# Patient Record
Sex: FEMALE | Race: WHITE | Employment: OTHER | URBAN - METROPOLITAN AREA
[De-identification: names, ages, dates, MRNs, and addresses within clinical notes are randomized per-mention and may not be internally consistent; named-entity substitution may affect disease eponyms.]

---

## 2017-01-03 ENCOUNTER — HOSPITAL ENCOUNTER (OUTPATIENT)
Dept: LAB | Age: 77
Discharge: HOME | End: 2017-01-03
Payer: MEDICARE

## 2017-01-03 PROCEDURE — 80053 COMPREHEN METABOLIC PANEL: CPT

## 2017-01-03 PROCEDURE — 80061 LIPID PANEL: CPT

## 2017-01-03 PROCEDURE — 82306 VITAMIN D 25 HYDROXY: CPT

## 2017-01-06 ENCOUNTER — OFFICE VISIT (OUTPATIENT)
Dept: INTERNAL MEDICINE CLINIC | Age: 77
End: 2017-01-06

## 2017-01-06 VITALS
WEIGHT: 219.6 LBS | SYSTOLIC BLOOD PRESSURE: 135 MMHG | DIASTOLIC BLOOD PRESSURE: 59 MMHG | HEIGHT: 66 IN | OXYGEN SATURATION: 97 % | HEART RATE: 79 BPM | TEMPERATURE: 98 F | BODY MASS INDEX: 35.29 KG/M2

## 2017-01-06 DIAGNOSIS — E08.43 DIABETIC AUTONOMIC NEUROPATHY ASSOCIATED WITH DIABETES MELLITUS DUE TO UNDERLYING CONDITION (HCC): ICD-10-CM

## 2017-01-06 DIAGNOSIS — E78.5 HYPERLIPIDEMIA, UNSPECIFIED HYPERLIPIDEMIA TYPE: ICD-10-CM

## 2017-01-06 DIAGNOSIS — I25.10 CORONARY ARTERY DISEASE INVOLVING NATIVE CORONARY ARTERY OF NATIVE HEART WITHOUT ANGINA PECTORIS: ICD-10-CM

## 2017-01-06 DIAGNOSIS — E08.3599: ICD-10-CM

## 2017-01-06 DIAGNOSIS — N18.30 CKD (CHRONIC KIDNEY DISEASE) STAGE 3, GFR 30-59 ML/MIN (HCC): ICD-10-CM

## 2017-01-06 DIAGNOSIS — Z79.4 TYPE 2 DIABETES MELLITUS WITH COMPLICATION, WITH LONG-TERM CURRENT USE OF INSULIN (HCC): Primary | ICD-10-CM

## 2017-01-06 DIAGNOSIS — M70.61 TROCHANTERIC BURSITIS, RIGHT HIP: ICD-10-CM

## 2017-01-06 DIAGNOSIS — E11.8 TYPE 2 DIABETES MELLITUS WITH COMPLICATION, WITH LONG-TERM CURRENT USE OF INSULIN (HCC): Primary | ICD-10-CM

## 2017-01-06 DIAGNOSIS — I10 ESSENTIAL HYPERTENSION: ICD-10-CM

## 2017-01-06 DIAGNOSIS — E55.9 VITAMIN D DEFICIENCY: ICD-10-CM

## 2017-01-06 RX ORDER — GABAPENTIN 300 MG/1
300 CAPSULE ORAL 3 TIMES DAILY
Qty: 90 CAP | Refills: 5 | Status: SHIPPED | OUTPATIENT
Start: 2017-01-06 | End: 2017-03-20 | Stop reason: SDUPTHER

## 2017-01-06 RX ORDER — ROSUVASTATIN CALCIUM 20 MG/1
20 TABLET, COATED ORAL
Qty: 90 TAB | Refills: 3 | Status: SHIPPED | OUTPATIENT
Start: 2017-01-06 | End: 2017-12-23 | Stop reason: SDUPTHER

## 2017-01-06 RX ORDER — NAPROXEN SODIUM 220 MG
220 TABLET ORAL 2 TIMES DAILY WITH MEALS
Qty: 30 TAB | Refills: 1 | Status: SHIPPED | OUTPATIENT
Start: 2017-01-06 | End: 2018-02-14

## 2017-01-06 NOTE — MR AVS SNAPSHOT
Visit Information Date & Time Provider Department Dept. Phone Encounter #  
 1/6/2017 11:30 AM Hieu Mcclain, [address] [place] [address] and Internal Medicine [phone] [id number] Follow-up Instructions Return in about 3 months (around 4/6/2017), or if symptoms worsen or fail to improve, for diabetes, neuropathy. Upcoming Health Maintenance Date Due DTaP/Tdap/Td series (1 - Tdap) 3/2/1960 ZOSTER VACCINE AGE 60> 3/2/1999 EYE EXAM RETINAL OR DILATED Q1 1/14/2017 MEDICARE YEARLY EXAM 3/12/2017 MICROALBUMIN Q1 3/21/2017 HEMOGLOBIN A1C Q6M 6/20/2017 FOOT EXAM Q1 12/20/2017 LIPID PANEL Q1 1/3/2018 GLAUCOMA SCREENING Q2Y 1/14/2018 Allergies as of 1/6/2017  Review Complete On: 1/6/2017 By: Hieu Mcclain MD  
  
 Severity Noted Reaction Type Reactions Ibuprofen  03/05/2010    Unknown (comments) Norvasc [Amlodipine]  04/14/2015    Other (comments) LE swelling Current Immunizations  Reviewed on 3/11/2016 Name Date Influenza Vaccine (Quad) PF 12/16/2014 Pneumococcal Conjugate (PCV-13) 3/11/2016 Pneumococcal Polysaccharide (PPSV-23) 12/16/2014 Not reviewed this visit You Were Diagnosed With   
  
 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 
ICD-9-CM: 401.9 CKD (chronic kidney disease) stage 3, GFR 30-59 ml/min     ICD-10-CM: N18.3 ICD-9-CM: 510. 3 Hyperlipidemia, unspecified hyperlipidemia type     ICD-10-CM: E78.5 ICD-9-CM: 272.4 Vitamin D deficiency     ICD-10-CM: E55.9 ICD-9-CM: 268.9 Trochanteric bursitis, right hip     ICD-10-CM: M70.61 ICD-9-CM: 726.5 Diabetic autonomic neuropathy associated with diabetes mellitus due to underlying condition ([facility]     ICD-10-CM: I85.56 
ICD-9-CM: 249.60, 337.1 Vitals BP Pulse Temp Height(growth percentile) Weight(growth percentile) SpO2  
 135/59 (BP 1 Location: Left arm, BP Patient Position: Sitting) 79 98 °F (36.7 °C) (Oral) 5' 6\" (1.676 m) 219 lb 9.6 oz (99.6 kg) 97% BMI OB Status Smoking Status 35.44 kg/m2 Postmenopausal Never Smoker BMI and BSA Data Body Mass Index Body Surface Area  
 35.44 kg/m 2 2.15 m 2 Preferred Pharmacy Pharmacy Name Phone [facility]/PHARMACY #[id number] [address] [phone] Your Updated Medication List  
  
   
This list is accurate as of: 1/6/17 12:57 PM.  Always use your most recent med list.  
  
  
  
  
 aspirin delayed-release 81 mg tablet Take 81 mg by mouth daily. atenolol 50 mg tablet Commonly known as:  TENORMIN Take 50 mg by mouth daily. BD INSULIN PEN NEEDLE UF SHORT 31 gauge x 5/16\" Ndle Generic drug:  Insulin Needles (Disposable) USE TO TEST BLOOD SUGAR 3 TIMES A DAY AS DIRECTED  
  
 esomeprazole 20 mg capsule Commonly known as:  NexIUM Take 1 Cap by mouth daily. gabapentin 300 mg capsule Commonly known as:  NEURONTIN Take 1 Cap by mouth three (3) times daily. Start 1 tab qhs x 1 week, then 1 bid x 1 week then 1 tid  
  
 glucose blood VI test strips strip Commonly known as:  ONE TOUCH TEST  
1 Each by Does Not Apply route. Pt checks blood sugar 3 times a day. hydroCHLOROthiazide 25 mg tablet Commonly known as:  HYDRODIURIL Take 1 Tab by mouth daily. Insulin Lisp & Lisp Prot (Hum) 100 unit/mL (75-25) Inpn Commonly known as:  HumaLOG Mix 75-25 KwikPen 32 units with breakfast and 30 units with dinner  Indications: TYPE 2 DIABETES MELLITUS  
  
 lisinopril 20 mg tablet Commonly known as:  Colette Shames Take 1 Tab by mouth daily. metFORMIN  mg tablet Commonly known as:  GLUCOPHAGE XR Take 2 Tabs by mouth daily (with dinner). naproxen sodium 220 mg tablet Commonly known as:  Abbe Deed Take 1 Tab by mouth two (2) times daily (with meals). X 1 week as directed  
  
 polyethylene glycol 17 gram/dose powder Commonly known as:  MIRALAX  
USE 1 TABLESPOONFUL AS DIRECTED BY MOUTH EVERY DAY  
  
 rosuvastatin 20 mg tablet Commonly known as:  CRESTOR Take 1 Tab by mouth nightly. SITagliptin 100 mg tablet Commonly known as:  Carolina Homans Take 1 Tab by mouth daily. VITAMIN D2 50,000 unit capsule Generic drug:  ergocalciferol TAKE ONE CAPSULE BY MOUTH EVERY 7 DAYS Prescriptions Sent to Pharmacy Refills  
 rosuvastatin (CRESTOR) 20 mg tablet 3 Sig: Take 1 Tab by mouth nightly. Class: Normal  
 Pharmacy: [facility]/pharmacy #[id number] Ph #: [phone] Route: Oral  
 naproxen sodium (ALEVE) 220 mg tablet 1 Sig: Take 1 Tab by mouth two (2) times daily (with meals). X 1 week as directed Class: Normal  
 Pharmacy: [facility]/pharmacy #[facility] Ph #: [phone] Route: Oral  
 gabapentin (NEURONTIN) 300 mg capsule 5 Sig: Take 1 Cap by mouth three (3) times daily. Start 1 tab qhs x 1 week, then 1 bid x 1 week then 1 tid Class: Normal  
 Pharmacy: [facility]/pharmacy #[id number] [place] [address] Ph #: [phone] Route: Oral  
  
Follow-up Instructions Return in about 3 months (around 4/6/2017), or if symptoms worsen or fail to improve, for diabetes, neuropathy. Introducing [facility]! Larissa Jackman introduces ehealthtracker patient portal. Now you can access parts of your medical record, email your doctor's office, and request medication refills online. 1. In your internet browser, go to [email or web address] 2. Click on the First Time User? Click Here link in the Sign In box. You will see the New Member Sign Up page. 3. Enter your ehealthtracker Access Code exactly as it appears below. You will not need to use this code after youve completed the sign-up process. If you do not sign up before the expiration date, you must request a new code. · ehealthtracker Access Code: [email or web address] Expires: 3/20/2017  9:28 AM 
 
4. Enter the last four digits of your Social Security Number (xxxx) and Date of Birth (mm/dd/yyyy) as indicated and click Submit. You will be taken to the next sign-up page. 5. Create a ehealthtracker ID. This will be your ehealthtracker login ID and cannot be changed, so think of one that is secure and easy to remember. 6. Create a ehealthtracker password. You can change your password at any time. 7. Enter your Password Reset Question and Answer. This can be used at a later time if you forget your password. 8. Enter your e-mail address. You will receive e-mail notification when new information is available in [address] 9. Click Sign Up. You can now view and download portions of your medical record. 10. Click the Download Summary menu link to download a portable copy of your medical information. If you have questions, please visit the Frequently Asked Questions section of the ehealthtracker website. Remember, ehealthtracker is NOT to be used for urgent needs. For medical emergencies, dial 911. Now available from your iPhone and Android! Please provide this summary of care documentation to your next provider. Your primary care clinician is listed as [address] If you have any questions after today's visit, please call [phone].

## 2017-01-06 NOTE — PROGRESS NOTES
HISTORY OF PRESENT ILLNESS  Sofi Mayberry is a 67 y.o. female. HPI  Presents for f/u leg pain, DM, HTN  Daughter translates by pt preference    Right leg pain - radiates from hip to foot  No inciting event  Cannot sleep or lie on that time    +daily walking    Daughter reports she tolerates prn ibuprofen fine in recent months    Past medical, Social, and Family history reviewed  Medications reviewed and updated. ROS  Complete ROS reviewed and negative or stable except as noted in HPI. Physical Exam   Constitutional: She is oriented to person, place, and time. She appears well-nourished. No distress. HENT:   Head: Normocephalic and atraumatic. Mouth/Throat: Oropharynx is clear and moist.   Eyes: EOM are normal. Pupils are equal, round, and reactive to light. No scleral icterus. Neck: Normal range of motion. Neck supple. No JVD present. Cardiovascular: Normal rate, regular rhythm and normal heart sounds. Exam reveals no gallop and no friction rub. No murmur heard. Pulmonary/Chest: Effort normal and breath sounds normal. No respiratory distress. She has no wheezes. She has no rales. Abdominal: Soft. She exhibits no distension. There is no tenderness. Musculoskeletal: Normal range of motion. She exhibits edema (trace ) and tenderness (right trochanteric bursal area). Also, skin sensitivity to lower leg with minimal palpation   Lymphadenopathy:     She has no cervical adenopathy. Neurological: She is alert and oriented to person, place, and time. She exhibits normal muscle tone. Skin: Skin is warm. No rash noted. Psychiatric: She has a normal mood and affect. Nursing note and vitals reviewed. Prior labs reviewed. ASSESSMENT and PLAN    ICD-10-CM ICD-9-CM    1. Type 2 diabetes mellitus with complication, with long-term current use of insulin ([facility]) E11.8 250.90     Z79.4 V58.67    2.  Proliferative diabetic retinopathy without macular edema associated with diabetes mellitus due to underlying condition ([facility] V10.7000 249.50      362.02    3. Coronary artery disease involving native coronary artery of native heart without angina pectoris I25.10 414.01    4. Essential hypertension I10 401.9    5. CKD (chronic kidney disease) stage 3, GFR 30-59 ml/min N18.3 585.3    6. Hyperlipidemia, unspecified hyperlipidemia type E78.5 272.4 rosuvastatin (CRESTOR) 20 mg tablet   7. Vitamin D deficiency E55.9 268.9    8. Trochanteric bursitis, right hip M70.61 726.5 naproxen sodium (ALEVE) 220 mg tablet   9. Diabetic autonomic neuropathy associated with diabetes mellitus due to underlying condition ([facility]) E08.43 249.60 gabapentin (NEURONTIN) 300 mg capsule     337.1      Follow-up Disposition:  Return in about 3 months (around 4/6/2017), or if symptoms worsen or fail to improve, for diabetes, neuropathy. results and schedule of future  studies reviewed with patient, daughter  reviewed diet, exercise and weight    cardiovascular risk and specific lipid/LDL goals reviewed  reviewed medications and side effects in detail   Add gabapentin  Defer trochanteric bursal injection  Scheduled NSAID x 1 week instead  Continue other care.

## 2017-01-06 NOTE — PROGRESS NOTES
Chief Complaint   Patient presents with    Labs     discuss lab results    Other     discomfor tof right hip radiating down to her leg   1. Have you been to the ER, urgent care clinic since your last visit? Hospitalized since your last visit? No    2. Have you seen or consulted any other health care providers outside of the [address] [place] [address] since your last visit? Include any pap smears or colon screening.  No

## 2017-01-16 ENCOUNTER — APPOINTMENT (OUTPATIENT)
Dept: CT IMAGING | Age: 77
End: 2017-01-16
Attending: EMERGENCY MEDICINE
Payer: MEDICARE

## 2017-01-16 ENCOUNTER — HOSPITAL ENCOUNTER (EMERGENCY)
Age: 77
Discharge: HOME | End: 2017-01-16
Attending: EMERGENCY MEDICINE | Admitting: EMERGENCY MEDICINE
Payer: MEDICARE

## 2017-01-16 VITALS
WEIGHT: 218.4 LBS | RESPIRATION RATE: 16 BRPM | HEIGHT: 66 IN | HEART RATE: 62 BPM | OXYGEN SATURATION: 97 % | TEMPERATURE: 97.7 F | BODY MASS INDEX: 35.1 KG/M2 | SYSTOLIC BLOOD PRESSURE: 166 MMHG | DIASTOLIC BLOOD PRESSURE: 56 MMHG

## 2017-01-16 DIAGNOSIS — R19.7 DIARRHEA, UNSPECIFIED TYPE: ICD-10-CM

## 2017-01-16 DIAGNOSIS — R11.2 NON-INTRACTABLE VOMITING WITH NAUSEA, UNSPECIFIED VOMITING TYPE: Primary | ICD-10-CM

## 2017-01-16 LAB
ALBUMIN SERPL BCP-MCNC: 3.6 G/DL (ref 3.5–5)
ALBUMIN/GLOB SERPL: 1.2 {RATIO} (ref 1.1–2.2)
ALP SERPL-CCNC: 57 U/L (ref 45–117)
ALT SERPL-CCNC: 34 U/L (ref 12–78)
ANION GAP BLD CALC-SCNC: 7 MMOL/L (ref 5–15)
APPEARANCE UR: ABNORMAL
AST SERPL W P-5'-P-CCNC: 17 U/L (ref 15–37)
BACTERIA URNS QL MICRO: NEGATIVE /HPF
BASOPHILS # BLD AUTO: 0 K/UL (ref 0–0.1)
BASOPHILS # BLD: 0 % (ref 0–1)
BILIRUB SERPL-MCNC: 0.3 MG/DL (ref 0.2–1)
BILIRUB UR QL: NEGATIVE
BUN SERPL-MCNC: 29 MG/DL (ref 6–20)
BUN/CREAT SERPL: 24 (ref 12–20)
CALCIUM SERPL-MCNC: 8.8 MG/DL (ref 8.5–10.1)
CHLORIDE SERPL-SCNC: 105 MMOL/L (ref 97–108)
CO2 SERPL-SCNC: 27 MMOL/L (ref 21–32)
COLOR UR: ABNORMAL
CREAT SERPL-MCNC: 1.19 MG/DL (ref 0.55–1.02)
EOSINOPHIL # BLD: 0.2 K/UL (ref 0–0.4)
EOSINOPHIL NFR BLD: 3 % (ref 0–7)
EPITH CASTS URNS QL MICRO: ABNORMAL /LPF
ERYTHROCYTE [DISTWIDTH] IN BLOOD BY AUTOMATED COUNT: 12.8 % (ref 11.5–14.5)
GLOBULIN SER CALC-MCNC: 3.1 G/DL (ref 2–4)
GLUCOSE SERPL-MCNC: 173 MG/DL (ref 65–100)
GLUCOSE UR STRIP.AUTO-MCNC: NEGATIVE MG/DL
HCT VFR BLD AUTO: 33.7 % (ref 35–47)
HGB BLD-MCNC: 11 G/DL (ref 11.5–16)
HGB UR QL STRIP: NEGATIVE
HYALINE CASTS URNS QL MICRO: ABNORMAL /LPF (ref 0–5)
KETONES UR QL STRIP.AUTO: NEGATIVE MG/DL
LEUKOCYTE ESTERASE UR QL STRIP.AUTO: ABNORMAL
LIPASE SERPL-CCNC: 126 U/L (ref 73–393)
LYMPHOCYTES # BLD AUTO: 44 % (ref 12–49)
LYMPHOCYTES # BLD: 3 K/UL (ref 0.8–3.5)
MCH RBC QN AUTO: 30.6 PG (ref 26–34)
MCHC RBC AUTO-ENTMCNC: 32.6 G/DL (ref 30–36.5)
MCV RBC AUTO: 93.9 FL (ref 80–99)
MONOCYTES # BLD: 0.5 K/UL (ref 0–1)
MONOCYTES NFR BLD AUTO: 7 % (ref 5–13)
NEUTS SEG # BLD: 3.1 K/UL (ref 1.8–8)
NEUTS SEG NFR BLD AUTO: 46 % (ref 32–75)
NITRITE UR QL STRIP.AUTO: NEGATIVE
PH UR STRIP: 7 [PH] (ref 5–8)
PLATELET # BLD AUTO: 143 K/UL (ref 150–400)
POTASSIUM SERPL-SCNC: 4.2 MMOL/L (ref 3.5–5.1)
PROT SERPL-MCNC: 6.7 G/DL (ref 6.4–8.2)
PROT UR STRIP-MCNC: NEGATIVE MG/DL
RBC # BLD AUTO: 3.59 M/UL (ref 3.8–5.2)
RBC #/AREA URNS HPF: ABNORMAL /HPF (ref 0–5)
SODIUM SERPL-SCNC: 139 MMOL/L (ref 136–145)
SP GR UR REFRACTOMETRY: 1.01 (ref 1–1.03)
UROBILINOGEN UR QL STRIP.AUTO: 0.2 EU/DL (ref 0.2–1)
WBC # BLD AUTO: 6.8 K/UL (ref 3.6–11)
WBC URNS QL MICRO: ABNORMAL /HPF (ref 0–4)

## 2017-01-16 PROCEDURE — 74011636320 HC RX REV CODE- 636/320: Performed by: EMERGENCY MEDICINE

## 2017-01-16 PROCEDURE — 85025 COMPLETE CBC W/AUTO DIFF WBC: CPT | Performed by: EMERGENCY MEDICINE

## 2017-01-16 PROCEDURE — 74011000258 HC RX REV CODE- 258: Performed by: EMERGENCY MEDICINE

## 2017-01-16 PROCEDURE — 74177 CT ABD & PELVIS W/CONTRAST: CPT

## 2017-01-16 PROCEDURE — 36415 COLL VENOUS BLD VENIPUNCTURE: CPT | Performed by: EMERGENCY MEDICINE

## 2017-01-16 PROCEDURE — 87147 CULTURE TYPE IMMUNOLOGIC: CPT | Performed by: EMERGENCY MEDICINE

## 2017-01-16 PROCEDURE — 74011250636 HC RX REV CODE- 250/636: Performed by: EMERGENCY MEDICINE

## 2017-01-16 PROCEDURE — 96361 HYDRATE IV INFUSION ADD-ON: CPT

## 2017-01-16 PROCEDURE — 99284 EMERGENCY DEPT VISIT MOD MDM: CPT

## 2017-01-16 PROCEDURE — 96374 THER/PROPH/DIAG INJ IV PUSH: CPT

## 2017-01-16 PROCEDURE — 80053 COMPREHEN METABOLIC PANEL: CPT | Performed by: EMERGENCY MEDICINE

## 2017-01-16 PROCEDURE — 83690 ASSAY OF LIPASE: CPT | Performed by: EMERGENCY MEDICINE

## 2017-01-16 PROCEDURE — 87086 URINE CULTURE/COLONY COUNT: CPT | Performed by: EMERGENCY MEDICINE

## 2017-01-16 PROCEDURE — 81001 URINALYSIS AUTO W/SCOPE: CPT | Performed by: EMERGENCY MEDICINE

## 2017-01-16 RX ORDER — ONDANSETRON 4 MG/1
4 TABLET, ORALLY DISINTEGRATING ORAL
Qty: 9 TAB | Refills: 0 | Status: SHIPPED | OUTPATIENT
Start: 2017-01-16 | End: 2017-03-20 | Stop reason: ALTCHOICE

## 2017-01-16 RX ORDER — ONDANSETRON 2 MG/ML
4 INJECTION INTRAMUSCULAR; INTRAVENOUS ONCE
Status: COMPLETED | OUTPATIENT
Start: 2017-01-16 | End: 2017-01-16

## 2017-01-16 RX ORDER — SODIUM CHLORIDE 0.9 % (FLUSH) 0.9 %
10 SYRINGE (ML) INJECTION
Status: COMPLETED | OUTPATIENT
Start: 2017-01-16 | End: 2017-01-16

## 2017-01-16 RX ADMIN — SODIUM CHLORIDE 1000 ML: 900 INJECTION, SOLUTION INTRAVENOUS at 21:41

## 2017-01-16 RX ADMIN — SODIUM CHLORIDE 100 ML: 900 INJECTION, SOLUTION INTRAVENOUS at 21:25

## 2017-01-16 RX ADMIN — IOPAMIDOL 100 ML: 755 INJECTION, SOLUTION INTRAVENOUS at 21:25

## 2017-01-16 RX ADMIN — Medication 10 ML: at 21:25

## 2017-01-16 RX ADMIN — ONDANSETRON 4 MG: 2 INJECTION INTRAMUSCULAR; INTRAVENOUS at 21:41

## 2017-01-16 NOTE — ED TRIAGE NOTES
Pt arrives from home c/o upper abd pain with N/V/D. Pt states that she is unable to keep anything down and is worried because she is diabetic.

## 2017-01-17 NOTE — DISCHARGE INSTRUCTIONS
We hope that we have addressed all of your medical concerns. The examination and treatment you received in the Emergency Department were for an emergent problem and were not intended as complete care. It is important that you follow up with your healthcare provider(s) for ongoing care. If your symptoms worsen or do not improve as expected, and you are unable to reach your usual health care provider(s), you should return to the Emergency Department. Today's healthcare is undergoing tremendous change, and patient satisfaction surveys are one of the many tools to assess the quality of medical care. You may receive a survey from the [employer] regarding your experience in the Emergency Department. I hope that your experience has been completely positive, particularly the medical care that I provided. As such, please participate in the survey; anything less than excellent does not meet my expectations or intentions. [facility] and 8 [facility] participate in nationally recognized quality of care measures. If your blood pressure is greater than 120/80, as reported below, we urge that you seek medical care to address the potential of high blood pressure, commonly known as hypertension. Hypertension can be hereditary or can be caused by certain medical conditions, pain, stress, or \"white coat syndrome. \"       Please make an appointment with your health care provider(s) for follow up of your Emergency Department visit. VITALS:   Patient Vitals for the past 8 hrs:   Temp Pulse Resp BP SpO2   01/16/17 2200 - 62 16 160/57 97 %   01/16/17 2143 97.8 °F (36.6 °C) 64 18 - 98 %   01/16/17 2115 - - - 157/60 98 %   01/16/17 2045 - (!) 59 20 164/59 99 %   01/16/17 1732 97.7 °F (36.5 °C) 69 20 168/68 97 %          Thank you for allowing us to provide you with medical care today. We realize that you have many choices for your emergency care needs.   Please choose us in the future for any continued health care needs. Erin Armand Perryangelica Valdeziban, [facility].   Office: [phone]            Recent Results (from the past 24 hour(s))   CBC WITH AUTOMATED DIFF    Collection Time: 01/16/17  6:18 PM   Result Value Ref Range    WBC 6.8 3.6 - 11.0 K/uL    RBC 3.59 (L) 3.80 - 5.20 M/uL    HGB 11.0 (L) 11.5 - 16.0 g/dL    HCT 33.7 (L) 35.0 - 47.0 %    MCV 93.9 80.0 - 99.0 FL    MCH 30.6 26.0 - 34.0 PG    MCHC 32.6 30.0 - 36.5 g/dL    RDW 12.8 11.5 - 14.5 %    PLATELET 624 (L) 019 - 400 K/uL    NEUTROPHILS 46 32 - 75 %    LYMPHOCYTES 44 12 - 49 %    MONOCYTES 7 5 - 13 %    EOSINOPHILS 3 0 - 7 %    BASOPHILS 0 0 - 1 %    ABS. NEUTROPHILS 3.1 1.8 - 8.0 K/UL    ABS. LYMPHOCYTES 3.0 0.8 - 3.5 K/UL    ABS. MONOCYTES 0.5 0.0 - 1.0 K/UL    ABS. EOSINOPHILS 0.2 0.0 - 0.4 K/UL    ABS. BASOPHILS 0.0 0.0 - 0.1 K/UL   METABOLIC PANEL, COMPREHENSIVE    Collection Time: 01/16/17  6:18 PM   Result Value Ref Range    Sodium 139 136 - 145 mmol/L    Potassium 4.2 3.5 - 5.1 mmol/L    Chloride 105 97 - 108 mmol/L    CO2 27 21 - 32 mmol/L    Anion gap 7 5 - 15 mmol/L    Glucose 173 (H) 65 - 100 mg/dL    BUN 29 (H) 6 - 20 MG/DL    Creatinine 1.19 (H) 0.55 - 1.02 MG/DL    BUN/Creatinine ratio 24 (H) 12 - 20      GFR est AA 53 (L) >60 ml/min/1.73m2    GFR est non-AA 44 (L) >60 ml/min/1.73m2    Calcium 8.8 8.5 - 10.1 MG/DL    Bilirubin, total 0.3 0.2 - 1.0 MG/DL    ALT 34 12 - 78 U/L    AST 17 15 - 37 U/L    Alk.  phosphatase 57 45 - 117 U/L    Protein, total 6.7 6.4 - 8.2 g/dL    Albumin 3.6 3.5 - 5.0 g/dL    Globulin 3.1 2.0 - 4.0 g/dL    A-G Ratio 1.2 1.1 - 2.2     LIPASE    Collection Time: 01/16/17  6:18 PM   Result Value Ref Range    Lipase 126 73 - 393 U/L   URINALYSIS W/MICROSCOPIC    Collection Time: 01/16/17 10:39 PM   Result Value Ref Range    Color YELLOW/STRAW      Appearance CLOUDY (A) CLEAR      Specific gravity 1.015 1.003 - 1.030      pH (UA) 7.0 5.0 - 8.0      Protein NEGATIVE  NEG mg/dL    Glucose NEGATIVE  NEG mg/dL    Ketone NEGATIVE  NEG mg/dL    Bilirubin NEGATIVE  NEG      Blood NEGATIVE  NEG      Urobilinogen 0.2 0.2 - 1.0 EU/dL    Nitrites NEGATIVE  NEG      Leukocyte Esterase SMALL (A) NEG      WBC 5-10 0 - 4 /hpf    RBC 0-5 0 - 5 /hpf    Epithelial cells FEW FEW /lpf    Bacteria NEGATIVE  NEG /hpf    Hyaline Cast 0-2 0 - 5 /lpf       Ct Abd Pelv W Cont    Result Date: 1/16/2017  INDICATION: Upper abdominal pain, nausea, and vomiting. Cholecystectomy. COMPARISON: CT abdomen/pelvis on 3/10/2014. TECHNIQUE: Following the uneventful intravenous administration of 98 cc Isovue-370, thin axial images were obtained through the abdomen and pelvis. Coronal and sagittal reconstructions were generated. Oral contrast was not administered. CT dose reduction was achieved through use of a standardized protocol tailored for this examination and automatic exposure control for dose modulation. Metformin instructions were given to the patient. Adaptive statistical iterative reconstruction (ASIR) was utilized. FINDINGS: LUNG BASES: Clear. INCIDENTALLY IMAGED HEART AND MEDIASTINUM: Unremarkable. LIVER: No mass or biliary dilatation. GALLBLADDER: Surgically resected. SPLEEN: No mass. PANCREAS: No mass or ductal dilatation. ADRENALS: Bilateral hypertrophy is unchanged. KIDNEYS: No mass, calculus, or hydronephrosis. STOMACH: Nondistended. SMALL BOWEL: No dilatation or wall thickening. COLON: No dilatation or wall thickening. APPENDIX: Small versus surgically resected. PERITONEUM: No ascites or pneumoperitoneum. RETROPERITONEUM: Aorta is atherosclerotic without aneurysm. REPRODUCTIVE ORGANS: Uterus is not enlarged. No adnexal mass. URINARY BLADDER: No mass or calculus. BONES: No destructive bone lesion. ADDITIONAL COMMENTS: N/A     IMPRESSION: No acute process on CT.               Diarrhea: Care Instructions  Your Care Instructions    Diarrhea is loose, watery stools (bowel movements). The exact cause is often hard to find. Sometimes diarrhea is your body's way of getting rid of what caused an upset stomach. Viruses, food poisoning, and many medicines can cause diarrhea. Some people get diarrhea in response to emotional stress, anxiety, or certain foods. Almost everyone has diarrhea now and then. It usually isn't serious, and your stools will return to normal soon. The important thing to do is replace the fluids you have lost, so you can prevent dehydration. The doctor has checked you carefully, but problems can develop later. If you notice any problems or new symptoms, get medical treatment right away. Follow-up care is a key part of your treatment and safety. Be sure to make and go to all appointments, and call your doctor if you are having problems. It's also a good idea to know your test results and keep a list of the medicines you take. How can you care for yourself at home? · Watch for signs of dehydration, which means your body has lost too much water. Dehydration is a serious condition and should be treated right away. Signs of dehydration are:  ¨ Increasing thirst and dry eyes and mouth. ¨ Feeling faint or lightheaded. ¨ Darker urine, and a smaller amount of urine than normal.  · To prevent dehydration, drink plenty of fluids, enough so that your urine is light yellow or clear like water. Choose water and other caffeine-free clear liquids until you feel better. If you have kidney, heart, or liver disease and have to limit fluids, talk with your doctor before you increase the amount of fluids you drink. · Begin eating small amounts of mild foods the next day, if you feel like it. ¨ Try yogurt that has live cultures of Lactobacillus. (Check the label.)  ¨ Avoid spicy foods, fruits, alcohol, and caffeine until 48 hours after all symptoms are gone. ¨ Avoid chewing gum that contains sorbitol.   ¨ Avoid dairy products (except for yogurt with Lactobacillus) while you have diarrhea and for 3 days after symptoms are gone. · The doctor may recommend that you take over-the-counter medicine, such as loperamide (Imodium), if you still have diarrhea after 6 hours. Read and follow all instructions on the label. Do not use this medicine if you have bloody diarrhea, a high fever, or other signs of serious illness. Call your doctor if you think you are having a problem with your medicine. When should you call for help? Call 911 anytime you think you may need emergency care. For example, call if:  · You passed out (lost consciousness). · Your stools are maroon or very bloody. Call your doctor now or seek immediate medical care if:  · You are dizzy or lightheaded, or you feel like you may faint. · Your stools are black and look like tar, or they have streaks of blood. · You have new or worse belly pain. · You have symptoms of dehydration, such as:  ¨ Dry eyes and a dry mouth. ¨ Passing only a little dark urine. ¨ Feeling thirstier than usual.  · You have a new or higher fever. Watch closely for changes in your health, and be sure to contact your doctor if:  · Your diarrhea is getting worse. · You see pus in the diarrhea. · You are not getting better after 2 days (48 hours). Where can you learn more? Go to http://desi-mono.info/. Enter V398 in the search box to learn more about \"Diarrhea: Care Instructions. \"  Current as of: May 27, 2016  Content Version: 11.1  © 9526-5911 [employer]. Care instructions adapted under license by [employer] (which disclaims liability or warranty for this information). If you have questions about a medical condition or this instruction, always ask your healthcare professional. [place] [address] any warranty or liability for your use of this information.          Nausea and Vomiting: Care Instructions  Your Care Instructions    When you are nauseated, you may feel weak and sweaty and notice a lot of saliva in your mouth. Nausea often leads to vomiting. Most of the time you do not need to worry about nausea and vomiting, but they can be signs of other illnesses. Two common causes of nausea and vomiting are stomach flu and food poisoning. Nausea and vomiting from viral stomach flu will usually start to improve within 24 hours. Nausea and vomiting from food poisoning may last from 12 to 48 hours. The doctor has checked you carefully, but problems can develop later. If you notice any problems or new symptoms, get medical treatment right away. Follow-up care is a key part of your treatment and safety. Be sure to make and go to all appointments, and call your doctor if you are having problems. It's also a good idea to know your test results and keep a list of the medicines you take. How can you care for yourself at home? · To prevent dehydration, drink plenty of fluids, enough so that your urine is light yellow or clear like water. Choose water and other caffeine-free clear liquids until you feel better. If you have kidney, heart, or liver disease and have to limit fluids, talk with your doctor before you increase the amount of fluids you drink. · Rest in bed until you feel better. · When you are able to eat, try clear soups, mild foods, and liquids until all symptoms are gone for 12 to 48 hours. Other good choices include dry toast, crackers, cooked cereal, and gelatin dessert, such as Jell-O. When should you call for help? Call 911 anytime you think you may need emergency care. For example, call if:  · You passed out (lost consciousness). Call your doctor now or seek immediate medical care if:  · You have symptoms of dehydration, such as:  ¨ Dry eyes and a dry mouth. ¨ Passing only a little dark urine. ¨ Feeling thirstier than usual.  · You have new or worsening belly pain. · You have a new or higher fever. · You vomit blood or what looks like coffee grounds.   Watch closely for changes in your health, and be sure to contact your doctor if:  · You have ongoing nausea and vomiting. · Your vomiting is getting worse. · Your vomiting lasts longer than 2 days. · You are not getting better as expected. Where can you learn more? Go to http://desi-mono.info/. Enter 25 [id number] in the search box to learn more about \"Nausea and Vomiting: Care Instructions. \"  Current as of: May 27, 2016  Content Version: 11.1  © 4751-7031 [employer]. Care instructions adapted under license by [employer] (which disclaims liability or warranty for this information). If you have questions about a medical condition or this instruction, always ask your healthcare professional. [address] any warranty or liability for your use of this information.

## 2017-01-17 NOTE — ED PROVIDER NOTES
HPI Comments: 71-year-old female presents emergency department for evaluation of nausea vomiting and diarrhea. Patient started with nausea vomiting and diarrhea today. Patient has had 4 episodes of nonbloody vomiting and 7 episodes of nonbloody diarrhea. Patient has associated upper abdominal cramping. No chest pain, shortness of breath difficulty breathing. No back pain. No dysuria. No headache, dizziness or lightheadedness. No known sick contacts. Patient has tried water and camomile tea with no relief. No alleviating factors at this time. Social hx  Nonsmoker  No alcohol    Patient is a 67 y.o. female presenting with vomiting. The history is provided by the patient. Vomiting    Associated symptoms include abdominal pain and diarrhea. Pertinent negatives include no chills, no fever, no headaches, no myalgias, no cough and no headaches. Past Medical History:   Diagnosis Date    CAD (coronary artery disease)      calcium score 229 - 2011, cath 4/2009 - [facility]    Cataract     CKD (chronic kidney disease) stage 3, GFR 30-59 ml/min     Diabetes ([address]     Diabetic neuropathy ([address]     Esophageal reflux 4/9/2015    Foot ulcer ([address]     Hypercholesterolemia     Hyperlipidemia 1/23/2015    Hypertension     Macular degeneration     Proliferative diabetic retinopathy ([address]     Rupture of ulnar collateral ligament of thumb     Urge incontinence     Vitreous hemorrhage of both eyes (HCC)        Past Surgical History:   Procedure Laterality Date    Hx cholecystectomy      Pr cardiac surg procedure unlist      Hx cataract removal Left          Family History:   Problem Relation Age of Onset    Family history unknown: Yes       Social History     Social History    Marital status: SINGLE     Spouse name: N/A    Number of children: N/A    Years of education: N/A     Occupational History    Not on file.      Social History Main Topics    Smoking status: Never Smoker    Smokeless tobacco: Never Used  Alcohol use No    Drug use: No    Sexual activity: No     Other Topics Concern    Not on file     Social History Narrative         ALLERGIES: Ibuprofen and Norvasc [amlodipine]    Review of Systems   Constitutional: Negative for chills and fever. HENT: Negative for congestion and rhinorrhea. Respiratory: Negative for cough and shortness of breath. Cardiovascular: Negative for chest pain and palpitations. Gastrointestinal: Positive for abdominal pain, diarrhea, nausea and vomiting. Negative for blood in stool. Genitourinary: Negative for dysuria, flank pain, frequency and urgency. Musculoskeletal: Negative for back pain, myalgias, neck pain and neck stiffness. Skin: Negative for rash and wound. Neurological: Negative for dizziness, weakness, numbness and headaches. All other systems reviewed and are negative. Vitals:    01/16/17 1732 01/16/17 2045 01/16/17 2115 01/16/17 2143   BP: 168/68 164/59 157/60    Pulse: 69 (!) 59  64   Resp: 20 20  18   Temp: 97.7 °F (36.5 °C)   97.8 °F (36.6 °C)   SpO2: 97% 99% 98% 98%   Weight: 99.1 kg (218 lb 6.4 oz)      Height: 5' 6\" (1.676 m)               Physical Exam   Constitutional: She is oriented to person, place, and time. She appears well-developed and well-nourished. No distress. HENT:   Head: Normocephalic and atraumatic. Right Ear: External ear normal.   Left Ear: External ear normal.   Eyes: Conjunctivae and EOM are normal. Pupils are equal, round, and reactive to light. Neck: Normal range of motion. Neck supple. Cardiovascular: Normal rate, regular rhythm and normal heart sounds. Pulmonary/Chest: Effort normal and breath sounds normal. No respiratory distress. She has no wheezes. Abdominal: Soft. Normal appearance and bowel sounds are normal. She exhibits no shifting dullness, no distension, no pulsatile liver, no abdominal bruit, no pulsatile midline mass and no mass.  There is no hepatosplenomegaly, splenomegaly or hepatomegaly. There is no tenderness. There is no rigidity, no rebound, no guarding, no CVA tenderness, no tenderness at McBurney's point and negative Mcnair's sign. Abdomen exposed for exam.  No pain with light or deep palpation. Soft, no peritoneal signs   Musculoskeletal: Normal range of motion. She exhibits no edema or tenderness. Neurological: She is alert and oriented to person, place, and time. She has normal reflexes. No cranial nerve deficit. Coordination normal.   Skin: Skin is warm and dry. No rash noted. No erythema. Psychiatric: She has a normal mood and affect. Her behavior is normal. Judgment and thought content normal.   Nursing note and vitals reviewed. MDM  Number of Diagnoses or Management Options  Diarrhea, unspecified type:   Non-intractable vomiting with nausea, unspecified vomiting type:   Diagnosis management comments: 50-year-old female presenting for nausea vomiting diarrhea. She is nontoxic appearing. She is afebrile. Abdomen is soft. Plan: CT, intravenous fluid, Zofran    11:39 PM  Patient is well hydrated, well appearing, and in no respiratory distress. Pt is feeling better. No nausea. No abdominal pain. Pt tolerating liquids. No diarrhea or vomiting while in ER. Physical exam is reassuring, and without signs of serious illness. Given the patient's history, clinical course, physical exam, and imaging findings, abdominal pain is unlikely secondary to a surgical etiology. Patient will be discharged home with pain control and follow-up with primary care physician in one to two days. Patient and caregivers were instructed on signs and symptoms of reasons to return including fever, worsening pain, vomiting, blood in the stool or any other concerns. Patient's results have been reviewed with them.   Patient and/or family have verbally conveyed their understanding and agreement of the patient's signs, symptoms, diagnosis, treatment and prognosis and additionally agree to follow up as recommended or return to the Emergency Room should their condition change prior to follow-up. Discharge instructions have also been provided to the patient with some educational information regarding their diagnosis as well a list of reasons why they would want to return to the ER prior to their follow-up appointment should their condition change. Amount and/or Complexity of Data Reviewed  Discuss the patient with other providers: yes (ER attending-Josephine)    Patient Progress  Patient progress: stable    ED Course       Procedures             Pt case including HPI, PE, and all available lab and radiology results has been discussed with attending physician. Opportunity to evaluate patient has been provided to ER attending. Discharge and prescription plan has been agreed upon.

## 2017-01-17 NOTE — ED NOTES
Assumed care of pt. Pt. For discharge. Denies any discomfort; states nausea has subsided. Discharge instructions given and reviewed with pt. Pt. Verbalized understanding and signed for discharge instructions.

## 2017-01-17 NOTE — ED NOTES
Pt A&O, speaks [place], orientation level assessed through visitor translating, pt respirations unlabored, skin warm and dry, not vomiting. No apparent distress.

## 2017-01-18 LAB
BACTERIA SPEC CULT: NORMAL
CC UR VC: NORMAL
SERVICE CMNT-IMP: NORMAL

## 2017-01-23 ENCOUNTER — TELEPHONE (OUTPATIENT)
Dept: INTERNAL MEDICINE CLINIC | Age: 77
End: 2017-01-23

## 2017-01-23 DIAGNOSIS — N18.30 CKD (CHRONIC KIDNEY DISEASE) STAGE 3, GFR 30-59 ML/MIN (HCC): ICD-10-CM

## 2017-01-23 DIAGNOSIS — Z79.4 TYPE 2 DIABETES MELLITUS WITH COMPLICATION, WITH LONG-TERM CURRENT USE OF INSULIN (HCC): Primary | ICD-10-CM

## 2017-01-23 DIAGNOSIS — E11.8 TYPE 2 DIABETES MELLITUS WITH COMPLICATION, WITH LONG-TERM CURRENT USE OF INSULIN (HCC): Primary | ICD-10-CM

## 2017-01-23 NOTE — TELEPHONE ENCOUNTER
The metformin should be held for 48-72 hours following the administration of IV contrast dye for a CT scan, but not the [place] and Chemult as long as they are in separate pills - as hers are. I can order the kidney function test, but it is not usually necessary. Since it has been >72 hours, she should resume both meds and may come in the get the kidney test just to be sure.

## 2017-01-23 NOTE — TELEPHONE ENCOUNTER
Pt's daughter Yessi Elias state's that the pt had a CT Scan on 1/16/17 at [facility] ER on her Kidney's. The ER doctor informed the pt's daughter Yessi Elias that she need's to discontinue her Januvia 100 mg and her Metformin 500 mg until she has labs drawn to see where her current kidney level's are. Please place order's to have these labs drawn. Please call Yessi Elias when labs have been placed so that she may make an appt.

## 2017-01-25 NOTE — TELEPHONE ENCOUNTER
Note from PCP reviewed with pt daughter. Pt has resumed both medications. Daughter will speak with mother and if mother desires to have labs drawn she will call back to schedule lab appt.

## 2017-01-31 ENCOUNTER — LAB ONLY (OUTPATIENT)
Dept: INTERNAL MEDICINE CLINIC | Age: 77
End: 2017-01-31

## 2017-01-31 ENCOUNTER — HOSPITAL ENCOUNTER (OUTPATIENT)
Dept: LAB | Age: 77
Discharge: HOME | End: 2017-01-31
Payer: MEDICARE

## 2017-01-31 PROCEDURE — 80048 BASIC METABOLIC PNL TOTAL CA: CPT

## 2017-02-01 LAB
BUN SERPL-MCNC: 25 MG/DL (ref 8–27)
BUN/CREAT SERPL: 24 (ref 11–26)
CALCIUM SERPL-MCNC: 9.4 MG/DL (ref 8.7–10.3)
CHLORIDE SERPL-SCNC: 106 MMOL/L (ref 96–106)
CO2 SERPL-SCNC: 27 MMOL/L (ref 18–29)
CREAT SERPL-MCNC: 1.06 MG/DL (ref 0.57–1)
GLUCOSE SERPL-MCNC: 129 MG/DL (ref 65–99)
POTASSIUM SERPL-SCNC: 5 MMOL/L (ref 3.5–5.2)
SODIUM SERPL-SCNC: 144 MMOL/L (ref 134–144)

## 2017-02-20 DIAGNOSIS — E55.9 VITAMIN D DEFICIENCY: ICD-10-CM

## 2017-02-20 RX ORDER — ERGOCALCIFEROL 1.25 MG/1
CAPSULE ORAL
Qty: 12 CAP | Refills: 1 | Status: SHIPPED | OUTPATIENT
Start: 2017-02-20 | End: 2017-05-22 | Stop reason: SDUPTHER

## 2017-02-20 RX ORDER — ERGOCALCIFEROL 1.25 MG/1
CAPSULE ORAL
Qty: 12 CAP | Refills: 1 | OUTPATIENT
Start: 2017-02-20

## 2017-03-17 DIAGNOSIS — I10 ESSENTIAL HYPERTENSION: ICD-10-CM

## 2017-03-19 RX ORDER — HYDROCHLOROTHIAZIDE 25 MG/1
TABLET ORAL
Qty: 90 TAB | Refills: 3 | Status: SHIPPED | OUTPATIENT
Start: 2017-03-19 | End: 2018-02-14

## 2017-03-19 RX ORDER — LISINOPRIL 20 MG/1
TABLET ORAL
Qty: 90 TAB | Refills: 3 | Status: SHIPPED | OUTPATIENT
Start: 2017-03-19 | End: 2018-01-22 | Stop reason: SDUPTHER

## 2017-03-20 ENCOUNTER — OFFICE VISIT (OUTPATIENT)
Dept: INTERNAL MEDICINE CLINIC | Age: 77
End: 2017-03-20

## 2017-03-20 ENCOUNTER — HOSPITAL ENCOUNTER (OUTPATIENT)
Dept: LAB | Age: 77
Discharge: HOME | End: 2017-03-20
Payer: MEDICARE

## 2017-03-20 VITALS
HEART RATE: 62 BPM | TEMPERATURE: 98 F | SYSTOLIC BLOOD PRESSURE: 134 MMHG | BODY MASS INDEX: 35.13 KG/M2 | RESPIRATION RATE: 18 BRPM | WEIGHT: 218.6 LBS | DIASTOLIC BLOOD PRESSURE: 51 MMHG | OXYGEN SATURATION: 98 % | HEIGHT: 66 IN

## 2017-03-20 DIAGNOSIS — Z71.89 ADVANCE DIRECTIVE DISCUSSED WITH PATIENT: ICD-10-CM

## 2017-03-20 DIAGNOSIS — R10.13 EPIGASTRIC PAIN: ICD-10-CM

## 2017-03-20 DIAGNOSIS — Z13.31 SCREENING FOR DEPRESSION: ICD-10-CM

## 2017-03-20 DIAGNOSIS — I10 ESSENTIAL HYPERTENSION: ICD-10-CM

## 2017-03-20 DIAGNOSIS — L72.9 SKIN CYST: ICD-10-CM

## 2017-03-20 DIAGNOSIS — E11.8 TYPE 2 DIABETES MELLITUS WITH COMPLICATION, WITH LONG-TERM CURRENT USE OF INSULIN (HCC): ICD-10-CM

## 2017-03-20 DIAGNOSIS — Z79.4 TYPE 2 DIABETES MELLITUS WITH COMPLICATION, WITH LONG-TERM CURRENT USE OF INSULIN (HCC): ICD-10-CM

## 2017-03-20 DIAGNOSIS — N18.30 CKD (CHRONIC KIDNEY DISEASE) STAGE 3, GFR 30-59 ML/MIN (HCC): ICD-10-CM

## 2017-03-20 DIAGNOSIS — Z13.39 SCREENING FOR ALCOHOLISM: ICD-10-CM

## 2017-03-20 DIAGNOSIS — E78.2 MIXED HYPERLIPIDEMIA: ICD-10-CM

## 2017-03-20 DIAGNOSIS — E08.43 DIABETIC AUTONOMIC NEUROPATHY ASSOCIATED WITH DIABETES MELLITUS DUE TO UNDERLYING CONDITION (HCC): ICD-10-CM

## 2017-03-20 DIAGNOSIS — Z00.00 ROUTINE GENERAL MEDICAL EXAMINATION AT A HEALTH CARE FACILITY: Primary | ICD-10-CM

## 2017-03-20 DIAGNOSIS — E55.9 VITAMIN D DEFICIENCY: ICD-10-CM

## 2017-03-20 LAB — HBA1C MFR BLD HPLC: 6.2 % (ref 4.8–5.6)

## 2017-03-20 PROCEDURE — 82043 UR ALBUMIN QUANTITATIVE: CPT

## 2017-03-20 RX ORDER — GABAPENTIN 300 MG/1
300 CAPSULE ORAL
Qty: 30 CAP | Refills: 5 | Status: SHIPPED | OUTPATIENT
Start: 2017-03-20 | End: 2017-05-22 | Stop reason: SDUPTHER

## 2017-03-20 RX ORDER — HYDROCHLOROTHIAZIDE 25 MG/1
25 TABLET ORAL DAILY
Qty: 90 TAB | Refills: 2 | Status: CANCELLED | OUTPATIENT
Start: 2017-03-20

## 2017-03-20 RX ORDER — DICYCLOMINE HYDROCHLORIDE 10 MG/1
10 CAPSULE ORAL
Qty: 30 CAP | Refills: 1 | Status: SHIPPED | OUTPATIENT
Start: 2017-03-20 | End: 2018-03-06 | Stop reason: ALTCHOICE

## 2017-03-20 RX ORDER — LISINOPRIL 20 MG/1
20 TABLET ORAL DAILY
Qty: 90 TAB | Refills: 2 | Status: CANCELLED | OUTPATIENT
Start: 2017-03-20

## 2017-03-20 NOTE — PROGRESS NOTES
Room 15    Patient present with her daughter today,    Chief Complaint   Patient presents with    Diabetes    Peripheral Neuropathy    Medication Ref[facility]     [facility]    Skin Problem     small knot located on her chest, painful to touch. Redness present, does not itch. Mentions epigastric pain that radiates to her left side. History of gallbladder removal in the past. Gabapentin makes her very sleepy. 1. Have you been to the ER, urgent care clinic since your last visit? Hospitalized since your last visit? Yes Where: [facility] for vomiting 1/16/17. Received fluids. 2. Have you seen or consulted any other health care providers outside of the [facility] since your last visit? Include any pap smears or colon screening. No     Cardiologist every 6 mo. Eye specialist- Jan. 2017 for recent cataract removal. (Dr. Noble Parrish)    Health Maintenance Due   Topic Date Due    DTaP/Tdap/Td series (1 - Tdap) 03/02/1960    ZOSTER VACCINE AGE 60>  03/02/1999    EYE EXAM RETINAL OR DILATED Q1  01/14/2017    MEDICARE YEARLY EXAM  03/12/2017    MICROALBUMIN Q1  03/21/2017     ADL Assessment 3/20/2017   Feeding yourself Help Needed   Getting from bed to chair Help Needed   Getting dressed Help Needed   Bathing or showering Help Needed   Walk across the room (includes cane/walker) Help Needed   Using the telphone Help Needed   Taking your medications Help Needed   Preparing meals Help Needed   Managing money (expenses/bills) Help Needed   Moderately strenuous housework (laundry) Help Needed   Shopping for personal items (toiletries/medicines) Help Needed   Shopping for groceries Help Needed   Driving Help Needed   Climbing a flight of stairs Help Needed   Getting to places beyond walking distances Help Needed     Fall Risk Assessment, last 12 mths 3/20/2017   Able to walk? Yes   Fall in past 12 months?  No   Fall with injury? -   Number of falls in past 12 months -   Fall Risk Score -     PHQ 2 / 9, over the last two weeks 3/20/2017   Little interest or pleasure in doing things Not at all   Feeling down, depressed or hopeless Not at all   Total Score PHQ 2 0     Abuse Screening Questionnaire 3/20/2017   Do you ever feel afraid of your partner? N   Are you in a relationship with someone who physically or mentally threatens you? N   Is it safe for you to go home?  Marcella Allan

## 2017-03-20 NOTE — PROGRESS NOTES
HISTORY OF PRESENT ILLNESS  Mc Mcrae is a 65 y.o. female. HPI  Presents for f/u abd pain    Daughter translates and provides hx per pt's preference    Pt c/o abd pain - epigastric and bilateral costal margins  Usually following fresh vegetables - cucumber, lettuce  Pain may last as long as 2-3 hours    Taking PPI daily    Using miralax and reports regular daily BMs, not constipated. Gabapentin at 300 mg causing dizziness, weakness    Chest wall skin lesion - noticed a few months ago  No fever, no drainage    Working on diet - daughter managing meals  Good glycemic control reported. Saw eye doctor 3 weeks ago      Past medical, Social, and Family history reviewed  Medications reviewed and updated. ROS  Complete ROS reviewed and negative or stable except as noted in HPI. Physical Exam   Constitutional: She is oriented to person, place, and time. She appears well-nourished. No distress. HENT:   Head: Normocephalic and atraumatic. Mouth/Throat: Oropharynx is clear and moist.   Eyes: EOM are normal. Pupils are equal, round, and reactive to light. No scleral icterus. Neck: Normal range of motion. Neck supple. No JVD present. Cardiovascular: Normal rate, regular rhythm and normal heart sounds. Exam reveals no gallop and no friction rub. No murmur heard. Pulmonary/Chest: Effort normal and breath sounds normal. No respiratory distress. She has no wheezes. She has no rales. Right anterior chest intradermal well circumscribed lesion, nontender, minimal erythema   Abdominal: Soft. She exhibits no distension. There is no tenderness. Musculoskeletal: Normal range of motion. She exhibits edema (trace ). Lymphadenopathy:     She has no cervical adenopathy. Neurological: She is alert and oriented to person, place, and time. She exhibits normal muscle tone. Skin: Skin is warm. No rash noted. Psychiatric: She has a normal mood and affect. Nursing note and vitals reviewed.       Prior labs reviewed. ASSESSMENT and PLAN    ICD-10-CM ICD-9-CM    1. Epigastric pain R10.13 789.06 LIPASE   2. Essential hypertension I10 401.9    3. Type 2 diabetes mellitus with complication, with long-term current use of insulin (HCC) E11.8 250.90 AMB POC HEMOGLOBIN A1C    Z79.4 V58.67 MICROALBUMIN, UR, RAND W/ MICROALBUMIN/CREA RATIO   4. Diabetic autonomic neuropathy associated with diabetes mellitus due to underlying condition ([facility]) E08.43 249.60 gabapentin (NEURONTIN) 300 mg capsule     337.1    5. Skin cyst L72.9 706.2 REFERRAL TO SURGERY   6. CKD (chronic kidney disease) stage 3, GFR 30-59 ml/min N18.3 585.3    7. Vitamin D deficiency E55.9 268.9    8. Mixed hyperlipidemia E78.2 272.2 CK      CBC WITH AUTOMATED DIFF      LIPID PANEL      METABOLIC PANEL, COMPREHENSIVE   9. Routine general medical examination at a health care facility Z00.00 V70.0    10. Screening for alcoholism Z13.89 V79.1    11. Screening for depression Z13.89 V79.0 [address] [address]   12. Advance directive discussed with patient Z71.89 V65.49      Follow-up Disposition:  Return in about 3 months (around 6/20/2017), or if symptoms worsen or fail to improve, for blood pressure, diabetes.    results and schedule of future  studies reviewed with patient  reviewed diet, exercise and weight  cardiovascular risk and specific lipid/LDL goals reviewed  reviewed medications and side effects in detail   Anti-spasmotic - bentyl   Avoid culprit foods  Adjust gabapentin to 300mg qhs only  Ref to surgeon for skin cyst excision  Continue current medications   Recheck lipids on crestor

## 2017-03-20 NOTE — MR AVS SNAPSHOT
Visit Information Date & Time Provider Department Dept. Phone Encounter #  
 3/20/2017  2:30 PM Jillian Garcia MD [address] [facility] [place] [facility] [phone] [id number] Follow-up Instructions Return in about 3 months (around 6/20/2017), or if symptoms worsen or fail to improve, for blood pressure, diabetes. Upcoming Health Maintenance Date Due DTaP/Tdap/Td series (1 - Tdap) 3/2/1960 EYE EXAM RETINAL OR DILATED Q1 1/14/2017 MEDICARE YEARLY EXAM 3/12/2017 MICROALBUMIN Q1 3/21/2017 HEMOGLOBIN A1C Q6M 6/20/2017 FOOT EXAM Q1 12/20/2017 LIPID PANEL Q1 1/3/2018 GLAUCOMA SCREENING Q2Y 1/14/2018 Allergies as of 3/20/2017  Review Complete On: 3/20/2017 By: Jillian Garcia MD  
  
 Severity Noted Reaction Type Reactions Ibuprofen  03/05/2010    Unknown (comments) Norvasc [Amlodipine]  04/14/2015    Other (comments) LE swelling Current Immunizations  Reviewed on 3/20/2017 Name Date Influenza Vaccine (Quad) PF 12/16/2014 Pneumococcal Conjugate (PCV-13) 3/11/2016 Pneumococcal Polysaccharide (PPSV-23) 12/16/2014 Reviewed by Jillian Garcia MD on 3/20/2017 at  3:41 PM  
You Were Diagnosed With   
  
 Codes Comments Type 2 diabetes mellitus with complication, with long-term current use of insulin ([facility])    -  Primary ICD-10-CM: E11.8, Z79.4 ICD-9-CM: 250.90, V58.67 Essential hypertension     ICD-10-CM: I10 
ICD-9-CM: 401.9 Diabetic autonomic neuropathy associated with diabetes mellitus due to underlying condition ([facility]     ICD-10-CM: N02.46 
ICD-9-CM: 249.60, 337.1 Skin cyst     ICD-10-CM: L72.9 ICD-9-CM: 706.2 CKD (chronic kidney disease) stage 3, GFR 30-59 ml/min     ICD-10-CM: N18.3 ICD-9-CM: 169. 3 Vitamin D deficiency     ICD-10-CM: E55.9 ICD-9-CM: 268.9 Epigastric pain     ICD-10-CM: R10.13 ICD-9-CM: 789.06 Mixed hyperlipidemia     ICD-10-CM: E78.2 ICD-9-CM: 272.2 Routine general medical examination at a health care facility     ICD-10-CM: Z00.00 ICD-9-CM: V70.0 Screening for alcoholism     ICD-10-CM: Z13.89 ICD-9-CM: V79.1 Screening for depression     ICD-10-CM: Z13.89 ICD-9-CM: V79.0 Advance directive discussed with patient     ICD-10-CM: Z71.89 ICD-9-CM: V65.49 Vitals BP Pulse Temp Resp Height(growth percentile) Weight(growth percentile) 134/51 (BP 1 Location: Left arm, BP Patient Position: Sitting) 62 98 °F (36.7 °C) (Oral) 18 5' 6\" (1.676 m) 218 lb 9.6 oz (99.2 kg) SpO2 BMI OB Status Smoking Status 98% 35.28 kg/m2 Postmenopausal Never Smoker BMI and BSA Data Body Mass Index Body Surface Area  
 35.28 kg/m 2 2.15 m 2 Preferred Pharmacy Pharmacy Name Phone [id number], [address] [place] [address] [phone] Your Updated Medication List  
  
   
This list is accurate as of: 3/20/17  3:50 PM.  Always use your most recent med list.  
  
  
  
  
 aspirin delayed-release 81 mg tablet Take 81 mg by mouth daily. atenolol 50 mg tablet Commonly known as:  TENORMIN Take 50 mg by mouth daily. BD INSULIN PEN NEEDLE UF SHORT 31 gauge x 5/16\" Ndle Generic drug:  Insulin Needles (Disposable) USE TO TEST BLOOD SUGAR 3 TIMES A DAY AS DIRECTED  
  
 dicyclomine 10 mg capsule Commonly known as:  BENTYL Take 1 Cap by mouth three (3) times daily as needed. For cramping  
  
 ergocalciferol 50,000 unit capsule Commonly known as:  VITAMIN D2  
TAKE ONE CAPSULE BY MOUTH EVERY 7 DAYS  
  
 esomeprazole 20 mg capsule Commonly known as:  NexIUM Take 1 Cap by mouth daily. gabapentin 300 mg capsule Commonly known as:  NEURONTIN Take 1 Cap by mouth nightly. glucose blood VI test strips strip Commonly known as:  ONE TOUCH TEST  
1 Each by Does Not Apply route. Pt checks blood sugar 3 times a day. hydroCHLOROthiazide 25 mg tablet Commonly known as:  HYDRODIURIL  
TAKE 1 TABLET BY MOUTH EVERY DAY 4/30 Insulin Lisp & Lisp Prot (Hum) 100 unit/mL (75-25) Inpn Commonly known as:  HumaLOG Mix 75-25 KwikPen 32 units with breakfast and 30 units with dinner  Indications: TYPE 2 DIABETES MELLITUS  
  
 lisinopril 20 mg tablet Commonly known as:  PRINIVIL, ZESTRIL  
TAKE 1 TABLET BY MOUTH DAILY  
  
 metFORMIN  mg tablet Commonly known as:  GLUCOPHAGE XR Take 2 Tabs by mouth daily (with dinner). naproxen sodium 220 mg tablet Commonly known as:  Yohan Huddle Take 1 Tab by mouth two (2) times daily (with meals). X 1 week as directed  
  
 polyethylene glycol 17 gram/dose powder Commonly known as:  MIRALAX  
USE 1 TABLESPOONFUL AS DIRECTED BY MOUTH EVERY DAY  
  
 rosuvastatin 20 mg tablet Commonly known as:  CRESTOR Take 1 Tab by mouth nightly. SITagliptin 100 mg tablet Commonly known as:  Michael Lemus Take 1 Tab by mouth daily. Prescriptions Sent to Pharmacy Refills  
 dicyclomine (BENTYL) 10 mg capsule 1 Sig: Take 1 Cap by mouth three (3) times daily as needed. For cramping Class: Normal  
 Pharmacy: [facility]/pharmacy #[id number] [address] Ph #: [phone] Route: Oral  
 gabapentin (NEURONTIN) 300 mg capsule 5 Sig: Take 1 Cap by mouth nightly. Class: Normal  
 Pharmacy: [facility]/pharmacy #[id number] [address] Ph #: [phone] Route: Oral  
  
We Performed the Following AMB POC HEMOGLOBIN A1C [30190 CPT(R)] CBC WITH AUTOMATED DIFF [52042 CPT(R)] CK [id number] CPT(R)] [address] [[id number] HCPCS] LIPASE [id number] CPT(R)] LIPID PANEL [98134 CPT(R)] METABOLIC PANEL, COMPREHENSIVE [34781 CPT(R)] MICROALBUMIN, UR, RAND W/ MICROALBUMIN/CREA RATIO [id number] CPT(R)] REFERRAL TO SURGERY [WYE529 Custom] Comments: Please evaluate patient for chest skin cyst, consider excision. Follow-up Instructions Return in about 3 months (around 6/20/2017), or if symptoms worsen or fail to improve, for blood pressure, diabetes. Referral Information Referral ID Referred By Referred To  
  
 [id number] Brooke ROBBINS MD   
   [address] [place] [address]  [facility], [address] Phone: [phone] Fax: [phone] Visits Status Start Date End Date 1 New Request 3/20/17 3/20/18 If your referral has a status of pending review or denied, additional information will be sent to support the outcome of this decision. Patient Instructions Ask pharmacy about Tdap vaccine Introducing [facility]! Ann Howard introduces [email or web address] patient portal. Now you can access parts of your medical record, email your doctor's office, and request medication refills online. 1. In your internet browser, go to [email or web address] 2. Click on the First Time User? Click Here link in the Sign In box. You will see the New Member Sign Up page. 3. Enter your [email or web address] Access Code exactly as it appears below. You will not need to use this code after youve completed the sign-up process. If you do not sign up before the expiration date, you must request a new code. · [email or web address] Access Code: [email or web address] Expires: 6/18/2017  2:25 PM 
 
4. Enter the last four digits of your Social Security Number (xxxx) and Date of Birth (mm/dd/yyyy) as indicated and click Submit. You will be taken to the next sign-up page. 5. Create a [employer] ID. This will be your [email or web address] login ID and cannot be changed, so think of one that is secure and easy to remember. 6. Create a [email or web address] password. You can change your password at any time. 7. Enter your Password Reset Question and Answer. This can be used at a later time if you forget your password. 8. Enter your e-mail address. You will receive e-mail notification when new information is available in [address] 9. Click Sign Up. You can now view and download portions of your medical record. 10. Click the Download Summary menu link to download a portable copy of your medical information. If you have questions, please visit the Frequently Asked Questions section of the [employer] website. Remember, [employer]m is NOT to be used for urgent needs. For medical emergencies, dial 911. Now available from your iPhone and Android! Please provide this summary of care documentation to your next provider. Your primary care clinician is listed as [address] If you have any questions after today's visit, please call [phone].

## 2017-03-20 NOTE — PROGRESS NOTES
This is a Subsequent Medicare Annual Wellness Visit providing Personalized Prevention Plan Services (PPPS) (Performed 12 months after initial AWV and PPPS )    I have reviewed the patient's medical history in detail and updated the computerized patient record. History     Past Medical History:   Diagnosis Date    CAD (coronary artery disease)     calcium score 229 - 2011, cath 4/2009 - [facility]    Cataract     CKD (chronic kidney disease) stage 3, GFR 30-59 ml/min     Diabetes [facility]     Diabetic neuropathy (HCC)     Esophageal reflux 4/9/2015    Foot ulcer ([facility]     Hypercholesterolemia     Hyperlipidemia 1/23/2015    Hypertension     Macular degeneration     Proliferative diabetic retinopathy ([facility]     Rupture of ulnar collateral ligament of thumb     Urge incontinence     Vitreous hemorrhage of both eyes (HCC)       Past Surgical History:   Procedure Laterality Date    CARDIAC SURG PROCEDURE UNLIST      HX CATARACT REMOVAL Left     HX CHOLECYSTECTOMY       Current Outpatient Prescriptions   Medication Sig Dispense Refill    dicyclomine (BENTYL) 10 mg capsule Take 1 Cap by mouth three (3) times daily as needed. For cramping 30 Cap 1    gabapentin (NEURONTIN) 300 mg capsule Take 1 Cap by mouth nightly. 30 Cap 5    lisinopril (PRINIVIL, ZESTRIL) 20 mg tablet TAKE 1 TABLET BY MOUTH DAILY 90 Tab 3    hydroCHLOROthiazide (HYDRODIURIL) 25 mg tablet TAKE 1 TABLET BY MOUTH EVERY DAY 4/30 90 Tab 3    ergocalciferol (VITAMIN D2) 50,000 unit capsule TAKE ONE CAPSULE BY MOUTH EVERY 7 DAYS 12 Cap 1    rosuvastatin (CRESTOR) 20 mg tablet Take 1 Tab by mouth nightly. 90 Tab 3    naproxen sodium (ALEVE) 220 mg tablet Take 1 Tab by mouth two (2) times daily (with meals).  X 1 week as directed 30 Tab 1    BD INSULIN PEN NEEDLE UF SHORT 31 gauge x 5/16\" ndle USE TO TEST BLOOD SUGAR 3 TIMES A DAY AS DIRECTED 300 Pen Needle 3    Insulin Lisp & Lisp Prot, Hum, (HUMALOG MIX 75-25 KWIKPEN) 100 unit/mL (75-25) inpn 32 units with breakfast and 30 units with dinner  Indications: TYPE 2 DIABETES MELLITUS 90 mL 3    esomeprazole (NEXIUM) 20 mg capsule Take 1 Cap by mouth daily. 90 Cap 3    polyethylene glycol (MIRALAX) 17 gram/dose powder USE 1 TABLESPOONFUL AS DIRECTED BY MOUTH EVERY DAY 1530 g 3    sitaGLIPtin (JANUVIA) 100 mg tablet Take 1 Tab by mouth daily. 90 Tab 3    metFORMIN ER (GLUCOPHAGE XR) 500 mg tablet Take 2 Tabs by mouth daily (with dinner). 180 Tab 3    aspirin delayed-release 81 mg tablet Take 81 mg by mouth daily.  glucose blood VI test strips (ONE TOUCH TEST) strip 1 Each by Does Not Apply route. Pt checks blood sugar 3 times a day. 1 Package 11    atenolol (TENORMIN) 50 mg tablet Take 50 mg by mouth daily.        Allergies   Allergen Reactions    Ibuprofen Unknown (comments)    Norvasc [Amlodipine] Other (comments)     LE swelling     Family History   Problem Relation Age of Onset    Family history unknown: Yes     Social History   Substance Use Topics    Smoking status: Never Smoker    Smokeless tobacco: Never Used    Alcohol use No     Patient Active Problem List   Diagnosis Code    Foot pain M79.673    T2DM (type 2 diabetes mellitus) ([address] E11.9    CAD (coronary artery disease) I25.10    HTN (hypertension) I10    Proliferative diabetic retinopathy ([address] N53.8728    CKD (chronic kidney disease) stage 3, GFR 30-59 ml/min N18.3    Macular degeneration H35.30    Cataract H26.9    Diabetic neuropathy ([address] E11.40    Urge incontinence N39.41    Hyperlipidemia E78.5    Encounter for long-term (current) use of other medications Z79.899    Vitreous hemorrhage of both eyes ([address] H43.13    Esophageal reflux K21.9    Essential hypertension I10    Advance directive discussed with patient Z70.80    Vitamin D deficiency E55.9    Mixed hyperlipidemia E78.2    Trochanteric bursitis, right hip M70.61       Depression Risk Factor Screening:     PHQ 2 / 9, over the last two weeks 3/20/2017   Little interest or pleasure in doing things Not at all   Feeling down, depressed or hopeless Not at all   Total Score PHQ 2 0     Alcohol Risk Factor Screening: On any occasion during the past 3 months, have you had more than 3 drinks containing alcohol? No    Do you average more than 7 drinks per week? No      Functional Ability and Level of Safety:     Hearing Loss   normal-to-mild    Activities of Daily Living   Partial assistance. Requires assistance with: grooming and dressing    Fall Risk     Fall Risk Assessment, last 12 mths 3/20/2017   Able to walk? Yes   Fall in past 12 months? No   Fall with injury? -   Number of falls in past 12 months -   Fall Risk Score -     Abuse Screen   Patient is not abused    Review of Systems   A comprehensive review of systems was negative except for that written in the HPI. Physical Examination     Evaluation of Cognitive Function:  Mood/affect:  neutral  Appearance: age appropriate  Family member/caregiver input: daughter     Visit Vitals    /51 (BP 1 Location: Left arm, BP Patient Position: Sitting)    Pulse 62    Temp 98 °F (36.7 °C) (Oral)    Resp 18    Ht 5' 6\" (1.676 m)    Wt 218 lb 9.6 oz (99.2 kg)    SpO2 98%    BMI 35.28 kg/m2     General appearance: alert, cooperative, no distress, appears stated age  Lungs: clear to auscultation bilaterally  Heart: regular rate and rhythm, S1, S2 normal, no murmur, click, rub or gallop  Abdomen: soft, non-tender. Bowel sounds normal. No masses,  no organomegaly  Neurologic: Grossly normal    Patient Care Team:  Laurel Aceves MD as PCP - General (Internal Medicine)  Jaylan Elias MD (Orthopedic Surgery)    Advice/Referrals/Counseling   Education and counseling provided:  Are appropriate based on today's review and evaluation      Assessment/Plan       ICD-10-CM ICD-9-CM    1. Routine general medical examination at a health care facility Z00.00 V70.0    2. Essential hypertension I10 401.9    3. Type 2 diabetes mellitus with complication, with long-term current use of insulin ([facility]) E11.8 250.90 AMB POC HEMOGLOBIN A1C    Z79.4 V58.67 MICROALBUMIN, UR, RAND W/ MICROALBUMIN/CREA RATIO   4. Diabetic autonomic neuropathy associated with diabetes mellitus due to underlying condition ([facility]) E08.43 249.60 gabapentin (NEURONTIN) 300 mg capsule     337.1    5. Skin cyst L72.9 706.2 REFERRAL TO SURGERY   6. CKD (chronic kidney disease) stage 3, GFR 30-59 ml/min N18.3 585.3    7. Vitamin D deficiency E55.9 268.9    8. Epigastric pain R10.13 789.06 LIPASE   9. Mixed hyperlipidemia E78.2 272.2 CK      CBC WITH AUTOMATED DIFF      LIPID PANEL      METABOLIC PANEL, COMPREHENSIVE   10. Screening for alcoholism Z13.89 V79.1    11. Screening for depression Z13.89 V79.0 [place] [address]   12. Advance directive discussed with patient Z71.89 V65.49      Follow-up Disposition:  Return in about 3 months (around 6/20/2017), or if symptoms worsen or fail to improve, for blood pressure, diabetes. results and schedule of future studies reviewed with patient, daughter  reviewed diet, exercise and weight    cardiovascular risk and specific lipid/LDL goals reviewed  reviewed medications and side effects in detail.

## 2017-03-21 LAB
ALBUMIN/CREAT UR: 149.6 MG/G CREAT (ref 0–30)
CREAT UR-MCNC: 22.6 MG/DL
MICROALBUMIN UR-MCNC: 33.8 UG/ML

## 2017-03-24 RX ORDER — ATORVASTATIN CALCIUM 80 MG/1
TABLET, FILM COATED ORAL
Qty: 90 TAB | Refills: 3 | OUTPATIENT
Start: 2017-03-24

## 2017-04-04 ENCOUNTER — OFFICE VISIT (OUTPATIENT)
Dept: SURGERY | Age: 77
End: 2017-04-04

## 2017-04-04 VITALS
OXYGEN SATURATION: 97 % | WEIGHT: 214 LBS | RESPIRATION RATE: 16 BRPM | DIASTOLIC BLOOD PRESSURE: 70 MMHG | TEMPERATURE: 97.6 F | SYSTOLIC BLOOD PRESSURE: 118 MMHG | HEART RATE: 61 BPM | BODY MASS INDEX: 34.39 KG/M2 | HEIGHT: 66 IN

## 2017-04-04 DIAGNOSIS — Z79.4 TYPE 2 DIABETES MELLITUS WITH COMPLICATION, WITH LONG-TERM CURRENT USE OF INSULIN (HCC): Primary | ICD-10-CM

## 2017-04-04 DIAGNOSIS — L72.3 SEBACEOUS CYST: Primary | ICD-10-CM

## 2017-04-04 DIAGNOSIS — E11.8 TYPE 2 DIABETES MELLITUS WITH COMPLICATION, WITH LONG-TERM CURRENT USE OF INSULIN (HCC): Primary | ICD-10-CM

## 2017-04-04 RX ORDER — BRIMONIDINE TARTRATE, TIMOLOL MALEATE 2; 5 MG/ML; MG/ML
SOLUTION/ DROPS OPHTHALMIC
Refills: 6 | COMMUNITY
Start: 2017-03-16

## 2017-04-04 NOTE — MR AVS SNAPSHOT
Visit Information Date & Time Provider Department Dept. Phone Encounter #  
 4/4/2017  2:00 PM MD [phone] [id number] Upcoming Health Maintenance Date Due DTaP/Tdap/Td series (1 - Tdap) 3/2/1960 EYE EXAM RETINAL OR DILATED Q1 1/14/2017 HEMOGLOBIN A1C Q6M 9/20/2017 FOOT EXAM Q1 12/20/2017 LIPID PANEL Q1 1/3/2018 MICROALBUMIN Q1 3/20/2018 MEDICARE YEARLY EXAM 3/21/2018 GLAUCOMA SCREENING Q2Y 2/7/2019 Allergies as of 4/4/2017  Review Complete On: 4/4/2017 By: Shannan Argueta LPN Severity Noted Reaction Type Reactions Ibuprofen  03/05/2010    Unknown (comments) Norvasc [Amlodipine]  04/14/2015    Other (comments) LE swelling Current Immunizations  Reviewed on 3/20/2017 Name Date Influenza Vaccine (Quad) PF 12/16/2014 Pneumococcal Conjugate (PCV-13) 3/11/2016 Pneumococcal Polysaccharide (PPSV-23) 12/16/2014 Not reviewed this visit Vitals BP Pulse Temp Resp Height(growth percentile) Weight(growth percentile) 118/70 (BP 1 Location: Right arm, BP Patient Position: Sitting) 61 97.6 °F (36.4 °C) (Oral) 16 5' 6\" (1.676 m) 214 lb (97.1 kg) SpO2 BMI OB Status Smoking Status 97% 34.54 kg/m2 Postmenopausal Never Smoker BMI and BSA Data Body Mass Index Body Surface Area 34.54 kg/m 2 2.13 m 2 Preferred Pharmacy Pharmacy Name Phone [id number], [address] [place] [address] [phone] Your Updated Medication List  
  
   
This list is accurate as of: 4/4/17  3:36 PM.  Always use your most recent med list.  
  
  
  
  
 aspirin delayed-release 81 mg tablet Take 81 mg by mouth daily. atenolol 50 mg tablet Commonly known as:  TENORMIN Take 50 mg by mouth daily. BD INSULIN PEN NEEDLE UF SHORT 31 gauge x 5/16\" Ndle Generic drug:  Insulin Needles (Disposable) USE TO TEST BLOOD SUGAR 3 TIMES A DAY AS DIRECTED  
  
 COMBIGAN 0.2-0.5 % Drop ophthalmic solution Generic drug:  brimonidine-timolol INSTILL 1 DROP IN RIGHT EYE EVERY MORNING AND NIGHT. dicyclomine 10 mg capsule Commonly known as:  BENTYL Take 1 Cap by mouth three (3) times daily as needed. For cramping  
  
 ergocalciferol 50,000 unit capsule Commonly known as:  VITAMIN D2  
TAKE ONE CAPSULE BY MOUTH EVERY 7 DAYS  
  
 esomeprazole 20 mg capsule Commonly known as:  NexIUM Take 1 Cap by mouth daily. gabapentin 300 mg capsule Commonly known as:  NEURONTIN Take 1 Cap by mouth nightly. glucose blood VI test strips strip Commonly known as:  ONE TOUCH TEST  
1 Each by Does Not Apply route. Pt checks blood sugar 3 times a day. hydroCHLOROthiazide 25 mg tablet Commonly known as:  HYDRODIURIL  
TAKE 1 TABLET BY MOUTH EVERY DAY 4/30 Insulin Lisp & Lisp Prot (Hum) 100 unit/mL (75-25) Inpn Commonly known as:  HumaLOG Mix 75-25 KwikPen 32 units with breakfast and 30 units with dinner  Indications: TYPE 2 DIABETES MELLITUS  
  
 lisinopril 20 mg tablet Commonly known as:  PRINIVIL, ZESTRIL  
TAKE 1 TABLET BY MOUTH DAILY  
  
 metFORMIN  mg tablet Commonly known as:  GLUCOPHAGE XR Take 2 Tabs by mouth daily (with dinner). naproxen sodium 220 mg tablet Commonly known as:  Dorie Ravi Take 1 Tab by mouth two (2) times daily (with meals). X 1 week as directed  
  
 polyethylene glycol 17 gram/dose powder Commonly known as:  MIRALAX  
USE 1 TABLESPOONFUL AS DIRECTED BY MOUTH EVERY DAY  
  
 rosuvastatin 20 mg tablet Commonly known as:  CRESTOR Take 1 Tab by mouth nightly. SITagliptin 100 mg tablet Commonly known as:  Carmen Bohr Take 1 Tab by mouth daily. Introducing [facility]!    
 Korin Elias introduces [employer] patient portal. Now you can access parts of your medical record, email your doctor's office, and request medication refills online. 1. In your internet browser, go to [email or web address] 2. Click on the First Time User? Click Here link in the Sign In box. You will see the New Member Sign Up page. 3. Enter your iWelcome Access Code exactly as it appears below. You will not need to use this code after youve completed the sign-up process. If you do not sign up before the expiration date, you must request a new code. · iWelcome Access Code: [email or web address] Expires: 6/18/2017  2:25 PM 
 
4. Enter the last four digits of your Social Security Number (xxxx) and Date of Birth (mm/dd/yyyy) as indicated and click Submit. You will be taken to the next sign-up page. 5. Create a iWelcome ID. This will be your iWelcome login ID and cannot be changed, so think of one that is secure and easy to remember. 6. Create a iWelcome password. You can change your password at any time. 7. Enter your Password Reset Question and Answer. This can be used at a later time if you forget your password. 8. Enter your e-mail address. You will receive e-mail notification when new information is available in [address] 9. Click Sign Up. You can now view and download portions of your medical record. 10. Click the Download Summary menu link to download a portable copy of your medical information. If you have questions, please visit the Frequently Asked Questions section of the iWelcome website. Remember, iWelcome is NOT to be used for urgent needs. For medical emergencies, dial 911. Now available from your iPhone and Android! Please provide this summary of care documentation to your next provider. Your primary care clinician is listed as [address] If you have any questions after today's visit, please call [phone].

## 2017-04-04 NOTE — PROGRESS NOTES
1. Have you been to the ER, urgent care clinic since your last visit? Hospitalized since your last visit? No    2. Have you seen or consulted any other health care providers outside of the [address] [place] [address] since your last visit? Include any pap smears or colon screening.  No

## 2017-04-04 NOTE — PROGRESS NOTES
HISTORY OF PRESENT ILLNESS  Darcie Councilman is a 65 y.o. female who is referred by Dr. Bharath Moy for further evaluation of a subcutaneous mass on her chest.   HPI Comments: Information obtained via . Ms. Mickey Waite tells me that she has had a subcutaneous mass on her chest for approx. 6 months now. No significant change in the size of the mass. No associated drainage. The mass is not particularly bothersome to her. She has otherwise been in her usual state of health. Past Medical History:  No date: CAD (coronary artery disease)      Comment: calcium score 229 - 2011, cath 4/2009 - [facility]  No date: Cataract  No date: CKD (chronic kidney disease) stage 3, GFR 30-5*  No date: Cystoid macular degeneration of right eye  No date: Diabetes ([address]  No date: Diabetic neuropathy ([address]  4/9/2015: Esophageal reflux  No date: Foot ulcer ([address]  No date: Glaucoma      Comment: right  No date: Hypercholesterolemia  1/23/2015: Hyperlipidemia  No date: Hypertension  No date: Macular degeneration  No date: Proliferative diabetic retinopathy (HCC)  No date: Rupture of ulnar collateral ligament of thumb  4/4/2017: Sebaceous cyst  No date: Urge incontinence  No date: Vitreous hemorrhage of both eyes ([address]    Past Surgical History:  No date: CARDIAC SURG PROCEDURE UNLIST  No date: HX CATARACT REMOVAL Left  No date: HX CHOLECYSTECTOMY    History reviewed. No pertinent family history. Social History: Employment - Homemaker. Tobacco - Denies. EtOH - Denies. Review of systems negative except as noted. Review of Systems   Gastrointestinal: Positive for abdominal pain and constipation. Musculoskeletal: Positive for myalgias. Physical Exam   Constitutional: She appears well-developed and well-nourished. No distress. HENT:   Head: Normocephalic and atraumatic. Eyes: No scleral icterus. Neck: Neck supple. Cardiovascular: Normal rate and regular rhythm.     Pulmonary/Chest: Effort normal and breath sounds normal.   Abdominal: Soft. She exhibits no distension. There is no tenderness. There is no rebound and no guarding. Musculoskeletal: Normal range of motion. She exhibits edema (Trace. ). Lymphadenopathy:     She has no cervical adenopathy. Neurological: She is alert. Skin:        Approx. 1/2cm x 1/2cm, well circumscribed, freely movable subcutaneous mass. No active infection. Clinically, this is c/w a sebaceous cyst.   Vitals reviewed. ASSESSMENT and PLAN  Explained to Ms. Zabala, via , that the mass appears to be c/w a sebaceous cyst. Discussed excision of the mass with her including risks of bleeding, infection, recurrence. At this point in time, Ms. Zabala does not with to proceed with excision as the mass has not changed in size and is not bothersome to her. Asked her to follow up with Dr. Darylene Champ as scheduled. Will see as needed.      CC: Chris Pringle MD

## 2017-04-05 RX ORDER — SITAGLIPTIN 100 MG/1
TABLET, FILM COATED ORAL
Qty: 90 TAB | Refills: 3 | Status: SHIPPED | OUTPATIENT
Start: 2017-04-05 | End: 2018-02-14

## 2017-04-05 RX ORDER — INSULIN LISPRO 100 [IU]/ML
INJECTION, SUSPENSION SUBCUTANEOUS
Qty: 90 ADJUSTABLE DOSE PRE-FILLED PEN SYRINGE | Refills: 3 | Status: SHIPPED | OUTPATIENT
Start: 2017-04-05 | End: 2018-01-22 | Stop reason: SDUPTHER

## 2017-04-13 DIAGNOSIS — E11.9 TYPE 2 DIABETES MELLITUS WITHOUT COMPLICATION (HCC): ICD-10-CM

## 2017-04-13 RX ORDER — METFORMIN HYDROCHLORIDE 500 MG/1
TABLET, EXTENDED RELEASE ORAL
Qty: 180 TAB | Refills: 1 | Status: SHIPPED | OUTPATIENT
Start: 2017-04-13 | End: 2017-10-08 | Stop reason: SDUPTHER

## 2017-04-16 DIAGNOSIS — K21.9 GASTROESOPHAGEAL REFLUX DISEASE WITHOUT ESOPHAGITIS: ICD-10-CM

## 2017-04-16 RX ORDER — HYDROGEN PEROXIDE 3 %
SOLUTION, NON-ORAL MISCELLANEOUS
Qty: 90 CAP | Refills: 3 | Status: SHIPPED | OUTPATIENT
Start: 2017-04-16 | End: 2018-02-19 | Stop reason: ALTCHOICE

## 2017-06-17 DIAGNOSIS — E08.43 DIABETIC AUTONOMIC NEUROPATHY ASSOCIATED WITH DIABETES MELLITUS DUE TO UNDERLYING CONDITION (HCC): ICD-10-CM

## 2017-06-17 RX ORDER — GABAPENTIN 300 MG/1
300 CAPSULE ORAL 3 TIMES DAILY
Qty: 90 CAP | Refills: 5 | Status: SHIPPED | OUTPATIENT
Start: 2017-06-17 | End: 2017-12-02 | Stop reason: SDUPTHER

## 2017-07-14 RX ORDER — POLYETHYLENE GLYCOL 3350 17 G/17G
POWDER, FOR SOLUTION ORAL
Qty: 527 G | Refills: 5 | Status: SHIPPED | OUTPATIENT
Start: 2017-07-14 | End: 2018-07-24 | Stop reason: SDUPTHER

## 2017-12-02 DIAGNOSIS — E08.43 DIABETIC AUTONOMIC NEUROPATHY ASSOCIATED WITH DIABETES MELLITUS DUE TO UNDERLYING CONDITION (HCC): ICD-10-CM

## 2017-12-02 RX ORDER — GABAPENTIN 300 MG/1
CAPSULE ORAL
Qty: 90 CAP | Refills: 5 | Status: SHIPPED | OUTPATIENT
Start: 2017-12-02 | End: 2018-02-02

## 2018-01-05 DIAGNOSIS — E08.43 DIABETIC AUTONOMIC NEUROPATHY ASSOCIATED WITH DIABETES MELLITUS DUE TO UNDERLYING CONDITION (HCC): ICD-10-CM

## 2018-01-05 NOTE — TELEPHONE ENCOUNTER
Medication refill request:    Last Office Visit: 03/20/17  Next Office Visit:  Future Appointments  Date Time Provider Consuelo Rosas   1/22/2018 10:45 AM Joaquin Currie MD CP [address] [facility] verified. yes    Patient requesting 90 day supply.

## 2018-01-08 ENCOUNTER — TELEPHONE (OUTPATIENT)
Dept: INTERNAL MEDICINE CLINIC | Age: 78
End: 2018-01-08

## 2018-01-08 RX ORDER — GABAPENTIN 300 MG/1
300 CAPSULE ORAL 3 TIMES DAILY
Qty: 270 CAP | Refills: 1 | Status: SHIPPED | OUTPATIENT
Start: 2018-01-08 | End: 2018-06-25 | Stop reason: SDUPTHER

## 2018-01-22 ENCOUNTER — OFFICE VISIT (OUTPATIENT)
Dept: INTERNAL MEDICINE CLINIC | Age: 78
End: 2018-01-22

## 2018-01-22 VITALS
HEART RATE: 74 BPM | DIASTOLIC BLOOD PRESSURE: 66 MMHG | WEIGHT: 216.6 LBS | TEMPERATURE: 97.6 F | SYSTOLIC BLOOD PRESSURE: 140 MMHG | OXYGEN SATURATION: 95 % | HEIGHT: 66 IN | BODY MASS INDEX: 34.81 KG/M2 | RESPIRATION RATE: 16 BRPM

## 2018-01-22 DIAGNOSIS — L97.529 DIABETIC ULCER OF TOE OF LEFT FOOT ASSOCIATED WITH TYPE 2 DIABETES MELLITUS, UNSPECIFIED ULCER STAGE (HCC): ICD-10-CM

## 2018-01-22 DIAGNOSIS — E08.43 DIABETIC AUTONOMIC NEUROPATHY ASSOCIATED WITH DIABETES MELLITUS DUE TO UNDERLYING CONDITION (HCC): ICD-10-CM

## 2018-01-22 DIAGNOSIS — E11.8 TYPE 2 DIABETES MELLITUS WITH COMPLICATION, WITH LONG-TERM CURRENT USE OF INSULIN (HCC): Primary | ICD-10-CM

## 2018-01-22 DIAGNOSIS — E55.9 VITAMIN D DEFICIENCY: ICD-10-CM

## 2018-01-22 DIAGNOSIS — E11.621 DIABETIC ULCER OF TOE OF LEFT FOOT ASSOCIATED WITH TYPE 2 DIABETES MELLITUS, UNSPECIFIED ULCER STAGE (HCC): ICD-10-CM

## 2018-01-22 DIAGNOSIS — E08.3599 PROLIFERATIVE DIABETIC RETINOPATHY WITHOUT MACULAR EDEMA ASSOCIATED WITH DIABETES MELLITUS DUE TO UNDERLYING CONDITION, UNSPECIFIED LATERALITY (HCC): ICD-10-CM

## 2018-01-22 DIAGNOSIS — Z79.4 TYPE 2 DIABETES MELLITUS WITH COMPLICATION, WITH LONG-TERM CURRENT USE OF INSULIN (HCC): Primary | ICD-10-CM

## 2018-01-22 DIAGNOSIS — E11.21 TYPE 2 DIABETES MELLITUS WITH NEPHROPATHY (HCC): ICD-10-CM

## 2018-01-22 DIAGNOSIS — E78.5 HYPERLIPIDEMIA, UNSPECIFIED HYPERLIPIDEMIA TYPE: ICD-10-CM

## 2018-01-22 DIAGNOSIS — I10 ESSENTIAL HYPERTENSION: ICD-10-CM

## 2018-01-22 DIAGNOSIS — M62.81 PROXIMAL MUSCLE WEAKNESS: ICD-10-CM

## 2018-01-22 LAB — HBA1C MFR BLD HPLC: 7.3 % (ref 4.8–5.6)

## 2018-01-22 RX ORDER — AMOXICILLIN AND CLAVULANATE POTASSIUM 875; 125 MG/1; MG/1
1 TABLET, FILM COATED ORAL 2 TIMES DAILY
Qty: 20 TAB | Refills: 0 | Status: SHIPPED | OUTPATIENT
Start: 2018-01-22 | End: 2018-02-01

## 2018-01-22 RX ORDER — INSULIN LISPRO 100 [IU]/ML
INJECTION, SUSPENSION SUBCUTANEOUS
Qty: 90 ADJUSTABLE DOSE PRE-FILLED PEN SYRINGE | Refills: 3 | Status: SHIPPED | OUTPATIENT
Start: 2018-01-22 | End: 2018-02-14

## 2018-01-22 RX ORDER — LISINOPRIL 40 MG/1
40 TABLET ORAL DAILY
Qty: 90 TAB | Refills: 1 | Status: SHIPPED | OUTPATIENT
Start: 2018-01-22 | End: 2018-02-14

## 2018-01-22 NOTE — PROGRESS NOTES
Rm 14    Chief Complaint   Patient presents with    Follow-up     DM and BP    Skin Problem     bilateral arms, ongoing for a few weeks   pt did eat this morning  1. Have you been to the ER, urgent care clinic since your last visit? Hospitalized since your last visit? No    2. Have you seen or consulted any other health care providers outside of the [address] [place] [address] since your last visit? Include any pap smears or colon screening. No    Health Maintenance Due   Topic Date Due    DTaP/Tdap/Td series (1 - Tdap) 03/02/1960    Influenza Age 5 to Adult  08/01/2017    HEMOGLOBIN A1C Q6M  09/20/2017    FOOT EXAM Q1  12/20/2017    LIPID PANEL Q1  01/03/2018   pt declines flu vaccine today  Fall Risk Assessment, last 12 mths 3/20/2017   Able to walk? Yes   Fall in past 12 months?  No   Fall with injury? -   Number of falls in past 12 months -   Fall Risk Score -     No new falls            PHQ over the last two weeks 3/20/2017   Little interest or pleasure in doing things Not at all   Feeling down, depressed or hopeless Not at all   Total Score PHQ 2 0

## 2018-01-22 NOTE — PROGRESS NOTES
HISTORY OF PRESENT ILLNESS  Marisela Bright is a 65 y.o. female. HPI  Presents for f/u HTN, DM, etc    Accompanied by daughter    Pt reported to be compliant with meds    C/o back pain    C/o arm achiness as well as thighs at times    C/o hand, arm itching in the past few weeks  Moisturizers have helped. C/o toe concern  Left toe wound, no fever  No drainage    Past medical, Social, and Family history reviewed  Medications reviewed and updated. ROS  Complete ROS reviewed and negative or stable except as noted in HPI. Physical Exam   Constitutional: She is oriented to person, place, and time. She appears well-nourished. No distress. HENT:   Head: Normocephalic and atraumatic. Mouth/Throat: Oropharynx is clear and moist.   Eyes: EOM are normal. Pupils are equal, round, and reactive to light. No scleral icterus. Neck: Normal range of motion. Neck supple. No JVD present. Cardiovascular: Normal rate, regular rhythm and normal heart sounds. Exam reveals no gallop and no friction rub. No murmur heard. Pulmonary/Chest: Effort normal and breath sounds normal. No respiratory distress. She has no wheezes. She has no rales. Abdominal: Soft. She exhibits no distension. There is no tenderness. Musculoskeletal: Normal range of motion. She exhibits edema (trace ). Lymphadenopathy:     She has no cervical adenopathy. Neurological: She is alert and oriented to person, place, and time. She exhibits normal muscle tone. Skin: Skin is warm. No rash noted. Psychiatric: She has a normal mood and affect. Nursing note and vitals reviewed. POC HgbA1C - 7.3  Reviewed BG log on her meter    ASSESSMENT and PLAN    ICD-10-CM ICD-9-CM    1. Type 2 diabetes mellitus with complication, with long-term current use of insulin (HCC) E11.8 250.90 AMB POC HEMOGLOBIN A1C    Z79.4 V58.67 REFERRAL TO PODIATRY      Insulin Lisp & Lisp Prot, Hum, (HUMALOG MIX 75-25 KWIKPEN) 100 unit/mL (75-25) inpn   2.  Type 2 diabetes mellitus with nephropathy (HCC) E11.21 250.40      583.81    3. Diabetic autonomic neuropathy associated with diabetes mellitus due to underlying condition ([facility]) E08.43 249.60 REFERRAL TO PODIATRY     337.1    4. Proliferative diabetic retinopathy without macular edema associated with diabetes mellitus due to underlying condition, unspecified laterality ([facility] F94.5359 249.50 REFERRAL TO PODIATRY     362.02    5. Diabetic ulcer of toe of left foot associated with type 2 diabetes mellitus, unspecified ulcer stage ([facility]) E11.621 250.80 REFERRAL TO PODIATRY    L97.529 707.15 amoxicillin-clavulanate (AUGMENTIN) 875-125 mg per tablet   6. Essential hypertension I10 401.9 lisinopril (PRINIVIL, ZESTRIL) 40 mg tablet      CBC WITH AUTOMATED DIFF   7. Hyperlipidemia, unspecified hyperlipidemia type E78.5 272.4 CK      LIPID PANEL      METABOLIC PANEL, COMPREHENSIVE   8. Vitamin D deficiency E55.9 268.9 VITAMIN D, 25 HYDROXY   9. Proximal muscle weakness M62.81 728.87 C REACTIVE PROTEIN, QT      FRENCH W/REFLEX      SED RATE (ESR)      RA + CCP ABS     Follow-up Disposition:  Return in about 3 months (around 4/22/2018), or if symptoms worsen or fail to improve, for diabetes, blood pressure, cholesterol, Medicare Wellness Visit.   results and schedule of future studies reviewed with patient  reviewed diet, exercise and weight    cardiovascular risk and specific lipid/LDL goals reviewed  reviewed medications and side effects in detail   Ref to podiatry  Increase 75/25 insulin to 34 am and 32 pm  Increase lisinopril to 40 mg daily  Check inflammatory markers, serology - ref to rheum as indicated

## 2018-01-22 NOTE — MR AVS SNAPSHOT
[facility] [address] [place] [address] 
[phone] Patient: Rigoberto Bansal MRN:  [id number] Visit Information Date & Time Provider Department Dept. Phone Encounter #  
 1/22/2018 10:45 AM Bao Meza MD [facility] Pediatrics and Internal Medicine [phone] [id number] Follow-up Instructions Return in about 3 months (around 4/22/2018), or if symptoms worsen or fail to improve, for diabetes, blood pressure, cholesterol, Medicare Wellness Visit. Upcoming Health Maintenance Date Due HEMOGLOBIN A1C Q6M 9/20/2017 FOOT EXAM Q1 12/20/2017 LIPID PANEL Q1 1/3/2018 EYE EXAM RETINAL OR DILATED Q1 3/3/2018 MICROALBUMIN Q1 3/20/2018 MEDICARE YEARLY EXAM 3/21/2018 GLAUCOMA SCREENING Q2Y 3/3/2019 DTaP/Tdap/Td series (2 - Td) 1/22/2028 Allergies as of 1/22/2018  Review Complete On: 1/22/2018 By: Bao Meza MD  
  
 Severity Noted Reaction Type Reactions Ibuprofen  03/05/2010    Unknown (comments) Norvasc [Amlodipine]  04/14/2015    Other (comments) LE swelling Current Immunizations  Reviewed on 3/20/2017 Name Date Influenza Vaccine (Quad) PF 12/16/2014 Pneumococcal Conjugate (PCV-13) 3/11/2016 Pneumococcal Polysaccharide (PPSV-23) 12/16/2014 Not reviewed this visit You Were Diagnosed With   
  
 Codes Comments Type 2 diabetes mellitus with complication, with long-term current use of insulin (HCC)    -  Primary ICD-10-CM: E11.8, Z79.4 ICD-9-CM: 250.90, V58.67 Type 2 diabetes mellitus with nephropathy (HCC)     ICD-10-CM: E11.21 
ICD-9-CM: 250.40, 583.81 Diabetic autonomic neuropathy associated with diabetes mellitus due to underlying condition ([facility]     ICD-10-CM: D58.53 
ICD-9-CM: 249.60, 337.1  Proliferative diabetic retinopathy without macular edema associated with diabetes mellitus due to underlying condition, unspecified laterality ([facility]     ICD-10-CM: N87.7586 ICD-9-CM: 249.50, 362.02 Diabetic ulcer of toe of left foot associated with type 2 diabetes mellitus, unspecified ulcer stage ([facility]     ICD-10-CM: E11.621, B80.950 ICD-9-CM: 250.80, 707.15 Essential hypertension     ICD-10-CM: I10 
ICD-9-CM: 401.9 Hyperlipidemia, unspecified hyperlipidemia type     ICD-10-CM: E78.5 ICD-9-CM: 272.4 Vitamin D deficiency     ICD-10-CM: E55.9 ICD-9-CM: 268.9 Proximal muscle weakness     ICD-10-CM: M62.81 ICD-9-CM: 728.87 Vitals BP Pulse Temp Resp Height(growth percentile) Weight(growth percentile) 140/66 (BP 1 Location: Left arm, BP Patient Position: Sitting) 74 97.6 °F (36.4 °C) (Oral) 16 5' 6\" (1.676 m) 216 lb 9.6 oz (98.2 kg) SpO2 BMI OB Status Smoking Status 95% 34.96 kg/m2 Postmenopausal Never Smoker Vitals History BMI and BSA Data Body Mass Index Body Surface Area 34.96 kg/m 2 2.14 m 2 Preferred Pharmacy Pharmacy Name Phone [id number] [phone] Your Updated Medication List  
  
   
This list is accurate as of: 1/22/18 12:22 PM.  Always use your most recent med list.  
  
  
  
  
 amoxicillin-clavulanate 875-125 mg per tablet Commonly known as:  AUGMENTIN Take 1 Tab by mouth two (2) times a day for 10 days. aspirin delayed-release 81 mg tablet Take 81 mg by mouth daily. atenolol 50 mg tablet Commonly known as:  TENORMIN Take 50 mg by mouth daily. BD INSULIN PEN NEEDLE UF SHORT 31 gauge x 5/16\" Ndle Generic drug:  Insulin Needles (Disposable) USE TO TEST BLOOD SUGAR 3 TIMES A DAY AS DIRECTED  
  
 COMBIGAN 0.2-0.5 % Drop ophthalmic solution Generic drug:  brimonidine-timolol INSTILL 1 DROP IN RIGHT EYE EVERY MORNING AND NIGHT. dicyclomine 10 mg capsule Commonly known as:  BENTYL Take 1 Cap by mouth three (3) times daily as needed. For cramping  
  
 ergocalciferol 50,000 unit capsule Commonly known as:  ERGOCALCIFEROL  
TAKE ONE CAPSULE BY MOUTH EVERY 7 DAYS  
  
 esomeprazole 20 mg capsule Commonly known as:  NEXIUM  
TAKE ONE CAPSULE BY MOUTH DAILY * gabapentin 300 mg capsule Commonly known as:  NEURONTIN  
TAKE 1 CAP BY MOUTH THREE (3) TIMES DAILY. * gabapentin 300 mg capsule Commonly known as:  NEURONTIN Take 1 Cap by mouth three (3) times daily. glucose blood VI test strips strip Commonly known as:  ONE TOUCH TEST  
1 Each by Does Not Apply route. Pt checks blood sugar 3 times a day. hydroCHLOROthiazide 25 mg tablet Commonly known as:  HYDRODIURIL  
TAKE 1 TABLET BY MOUTH EVERY DAY 4/30 Insulin Lisp & Lisp Prot (Hum) 100 unit/mL (75-25) Inpn Commonly known as:  HumaLOG Mix 75-25 KwikPen 34 units with breakfast and 32 units with dinner JANUVIA 100 mg tablet Generic drug:  SITagliptin TAKE 1 TABLET BY MOUTH DAILY  
  
 lisinopril 40 mg tablet Commonly known as:  Vito Art Take 1 Tab by mouth daily. metFORMIN  mg tablet Commonly known as:  GLUCOPHAGE XR  
TAKE 2 TABLETS BY MOUTH DAILY WITH DINNER  
  
 naproxen sodium 220 mg tablet Commonly known as:  Sadaf Spina Take 1 Tab by mouth two (2) times daily (with meals). X 1 week as directed  
  
 polyethylene glycol 17 gram/dose powder Commonly known as:  MIRALAX  
USE 1 TABLESPOONFUL AS DIRECTED BY MOUTH EVERY DAY  
  
 rosuvastatin 20 mg tablet Commonly known as:  CRESTOR  
TAKE 1 TAB BY MOUTH NIGHTLY. * Notice: This list has 2 medication(s) that are the same as other medications prescribed for you. Read the directions carefully, and ask your doctor or other care provider to review them with you. Prescriptions Sent to Pharmacy  Refills  
 amoxicillin-clavulanate (AUGMENTIN) 875-125 mg per tablet 0  
 Sig: Take 1 Tab by mouth two (2) times a day for 10 days. Class: Normal  
 Pharmacy: [facility] #[id number] Ph #: [phone] Route: Oral  
 lisinopril (PRINIVIL, ZESTRIL) 40 mg tablet 1 Sig: Take 1 Tab by mouth daily. Class: Normal  
 Pharmacy: [facility]/pharmacy #[id number] Ph #: [phone] Route: Oral  
 Insulin Lisp & Lisp Prot, Hum, (HUMALOG MIX 75-25 KWIKPEN) 100 unit/mL (75-25) inpn 3 Si units with breakfast and 32 units with dinner Class: Normal  
 Pharmacy: [facility] #[id number] Ph #: [phone] We Performed the Following AMB POC HEMOGLOBIN A1C [99607 CPT(R)] REFERRAL TO PODIATRY [REF90 Custom] Follow-up Instructions Return in about 3 months (around 2018), or if symptoms worsen or fail to improve, for diabetes, blood pressure, cholesterol, Medicare Wellness Visit. To-Do List   
 Around 2018 Lab:  FRENCH W/REFLEX Around 2018 Lab:  C REACTIVE PROTEIN, QT Around 2018 Lab:  CBC WITH AUTOMATED DIFF Around 2018 Lab:  CK Around 2018 Lab:  LIPID PANEL Around 2018 Lab:  METABOLIC PANEL, COMPREHENSIVE Around 2018 Lab:  RA + CCP ABS Around 2018 Lab:  SED RATE (ESR) Around 2018 Lab:  VITAMIN D, 25 HYDROXY Referral Information Referral ID Referred By Referred To  
  
 [id number] [address], [place] [address] Melia Jones [address] Visits Status Start Date End Date 1 New Request 18 If your referral has a status of pending review or denied, additional information will be sent to support the outcome of this decision. Introducing [facility]! Maribell Dawson introduces [employer] patient portal. Now you can access parts of your medical record, email your doctor's office, and request medication refills online. 1. In your internet browser, go to [email or web address] 2. Click on the First Time User? Click Here link in the Sign In box. You will see the New Member Sign Up page. 3. Enter your [employer] Access Code exactly as it appears below. You will not need to use this code after youve completed the sign-up process. If you do not sign up before the expiration date, you must request a new code. · [employer] Access Code: [email or web address] Expires: 4/22/2018 10:56 AM 
 
4. Enter the last four digits of your Social Security Number (xxxx) and Date of Birth (mm/dd/yyyy) as indicated and click Submit. You will be taken to the next sign-up page. 5. Create a [employer] ID. This will be your [employer] login ID and cannot be changed, so think of one that is secure and easy to remember. 6. Create a [employer] password. You can change your password at any time. 7. Enter your Password Reset Question and Answer. This can be used at a later time if you forget your password. 8. Enter your e-mail address. You will receive e-mail notification when new information is available in [address] 9. Click Sign Up. You can now view and download portions of your medical record. 10. Click the Download Summary menu link to download a portable copy of your medical information. If you have questions, please visit the Frequently Asked Questions section of the [employer] website. Remember, [employer] is NOT to be used for urgent needs. For medical emergencies, dial 911. Now available from your iPhone and Android! Please provide this summary of care documentation to your next provider. Your primary care clinician is listed as [address] If you have any questions after today's visit, please call [phone].

## 2018-01-24 ENCOUNTER — HOSPITAL ENCOUNTER (OUTPATIENT)
Dept: LAB | Age: 78
Discharge: HOME | End: 2018-01-24
Payer: MEDICARE

## 2018-01-24 PROCEDURE — 80061 LIPID PANEL: CPT

## 2018-01-24 PROCEDURE — 86140 C-REACTIVE PROTEIN: CPT

## 2018-01-24 PROCEDURE — 85025 COMPLETE CBC W/AUTO DIFF WBC: CPT

## 2018-01-24 PROCEDURE — 86431 RHEUMATOID FACTOR QUANT: CPT

## 2018-01-24 PROCEDURE — 86038 ANTINUCLEAR ANTIBODIES: CPT

## 2018-01-24 PROCEDURE — 85651 RBC SED RATE NONAUTOMATED: CPT

## 2018-01-24 PROCEDURE — 82306 VITAMIN D 25 HYDROXY: CPT

## 2018-01-24 PROCEDURE — 80053 COMPREHEN METABOLIC PANEL: CPT

## 2018-01-24 PROCEDURE — 82550 ASSAY OF CK (CPK): CPT

## 2018-01-28 RX ORDER — METFORMIN HYDROCHLORIDE 500 MG/1
TABLET, EXTENDED RELEASE ORAL
Qty: 180 TAB | Refills: 1 | Status: SHIPPED | OUTPATIENT
Start: 2018-01-28 | End: 2018-02-14

## 2018-01-29 ENCOUNTER — TELEPHONE (OUTPATIENT)
Dept: INTERNAL MEDICINE CLINIC | Age: 78
End: 2018-01-29

## 2018-01-29 DIAGNOSIS — Z79.4 TYPE 2 DIABETES MELLITUS WITH COMPLICATION, WITH LONG-TERM CURRENT USE OF INSULIN (HCC): Primary | ICD-10-CM

## 2018-01-29 DIAGNOSIS — E08.43 DIABETIC AUTONOMIC NEUROPATHY ASSOCIATED WITH DIABETES MELLITUS DUE TO UNDERLYING CONDITION (HCC): ICD-10-CM

## 2018-01-29 DIAGNOSIS — E11.8 TYPE 2 DIABETES MELLITUS WITH COMPLICATION, WITH LONG-TERM CURRENT USE OF INSULIN (HCC): Primary | ICD-10-CM

## 2018-01-29 DIAGNOSIS — E55.9 VITAMIN D DEFICIENCY: ICD-10-CM

## 2018-01-29 DIAGNOSIS — E78.5 HYPERLIPIDEMIA, UNSPECIFIED HYPERLIPIDEMIA TYPE: ICD-10-CM

## 2018-01-29 DIAGNOSIS — R76.8 RHEUMATOID FACTOR POSITIVE: ICD-10-CM

## 2018-01-29 DIAGNOSIS — M79.673 PAIN OF FOOT, UNSPECIFIED LATERALITY: ICD-10-CM

## 2018-01-30 NOTE — TELEPHONE ENCOUNTER
Spoke to Dr. Delfina Duenas office and the daughter is correct. They aren't taking new patient's with medicare that need diabetic care. The office was unable to help with a referral to another doctor.

## 2018-01-30 NOTE — TELEPHONE ENCOUNTER
Please contact Dr Sondar Paulson office - podiatry - to verify whether they are taking any new medicare pt's, either himself or one of his practice colleagues. Daughter reporting that he is not taking new patients.

## 2018-01-30 NOTE — TELEPHONE ENCOUNTER
Number given to daughter of pt for Dr. Fely Durbin. Daughter would like the lab results ASAP.  Please review with recommendations

## 2018-01-31 PROBLEM — R76.8 RHEUMATOID FACTOR POSITIVE: Status: ACTIVE | Noted: 2018-01-31

## 2018-01-31 RX ORDER — ERGOCALCIFEROL 1.25 MG/1
CAPSULE ORAL
Qty: 13 CAP | Refills: 3 | Status: SHIPPED | OUTPATIENT
Start: 2018-01-31 | End: 2019-01-25 | Stop reason: SDUPTHER

## 2018-01-31 RX ORDER — ROSUVASTATIN CALCIUM 40 MG/1
40 TABLET, COATED ORAL DAILY
Qty: 90 TAB | Refills: 1 | Status: SHIPPED | OUTPATIENT
Start: 2018-01-31 | End: 2020-02-14 | Stop reason: SDUPTHER

## 2018-01-31 NOTE — TELEPHONE ENCOUNTER
Reviewed labs with daughter. Reviewed rheum factor is not diagnostic. Plan to check Hep C to r/o as cause. Monitor rheumatoid type sx    Increase hydration due to slightly elevated BUN/creat    Increase crestor to 40 mg for better lipid control along with better diet. Refill high dosed vit D and encouraged compliance as pt may have missed a weekly dose here or there. Other labs stable.   Continue other current medications     F/u in 3 months as previously rec'd

## 2018-02-02 ENCOUNTER — HOSPITAL ENCOUNTER (INPATIENT)
Age: 78
LOS: 11 days | Discharge: HOME HEALTH | DRG: 234 | End: 2018-02-14
Attending: EMERGENCY MEDICINE | Admitting: HOSPITALIST
Payer: MEDICARE

## 2018-02-02 ENCOUNTER — APPOINTMENT (OUTPATIENT)
Dept: GENERAL RADIOLOGY | Age: 78
DRG: 234 | End: 2018-02-02
Attending: STUDENT IN AN ORGANIZED HEALTH CARE EDUCATION/TRAINING PROGRAM
Payer: MEDICARE

## 2018-02-02 DIAGNOSIS — R07.9 EXERTIONAL CHEST PAIN: Primary | ICD-10-CM

## 2018-02-02 DIAGNOSIS — E11.8 TYPE 2 DIABETES MELLITUS WITH COMPLICATION, UNSPECIFIED LONG TERM INSULIN USE STATUS: ICD-10-CM

## 2018-02-02 DIAGNOSIS — D69.6 THROMBOCYTOPENIA (HCC): ICD-10-CM

## 2018-02-02 DIAGNOSIS — R79.89 ELEVATED SERUM CREATININE: ICD-10-CM

## 2018-02-02 DIAGNOSIS — Z95.1 S/P CABG X 4: ICD-10-CM

## 2018-02-02 LAB
ALBUMIN SERPL-MCNC: 3.4 G/DL (ref 3.5–5)
ALBUMIN/GLOB SERPL: 0.9 {RATIO} (ref 1.1–2.2)
ALP SERPL-CCNC: 63 U/L (ref 45–117)
ALT SERPL-CCNC: 28 U/L (ref 12–78)
ANION GAP SERPL CALC-SCNC: 7 MMOL/L (ref 5–15)
AST SERPL-CCNC: 11 U/L (ref 15–37)
BASOPHILS # BLD: 0 K/UL (ref 0–0.1)
BASOPHILS NFR BLD: 0 % (ref 0–1)
BILIRUB SERPL-MCNC: 0.3 MG/DL (ref 0.2–1)
BNP SERPL-MCNC: 110 PG/ML (ref 0–450)
BUN SERPL-MCNC: 40 MG/DL (ref 6–20)
BUN/CREAT SERPL: 33 (ref 12–20)
CALCIUM SERPL-MCNC: 8.8 MG/DL (ref 8.5–10.1)
CHLORIDE SERPL-SCNC: 104 MMOL/L (ref 97–108)
CO2 SERPL-SCNC: 29 MMOL/L (ref 21–32)
CREAT SERPL-MCNC: 1.22 MG/DL (ref 0.55–1.02)
D DIMER PPP FEU-MCNC: 1.61 MG/L FEU (ref 0–0.65)
DIFFERENTIAL METHOD BLD: ABNORMAL
EOSINOPHIL # BLD: 0.2 K/UL (ref 0–0.4)
EOSINOPHIL NFR BLD: 4 % (ref 0–7)
ERYTHROCYTE [DISTWIDTH] IN BLOOD BY AUTOMATED COUNT: 12.7 % (ref 11.5–14.5)
EST. AVERAGE GLUCOSE BLD GHB EST-MCNC: 177 MG/DL
GLOBULIN SER CALC-MCNC: 3.7 G/DL (ref 2–4)
GLUCOSE BLD STRIP.AUTO-MCNC: 115 MG/DL (ref 65–100)
GLUCOSE SERPL-MCNC: 116 MG/DL (ref 65–100)
HBA1C MFR BLD: 7.8 % (ref 4.2–6.3)
HCT VFR BLD AUTO: 35.3 % (ref 35–47)
HGB BLD-MCNC: 11.8 G/DL (ref 11.5–16)
IMM GRANULOCYTES # BLD: 0 K/UL (ref 0–0.04)
IMM GRANULOCYTES NFR BLD AUTO: 0 % (ref 0–0.5)
LYMPHOCYTES # BLD: 3 K/UL (ref 0.8–3.5)
LYMPHOCYTES NFR BLD: 45 % (ref 12–49)
MCH RBC QN AUTO: 32 PG (ref 26–34)
MCHC RBC AUTO-ENTMCNC: 33.4 G/DL (ref 30–36.5)
MCV RBC AUTO: 95.7 FL (ref 80–99)
MONOCYTES # BLD: 0.5 K/UL (ref 0–1)
MONOCYTES NFR BLD: 7 % (ref 5–13)
NEUTS SEG # BLD: 3 K/UL (ref 1.8–8)
NEUTS SEG NFR BLD: 44 % (ref 32–75)
NRBC # BLD: 0 K/UL (ref 0–0.01)
NRBC BLD-RTO: 0 PER 100 WBC
PLATELET # BLD AUTO: 129 K/UL (ref 150–400)
PMV BLD AUTO: 12.4 FL (ref 8.9–12.9)
POTASSIUM SERPL-SCNC: 4.7 MMOL/L (ref 3.5–5.1)
PROT SERPL-MCNC: 7.1 G/DL (ref 6.4–8.2)
RBC # BLD AUTO: 3.69 M/UL (ref 3.8–5.2)
SERVICE CMNT-IMP: ABNORMAL
SODIUM SERPL-SCNC: 140 MMOL/L (ref 136–145)
TROPONIN I SERPL-MCNC: <0.04 NG/ML
TROPONIN I SERPL-MCNC: <0.04 NG/ML
TSH SERPL DL<=0.05 MIU/L-ACNC: 3.96 UIU/ML (ref 0.36–3.74)
WBC # BLD AUTO: 6.7 K/UL (ref 3.6–11)

## 2018-02-02 PROCEDURE — 85025 COMPLETE CBC W/AUTO DIFF WBC: CPT | Performed by: STUDENT IN AN ORGANIZED HEALTH CARE EDUCATION/TRAINING PROGRAM

## 2018-02-02 PROCEDURE — 80053 COMPREHEN METABOLIC PANEL: CPT | Performed by: STUDENT IN AN ORGANIZED HEALTH CARE EDUCATION/TRAINING PROGRAM

## 2018-02-02 PROCEDURE — 85379 FIBRIN DEGRADATION QUANT: CPT | Performed by: FAMILY MEDICINE

## 2018-02-02 PROCEDURE — 36415 COLL VENOUS BLD VENIPUNCTURE: CPT | Performed by: FAMILY MEDICINE

## 2018-02-02 PROCEDURE — 71046 X-RAY EXAM CHEST 2 VIEWS: CPT

## 2018-02-02 PROCEDURE — 83036 HEMOGLOBIN GLYCOSYLATED A1C: CPT | Performed by: FAMILY MEDICINE

## 2018-02-02 PROCEDURE — 84484 ASSAY OF TROPONIN QUANT: CPT | Performed by: STUDENT IN AN ORGANIZED HEALTH CARE EDUCATION/TRAINING PROGRAM

## 2018-02-02 PROCEDURE — 99285 EMERGENCY DEPT VISIT HI MDM: CPT

## 2018-02-02 PROCEDURE — 84443 ASSAY THYROID STIM HORMONE: CPT | Performed by: FAMILY MEDICINE

## 2018-02-02 PROCEDURE — 96374 THER/PROPH/DIAG INJ IV PUSH: CPT

## 2018-02-02 PROCEDURE — 74011000250 HC RX REV CODE- 250: Performed by: FAMILY MEDICINE

## 2018-02-02 PROCEDURE — 99218 HC RM OBSERVATION: CPT

## 2018-02-02 PROCEDURE — 74011250636 HC RX REV CODE- 250/636: Performed by: FAMILY MEDICINE

## 2018-02-02 PROCEDURE — 82962 GLUCOSE BLOOD TEST: CPT

## 2018-02-02 PROCEDURE — 83880 ASSAY OF NATRIURETIC PEPTIDE: CPT | Performed by: STUDENT IN AN ORGANIZED HEALTH CARE EDUCATION/TRAINING PROGRAM

## 2018-02-02 PROCEDURE — 96375 TX/PRO/DX INJ NEW DRUG ADDON: CPT

## 2018-02-02 PROCEDURE — 93005 ELECTROCARDIOGRAM TRACING: CPT

## 2018-02-02 RX ORDER — ENALAPRILAT 1.25 MG/ML
1.25 INJECTION INTRAVENOUS
Status: COMPLETED | OUTPATIENT
Start: 2018-02-02 | End: 2018-02-02

## 2018-02-02 RX ORDER — SODIUM CHLORIDE 9 MG/ML
100 INJECTION, SOLUTION INTRAVENOUS CONTINUOUS
Status: DISCONTINUED | OUTPATIENT
Start: 2018-02-02 | End: 2018-02-03

## 2018-02-02 RX ORDER — INSULIN LISPRO 100 [IU]/ML
INJECTION, SOLUTION INTRAVENOUS; SUBCUTANEOUS EVERY 6 HOURS
Status: DISCONTINUED | OUTPATIENT
Start: 2018-02-02 | End: 2018-02-04

## 2018-02-02 RX ORDER — ATENOLOL 25 MG/1
50 TABLET ORAL DAILY
Status: CANCELLED | OUTPATIENT
Start: 2018-02-03

## 2018-02-02 RX ORDER — HYDRALAZINE HYDROCHLORIDE 20 MG/ML
10 INJECTION INTRAMUSCULAR; INTRAVENOUS
Status: DISCONTINUED | OUTPATIENT
Start: 2018-02-02 | End: 2018-02-06

## 2018-02-02 RX ORDER — DEXTROSE 50 % IN WATER (D50W) INTRAVENOUS SYRINGE
12.5-25 AS NEEDED
Status: DISCONTINUED | OUTPATIENT
Start: 2018-02-02 | End: 2018-02-05 | Stop reason: SDUPTHER

## 2018-02-02 RX ORDER — LISINOPRIL 20 MG/1
40 TABLET ORAL DAILY
Status: DISCONTINUED | OUTPATIENT
Start: 2018-02-03 | End: 2018-02-03

## 2018-02-02 RX ORDER — MAGNESIUM SULFATE 100 %
4 CRYSTALS MISCELLANEOUS AS NEEDED
Status: DISCONTINUED | OUTPATIENT
Start: 2018-02-02 | End: 2018-02-05 | Stop reason: SDUPTHER

## 2018-02-02 RX ADMIN — ENALAPRILAT 1.25 MG: 2.5 INJECTION INTRAVENOUS at 22:47

## 2018-02-02 RX ADMIN — HYDRALAZINE HYDROCHLORIDE 10 MG: 20 INJECTION INTRAMUSCULAR; INTRAVENOUS at 21:18

## 2018-02-02 NOTE — ED TRIAGE NOTES
Pt arrives ambulatory to triage c/o chest pain and sob when laying flat. Pt also reports constipation, last BM 4 days ago.

## 2018-02-02 NOTE — ED PROVIDER NOTES
HPI Comments: 65 y.o. female with past medical history significant for HTN, diabetes, CAD, CKD, and Hypercholesterolemia who presents from home via private vehicle with chief complaint of SOB. Pt reports 2 weeks of worsening SOB and chest pain on exertion or laying flat with accompanying upper back back. Pt's daughter reports she was seen at her PCP's office about 10 days ago and given abx for an infection on her toe, increased her HTN medication and increased her insulin, d/t HTN and variations of her BS, and mentioned the pt was a little dehydrated. Pt reports taking 81 mg ASA daily. Pt has an appointment with a new cardiologist at [address] [place] [address] on Monday, since Dr. Grzegorz Kaiser is retired. Pt would like to remain with [facility]. There are no other acute medical concerns at this time. Old Chart Review: Pt is followed by [facility], had a cardiac cath in 2009, had an EF of 50-70%, see cath report. She was started on a beta-blocker in the form of atenolol 25 mg daily at that time. Social hx: Nonsmoker; No EtOH use  PCP: Michael Solis MD  Cardiologist: Rubi Barrios MD    Note written by Hershell Simmonds, Scribe, as dictated by Nate Garcia MD 6:07 PM          The history is provided by the patient. A  was used (Pt's daughter).         Past Medical History:   Diagnosis Date    CAD (coronary artery disease)     calcium score 229 - 2011, cath 4/2009 - [facility]    Cataract     CKD (chronic kidney disease) stage 3, GFR 30-59 ml/min     Cystoid macular degeneration of right eye     Diabetes ([address]     Diabetic neuropathy ([address]     Esophageal reflux 4/9/2015    Foot ulcer ([address]     Glaucoma     right    Hypercholesterolemia     Hyperlipidemia 1/23/2015    Hypertension     Macular degeneration     Proliferative diabetic retinopathy ([address]     Rupture of ulnar collateral ligament of thumb     Sebaceous cyst 4/4/2017    Urge incontinence     Vitreous hemorrhage of both eyes (HCC)        Past Surgical History:   Procedure Laterality Date    CARDIAC SURG PROCEDURE UNLIST      HX CATARACT REMOVAL Left     HX CHOLECYSTECTOMY           Family History:   Problem Relation Age of Onset    Family history unknown: Yes       Social History     Social History    Marital status:      Spouse name: N/A    Number of children: N/A    Years of education: N/A     Occupational History    Not on file. Social History Main Topics    Smoking status: Never Smoker    Smokeless tobacco: Never Used    Alcohol use No    Drug use: No    Sexual activity: No     Other Topics Concern    Not on file     Social History Narrative         ALLERGIES: Ibuprofen and Norvasc [amlodipine]    Review of Systems   Constitutional: Negative for fever. Respiratory: Positive for shortness of breath. Cardiovascular: Positive for chest pain. Musculoskeletal: Positive for back pain. All other systems reviewed and are negative. Vitals:    02/02/18 1608   BP: 157/74   Pulse: 70   Resp: 16   Temp: 97.7 °F (36.5 °C)   SpO2: 95%   Weight: 99.8 kg (220 lb)   Height: 5' 6\" (1.676 m)            Physical Exam   Nursing note and vitals reviewed. Constitutional: appears well-developed and well-nourished. No distress. HENT:   Head: Normocephalic and atraumatic. Sclera anicteric  Nose: No rhinorrhea  Mouth/Throat: Oropharynx is clear and moist. Pharynx normal  Eyes: Conjunctivae are normal. Pupils are equal, round, and reactive to light. Right eye exhibits no discharge. Left eye exhibits no discharge. No scleral icterus. Neck: Painless normal range of motion. Supple  Cardiovascular: Normal rate, regular rhythm, normal heart sounds and intact distal pulses. Exam reveals no gallop and no friction rub. No murmur heard. Pulmonary/Chest: Effort normal and breath sounds normal. No respiratory distress. no wheezes. no rales. Abdominal: Soft. Bowel sounds are normal. Exhibits no distension and no mass. No tenderness.  No guarding. Musculoskeletal: Normal range of motion. no tenderness. No edema  Lymphadenopathy:   No cervical adenopathy. Neurological:  Alert and oriented to person, place, and time. Coordination normal. CN 2-12 intact. Moving all extremities. Skin: Skin is warm and dry. No rash noted. No pallor. MDM    67-year-old female here with exertional chest pain as well as shortness of breath. Multiple cardiac risk factors including known coronary artery disease on medical therapy. Diabetes. Currently chest pain-free. Differential diagnosis includes MI, angina, CHF and others. Initial troponin negative. EKG with new T wave inversions inferiorly. Chest x-ray pending. [facility] consult cardiology for admission. ED Course       Procedures      ED EKG interpretation:  Rhythm: normal sinus rhythm; and regular . Rate (approx.): 65 bpm; Axis: normal; Normal intervals; T wave inversions in II, III, and aVF. Compared to 3/10/14 study, these are new EKG findings. Note written by Rafi Shine, as dictated by Jaylene Gordon MD 6:18 PM    CONSULT NOTE:  6:20 PM Jaylene Gordon MD spoke with Dr. Regina Frank, Consult for Cardiology. Discussed available diagnostic tests and clinical findings. He is in agreement with care plans as outlined. Dr. Regina Frank recommends admission through the hospitalist and will come see the pt in the morning. 6:50 PM  Patient is being admitted to the hospital.  The results of their tests and reasons for their admission have been discussed with them and/or available family. They convey agreement and understanding for the need to be admitted and for their admission diagnosis. Consultation will be made now with the inpatient physician for hospitalization. CONSULT NOTE:  6:55 Manuel Manzanares MD spoke with Dr. Mg Padilla, Consult for Hospitalist.  Discussed available diagnostic tests and clinical findings. He is in agreement with care plans as outlined.   Dr. Mg Padilla will see and admit the pt. Recent Results (from the past 24 hour(s))   EKG, 12 LEAD, INITIAL    Collection Time: 02/02/18  4:16 PM   Result Value Ref Range    Ventricular Rate 65 BPM    Atrial Rate 65 BPM    P-R Interval 180 ms    QRS Duration 98 ms    Q-T Interval 396 ms    QTC Calculation (Bezet) 411 ms    Calculated P Axis 46 degrees    Calculated R Axis 5 degrees    Calculated T Axis 0 degrees    Diagnosis       Normal sinus rhythm  When compared with ECG of 10-MAR-2014 18:33,  T wave inversion now evident in Inferior leads     CBC WITH AUTOMATED DIFF    Collection Time: 02/02/18  4:23 PM   Result Value Ref Range    WBC 6.7 3.6 - 11.0 K/uL    RBC 3.69 (L) 3.80 - 5.20 M/uL    HGB 11.8 11.5 - 16.0 g/dL    HCT 35.3 35.0 - 47.0 %    MCV 95.7 80.0 - 99.0 FL    MCH 32.0 26.0 - 34.0 PG    MCHC 33.4 30.0 - 36.5 g/dL    RDW 12.7 11.5 - 14.5 %    PLATELET 949 (L) 277 - 400 K/uL    MPV 12.4 8.9 - 12.9 FL    NRBC 0.0 0  WBC    ABSOLUTE NRBC 0.00 0.00 - 0.01 K/uL    NEUTROPHILS 44 32 - 75 %    LYMPHOCYTES 45 12 - 49 %    MONOCYTES 7 5 - 13 %    EOSINOPHILS 4 0 - 7 %    BASOPHILS 0 0 - 1 %    IMMATURE GRANULOCYTES 0 0.0 - 0.5 %    ABS. NEUTROPHILS 3.0 1.8 - 8.0 K/UL    ABS. LYMPHOCYTES 3.0 0.8 - 3.5 K/UL    ABS. MONOCYTES 0.5 0.0 - 1.0 K/UL    ABS. EOSINOPHILS 0.2 0.0 - 0.4 K/UL    ABS. BASOPHILS 0.0 0.0 - 0.1 K/UL    ABS. IMM.  GRANS. 0.0 0.00 - 0.04 K/UL    DF AUTOMATED     METABOLIC PANEL, COMPREHENSIVE    Collection Time: 02/02/18  4:23 PM   Result Value Ref Range    Sodium 140 136 - 145 mmol/L    Potassium 4.7 3.5 - 5.1 mmol/L    Chloride 104 97 - 108 mmol/L    CO2 29 21 - 32 mmol/L    Anion gap 7 5 - 15 mmol/L    Glucose 116 (H) 65 - 100 mg/dL    BUN 40 (H) 6 - 20 MG/DL    Creatinine 1.22 (H) 0.55 - 1.02 MG/DL    BUN/Creatinine ratio 33 (H) 12 - 20      GFR est AA 52 (L) >60 ml/min/1.73m2    GFR est non-AA 43 (L) >60 ml/min/1.73m2    Calcium 8.8 8.5 - 10.1 MG/DL    Bilirubin, total 0.3 0.2 - 1.0 MG/DL    ALT (SGPT) 28 12 - 78 U/L    AST (SGOT) 11 (L) 15 - 37 U/L    Alk. phosphatase 63 45 - 117 U/L    Protein, total 7.1 6.4 - 8.2 g/dL    Albumin 3.4 (L) 3.5 - 5.0 g/dL    Globulin 3.7 2.0 - 4.0 g/dL    A-G Ratio 0.9 (L) 1.1 - 2.2     TROPONIN I    Collection Time: 02/02/18  4:23 PM   Result Value Ref Range    Troponin-I, Qt. <0.04 <0.05 ng/mL   NT-PRO BNP    Collection Time: 02/02/18  4:23 PM   Result Value Ref Range    NT pro- 0 - 450 PG/ML       Xr Chest Pa Lat    Result Date: 2/2/2018  EXAM:  XR CHEST PA LAT INDICATION:   chest pain w/sob COMPARISON: 3/10/2014. FINDINGS: PA and lateral radiographs of the chest demonstrate clear lungs and pleural margins. Cardiac, mediastinal and hilar contours are normal. Atherosclerotic calcification of the aortic arch is again shown. No substantial osseous abnormality is demonstrated. IMPRESSION: No acute findings.

## 2018-02-03 ENCOUNTER — APPOINTMENT (OUTPATIENT)
Dept: CT IMAGING | Age: 78
DRG: 234 | End: 2018-02-03
Attending: HOSPITALIST
Payer: MEDICARE

## 2018-02-03 PROBLEM — I20.0 UNSTABLE ANGINA (HCC): Status: ACTIVE | Noted: 2018-02-03

## 2018-02-03 LAB
ANION GAP SERPL CALC-SCNC: 8 MMOL/L (ref 5–15)
ATRIAL RATE: 65 BPM
ATRIAL RATE: 70 BPM
BASOPHILS # BLD: 0 K/UL (ref 0–0.1)
BASOPHILS NFR BLD: 1 % (ref 0–1)
BUN SERPL-MCNC: 37 MG/DL (ref 6–20)
BUN/CREAT SERPL: 35 (ref 12–20)
CALCIUM SERPL-MCNC: 9.1 MG/DL (ref 8.5–10.1)
CALCULATED P AXIS, ECG09: 46 DEGREES
CALCULATED P AXIS, ECG09: 62 DEGREES
CALCULATED R AXIS, ECG10: 3 DEGREES
CALCULATED R AXIS, ECG10: 5 DEGREES
CALCULATED T AXIS, ECG11: 0 DEGREES
CALCULATED T AXIS, ECG11: 49 DEGREES
CHLORIDE SERPL-SCNC: 107 MMOL/L (ref 97–108)
CHOLEST SERPL-MCNC: 208 MG/DL
CO2 SERPL-SCNC: 28 MMOL/L (ref 21–32)
CREAT SERPL-MCNC: 1.05 MG/DL (ref 0.55–1.02)
DIAGNOSIS, 93000: NORMAL
DIAGNOSIS, 93000: NORMAL
DIFFERENTIAL METHOD BLD: ABNORMAL
EOSINOPHIL # BLD: 0.2 K/UL (ref 0–0.4)
EOSINOPHIL NFR BLD: 3 % (ref 0–7)
ERYTHROCYTE [DISTWIDTH] IN BLOOD BY AUTOMATED COUNT: 13.1 % (ref 11.5–14.5)
GLUCOSE BLD STRIP.AUTO-MCNC: 125 MG/DL (ref 65–100)
GLUCOSE BLD STRIP.AUTO-MCNC: 202 MG/DL (ref 65–100)
GLUCOSE BLD STRIP.AUTO-MCNC: 306 MG/DL (ref 65–100)
GLUCOSE SERPL-MCNC: 129 MG/DL (ref 65–100)
HCT VFR BLD AUTO: 36.3 % (ref 35–47)
HDLC SERPL-MCNC: 41 MG/DL
HDLC SERPL: 5.1 {RATIO} (ref 0–5)
HGB BLD-MCNC: 12.1 G/DL (ref 11.5–16)
IMM GRANULOCYTES # BLD: 0 K/UL (ref 0–0.04)
IMM GRANULOCYTES NFR BLD AUTO: 0 % (ref 0–0.5)
LDLC SERPL CALC-MCNC: 124.4 MG/DL (ref 0–100)
LIPID PROFILE,FLP: ABNORMAL
LYMPHOCYTES # BLD: 3.3 K/UL (ref 0.8–3.5)
LYMPHOCYTES NFR BLD: 44 % (ref 12–49)
MCH RBC QN AUTO: 31.4 PG (ref 26–34)
MCHC RBC AUTO-ENTMCNC: 33.3 G/DL (ref 30–36.5)
MCV RBC AUTO: 94.3 FL (ref 80–99)
MONOCYTES # BLD: 0.5 K/UL (ref 0–1)
MONOCYTES NFR BLD: 7 % (ref 5–13)
NEUTS SEG # BLD: 3.3 K/UL (ref 1.8–8)
NEUTS SEG NFR BLD: 44 % (ref 32–75)
NRBC # BLD: 0 K/UL (ref 0–0.01)
NRBC BLD-RTO: 0 PER 100 WBC
P-R INTERVAL, ECG05: 180 MS
P-R INTERVAL, ECG05: 188 MS
PLATELET # BLD AUTO: 116 K/UL (ref 150–400)
PMV BLD AUTO: 12.7 FL (ref 8.9–12.9)
POTASSIUM SERPL-SCNC: 4.2 MMOL/L (ref 3.5–5.1)
Q-T INTERVAL, ECG07: 396 MS
Q-T INTERVAL, ECG07: 400 MS
QRS DURATION, ECG06: 98 MS
QRS DURATION, ECG06: 98 MS
QTC CALCULATION (BEZET), ECG08: 411 MS
QTC CALCULATION (BEZET), ECG08: 432 MS
RBC # BLD AUTO: 3.85 M/UL (ref 3.8–5.2)
SERVICE CMNT-IMP: ABNORMAL
SODIUM SERPL-SCNC: 143 MMOL/L (ref 136–145)
TRIGL SERPL-MCNC: 213 MG/DL (ref ?–150)
TROPONIN I SERPL-MCNC: <0.04 NG/ML
VENTRICULAR RATE, ECG03: 65 BPM
VENTRICULAR RATE, ECG03: 70 BPM
VLDLC SERPL CALC-MCNC: 42.6 MG/DL
WBC # BLD AUTO: 7.4 K/UL (ref 3.6–11)

## 2018-02-03 PROCEDURE — 93306 TTE W/DOPPLER COMPLETE: CPT

## 2018-02-03 PROCEDURE — 85025 COMPLETE CBC W/AUTO DIFF WBC: CPT | Performed by: FAMILY MEDICINE

## 2018-02-03 PROCEDURE — 93005 ELECTROCARDIOGRAM TRACING: CPT

## 2018-02-03 PROCEDURE — 74011636320 HC RX REV CODE- 636/320: Performed by: HOSPITALIST

## 2018-02-03 PROCEDURE — 99218 HC RM OBSERVATION: CPT

## 2018-02-03 PROCEDURE — 80048 BASIC METABOLIC PNL TOTAL CA: CPT | Performed by: FAMILY MEDICINE

## 2018-02-03 PROCEDURE — 82962 GLUCOSE BLOOD TEST: CPT

## 2018-02-03 PROCEDURE — 36415 COLL VENOUS BLD VENIPUNCTURE: CPT | Performed by: FAMILY MEDICINE

## 2018-02-03 PROCEDURE — 74011636637 HC RX REV CODE- 636/637: Performed by: FAMILY MEDICINE

## 2018-02-03 PROCEDURE — 74011000250 HC RX REV CODE- 250: Performed by: FAMILY MEDICINE

## 2018-02-03 PROCEDURE — 65660000000 HC RM CCU STEPDOWN

## 2018-02-03 PROCEDURE — 74011250636 HC RX REV CODE- 250/636: Performed by: EMERGENCY MEDICINE

## 2018-02-03 PROCEDURE — 74011000258 HC RX REV CODE- 258: Performed by: HOSPITALIST

## 2018-02-03 PROCEDURE — 74011250637 HC RX REV CODE- 250/637: Performed by: INTERNAL MEDICINE

## 2018-02-03 PROCEDURE — 74011250636 HC RX REV CODE- 250/636: Performed by: HOSPITALIST

## 2018-02-03 PROCEDURE — 74011250637 HC RX REV CODE- 250/637: Performed by: FAMILY MEDICINE

## 2018-02-03 PROCEDURE — 84484 ASSAY OF TROPONIN QUANT: CPT | Performed by: FAMILY MEDICINE

## 2018-02-03 PROCEDURE — 93970 EXTREMITY STUDY: CPT

## 2018-02-03 PROCEDURE — 74011250637 HC RX REV CODE- 250/637: Performed by: HOSPITALIST

## 2018-02-03 PROCEDURE — 71275 CT ANGIOGRAPHY CHEST: CPT

## 2018-02-03 PROCEDURE — 74011250636 HC RX REV CODE- 250/636: Performed by: FAMILY MEDICINE

## 2018-02-03 PROCEDURE — 80061 LIPID PANEL: CPT | Performed by: FAMILY MEDICINE

## 2018-02-03 RX ORDER — PANTOPRAZOLE SODIUM 40 MG/1
40 TABLET, DELAYED RELEASE ORAL
Status: DISCONTINUED | OUTPATIENT
Start: 2018-02-03 | End: 2018-02-06

## 2018-02-03 RX ORDER — LISINOPRIL 20 MG/1
20 TABLET ORAL DAILY
Status: DISCONTINUED | OUTPATIENT
Start: 2018-02-04 | End: 2018-02-05

## 2018-02-03 RX ORDER — ENOXAPARIN SODIUM 100 MG/ML
40 INJECTION SUBCUTANEOUS EVERY 24 HOURS
Status: DISCONTINUED | OUTPATIENT
Start: 2018-02-03 | End: 2018-02-06

## 2018-02-03 RX ORDER — SODIUM CHLORIDE 0.9 % (FLUSH) 0.9 %
5-10 SYRINGE (ML) INJECTION AS NEEDED
Status: DISCONTINUED | OUTPATIENT
Start: 2018-02-03 | End: 2018-02-06

## 2018-02-03 RX ORDER — SODIUM CHLORIDE 0.9 % (FLUSH) 0.9 %
5-10 SYRINGE (ML) INJECTION EVERY 8 HOURS
Status: DISCONTINUED | OUTPATIENT
Start: 2018-02-03 | End: 2018-02-06

## 2018-02-03 RX ORDER — NITROGLYCERIN 0.4 MG/1
0.4 TABLET SUBLINGUAL
Status: DISCONTINUED | OUTPATIENT
Start: 2018-02-03 | End: 2018-02-06

## 2018-02-03 RX ORDER — SODIUM CHLORIDE 0.9 % (FLUSH) 0.9 %
10 SYRINGE (ML) INJECTION
Status: COMPLETED | OUTPATIENT
Start: 2018-02-03 | End: 2018-02-03

## 2018-02-03 RX ORDER — ACETAMINOPHEN 325 MG/1
650 TABLET ORAL
Status: DISCONTINUED | OUTPATIENT
Start: 2018-02-03 | End: 2018-02-06

## 2018-02-03 RX ORDER — SODIUM CHLORIDE 0.9 % (FLUSH) 0.9 %
10 SYRINGE (ML) INJECTION
Status: DISPENSED | OUTPATIENT
Start: 2018-02-03 | End: 2018-02-04

## 2018-02-03 RX ORDER — DICYCLOMINE HYDROCHLORIDE 10 MG/1
10 CAPSULE ORAL
Status: DISCONTINUED | OUTPATIENT
Start: 2018-02-03 | End: 2018-02-06

## 2018-02-03 RX ORDER — TIMOLOL MALEATE 5 MG/ML
1 SOLUTION/ DROPS OPHTHALMIC 2 TIMES DAILY
Status: DISCONTINUED | OUTPATIENT
Start: 2018-02-03 | End: 2018-02-07

## 2018-02-03 RX ORDER — HYDROGEN PEROXIDE 3 %
20 SOLUTION, NON-ORAL MISCELLANEOUS DAILY
Status: DISCONTINUED | OUTPATIENT
Start: 2018-02-03 | End: 2018-02-03 | Stop reason: CLARIF

## 2018-02-03 RX ORDER — ASPIRIN 81 MG/1
81 TABLET ORAL DAILY
Status: DISCONTINUED | OUTPATIENT
Start: 2018-02-03 | End: 2018-02-06

## 2018-02-03 RX ORDER — GABAPENTIN 300 MG/1
300 CAPSULE ORAL
Status: DISCONTINUED | OUTPATIENT
Start: 2018-02-03 | End: 2018-02-06

## 2018-02-03 RX ORDER — ISOSORBIDE MONONITRATE 30 MG/1
30 TABLET, EXTENDED RELEASE ORAL DAILY
Status: DISCONTINUED | OUTPATIENT
Start: 2018-02-03 | End: 2018-02-06

## 2018-02-03 RX ORDER — METOPROLOL TARTRATE 25 MG/1
12.5 TABLET, FILM COATED ORAL EVERY 12 HOURS
Status: DISCONTINUED | OUTPATIENT
Start: 2018-02-03 | End: 2018-02-04

## 2018-02-03 RX ORDER — BRIMONIDINE TARTRATE 2 MG/ML
1 SOLUTION/ DROPS OPHTHALMIC 2 TIMES DAILY
Status: DISCONTINUED | OUTPATIENT
Start: 2018-02-03 | End: 2018-02-14 | Stop reason: HOSPADM

## 2018-02-03 RX ORDER — ROSUVASTATIN CALCIUM 10 MG/1
40 TABLET, COATED ORAL DAILY
Status: DISCONTINUED | OUTPATIENT
Start: 2018-02-03 | End: 2018-02-06

## 2018-02-03 RX ORDER — ASPIRIN 81 MG/1
81 TABLET ORAL DAILY
Status: DISCONTINUED | OUTPATIENT
Start: 2018-02-03 | End: 2018-02-03 | Stop reason: SDUPTHER

## 2018-02-03 RX ORDER — SODIUM CHLORIDE 9 MG/ML
75 INJECTION, SOLUTION INTRAVENOUS CONTINUOUS
Status: DISCONTINUED | OUTPATIENT
Start: 2018-02-03 | End: 2018-02-06

## 2018-02-03 RX ADMIN — BRIMONIDINE TARTRATE 1 DROP: 2 SOLUTION OPHTHALMIC at 09:21

## 2018-02-03 RX ADMIN — ENOXAPARIN SODIUM 40 MG: 40 INJECTION SUBCUTANEOUS at 11:45

## 2018-02-03 RX ADMIN — ACETAMINOPHEN 650 MG: 325 TABLET, FILM COATED ORAL at 09:32

## 2018-02-03 RX ADMIN — PANTOPRAZOLE SODIUM 40 MG: 40 TABLET, DELAYED RELEASE ORAL at 06:34

## 2018-02-03 RX ADMIN — SODIUM CHLORIDE 75 ML/HR: 900 INJECTION, SOLUTION INTRAVENOUS at 13:05

## 2018-02-03 RX ADMIN — Medication 10 ML: at 21:08

## 2018-02-03 RX ADMIN — SODIUM CHLORIDE 100 ML: 900 INJECTION, SOLUTION INTRAVENOUS at 15:04

## 2018-02-03 RX ADMIN — Medication 10 ML: at 15:04

## 2018-02-03 RX ADMIN — INSULIN LISPRO 7 UNITS: 100 INJECTION, SOLUTION INTRAVENOUS; SUBCUTANEOUS at 17:45

## 2018-02-03 RX ADMIN — ROSUVASTATIN CALCIUM 40 MG: 40 TABLET ORAL at 09:21

## 2018-02-03 RX ADMIN — Medication 10 ML: at 14:00

## 2018-02-03 RX ADMIN — Medication 10 ML: at 02:00

## 2018-02-03 RX ADMIN — TIMOLOL MALEATE 1 DROP: 5 SOLUTION OPHTHALMIC at 09:21

## 2018-02-03 RX ADMIN — METOPROLOL TARTRATE 12.5 MG: 25 TABLET ORAL at 21:06

## 2018-02-03 RX ADMIN — LISINOPRIL 40 MG: 20 TABLET ORAL at 09:21

## 2018-02-03 RX ADMIN — ASPIRIN 81 MG: 81 TABLET, COATED ORAL at 09:21

## 2018-02-03 RX ADMIN — BRIMONIDINE TARTRATE 1 DROP: 2 SOLUTION OPHTHALMIC at 21:08

## 2018-02-03 RX ADMIN — GABAPENTIN 300 MG: 300 CAPSULE ORAL at 21:06

## 2018-02-03 RX ADMIN — IOPAMIDOL 85 ML: 755 INJECTION, SOLUTION INTRAVENOUS at 15:04

## 2018-02-03 RX ADMIN — TIMOLOL MALEATE 1 DROP: 5 SOLUTION OPHTHALMIC at 21:08

## 2018-02-03 RX ADMIN — METOPROLOL TARTRATE 12.5 MG: 25 TABLET ORAL at 15:58

## 2018-02-03 RX ADMIN — ISOSORBIDE MONONITRATE 30 MG: 30 TABLET, EXTENDED RELEASE ORAL at 11:45

## 2018-02-03 RX ADMIN — INSULIN LISPRO 3 UNITS: 100 INJECTION, SOLUTION INTRAVENOUS; SUBCUTANEOUS at 11:45

## 2018-02-03 NOTE — PROCEDURES
[facility]  *** FINAL REPORT ***    Name: Antony Mak  MRN: [id number]    Outpatient  : 02 Mar 1940  HIS Order #: [id number]  [address] [facility] Visit #: [id number]  Date: 2018    TYPE OF TEST: Peripheral Venous Testing    REASON FOR TEST  L Hip pain R/O DVT    Right Leg:-  Deep venous thrombosis:           No  Superficial venous thrombosis:    No  Deep venous insufficiency:        Not examined  Superficial venous insufficiency: Not examined    Left Leg:-  Deep venous thrombosis:           No  Superficial venous thrombosis:    No  Deep venous insufficiency:        Not examined  Superficial venous insufficiency: Not examined      INTERPRETATION/FINDINGS  PROCEDURE:  Color duplex ultrasound imaging of lower extremity veins. FINDINGS:       Right: The common femoral, deep femoral, femoral, popliteal,  posterior tibial, peroneal, and great saphenous are patent and without   evidence of thrombus;  each is fully compressible and there is no  narrowing of the flow channel on color Doppler imaging. Phasic flow  is observed in the common femoral vein. Left:   The common femoral, deep femoral, femoral, popliteal,  posterior tibial, peroneal, and great saphenous are patent and without   evidence of thrombus;  each is fully compressible and there is no  narrowing of the flow channel on color Doppler imaging. Phasic flow  is observed in the common femoral vein. IMPRESSION:  No evidence of right or left lower extremity vein  thrombosis. ADDITIONAL COMMENTS    I have personally reviewed the data relevant to the interpretation of  this  study.     TECHNOLOGIST: Ashley Flowers RVT  Signed: 2018 11:31 AM    PHYSICIAN: Noble Trujillo MD  Signed: 2018 01:38 PM

## 2018-02-03 NOTE — ROUTINE PROCESS
Bedside shift change report given to River Mccormick (oncoming nurse) by Laure Calderon (offgoing nurse). Report included the following information SBAR, ED Summary, Recent Results and Cardiac Rhythm NSR - Sinus tach .

## 2018-02-03 NOTE — ED NOTES
2015: Assumed care of patient. Patient is resting in bed, daughter at bedside. A&O x3, informed patient and daughter of admission, hospitalist has been in to make family aware as well. Patient able to make needs known, voiced no further questions or concerns. 2130: Administered blood pressure medication via IV at this time over 2 minutes, patient tolerated well. Flushed with NS, site is patent and intact. BP:  193/66    2226: Patient is resting quietly in bed, spoke with family member regarding nighttime medications. Call bell within reach, will continue to monitor. 2245:  Dr. Linda Corona paged at this time in reference to patients blood pressure. Received appropriate orders, medicated at this time. 0004: Attempted to call report, nurse will return call.

## 2018-02-03 NOTE — ROUTINE PROCESS
TRANSFER - IN REPORT:    Verbal report received from Ahsan (name) on Dustin Jewel  being received from ED (unit) for routine progression of care      Report consisted of patients Situation, Background, Assessment and   Recommendations(SBAR). Information from the following report(s) SBAR, ED Summary, Intake/Output, Recent Results and Cardiac Rhythm NSR was reviewed with the receiving nurse. Opportunity for questions and clarification was provided. Assessment completed upon patients arrival to unit and care assumed.        Primary Nurse Ann Ferris RN and Itzel Alfonso RN performed a dual skin assessment on this patient Impairment noted- see wound doc flow sheet  Luis Armando score is 23

## 2018-02-03 NOTE — ED NOTES
Patient left department  for transportation to inpatient bed. Patient's VS at the time of transfer were /59, 94% on RA, denies pain at this time, 98.3 orally, HR 71 rhythm on the monitor. Patient was alert and oriented x 4 and denies further needs at time of transfer. Patient's family went home prior to transfer to floor. TRANSFER - OUT REPORT:    Verbal report given to RN(name) on Karis Zabala  being transferred to [facility](unit) for routine progression of care       Report consisted of patients Situation, Background, Assessment and   Recommendations(SBAR). Information from the following report(s) SBAR, Kardex, ED Summary, [facility] and Recent Results was reviewed with the receiving nurse. Opportunity for questions and clarification was provided.

## 2018-02-03 NOTE — H&P
[address] [facility]  ACUTE CARE HISTORY AND PHYSICAL    Kari Herrera  MR#: [id number]  : 1940  ACCOUNT #: [de-identified]   DATE OF SERVICE: 2018    CHIEF COMPLAINT:  Chest pain. HISTORY OF PRESENT ILLNESS:  Patient is a 65-year-old female who only speaks [place], who has past medical history of coronary artery disease status post catheterization in , cataracts, chronic kidney disease stage III, diabetes, diabetic neuropathy, esophageal reflux, history of foot ulcers, hypercholesterolemia, hyperlipidemia, hypertension, macular degeneration, proliferative diabetic retinopathy, rupture of ulnar collateral ligaments of the thumb, urge incontinence, and  of both eyes presents to the hospital with the above-mentioned symptoms. I could not communicate with the patient because of the language barrier, but history was obtained from the daughter who was present at the bedside who is translating. The patient reports that she has been having some chest pain that has been going on for about 2 weeks. Denies any shortness of breath associated with that. Reports that when she lies down, she gets more back pain, but does not get any shortness of breath. She reports that she feels there is a \"choking sensation\" in her chest like somebody \" is choking her heart. \"  Patient reports that she does not have any exertional component. Patient reports that she has some pain on exertion and got concerned and decided to come to the hospital.  Patient's daughter reports that she was seen at PCP's office about 10 days ago and given an antibiotic for the infection on her toe. Yesterday they went to the podiatrist and he felt that the patient has a fungal infection of the nails and had cut her toenails, sent it for lab work, and is in the process of prescribing an antifungal for her. Patient denies any other complaints or problems.   Denies any syncope, near syncope, lightheadedness, dizziness, diaphoresis falls, injuries, hematemesis, melena, hemoptysis, headaches, blurry vision, sore throat, trouble swallowing, trouble with speech, any abdominal pain, constipation, diarrhea, shortness of breath, cough, fever, chills, urinary symptoms, nausea, vomiting, focal or generalized neurological weakness, recent travel, or sick contacts. PAST MEDICAL HISTORY:  See above. HOME MEDICATIONS:  Bentyl 10 mg t.i.d. as needed, hydrochlorothiazide 25 mg every day, Aleve, aspirin 81 mg daily, ergocalciferol, Crestor 40 mg daily, metformin 500 mg b.i.d., Augmentin one tablet b.i.d. for 10 days, lisinopril 40 mg every day, gabapentin 300 mg t.i.d.,  MiraLax 17 grams every day, Nexium 20 mg every day, Januvia 100 mg every day, and atenolol 50 mg every day. SOCIAL HISTORY:  No tobacco abuse, no alcohol use, no IV drug abuse. Lives at home. ALLERGIES:  IBUPROFEN AND NORVASC. REVIEW OF SYSTEMS:  All systems were reviewed and were found to be essentially negative except for the symptoms mentioned above. PHYSICAL EXAMINATION:  VITAL SIGNS:  Temperature 97.7, pulse , respiration rate 19, blood pressure , pulse ox 95% on room air. GENERAL:  Alert x3, awake, no acute distress, resting in bed, pleasant female, appears to be stated age. HEENT:  Pupils equal and reactive to light. Dry mucous membranes. Tympanic membranes are clear. NECK:  Supple. CHEST:  Clear to auscultation bilaterally. HEART:  S1, S2 heard. ABDOMEN:  Soft, nontender, nondistended. Bowel sounds are physiologic. EXTREMITIES:  No clubbing, no cyanosis, no edema. NEUROPSYCHIATRIC:  Pleasant mood and affect. Cranial nerves II-XII grossly intact. Sensory grossly within normal limits. DTRs are 2+. Strength 5/5. SKIN:  Warm. LABORATORY DATA:  White count 6.7, hemoglobin 11.8, hematocrit 35.3, platelets 019.   Sodium 140, potassium 4.7, chloride , bicarbonate , anion gap 7, glucose , BUN 40, creatinine 1.22, calcium 8.8, bilirubin total 0.3, ALT ,  AST 11, alkaline phosphatase . Troponin less than 0.04. BNP . X-ray of the chest shows no acute abnormality. EKG shows normal sinus rhythm, nonspecific T-wave changes. ASSESSMENT AND PLAN:    1. Chest pain, rule out acute coronary syndrome. The patient will be admitted on a telemetry bed. We will get cardiac enzymes x3 sets and continue the patient on aspirin and statin. We will provide nitroglycerin and pain control. Continue to closely monitor. We will optimize blood pressure control. Await cardiology input and reassess as needed. 2.  Hypertensive urgency. We will continue the patient on home medications. Start patient on p.r.n. medication. Optimize blood pressure control. May consider adding or increasing oral antihypertensive that the patient has been taking at home. We will continue to closely monitor. Further intervention will be per hospital course. Reassess as needed. 3.  Dehydration. The patient appears to be mildly dehydrated. We will start patient on gentle IV hydration. Repeat labs in the morning. Continue to monitor. 4.  Diabetes. Patient will be on sliding scale NovoLog insulin, Accu-Cheks, diet control, and close monitoring. 5.  GI and DVT prophylaxis. Patient will be on SCDs.       Aleksandra Sands MD MM / Loren Stanley  D: 02/02/2018 20:21     T: 02/02/2018 20:57  JOB #: [id number]

## 2018-02-03 NOTE — PROGRESS NOTES
Hospitalist Progress Note                               Sushila Valenzuela MD                                     Answering service: [phone]                               OR [phone] from in house phone                               Cell: [phone]              Date of Service:  2/3/2018  NAME:  Светлана Bull  :  1940  MRN:  [id number]      Admission Summary:   Ramona Ribeiro is [place] who does not speak English was admitted for subacute chest pain. Interval history / Subjective:     Ms Yoseph Rizzo does not speak English,her daughter helped with translation and provided the details of the history. Patient has been having back and chest pain for a while and has been evaluated by PCP. Her BP and dm were uncontrolled and adjustments were made. Her son is mainly exertional.She also could not tolerate lying flat. She had cardiac cath in  when she was found to have mild-moderate 3 vessel disease,no interventionshe was treated medically. Assessment & Plan:   Possibly UA,need to r/o PE as well.her d dimer is elevated  -Cardiac enzymes negative,EKG inverted T wave in one of the EKG  -Check Echo. Cardiology consulted,if PE is ruled out ,she may need cardiac cath. -CTA chest and venous doppler ordered. Hypertension,daughter notes BP has been running high and pcp has increased lisinopril,she is on hctz.  -She has bump in creatinine,she is on hctz and lisinopril,decrease lisinopril and add imdur    Dehydration,bump in creatinine  -Iv fluids. Decreased lisinopril dose,metformin on hold. -Avoid NSAIDS    DM, fairly controlled. -continue home regimen. A1c 7.8        Diet:diabetic  Code status: full  DVT prophylaxis: lovenox  Care Plan discussed with,pt and dtr    Discharge planning/disposition:home once work up is  completed.     Hospital Problems  Date Reviewed: 2018          Codes Class Noted POA    * (Principal)Chest pain ICD-10-CM: R07.9  ICD-9-CM: 786.50  2/2/2018 Unknown                Review of Systems:   A comprehensive review of systems was negative except for that written in the HPI. Physical Examination:      Last 24hrs VS reviewed since prior progress note. Most recent are:  Visit Vitals    /71 (BP 1 Location: Left arm, BP Patient Position: At rest)    Pulse 66    Temp 97.5 °F (36.4 °C)    Resp 18    Ht 5' 6\" (1.676 m)    Wt 96 kg (211 lb 10.3 oz)    SpO2 93%    BMI 34.16 kg/m2           Constitutional:  No acute distress, cooperative, pleasant    Eyes: Non icteric,non pallor,no erythema,discharge,PERRLA   ENT:  Oral mucous moist, oropharynx benign. Neck supple,    Resp:  CTA bilaterally. No wheezing/rhonchi/rales. No accessory muscle use   CV:  Regular rhythm, normal rate, no murmurs, gallops, rubs    GI:  Soft, non distended, non tender. normoactive bowel sounds, no hepatosplenomegaly    :  No CVA or suprapubic tenderness   Skin  :  No erythema,rash,bullae,dipigmentation     Musculoskeletal:  No edema, warm, 2+ pulses throughout    Neurologic:  AAOx3, CN II-XII reviewed. Moves all extremities. Psych:  Good insight, Not anxious nor agitated.        Intake/Output Summary (Last 24 hours) at 02/03/18 1049  Last data filed at 02/03/18 0438   Gross per 24 hour   Intake            197.5 ml   Output                0 ml   Net            197.5 ml          Data Review:    Review and/or order of clinical lab test  Review and/or order of tests in the radiology section of CPT  Review and/or order of tests in the medicine section of CPT      Labs:     Recent Labs      02/03/18   0445  02/02/18   1623   WBC  7.4  6.7   HGB  12.1  11.8   HCT  36.3  35.3   PLT  116*  129*     Recent Labs      02/03/18   0445  02/02/18   1623   NA  143  140   K  4.2  4.7   CL  107  104   CO2  28  29   BUN  37*  40*   CREA  1.05*  1.22*   GLU  129*  116*   CA  9.1  8.8     Recent Labs      02/02/18   1623   SGOT  11*   ALT  28   AP  63   TBILI  0.3 TP  7.1   ALB  3.4*   GLOB  3.7     No results for input(s): INR, PTP, APTT in the last 72 hours. No lab exists for component: INREXT   No results for input(s): FE, TIBC, PSAT, FERR in the last 72 hours. No results found for: FOL, RBCF   No results for input(s): PH, PCO2, PO2 in the last 72 hours.   Recent Labs      02/03/18   0445  02/02/18   2241  02/02/18   1623   TROIQ  <0.04  <0.04  <0.04     Lab Results   Component Value Date/Time    Cholesterol, total 208 02/03/2018 04:45 AM    HDL Cholesterol 41 02/03/2018 04:45 AM    LDL, calculated 124.4 02/03/2018 04:45 AM    Triglyceride 213 02/03/2018 04:45 AM    CHOL/HDL Ratio 5.1 02/03/2018 04:45 AM     Lab Results   Component Value Date/Time    Glucose (POC) 125 02/03/2018 06:10 AM    Glucose (POC) 115 02/02/2018 10:42 PM    Glucose (POC) 86 02/01/2013 01:25 AM    Glucose (POC) 58 02/01/2013 12:52 AM    Glucose (POC) 160 02/07/2012 10:32 PM    Glucose  01/24/2014 12:18 PM     Lab Results   Component Value Date/Time    Color YELLOW/STRAW 01/16/2017 10:39 PM    Appearance CLOUDY 01/16/2017 10:39 PM    Specific gravity 1.015 01/16/2017 10:39 PM    Specific gravity 1.010 12/14/2014 11:32 AM    pH (UA) 7.0 01/16/2017 10:39 PM    Protein NEGATIVE  01/16/2017 10:39 PM    Glucose NEGATIVE  01/16/2017 10:39 PM    Ketone NEGATIVE  01/16/2017 10:39 PM    Bilirubin NEGATIVE  01/16/2017 10:39 PM    Urobilinogen 0.2 01/16/2017 10:39 PM    Nitrites NEGATIVE  01/16/2017 10:39 PM    Leukocyte Esterase SMALL 01/16/2017 10:39 PM    Epithelial cells FEW 01/16/2017 10:39 PM    Bacteria NEGATIVE  01/16/2017 10:39 PM    WBC 5-10 01/16/2017 10:39 PM    RBC 0-5 01/16/2017 10:39 PM         Medications Reviewed:     Current Facility-Administered Medications   Medication Dose Route Frequency    aspirin delayed-release tablet 81 mg  81 mg Oral DAILY    brimonidine (ALPHAGAN) 0.2 % ophthalmic solution 1 Drop  1 Drop Right Eye BID    dicyclomine (BENTYL) capsule 10 mg  10 mg Oral TID PRN    gabapentin (NEURONTIN) capsule 300 mg  300 mg Oral QHS    rosuvastatin (CRESTOR) tablet 40 mg  40 mg Oral DAILY    sodium chloride (NS) flush 5-10 mL  5-10 mL IntraVENous Q8H    sodium chloride (NS) flush 5-10 mL  5-10 mL IntraVENous PRN    0.9% sodium chloride infusion  75 mL/hr IntraVENous CONTINUOUS    nitroglycerin (NITROSTAT) tablet 0.4 mg  0.4 mg SubLINGual Q5MIN PRN    acetaminophen (TYLENOL) tablet 650 mg  650 mg Oral Q4H PRN    pantoprazole (PROTONIX) tablet 40 mg  40 mg Oral ACB    timolol (TIMOPTIC) 0.5 % ophthalmic solution 1 Drop  1 Drop Right Eye BID    influenza vaccine 2017-18 (3 yrs+)(PF) (FLUZONE QUAD/FLUARIX QUAD) injection 0.5 mL  0.5 mL IntraMUSCular PRIOR TO DISCHARGE    0.9% sodium chloride infusion  100 mL/hr IntraVENous CONTINUOUS    lisinopril (PRINIVIL, ZESTRIL) tablet 40 mg  40 mg Oral DAILY    hydrALAZINE (APRESOLINE) 20 mg/mL injection 10 mg  10 mg IntraVENous Q6H PRN    glucose chewable tablet 16 g  4 Tab Oral PRN    dextrose (D50W) injection syrg 12.5-25 g  12.5-25 g IntraVENous PRN    glucagon (GLUCAGEN) injection 1 mg  1 mg IntraMUSCular PRN    insulin lispro (HUMALOG) injection   SubCUTAneous Q6H     ______________________________________________________________________  EXPECTED LENGTH OF STAY: - - -  ACTUAL LENGTH OF STAY:          0                 Sherryle Negri, MD

## 2018-02-03 NOTE — PROGRESS NOTES
Admission Medication Reconciliation:    Information obtained from: Daughter: BRITTANY    Significant PMH/Disease States:   Past Medical History:   Diagnosis Date    CAD (coronary artery disease)     calcium score 229 - 2011, cath 2009 - [facility]    Cataract     CKD (chronic kidney disease) stage 3, GFR 30-59 ml/min     Cystoid macular degeneration of right eye     Diabetes ([address]     Diabetic neuropathy ([address]     Esophageal reflux 2015    Foot ulcer ([address]     Glaucoma     right    Hypercholesterolemia     Hyperlipidemia 2015    Hypertension     Macular degeneration     Proliferative diabetic retinopathy ([address]     Rupture of ulnar collateral ligament of thumb     Sebaceous cyst 2017    Urge incontinence     Vitreous hemorrhage of both eyes ([address]        Chief Complaint for this Admission:  SOB, constipation    Allergies:  Ibuprofen and Norvasc [amlodipine]    Prior to Admission Medications:   Prior to Admission Medications   Prescriptions Last Dose Informant Patient Reported? Taking? COMBIGAN 0.2-0.5 % drop ophthalmic solution 2018 at Unknown time  Yes Yes   Sig: INSTILL 1 DROP IN RIGHT EYE EVERY MORNING AND NIGHT. Insulin Lisp & Lisp Prot, Hum, (HUMALOG MIX 75-25 KWIKPEN) 100 unit/mL (75-25) inpn   No No   Si units with breakfast and 32 units with dinner   Patient taking differently: 33 units with breakfast and 33 units with dinner   JANUVIA 100 mg tablet 2018 at Unknown time  No Yes   Sig: TAKE 1 TABLET BY MOUTH DAILY   amoxicillin-clavulanate (AUGMENTIN) 875-125 mg per tablet   No No   Sig: Take 1 Tab by mouth two (2) times a day for 10 days. aspirin delayed-release 81 mg tablet 2018 at Unknown time  Yes Yes   Sig: Take 81 mg by mouth daily. dicyclomine (BENTYL) 10 mg capsule 2018 at Unknown time  No Yes   Sig: Take 1 Cap by mouth three (3) times daily as needed.  For cramping   ergocalciferol (ERGOCALCIFEROL) 50,000 unit capsule 2018  No No   Sig: TAKE ONE CAPSULE BY MOUTH EVERY 7 DAYS   esomeprazole (NEXIUM) 20 mg capsule 2/1/2018 at Unknown time  No Yes   Sig: TAKE ONE CAPSULE BY MOUTH DAILY   gabapentin (NEURONTIN) 300 mg capsule 2/1/2018 at Unknown time  No Yes   Sig: Take 1 Cap by mouth three (3) times daily. Patient taking differently: Take 300 mg by mouth nightly. Takes only QHS   hydroCHLOROthiazide (HYDRODIURIL) 25 mg tablet 2/2/2018 at Unknown time  No Yes   Sig: TAKE 1 TABLET BY MOUTH EVERY DAY 4/30   lisinopril (PRINIVIL, ZESTRIL) 40 mg tablet 2/1/2018 at Unknown time  No Yes   Sig: Take 1 Tab by mouth daily. metFORMIN ER (GLUCOPHAGE XR) 500 mg tablet 2/1/2018 at Unknown time  No Yes   Sig: TAKE 2 TABLETS BY MOUTH DAILY WITH DINNER   naproxen sodium (ALEVE) 220 mg tablet 1/26/2018 at Unknown time  No Yes   Sig: Take 1 Tab by mouth two (2) times daily (with meals). X 1 week as directed   polyethylene glycol (MIRALAX) 17 gram/dose powder 2/2/2018 at Unknown time  No Yes   Sig: USE 1 TABLESPOONFUL AS DIRECTED BY MOUTH EVERY DAY   rosuvastatin (CRESTOR) 40 mg tablet 2/1/2018 at Unknown time  No Yes   Sig: Take 1 Tab by mouth daily. Facility-Administered Medications: None         Comments/Recommendations: Patient does not speak [place], daughter wanted to translate for patient. Allergies were reviewed and confirmed. Please note:  1. RECENT ANTIBIOTIC USE: Augmentin was prescribed for ten days, completed 2/1/2018, presumably for foot ulcers (per daughter)    Revised:  1. Humalog mix to 33 units with breakfast and dinner  2. Gabapentin to QHS (\"makes her conk out during the day\")    Deleted:  1. Atenolol (\"makes her heart slower\")  2. Glucose meter supplies  3. Gabapentin duplicate    Thank you for allowing me to participate in the care of your patient.     Ricardo KnightD, RN #[id number]

## 2018-02-03 NOTE — CONSULTS
CARDIOLOGY CONSULT                  Subjective:    Date of  Admission: 2/2/2018  5:48 PM     Admission type:Emergency    Sahra Madden is a 65 y.o. female admitted for Chest pain. She had a C in 2009 with moderate non-obstructive disease noted. She had been well maintained and has not had chest pain for some time. Recently, she has started developing an exertional chest pain with dyspnea. The pain would resolve with rest. She has been having some trouble with her HTN being well controlled. ED work up showed an inferior lead change with some mild TWI which resolved on subsequent studies. Troponins were consistently negative and she has had no more chest pain.     Patient Active Problem List    Diagnosis Date Noted    Chest pain 02/02/2018    Rheumatoid factor positive 01/31/2018    Type 2 diabetes mellitus with nephropathy ([address] 01/22/2018    Sebaceous cyst 04/04/2017    Glaucoma     Cystoid macular degeneration of right eye     Trochanteric bursitis, right hip 01/06/2017    Mixed hyperlipidemia 12/20/2016    Vitamin D deficiency 07/31/2016    Advance directive discussed with patient 03/27/2016    Essential hypertension 12/29/2015    Esophageal reflux 04/09/2015    Vitreous hemorrhage of both eyes ([address]     Hyperlipidemia 01/23/2015    Encounter for long-term (current) use of other medications 01/23/2015    Urge incontinence     Macular degeneration     Cataract     Diabetic neuropathy (HCC)     Proliferative diabetic retinopathy ([address]     CKD (chronic kidney disease) stage 3, GFR 30-59 ml/min     Foot pain 12/19/2013    T2DM (type 2 diabetes mellitus) ([address] 12/19/2013    CAD (coronary artery disease) 12/19/2013    HTN (hypertension) 12/19/2013      Libby Smith MD  Past Medical History:   Diagnosis Date    CAD (coronary artery disease)     calcium score 229 - 2011, cath 4/2009 - [facility]    Cataract     CKD (chronic kidney disease) stage 3, GFR 30-59 ml/min     Cystoid macular degeneration of right eye     Diabetes ([address]     Diabetic neuropathy ([address]     Esophageal reflux 4/9/2015    Foot ulcer ([address]     Glaucoma     right    Hypercholesterolemia     Hyperlipidemia 1/23/2015    Hypertension     Macular degeneration     Proliferative diabetic retinopathy ([address]     Rupture of ulnar collateral ligament of thumb     Sebaceous cyst 4/4/2017    Urge incontinence     Vitreous hemorrhage of both eyes (HCC)       Past Surgical History:   Procedure Laterality Date    CARDIAC SURG PROCEDURE UNLIST      HX CATARACT REMOVAL Left     HX CHOLECYSTECTOMY       Allergies   Allergen Reactions    Ibuprofen Unknown (comments)    Norvasc [Amlodipine] Other (comments)     LE swelling      Family History   Problem Relation Age of Onset    Family history unknown: Yes      Current Facility-Administered Medications   Medication Dose Route Frequency    aspirin delayed-release tablet 81 mg  81 mg Oral DAILY    brimonidine (ALPHAGAN) 0.2 % ophthalmic solution 1 Drop  1 Drop Right Eye BID    dicyclomine (BENTYL) capsule 10 mg  10 mg Oral TID PRN    gabapentin (NEURONTIN) capsule 300 mg  300 mg Oral QHS    rosuvastatin (CRESTOR) tablet 40 mg  40 mg Oral DAILY    sodium chloride (NS) flush 5-10 mL  5-10 mL IntraVENous Q8H    sodium chloride (NS) flush 5-10 mL  5-10 mL IntraVENous PRN    0.9% sodium chloride infusion  75 mL/hr IntraVENous CONTINUOUS    nitroglycerin (NITROSTAT) tablet 0.4 mg  0.4 mg SubLINGual Q5MIN PRN    acetaminophen (TYLENOL) tablet 650 mg  650 mg Oral Q4H PRN    pantoprazole (PROTONIX) tablet 40 mg  40 mg Oral ACB    timolol (TIMOPTIC) 0.5 % ophthalmic solution 1 Drop  1 Drop Right Eye BID    influenza vaccine 2017-18 (3 yrs+)(PF) (FLUZONE QUAD/FLUARIX QUAD) injection 0.5 mL  0.5 mL IntraMUSCular PRIOR TO DISCHARGE    [START ON 2/4/2018] lisinopril (PRINIVIL, ZESTRIL) tablet 20 mg  20 mg Oral DAILY    isosorbide mononitrate ER (IMDUR) tablet 30 mg  30 mg Oral DAILY  enoxaparin (LOVENOX) injection 40 mg  40 mg SubCUTAneous Q24H    iopamidol (ISOVUE-370) 76 % injection 100 mL  100 mL IntraVENous RAD ONCE    sodium chloride (NS) flush 10 mL  10 mL IntraVENous RAD ONCE    sodium chloride 0.9 % bolus infusion 100 mL  100 mL IntraVENous RAD ONCE    metoprolol tartrate (LOPRESSOR) tablet 12.5 mg  12.5 mg Oral Q12H    hydrALAZINE (APRESOLINE) 20 mg/mL injection 10 mg  10 mg IntraVENous Q6H PRN    glucose chewable tablet 16 g  4 Tab Oral PRN    dextrose (D50W) injection syrg 12.5-25 g  12.5-25 g IntraVENous PRN    glucagon (GLUCAGEN) injection 1 mg  1 mg IntraMUSCular PRN    insulin lispro (HUMALOG) injection   SubCUTAneous Q6H         Review of Symptoms:  Gen - no F/C/S  Eyes - no vision changes  ENT - no sore throat, rhinorrhea, otalgia  CV - no CP, no palpitations, no orthopnea, no PND, no MISAEL  Resp no cough, no SOB/CARO  GI - no AP, no n/v/d/c   - no dysuria, no hematuria  MSK - no abnormal joint pains  Skin - no rashes  Neuro - no HA, no numbness, no weakness, no slurred speech  Psych - no change in mood         Physical Exam    Visit Vitals    /71 (BP 1 Location: Right arm, BP Patient Position: At rest)    Pulse 66    Temp 97.7 °F (36.5 °C)    Resp 16    Ht 5' 6\" (1.676 m)    Wt 96 kg (211 lb 10.3 oz)    SpO2 95%    BMI 34.16 kg/m2     NAD  Skin warm and dry  Nl conjunctiva  Oropharynx without exudate.     Neck supple  Lungs clear  Normal S1/ S2 with occasional ectopy  No Murmurs, click or Rubs  Abdomen soft and non tender  Pulses 2+ radials  Neuro:  Grossly intact  Appropriate      Cardiographics    Telemetry: normal sinus rhythm  ECG: nsr, nsst changes    Labs:   Recent Results (from the past 24 hour(s))   EKG, 12 LEAD, INITIAL    Collection Time: 02/02/18  4:16 PM   Result Value Ref Range    Ventricular Rate 65 BPM    Atrial Rate 65 BPM    P-R Interval 180 ms    QRS Duration 98 ms    Q-T Interval 396 ms    QTC Calculation (Bezet) 411 ms    Calculated P Axis 46 degrees    Calculated R Axis 5 degrees    Calculated T Axis 0 degrees    Diagnosis       Normal sinus rhythm  When compared with ECG of 10-MAR-2014 18:33,  T wave inversion now evident in Inferior leads     CBC WITH AUTOMATED DIFF    Collection Time: 02/02/18  4:23 PM   Result Value Ref Range    WBC 6.7 3.6 - 11.0 K/uL    RBC 3.69 (L) 3.80 - 5.20 M/uL    HGB 11.8 11.5 - 16.0 g/dL    HCT 35.3 35.0 - 47.0 %    MCV 95.7 80.0 - 99.0 FL    MCH 32.0 26.0 - 34.0 PG    MCHC 33.4 30.0 - 36.5 g/dL    RDW 12.7 11.5 - 14.5 %    PLATELET 072 (L) 320 - 400 K/uL    MPV 12.4 8.9 - 12.9 FL    NRBC 0.0 0  WBC    ABSOLUTE NRBC 0.00 0.00 - 0.01 K/uL    NEUTROPHILS 44 32 - 75 %    LYMPHOCYTES 45 12 - 49 %    MONOCYTES 7 5 - 13 %    EOSINOPHILS 4 0 - 7 %    BASOPHILS 0 0 - 1 %    IMMATURE GRANULOCYTES 0 0.0 - 0.5 %    ABS. NEUTROPHILS 3.0 1.8 - 8.0 K/UL    ABS. LYMPHOCYTES 3.0 0.8 - 3.5 K/UL    ABS. MONOCYTES 0.5 0.0 - 1.0 K/UL    ABS. EOSINOPHILS 0.2 0.0 - 0.4 K/UL    ABS. BASOPHILS 0.0 0.0 - 0.1 K/UL    ABS. IMM. GRANS. 0.0 0.00 - 0.04 K/UL    DF AUTOMATED     METABOLIC PANEL, COMPREHENSIVE    Collection Time: 02/02/18  4:23 PM   Result Value Ref Range    Sodium 140 136 - 145 mmol/L    Potassium 4.7 3.5 - 5.1 mmol/L    Chloride 104 97 - 108 mmol/L    CO2 29 21 - 32 mmol/L    Anion gap 7 5 - 15 mmol/L    Glucose 116 (H) 65 - 100 mg/dL    BUN 40 (H) 6 - 20 MG/DL    Creatinine 1.22 (H) 0.55 - 1.02 MG/DL    BUN/Creatinine ratio 33 (H) 12 - 20      GFR est AA 52 (L) >60 ml/min/1.73m2    GFR est non-AA 43 (L) >60 ml/min/1.73m2    Calcium 8.8 8.5 - 10.1 MG/DL    Bilirubin, total 0.3 0.2 - 1.0 MG/DL    ALT (SGPT) 28 12 - 78 U/L    AST (SGOT) 11 (L) 15 - 37 U/L    Alk.  phosphatase 63 45 - 117 U/L    Protein, total 7.1 6.4 - 8.2 g/dL    Albumin 3.4 (L) 3.5 - 5.0 g/dL    Globulin 3.7 2.0 - 4.0 g/dL    A-G Ratio 0.9 (L) 1.1 - 2.2     TROPONIN I    Collection Time: 02/02/18  4:23 PM   Result Value Ref Range    Troponin-I, Qt. <0.04 <0.05 ng/mL   NT-PRO BNP    Collection Time: 02/02/18  4:23 PM   Result Value Ref Range    NT pro- 0 - 450 PG/ML   HEMOGLOBIN A1C WITH EAG    Collection Time: 02/02/18  4:23 PM   Result Value Ref Range    Hemoglobin A1c 7.8 (H) 4.2 - 6.3 %    Est. average glucose 177 mg/dL   TSH 3RD GENERATION    Collection Time: 02/02/18  4:23 PM   Result Value Ref Range    TSH 3.96 (H) 0.36 - 3.74 uIU/mL   D DIMER    Collection Time: 02/02/18  4:24 PM   Result Value Ref Range    D-dimer 1.61 (H) 0.00 - 0.65 mg/L FEU   TROPONIN I    Collection Time: 02/02/18 10:41 PM   Result Value Ref Range    Troponin-I, Qt. <0.04 <0.05 ng/mL   GLUCOSE, POC    Collection Time: 02/02/18 10:42 PM   Result Value Ref Range    Glucose (POC) 115 (H) 65 - 100 mg/dL    Performed by Jennifer Chun    EKG, 12 LEAD, INITIAL    Collection Time: 02/03/18  4:31 AM   Result Value Ref Range    Ventricular Rate 70 BPM    Atrial Rate 70 BPM    P-R Interval 188 ms    QRS Duration 98 ms    Q-T Interval 400 ms    QTC Calculation (Bezet) 432 ms    Calculated P Axis 62 degrees    Calculated R Axis 3 degrees    Calculated T Axis 49 degrees    Diagnosis       Normal sinus rhythm  When compared with ECG of 02-FEB-2018 16:16,  T wave inversion no longer evident in Inferior leads     TROPONIN I    Collection Time: 02/03/18  4:45 AM   Result Value Ref Range    Troponin-I, Qt. <0.04 <8.00 ng/mL   METABOLIC PANEL, BASIC    Collection Time: 02/03/18  4:45 AM   Result Value Ref Range    Sodium 143 136 - 145 mmol/L    Potassium 4.2 3.5 - 5.1 mmol/L    Chloride 107 97 - 108 mmol/L    CO2 28 21 - 32 mmol/L    Anion gap 8 5 - 15 mmol/L    Glucose 129 (H) 65 - 100 mg/dL    BUN 37 (H) 6 - 20 MG/DL    Creatinine 1.05 (H) 0.55 - 1.02 MG/DL    BUN/Creatinine ratio 35 (H) 12 - 20      GFR est AA >60 >60 ml/min/1.73m2    GFR est non-AA 51 (L) >60 ml/min/1.73m2    Calcium 9.1 8.5 - 10.1 MG/DL   CBC WITH AUTOMATED DIFF    Collection Time: 02/03/18  4:45 AM   Result Value Ref Range    WBC 7.4 3.6 - 11.0 K/uL    RBC 3.85 3.80 - 5.20 M/uL    HGB 12.1 11.5 - 16.0 g/dL    HCT 36.3 35.0 - 47.0 %    MCV 94.3 80.0 - 99.0 FL    MCH 31.4 26.0 - 34.0 PG    MCHC 33.3 30.0 - 36.5 g/dL    RDW 13.1 11.5 - 14.5 %    PLATELET 592 (L) 485 - 400 K/uL    MPV 12.7 8.9 - 12.9 FL    NRBC 0.0 0  WBC    ABSOLUTE NRBC 0.00 0.00 - 0.01 K/uL    NEUTROPHILS 44 32 - 75 %    LYMPHOCYTES 44 12 - 49 %    MONOCYTES 7 5 - 13 %    EOSINOPHILS 3 0 - 7 %    BASOPHILS 1 0 - 1 %    IMMATURE GRANULOCYTES 0 0.0 - 0.5 %    ABS. NEUTROPHILS 3.3 1.8 - 8.0 K/UL    ABS. LYMPHOCYTES 3.3 0.8 - 3.5 K/UL    ABS. MONOCYTES 0.5 0.0 - 1.0 K/UL    ABS. EOSINOPHILS 0.2 0.0 - 0.4 K/UL    ABS. BASOPHILS 0.0 0.0 - 0.1 K/UL    ABS. IMM. GRANS. 0.0 0.00 - 0.04 K/UL    DF AUTOMATED     LIPID PANEL    Collection Time: 02/03/18  4:45 AM   Result Value Ref Range    LIPID PROFILE          Cholesterol, total 208 (H) <200 MG/DL    Triglyceride 213 (H) <150 MG/DL    HDL Cholesterol 41 MG/DL    LDL, calculated 124.4 (H) 0 - 100 MG/DL    VLDL, calculated 42.6 MG/DL    CHOL/HDL Ratio 5.1 (H) 0 - 5.0     GLUCOSE, POC    Collection Time: 02/03/18  6:10 AM   Result Value Ref Range    Glucose (POC) 125 (H) 65 - 100 mg/dL    Performed by [facility]    GLUCOSE, POC    Collection Time: 02/03/18 11:10 AM   Result Value Ref Range    Glucose (POC) 202 (H) 65 - 100 mg/dL    Performed by Isa Cid         Assessment and Plan: This is a 65 yof with moderate CAD here with new onset exertional CP with negative Troponins and some mild ST changes which have resolved.     Started BB  Cont with ACEi, ASA, high-dose statin  Echo pending  NPO at [facility] on 1/5 for [facility]    Thank you for this consult, please call with questions     Assessment:

## 2018-02-04 LAB
ANION GAP SERPL CALC-SCNC: 6 MMOL/L (ref 5–15)
APTT PPP: 29.1 SEC (ref 22.1–32.5)
BUN SERPL-MCNC: 36 MG/DL (ref 6–20)
BUN/CREAT SERPL: 33 (ref 12–20)
CALCIUM SERPL-MCNC: 8.5 MG/DL (ref 8.5–10.1)
CHLORIDE SERPL-SCNC: 107 MMOL/L (ref 97–108)
CHOLEST SERPL-MCNC: 178 MG/DL
CO2 SERPL-SCNC: 27 MMOL/L (ref 21–32)
CREAT SERPL-MCNC: 1.08 MG/DL (ref 0.55–1.02)
GLUCOSE BLD STRIP.AUTO-MCNC: 172 MG/DL (ref 65–100)
GLUCOSE BLD STRIP.AUTO-MCNC: 180 MG/DL (ref 65–100)
GLUCOSE BLD STRIP.AUTO-MCNC: 203 MG/DL (ref 65–100)
GLUCOSE BLD STRIP.AUTO-MCNC: 262 MG/DL (ref 65–100)
GLUCOSE BLD STRIP.AUTO-MCNC: 278 MG/DL (ref 65–100)
GLUCOSE SERPL-MCNC: 174 MG/DL (ref 65–100)
HDLC SERPL-MCNC: 30 MG/DL
HDLC SERPL: 5.9 {RATIO} (ref 0–5)
INR PPP: 1.1 (ref 0.9–1.1)
LDLC SERPL CALC-MCNC: 102.6 MG/DL (ref 0–100)
LIPID PROFILE,FLP: ABNORMAL
POTASSIUM SERPL-SCNC: 3.8 MMOL/L (ref 3.5–5.1)
PROTHROMBIN TIME: 11.4 SEC (ref 9–11.1)
SERVICE CMNT-IMP: ABNORMAL
SODIUM SERPL-SCNC: 140 MMOL/L (ref 136–145)
THERAPEUTIC RANGE,PTTT: NORMAL SECS (ref 58–77)
TRIGL SERPL-MCNC: 227 MG/DL (ref ?–150)
VLDLC SERPL CALC-MCNC: 45.4 MG/DL

## 2018-02-04 PROCEDURE — 74011250637 HC RX REV CODE- 250/637: Performed by: HOSPITALIST

## 2018-02-04 PROCEDURE — 74011636637 HC RX REV CODE- 636/637: Performed by: HOSPITALIST

## 2018-02-04 PROCEDURE — 85610 PROTHROMBIN TIME: CPT | Performed by: INTERNAL MEDICINE

## 2018-02-04 PROCEDURE — 74011636637 HC RX REV CODE- 636/637: Performed by: FAMILY MEDICINE

## 2018-02-04 PROCEDURE — 85730 THROMBOPLASTIN TIME PARTIAL: CPT | Performed by: INTERNAL MEDICINE

## 2018-02-04 PROCEDURE — 74011250637 HC RX REV CODE- 250/637: Performed by: INTERNAL MEDICINE

## 2018-02-04 PROCEDURE — 36415 COLL VENOUS BLD VENIPUNCTURE: CPT | Performed by: INTERNAL MEDICINE

## 2018-02-04 PROCEDURE — 74011250636 HC RX REV CODE- 250/636: Performed by: HOSPITALIST

## 2018-02-04 PROCEDURE — 80048 BASIC METABOLIC PNL TOTAL CA: CPT | Performed by: INTERNAL MEDICINE

## 2018-02-04 PROCEDURE — 74011250637 HC RX REV CODE- 250/637: Performed by: FAMILY MEDICINE

## 2018-02-04 PROCEDURE — 65660000000 HC RM CCU STEPDOWN

## 2018-02-04 RX ORDER — INSULIN LISPRO 100 [IU]/ML
INJECTION, SOLUTION INTRAVENOUS; SUBCUTANEOUS
Status: DISCONTINUED | OUTPATIENT
Start: 2018-02-04 | End: 2018-02-05

## 2018-02-04 RX ORDER — INSULIN GLARGINE 100 [IU]/ML
10 INJECTION, SOLUTION SUBCUTANEOUS
Status: DISCONTINUED | OUTPATIENT
Start: 2018-02-04 | End: 2018-02-05

## 2018-02-04 RX ORDER — METOPROLOL TARTRATE 25 MG/1
25 TABLET, FILM COATED ORAL EVERY 12 HOURS
Status: DISCONTINUED | OUTPATIENT
Start: 2018-02-04 | End: 2018-02-06

## 2018-02-04 RX ADMIN — BRIMONIDINE TARTRATE 1 DROP: 2 SOLUTION OPHTHALMIC at 22:31

## 2018-02-04 RX ADMIN — BRIMONIDINE TARTRATE 1 DROP: 2 SOLUTION OPHTHALMIC at 09:18

## 2018-02-04 RX ADMIN — Medication 10 ML: at 22:32

## 2018-02-04 RX ADMIN — INSULIN LISPRO 2 UNITS: 100 INJECTION, SOLUTION INTRAVENOUS; SUBCUTANEOUS at 22:26

## 2018-02-04 RX ADMIN — GABAPENTIN 300 MG: 300 CAPSULE ORAL at 22:26

## 2018-02-04 RX ADMIN — LISINOPRIL 20 MG: 20 TABLET ORAL at 09:18

## 2018-02-04 RX ADMIN — INSULIN LISPRO 2 UNITS: 100 INJECTION, SOLUTION INTRAVENOUS; SUBCUTANEOUS at 06:58

## 2018-02-04 RX ADMIN — METOPROLOL TARTRATE 25 MG: 25 TABLET ORAL at 22:26

## 2018-02-04 RX ADMIN — ROSUVASTATIN CALCIUM 40 MG: 40 TABLET ORAL at 09:18

## 2018-02-04 RX ADMIN — PANTOPRAZOLE SODIUM 40 MG: 40 TABLET, DELAYED RELEASE ORAL at 06:58

## 2018-02-04 RX ADMIN — INSULIN GLARGINE 10 UNITS: 100 INJECTION, SOLUTION SUBCUTANEOUS at 22:27

## 2018-02-04 RX ADMIN — TIMOLOL MALEATE 1 DROP: 5 SOLUTION OPHTHALMIC at 22:31

## 2018-02-04 RX ADMIN — TIMOLOL MALEATE 1 DROP: 5 SOLUTION OPHTHALMIC at 09:19

## 2018-02-04 RX ADMIN — INSULIN LISPRO 5 UNITS: 100 INJECTION, SOLUTION INTRAVENOUS; SUBCUTANEOUS at 11:51

## 2018-02-04 RX ADMIN — ASPIRIN 81 MG: 81 TABLET, COATED ORAL at 09:18

## 2018-02-04 RX ADMIN — METOPROLOL TARTRATE 12.5 MG: 25 TABLET ORAL at 09:18

## 2018-02-04 RX ADMIN — ISOSORBIDE MONONITRATE 30 MG: 30 TABLET, EXTENDED RELEASE ORAL at 09:18

## 2018-02-04 RX ADMIN — ENOXAPARIN SODIUM 40 MG: 40 INJECTION SUBCUTANEOUS at 11:51

## 2018-02-04 RX ADMIN — INSULIN LISPRO 5 UNITS: 100 INJECTION, SOLUTION INTRAVENOUS; SUBCUTANEOUS at 18:00

## 2018-02-04 RX ADMIN — INSULIN LISPRO 2 UNITS: 100 INJECTION, SOLUTION INTRAVENOUS; SUBCUTANEOUS at 00:51

## 2018-02-04 NOTE — PROGRESS NOTES
Hospitalist Progress Note                               Sarah Drake MD                                     Answering service: [phone]                               OR [phone] from in house phone                               Cell: [phone]              Date of Service:  2018  NAME:  Lashanda Chavez  :  1940  MRN:  [id number]      Admission Summary:   Nathaniel Villalta has no complaints,family members helped with translation. Interval history / Subjective:     Ms Caesar Garcia does not speak English,her daughter helped with translation and provided the details of the history. Patient has been having back and chest pain for a while and has been evaluated by PCP. Her BP and dm were uncontrolled and adjustments were made. Her son is mainly exertional.She also could not tolerate lying flat. She had cardiac cath in  when she was found to have mild-moderate 3 vessel disease,no interventionshe was treated medically. Assessment & Plan:   Possibly UA,  -Cardiac enzymes negative,EKG inverted T wave in one of the EKG  -Check Echo  -PE and DVT RULED out. -[facility] tomorrow. Hypertension,daughter notes BP has been running high and pcp has increased lisinopril,she is on hctz.  -She has bump in creatinine,she is on hctz and lisinopril,decrease lisinopril and add imdur    Dehydration,bump in creatinine  -Iv fluids. Decreased lisinopril dose,metformin on hold. -Avoid NSAIDS    DM, fairly controlled. A1C 7.8  -Accucheks. Humalog SS. Lantus while here,can resume home regimen on discharge. Diet:diabetic  Code status: full  DVT prophylaxis: lovenox  Care Plan discussed with,pt and dtr    Discharge planning/disposition:home once work up is  completed.     Hospital Problems  Date Reviewed: 2018          Codes Class Noted POA    Unstable angina ([facility] ICD-10-CM: I20.0  ICD-9-CM: 411.1  2/3/2018 Unknown        * (Principal)Chest pain ICD-10-CM: R07.9  ICD-9-CM: 786.50  2/2/2018 Unknown                Review of Systems:   A comprehensive review of systems was negative except for that written in the HPI. Physical Examination:      Last 24hrs VS reviewed since prior progress note. Most recent are:  Visit Vitals    /63 (BP 1 Location: Right arm, BP Patient Position: Sitting)    Pulse 66    Temp 97.7 °F (36.5 °C)    Resp 14    Ht 5' 6\" (1.676 m)    Wt 96.3 kg (212 lb 4.9 oz)    SpO2 96%    BMI 34.27 kg/m2           Constitutional:  No acute distress, cooperative, pleasant    Eyes: Non icteric,non pallor,no erythema,discharge,PERRLA   ENT:  Oral mucous moist, oropharynx benign. Neck supple,    Resp:  CTA bilaterally. No wheezing/rhonchi/rales. No accessory muscle use   CV:  Regular rhythm, normal rate, no murmurs, gallops, rubs    GI:  Soft, non distended, non tender. normoactive bowel sounds, no hepatosplenomegaly    :  No CVA or suprapubic tenderness   Skin  :  No erythema,rash,bullae,dipigmentation     Musculoskeletal:  No edema, warm, 2+ pulses throughout    Neurologic:  AAOx3, CN II-XII reviewed. Moves all extremities. Psych:  Good insight, Not anxious nor agitated.          Intake/Output Summary (Last 24 hours) at 02/04/18 1446  Last data filed at 02/04/18 1392   Gross per 24 hour   Intake           1987.5 ml   Output                0 ml   Net           1987.5 ml          Data Review:    Review and/or order of clinical lab test  Review and/or order of tests in the radiology section of CPT  Review and/or order of tests in the medicine section of CPT      Labs:     Recent Labs      02/03/18   0445  02/02/18   1623   WBC  7.4  6.7   HGB  12.1  11.8   HCT  36.3  35.3   PLT  116*  129*     Recent Labs      02/04/18   0110  02/03/18   0445  02/02/18   1623   NA  140  143  140   K  3.8  4.2  4.7   CL  107  107  104   CO2  27  28  29   BUN  36*  37*  40*   CREA  1.08*  1.05*  1.22*   GLU  174*  129*  116*   CA  8.5  9.1  8.8 Recent Labs      02/02/18   1623   SGOT  11*   ALT  28   AP  63   TBILI  0.3   TP  7.1   ALB  3.4*   GLOB  3.7     Recent Labs      02/04/18   0110   INR  1.1   PTP  11.4*   APTT  29.1      No results for input(s): FE, TIBC, PSAT, FERR in the last 72 hours. No results found for: FOL, RBCF   No results for input(s): PH, PCO2, PO2 in the last 72 hours.   Recent Labs      02/03/18   0445  02/02/18   2241  02/02/18   1623   TROIQ  <0.04  <0.04  <0.04     Lab Results   Component Value Date/Time    Cholesterol, total 208 02/03/2018 04:45 AM    HDL Cholesterol 41 02/03/2018 04:45 AM    LDL, calculated 124.4 02/03/2018 04:45 AM    Triglyceride 213 02/03/2018 04:45 AM    CHOL/HDL Ratio 5.1 02/03/2018 04:45 AM     Lab Results   Component Value Date/Time    Glucose (POC) 262 02/04/2018 11:37 AM    Glucose (POC) 180 02/04/2018 06:03 AM    Glucose (POC) 172 02/03/2018 11:59 PM    Glucose (POC) 306 02/03/2018 05:37 PM    Glucose (POC) 202 02/03/2018 11:10 AM     Lab Results   Component Value Date/Time    Color YELLOW/STRAW 01/16/2017 10:39 PM    Appearance CLOUDY 01/16/2017 10:39 PM    Specific gravity 1.015 01/16/2017 10:39 PM    Specific gravity 1.010 12/14/2014 11:32 AM    pH (UA) 7.0 01/16/2017 10:39 PM    Protein NEGATIVE  01/16/2017 10:39 PM    Glucose NEGATIVE  01/16/2017 10:39 PM    Ketone NEGATIVE  01/16/2017 10:39 PM    Bilirubin NEGATIVE  01/16/2017 10:39 PM    Urobilinogen 0.2 01/16/2017 10:39 PM    Nitrites NEGATIVE  01/16/2017 10:39 PM    Leukocyte Esterase SMALL 01/16/2017 10:39 PM    Epithelial cells FEW 01/16/2017 10:39 PM    Bacteria NEGATIVE  01/16/2017 10:39 PM    WBC 5-10 01/16/2017 10:39 PM    RBC 0-5 01/16/2017 10:39 PM         Medications Reviewed:     Current Facility-Administered Medications   Medication Dose Route Frequency    metoprolol tartrate (LOPRESSOR) tablet 25 mg  25 mg Oral Q12H    aspirin delayed-release tablet 81 mg  81 mg Oral DAILY    brimonidine (ALPHAGAN) 0.2 % ophthalmic solution 1 Drop  1 Drop Right Eye BID    dicyclomine (BENTYL) capsule 10 mg  10 mg Oral TID PRN    gabapentin (NEURONTIN) capsule 300 mg  300 mg Oral QHS    rosuvastatin (CRESTOR) tablet 40 mg  40 mg Oral DAILY    sodium chloride (NS) flush 5-10 mL  5-10 mL IntraVENous Q8H    sodium chloride (NS) flush 5-10 mL  5-10 mL IntraVENous PRN    0.9% sodium chloride infusion  75 mL/hr IntraVENous CONTINUOUS    nitroglycerin (NITROSTAT) tablet 0.4 mg  0.4 mg SubLINGual Q5MIN PRN    acetaminophen (TYLENOL) tablet 650 mg  650 mg Oral Q4H PRN    pantoprazole (PROTONIX) tablet 40 mg  40 mg Oral ACB    timolol (TIMOPTIC) 0.5 % ophthalmic solution 1 Drop  1 Drop Right Eye BID    influenza vaccine 2017-18 (3 yrs+)(PF) (FLUZONE QUAD/FLUARIX QUAD) injection 0.5 mL  0.5 mL IntraMUSCular PRIOR TO DISCHARGE    lisinopril (PRINIVIL, ZESTRIL) tablet 20 mg  20 mg Oral DAILY    isosorbide mononitrate ER (IMDUR) tablet 30 mg  30 mg Oral DAILY    enoxaparin (LOVENOX) injection 40 mg  40 mg SubCUTAneous Q24H    hydrALAZINE (APRESOLINE) 20 mg/mL injection 10 mg  10 mg IntraVENous Q6H PRN    glucose chewable tablet 16 g  4 Tab Oral PRN    dextrose (D50W) injection syrg 12.5-25 g  12.5-25 g IntraVENous PRN    glucagon (GLUCAGEN) injection 1 mg  1 mg IntraMUSCular PRN    insulin lispro (HUMALOG) injection   SubCUTAneous Q6H     ______________________________________________________________________  EXPECTED LENGTH OF STAY: - - -  ACTUAL LENGTH OF STAY:          1                 Yarely Aguilar MD

## 2018-02-04 NOTE — PROGRESS NOTES
Bedside shift change report given to [address]burn (oncoming nurse) by Yajaira Garza (offgoing nurse). Report included the following information SBAR, Intake/Output, MAR, Recent Results and Cardiac Rhythm NSR.

## 2018-02-04 NOTE — ROUTINE PROCESS
Bedside shift change report given to April (oncoming nurse) by Petr Good (offgoing nurse). Report included the following information SBAR, ED Summary, Procedure Summary, Intake/Output, Recent Results and Cardiac Rhythm NSR.

## 2018-02-04 NOTE — PROGRESS NOTES
Cardiology Progress Note  2018     Admit Date: 2018  Admit Diagnosis: Chest pain  [facility]  CC: none currently    Assessment:   Principal Problem:    Chest pain (2018)    Active Problems:    Unstable angina ([facility] [address] (2/3/2018)      Plan:     Troponin negative  No more pain  LDL above goal  Increased metoprolol  Cont current meds  NPO at [facility] for [facility]    Subjective:      Karis Zabala has no c/o this AM. LORENE o/n      Objective:    Physical Exam:  Overall VSSAF;    Visit Vitals    /54 (BP 1 Location: Right arm, BP Patient Position: At rest)    Pulse 68    Temp 97.5 °F (36.4 °C)    Resp 18    Ht 5' 6\" (1.676 m)    Wt 96.3 kg (212 lb 4.9 oz)    SpO2 94%    BMI 34.27 kg/m2     Temp (24hrs), Av.6 °F (36.4 °C), Min:97.1 °F (36.2 °C), Max:98.1 °F (36.7 °C)    Patient Vitals for the past 8 hrs:   Pulse   18 0817 68    Patient Vitals for the past 8 hrs:   Resp   18 0817 18    Patient Vitals for the past 8 hrs:   BP   18 0817 143/54       1901 -  0700  In: 2185 [P.O.:270; I.V.:1915]  Out: -       General Appearance: Well developed, well nourished, no acute distress. Ears/Nose/Mouth/Throat:   Normal MM; anicteric. JVP: WNL   Resp:   Lungs clear to auscultation bilaterally. Nl resp effort. Cardiovascular:  RRR, S1, S2 normal, no new murmur. No gallop or rub. Abdomen:   Soft, non-tender, bowel sounds are present. Extremities: No edema bilaterally. Skin:  Neuro: Warm and dry.   A/O x3, grossly nonfocal                         Data Review:       Labs:   Recent Results (from the past 24 hour(s))   GLUCOSE, POC    Collection Time: 18  5:37 PM   Result Value Ref Range    Glucose (POC) 306 (H) 65 - 100 mg/dL    Performed by Babara Rubinstein ([facility])    GLUCOSE, POC    Collection Time: 18 11:59 PM   Result Value Ref Range    Glucose (POC) 172 (H) 65 - 100 mg/dL    Performed by Roque Hedrick    METABOLIC PANEL, BASIC    Collection Time: 18 1:10 AM   Result Value Ref Range    Sodium 140 136 - 145 mmol/L    Potassium 3.8 3.5 - 5.1 mmol/L    Chloride 107 97 - 108 mmol/L    CO2 27 21 - 32 mmol/L    Anion gap 6 5 - 15 mmol/L    Glucose 174 (H) 65 - 100 mg/dL    BUN 36 (H) 6 - 20 MG/DL    Creatinine 1.08 (H) 0.55 - 1.02 MG/DL    BUN/Creatinine ratio 33 (H) 12 - 20      GFR est AA 59 (L) >60 ml/min/1.73m2    GFR est non-AA 49 (L) >60 ml/min/1.73m2    Calcium 8.5 8.5 - 10.1 MG/DL   PROTHROMBIN TIME + INR    Collection Time: 02/04/18  1:10 AM   Result Value Ref Range    INR 1.1 0.9 - 1.1      Prothrombin time 11.4 (H) 9.0 - 11.1 sec   PTT    Collection Time: 02/04/18  1:10 AM   Result Value Ref Range    aPTT 29.1 22.1 - 32.5 sec    aPTT, therapeutic range     58.0 - 77.0 SECS   GLUCOSE, POC    Collection Time: 02/04/18  6:03 AM   Result Value Ref Range    Glucose (POC) 180 (H) 65 - 100 mg/dL    Performed by Jazzy Figueroa    GLUCOSE, POC    Collection Time: 02/04/18 11:37 AM   Result Value Ref Range    Glucose (POC) 262 (H) 65 - 100 mg/dL    Performed by Francy Mcclure       Current medications reviewed       Deann Jang MD

## 2018-02-05 LAB
ADMINISTERED INITIALS, ADMINIT: NORMAL
ALBUMIN SERPL-MCNC: 3.1 G/DL (ref 3.5–5)
ALBUMIN/GLOB SERPL: 0.9 {RATIO} (ref 1.1–2.2)
ALP SERPL-CCNC: 57 U/L (ref 45–117)
ALT SERPL-CCNC: 25 U/L (ref 12–78)
ANION GAP SERPL CALC-SCNC: 8 MMOL/L (ref 5–15)
APPEARANCE UR: CLEAR
APTT PPP: 23.5 SEC (ref 22.1–32.5)
ARTERIAL PATENCY WRIST A: ABNORMAL
ARTERIAL PATENCY WRIST A: YES
AST SERPL-CCNC: 13 U/L (ref 15–37)
BACTERIA URNS QL MICRO: NEGATIVE /HPF
BASE EXCESS BLD CALC-SCNC: 4 MMOL/L
BASE EXCESS BLD CALC-SCNC: 6 MMOL/L
BASOPHILS # BLD: 0 K/UL (ref 0–0.1)
BASOPHILS NFR BLD: 1 % (ref 0–1)
BDY SITE: ABNORMAL
BDY SITE: ABNORMAL
BILIRUB SERPL-MCNC: 0.3 MG/DL (ref 0.2–1)
BILIRUB UR QL: NEGATIVE
BUN SERPL-MCNC: 25 MG/DL (ref 6–20)
BUN/CREAT SERPL: 29 (ref 12–20)
CALCIUM SERPL-MCNC: 9 MG/DL (ref 8.5–10.1)
CHLORIDE SERPL-SCNC: 107 MMOL/L (ref 97–108)
CO2 SERPL-SCNC: 25 MMOL/L (ref 21–32)
COLOR UR: ABNORMAL
CREAT SERPL-MCNC: 0.85 MG/DL (ref 0.55–1.02)
D50 ADMINISTERED, D50ADM: 0 ML
D50 ORDER, D50ORD: 0 ML
DIFFERENTIAL METHOD BLD: ABNORMAL
EOSINOPHIL # BLD: 0.2 K/UL (ref 0–0.4)
EOSINOPHIL NFR BLD: 4 % (ref 0–7)
EPITH CASTS URNS QL MICRO: ABNORMAL /LPF
ERYTHROCYTE [DISTWIDTH] IN BLOOD BY AUTOMATED COUNT: 12.4 % (ref 11.5–14.5)
EST. AVERAGE GLUCOSE BLD GHB EST-MCNC: 183 MG/DL
GAS FLOW.O2 O2 DELIVERY SYS: ABNORMAL L/MIN
GAS FLOW.O2 O2 DELIVERY SYS: ABNORMAL L/MIN
GLOBULIN SER CALC-MCNC: 3.6 G/DL (ref 2–4)
GLSCOM COMMENTS: NORMAL
GLUCOSE BLD STRIP.AUTO-MCNC: 135 MG/DL (ref 65–100)
GLUCOSE BLD STRIP.AUTO-MCNC: 173 MG/DL (ref 65–100)
GLUCOSE BLD STRIP.AUTO-MCNC: 225 MG/DL (ref 65–100)
GLUCOSE BLD STRIP.AUTO-MCNC: 234 MG/DL (ref 65–100)
GLUCOSE BLD STRIP.AUTO-MCNC: 250 MG/DL (ref 65–100)
GLUCOSE BLD STRIP.AUTO-MCNC: 257 MG/DL (ref 65–100)
GLUCOSE BLD STRIP.AUTO-MCNC: 264 MG/DL (ref 65–100)
GLUCOSE BLD STRIP.AUTO-MCNC: 278 MG/DL (ref 65–100)
GLUCOSE SERPL-MCNC: 212 MG/DL (ref 65–100)
GLUCOSE UR STRIP.AUTO-MCNC: NEGATIVE MG/DL
GLUCOSE, GLC: 135 MG/DL
GLUCOSE, GLC: 173 MG/DL
GLUCOSE, GLC: 225 MG/DL
GLUCOSE, GLC: 257 MG/DL
GLUCOSE, GLC: 264 MG/DL
GLUCOSE, GLC: 278 MG/DL
HBA1C MFR BLD: 8 % (ref 4.2–6.3)
HCO3 BLD-SCNC: 27.4 MMOL/L (ref 22–26)
HCO3 BLD-SCNC: 29.1 MMOL/L (ref 22–26)
HCT VFR BLD AUTO: 34.7 % (ref 35–47)
HGB BLD-MCNC: 11.7 G/DL (ref 11.5–16)
HGB UR QL STRIP: NEGATIVE
HIGH TARGET, HITG: 130 MG/DL
HYALINE CASTS URNS QL MICRO: ABNORMAL /LPF (ref 0–5)
IMM GRANULOCYTES # BLD: 0 K/UL (ref 0–0.04)
IMM GRANULOCYTES NFR BLD AUTO: 0 % (ref 0–0.5)
INR PPP: 1.1 (ref 0.9–1.1)
INSULIN ADMINSTERED, INSADM: 10.2 UNITS/HOUR
INSULIN ADMINSTERED, INSADM: 6 UNITS/HOUR
INSULIN ADMINSTERED, INSADM: 6.5 UNITS/HOUR
INSULIN ADMINSTERED, INSADM: 7.9 UNITS/HOUR
INSULIN ADMINSTERED, INSADM: 7.9 UNITS/HOUR
INSULIN ADMINSTERED, INSADM: 9.9 UNITS/HOUR
INSULIN ORDER, INSORD: 10.2 UNITS/HOUR
INSULIN ORDER, INSORD: 6 UNITS/HOUR
INSULIN ORDER, INSORD: 6.5 UNITS/HOUR
INSULIN ORDER, INSORD: 7.9 UNITS/HOUR
INSULIN ORDER, INSORD: 7.9 UNITS/HOUR
INSULIN ORDER, INSORD: 9.9 UNITS/HOUR
KETONES UR QL STRIP.AUTO: NEGATIVE MG/DL
LEUKOCYTE ESTERASE UR QL STRIP.AUTO: NEGATIVE
LOW TARGET, LOT: 95 MG/DL
LYMPHOCYTES # BLD: 2.9 K/UL (ref 0.8–3.5)
LYMPHOCYTES NFR BLD: 44 % (ref 12–49)
MAGNESIUM SERPL-MCNC: 1.5 MG/DL (ref 1.6–2.4)
MCH RBC QN AUTO: 31 PG (ref 26–34)
MCHC RBC AUTO-ENTMCNC: 33.7 G/DL (ref 30–36.5)
MCV RBC AUTO: 92 FL (ref 80–99)
MINUTES UNTIL NEXT BG, NBG: 60 MIN
MONOCYTES # BLD: 0.5 K/UL (ref 0–1)
MONOCYTES NFR BLD: 7 % (ref 5–13)
MULTIPLIER, MUL: 0.03
MULTIPLIER, MUL: 0.04
MULTIPLIER, MUL: 0.05
MULTIPLIER, MUL: 0.06
MULTIPLIER, MUL: 0.07
MULTIPLIER, MUL: 0.08
NEUTS SEG # BLD: 3 K/UL (ref 1.8–8)
NEUTS SEG NFR BLD: 45 % (ref 32–75)
NITRITE UR QL STRIP.AUTO: NEGATIVE
NRBC # BLD: 0 K/UL (ref 0–0.01)
NRBC BLD-RTO: 0 PER 100 WBC
ORDER INITIALS, ORDINIT: NORMAL
PCO2 BLD: 37.6 MMHG (ref 35–45)
PCO2 BLD: 38.2 MMHG (ref 35–45)
PH BLD: 7.46 [PH] (ref 7.35–7.45)
PH BLD: 7.5 [PH] (ref 7.35–7.45)
PH UR STRIP: 6 [PH] (ref 5–8)
PLATELET # BLD AUTO: 121 K/UL (ref 150–400)
PMV BLD AUTO: 12.7 FL (ref 8.9–12.9)
PO2 BLD: 72 MMHG (ref 80–100)
PO2 BLD: 85 MMHG (ref 80–100)
POTASSIUM SERPL-SCNC: 3.7 MMOL/L (ref 3.5–5.1)
PROT SERPL-MCNC: 6.7 G/DL (ref 6.4–8.2)
PROT UR STRIP-MCNC: 100 MG/DL
PROTHROMBIN TIME: 10.7 SEC (ref 9–11.1)
RBC # BLD AUTO: 3.77 M/UL (ref 3.8–5.2)
RBC #/AREA URNS HPF: ABNORMAL /HPF (ref 0–5)
SAO2 % BLD: 95 % (ref 92–97)
SAO2 % BLD: 97 % (ref 92–97)
SERVICE CMNT-IMP: ABNORMAL
SODIUM SERPL-SCNC: 140 MMOL/L (ref 136–145)
SP GR UR REFRACTOMETRY: 1.01 (ref 1–1.03)
SPECIMEN TYPE: ABNORMAL
SPECIMEN TYPE: ABNORMAL
THERAPEUTIC RANGE,PTTT: NORMAL SECS (ref 58–77)
TOTAL RESP. RATE, ITRR: 15
UA: UC IF INDICATED,UAUC: ABNORMAL
UROBILINOGEN UR QL STRIP.AUTO: 0.2 EU/DL (ref 0.2–1)
WBC # BLD AUTO: 6.6 K/UL (ref 3.6–11)
WBC URNS QL MICRO: ABNORMAL /HPF (ref 0–4)

## 2018-02-05 PROCEDURE — 74011250637 HC RX REV CODE- 250/637: Performed by: FAMILY MEDICINE

## 2018-02-05 PROCEDURE — 65660000000 HC RM CCU STEPDOWN

## 2018-02-05 PROCEDURE — 77030013744

## 2018-02-05 PROCEDURE — 74011636320 HC RX REV CODE- 636/320: Performed by: INTERNAL MEDICINE

## 2018-02-05 PROCEDURE — 93922 UPR/L XTREMITY ART 2 LEVELS: CPT

## 2018-02-05 PROCEDURE — 74011636637 HC RX REV CODE- 636/637: Performed by: PHYSICIAN ASSISTANT

## 2018-02-05 PROCEDURE — B2111ZZ FLUOROSCOPY OF MULTIPLE CORONARY ARTERIES USING LOW OSMOLAR CONTRAST: ICD-10-PCS | Performed by: INTERNAL MEDICINE

## 2018-02-05 PROCEDURE — B2151ZZ FLUOROSCOPY OF LEFT HEART USING LOW OSMOLAR CONTRAST: ICD-10-PCS | Performed by: INTERNAL MEDICINE

## 2018-02-05 PROCEDURE — 77030019569 HC BND COMPR RAD TERU -B

## 2018-02-05 PROCEDURE — 74011250637 HC RX REV CODE- 250/637: Performed by: INTERNAL MEDICINE

## 2018-02-05 PROCEDURE — 85730 THROMBOPLASTIN TIME PARTIAL: CPT | Performed by: PHYSICIAN ASSISTANT

## 2018-02-05 PROCEDURE — 74011250636 HC RX REV CODE- 250/636: Performed by: INTERNAL MEDICINE

## 2018-02-05 PROCEDURE — C1769 GUIDE WIRE: HCPCS

## 2018-02-05 PROCEDURE — 36415 COLL VENOUS BLD VENIPUNCTURE: CPT | Performed by: PHYSICIAN ASSISTANT

## 2018-02-05 PROCEDURE — 77030004532 HC CATH ANGI DX IMP BSC -A

## 2018-02-05 PROCEDURE — 83735 ASSAY OF MAGNESIUM: CPT | Performed by: PHYSICIAN ASSISTANT

## 2018-02-05 PROCEDURE — 36600 WITHDRAWAL OF ARTERIAL BLOOD: CPT

## 2018-02-05 PROCEDURE — 74011250637 HC RX REV CODE- 250/637: Performed by: PHYSICIAN ASSISTANT

## 2018-02-05 PROCEDURE — 94010 BREATHING CAPACITY TEST: CPT

## 2018-02-05 PROCEDURE — 82962 GLUCOSE BLOOD TEST: CPT

## 2018-02-05 PROCEDURE — C1894 INTRO/SHEATH, NON-LASER: HCPCS

## 2018-02-05 PROCEDURE — 74011000258 HC RX REV CODE- 258: Performed by: PHYSICIAN ASSISTANT

## 2018-02-05 PROCEDURE — 80053 COMPREHEN METABOLIC PANEL: CPT | Performed by: PHYSICIAN ASSISTANT

## 2018-02-05 PROCEDURE — 74011250636 HC RX REV CODE- 250/636: Performed by: HOSPITALIST

## 2018-02-05 PROCEDURE — 74011000250 HC RX REV CODE- 250: Performed by: INTERNAL MEDICINE

## 2018-02-05 PROCEDURE — 83036 HEMOGLOBIN GLYCOSYLATED A1C: CPT | Performed by: PHYSICIAN ASSISTANT

## 2018-02-05 PROCEDURE — 85025 COMPLETE CBC W/AUTO DIFF WBC: CPT | Performed by: PHYSICIAN ASSISTANT

## 2018-02-05 PROCEDURE — 86923 COMPATIBILITY TEST ELECTRIC: CPT | Performed by: PHYSICIAN ASSISTANT

## 2018-02-05 PROCEDURE — 81001 URINALYSIS AUTO W/SCOPE: CPT | Performed by: PHYSICIAN ASSISTANT

## 2018-02-05 PROCEDURE — 82803 BLOOD GASES ANY COMBINATION: CPT

## 2018-02-05 PROCEDURE — 74011636637 HC RX REV CODE- 636/637: Performed by: HOSPITALIST

## 2018-02-05 PROCEDURE — 99153 MOD SED SAME PHYS/QHP EA: CPT

## 2018-02-05 PROCEDURE — 74011250636 HC RX REV CODE- 250/636: Performed by: FAMILY MEDICINE

## 2018-02-05 PROCEDURE — 85610 PROTHROMBIN TIME: CPT | Performed by: PHYSICIAN ASSISTANT

## 2018-02-05 PROCEDURE — 4A023N7 MEASUREMENT OF CARDIAC SAMPLING AND PRESSURE, LEFT HEART, PERCUTANEOUS APPROACH: ICD-10-PCS | Performed by: INTERNAL MEDICINE

## 2018-02-05 PROCEDURE — 86850 RBC ANTIBODY SCREEN: CPT | Performed by: PHYSICIAN ASSISTANT

## 2018-02-05 RX ORDER — HEPARIN SODIUM 1000 [USP'U]/ML
5000 INJECTION, SOLUTION INTRAVENOUS; SUBCUTANEOUS
Status: DISCONTINUED | OUTPATIENT
Start: 2018-02-05 | End: 2018-02-05 | Stop reason: ALTCHOICE

## 2018-02-05 RX ORDER — HEPARIN SODIUM 200 [USP'U]/100ML
1000 INJECTION, SOLUTION INTRAVENOUS CONTINUOUS
Status: DISCONTINUED | OUTPATIENT
Start: 2018-02-05 | End: 2018-02-05 | Stop reason: ALTCHOICE

## 2018-02-05 RX ORDER — INSULIN GLARGINE 100 [IU]/ML
15 INJECTION, SOLUTION SUBCUTANEOUS
Status: DISCONTINUED | OUTPATIENT
Start: 2018-02-05 | End: 2018-02-05

## 2018-02-05 RX ORDER — ATROPINE SULFATE 0.1 MG/ML
0.5 INJECTION INTRAVENOUS AS NEEDED
Status: DISCONTINUED | OUTPATIENT
Start: 2018-02-05 | End: 2018-02-05 | Stop reason: ALTCHOICE

## 2018-02-05 RX ORDER — CHLORHEXIDINE GLUCONATE 1.2 MG/ML
15 RINSE ORAL EVERY 12 HOURS
Status: DISCONTINUED | OUTPATIENT
Start: 2018-02-05 | End: 2018-02-06

## 2018-02-05 RX ORDER — SODIUM CHLORIDE 9 MG/ML
1.5 INJECTION, SOLUTION INTRAVENOUS CONTINUOUS
Status: DISCONTINUED | OUTPATIENT
Start: 2018-02-05 | End: 2018-02-06

## 2018-02-05 RX ORDER — INSULIN GLARGINE 100 [IU]/ML
1-50 INJECTION, SOLUTION SUBCUTANEOUS
Status: DISCONTINUED | OUTPATIENT
Start: 2018-02-05 | End: 2018-02-05

## 2018-02-05 RX ORDER — VERAPAMIL HYDROCHLORIDE 2.5 MG/ML
2.5-5 INJECTION, SOLUTION INTRAVENOUS
Status: DISCONTINUED | OUTPATIENT
Start: 2018-02-05 | End: 2018-02-05 | Stop reason: ALTCHOICE

## 2018-02-05 RX ORDER — FENTANYL CITRATE 50 UG/ML
25-200 INJECTION, SOLUTION INTRAMUSCULAR; INTRAVENOUS
Status: DISCONTINUED | OUTPATIENT
Start: 2018-02-05 | End: 2018-02-05 | Stop reason: ALTCHOICE

## 2018-02-05 RX ORDER — INSULIN LISPRO 100 [IU]/ML
INJECTION, SOLUTION INTRAVENOUS; SUBCUTANEOUS
Status: DISCONTINUED | OUTPATIENT
Start: 2018-02-05 | End: 2018-02-05

## 2018-02-05 RX ORDER — MAGNESIUM SULFATE 100 %
4 CRYSTALS MISCELLANEOUS AS NEEDED
Status: DISCONTINUED | OUTPATIENT
Start: 2018-02-05 | End: 2018-02-06

## 2018-02-05 RX ORDER — LIDOCAINE HYDROCHLORIDE 10 MG/ML
4-30 INJECTION INFILTRATION; PERINEURAL
Status: DISCONTINUED | OUTPATIENT
Start: 2018-02-05 | End: 2018-02-05 | Stop reason: ALTCHOICE

## 2018-02-05 RX ORDER — SODIUM CHLORIDE 9 MG/ML
3 INJECTION, SOLUTION INTRAVENOUS CONTINUOUS
Status: DISCONTINUED | OUTPATIENT
Start: 2018-02-05 | End: 2018-02-05 | Stop reason: HOSPADM

## 2018-02-05 RX ORDER — MIDAZOLAM HYDROCHLORIDE 1 MG/ML
.5-1 INJECTION, SOLUTION INTRAMUSCULAR; INTRAVENOUS
Status: DISCONTINUED | OUTPATIENT
Start: 2018-02-05 | End: 2018-02-05 | Stop reason: ALTCHOICE

## 2018-02-05 RX ORDER — HEPARIN SODIUM 200 [USP'U]/100ML
1000 INJECTION, SOLUTION INTRAVENOUS
Status: DISCONTINUED | OUTPATIENT
Start: 2018-02-05 | End: 2018-02-05 | Stop reason: ALTCHOICE

## 2018-02-05 RX ORDER — CLOPIDOGREL 300 MG/1
600 TABLET, FILM COATED ORAL
Status: DISCONTINUED | OUTPATIENT
Start: 2018-02-05 | End: 2018-02-05 | Stop reason: ALTCHOICE

## 2018-02-05 RX ORDER — SODIUM CHLORIDE 0.9 % (FLUSH) 0.9 %
10 SYRINGE (ML) INJECTION AS NEEDED
Status: DISCONTINUED | OUTPATIENT
Start: 2018-02-05 | End: 2018-02-05 | Stop reason: ALTCHOICE

## 2018-02-05 RX ORDER — DEXTROSE 50 % IN WATER (D50W) INTRAVENOUS SYRINGE
12.5-25 AS NEEDED
Status: DISCONTINUED | OUTPATIENT
Start: 2018-02-05 | End: 2018-02-06

## 2018-02-05 RX ORDER — MUPIROCIN 20 MG/G
OINTMENT TOPICAL 2 TIMES DAILY
Status: DISCONTINUED | OUTPATIENT
Start: 2018-02-05 | End: 2018-02-06

## 2018-02-05 RX ORDER — CLOPIDOGREL 300 MG/1
600 TABLET, FILM COATED ORAL ONCE
Status: DISCONTINUED | OUTPATIENT
Start: 2018-02-05 | End: 2018-02-05 | Stop reason: ALTCHOICE

## 2018-02-05 RX ORDER — VERAPAMIL HYDROCHLORIDE 2.5 MG/ML
2.5-5 INJECTION, SOLUTION INTRAVENOUS
Status: DISCONTINUED | OUTPATIENT
Start: 2018-02-05 | End: 2018-02-05

## 2018-02-05 RX ORDER — SODIUM CHLORIDE 0.9 % (FLUSH) 0.9 %
5-10 SYRINGE (ML) INJECTION EVERY 8 HOURS
Status: DISCONTINUED | OUTPATIENT
Start: 2018-02-05 | End: 2018-02-06 | Stop reason: HOSPADM

## 2018-02-05 RX ORDER — CEFAZOLIN SODIUM/WATER 2 G/20 ML
2 SYRINGE (ML) INTRAVENOUS
Status: ACTIVE | OUTPATIENT
Start: 2018-02-05 | End: 2018-02-06

## 2018-02-05 RX ORDER — ATROPINE SULFATE 0.1 MG/ML
1 INJECTION INTRAVENOUS AS NEEDED
Status: DISCONTINUED | OUTPATIENT
Start: 2018-02-05 | End: 2018-02-05 | Stop reason: ALTCHOICE

## 2018-02-05 RX ORDER — SODIUM CHLORIDE 0.9 % (FLUSH) 0.9 %
5 SYRINGE (ML) INJECTION AS NEEDED
Status: DISCONTINUED | OUTPATIENT
Start: 2018-02-05 | End: 2018-02-05 | Stop reason: ALTCHOICE

## 2018-02-05 RX ORDER — LIDOCAINE HYDROCHLORIDE 10 MG/ML
10-30 INJECTION INFILTRATION; PERINEURAL ONCE
Status: DISCONTINUED | OUTPATIENT
Start: 2018-02-05 | End: 2018-02-05 | Stop reason: ALTCHOICE

## 2018-02-05 RX ORDER — SODIUM CHLORIDE 0.9 % (FLUSH) 0.9 %
5-10 SYRINGE (ML) INJECTION AS NEEDED
Status: DISCONTINUED | OUTPATIENT
Start: 2018-02-05 | End: 2018-02-06 | Stop reason: HOSPADM

## 2018-02-05 RX ORDER — MIDAZOLAM HYDROCHLORIDE 1 MG/ML
1-10 INJECTION, SOLUTION INTRAMUSCULAR; INTRAVENOUS
Status: DISCONTINUED | OUTPATIENT
Start: 2018-02-05 | End: 2018-02-05 | Stop reason: ALTCHOICE

## 2018-02-05 RX ORDER — HEPARIN SODIUM 1000 [USP'U]/ML
1000-5000 INJECTION, SOLUTION INTRAVENOUS; SUBCUTANEOUS ONCE
Status: DISCONTINUED | OUTPATIENT
Start: 2018-02-05 | End: 2018-02-05 | Stop reason: ALTCHOICE

## 2018-02-05 RX ORDER — AMIODARONE HYDROCHLORIDE 200 MG/1
400 TABLET ORAL EVERY 12 HOURS
Status: DISCONTINUED | OUTPATIENT
Start: 2018-02-05 | End: 2018-02-06 | Stop reason: HOSPADM

## 2018-02-05 RX ADMIN — IOPAMIDOL 107 ML: 755 INJECTION, SOLUTION INTRAVENOUS at 11:00

## 2018-02-05 RX ADMIN — Medication 10 ML: at 11:09

## 2018-02-05 RX ADMIN — GABAPENTIN 300 MG: 300 CAPSULE ORAL at 22:42

## 2018-02-05 RX ADMIN — METOPROLOL TARTRATE 25 MG: 25 TABLET ORAL at 22:42

## 2018-02-05 RX ADMIN — MIDAZOLAM HYDROCHLORIDE 2 MG: 1 INJECTION, SOLUTION INTRAMUSCULAR; INTRAVENOUS at 11:10

## 2018-02-05 RX ADMIN — INSULIN LISPRO 5 UNITS: 100 INJECTION, SOLUTION INTRAVENOUS; SUBCUTANEOUS at 07:03

## 2018-02-05 RX ADMIN — Medication 10 ML: at 15:14

## 2018-02-05 RX ADMIN — FENTANYL CITRATE 25 MCG: 50 INJECTION, SOLUTION INTRAMUSCULAR; INTRAVENOUS at 11:10

## 2018-02-05 RX ADMIN — SODIUM CHLORIDE 75 ML/HR: 900 INJECTION, SOLUTION INTRAVENOUS at 14:45

## 2018-02-05 RX ADMIN — LIDOCAINE HYDROCHLORIDE 4 ML: 10 INJECTION, SOLUTION INFILTRATION; PERINEURAL at 11:18

## 2018-02-05 RX ADMIN — HEPARIN SODIUM IN SODIUM CHLORIDE 2000 UNITS: 200 INJECTION INTRAVENOUS at 11:20

## 2018-02-05 RX ADMIN — TIMOLOL MALEATE 1 DROP: 5 SOLUTION OPHTHALMIC at 22:50

## 2018-02-05 RX ADMIN — Medication 10 ML: at 22:42

## 2018-02-05 RX ADMIN — ENOXAPARIN SODIUM 40 MG: 40 INJECTION SUBCUTANEOUS at 15:13

## 2018-02-05 RX ADMIN — FENTANYL CITRATE 25 MCG: 50 INJECTION, SOLUTION INTRAMUSCULAR; INTRAVENOUS at 11:58

## 2018-02-05 RX ADMIN — LIDOCAINE HYDROCHLORIDE 5 ML: 10 INJECTION, SOLUTION INFILTRATION; PERINEURAL at 11:33

## 2018-02-05 RX ADMIN — AMIODARONE HYDROCHLORIDE 400 MG: 200 TABLET ORAL at 22:42

## 2018-02-05 RX ADMIN — HEPARIN SODIUM 3000 UNITS: 1000 INJECTION, SOLUTION INTRAVENOUS; SUBCUTANEOUS at 11:23

## 2018-02-05 RX ADMIN — CHLORHEXIDINE GLUCONATE 15 ML: 1.2 RINSE ORAL at 22:54

## 2018-02-05 RX ADMIN — MIDAZOLAM HYDROCHLORIDE 1 MG: 1 INJECTION, SOLUTION INTRAMUSCULAR; INTRAVENOUS at 11:23

## 2018-02-05 RX ADMIN — FENTANYL CITRATE 25 MCG: 50 INJECTION, SOLUTION INTRAMUSCULAR; INTRAVENOUS at 11:19

## 2018-02-05 RX ADMIN — MUPIROCIN: 20 OINTMENT TOPICAL at 22:52

## 2018-02-05 RX ADMIN — Medication 10 ML: at 15:13

## 2018-02-05 RX ADMIN — METOPROLOL TARTRATE 25 MG: 25 TABLET ORAL at 13:40

## 2018-02-05 RX ADMIN — VERAPAMIL HYDROCHLORIDE 5 MG: 2.5 INJECTION INTRAVENOUS at 11:23

## 2018-02-05 RX ADMIN — MIDAZOLAM HYDROCHLORIDE 1 MG: 1 INJECTION, SOLUTION INTRAMUSCULAR; INTRAVENOUS at 11:19

## 2018-02-05 RX ADMIN — SODIUM CHLORIDE 7.9 UNITS/HR: 900 INJECTION, SOLUTION INTRAVENOUS at 20:00

## 2018-02-05 RX ADMIN — AMIODARONE HYDROCHLORIDE 400 MG: 200 TABLET ORAL at 15:13

## 2018-02-05 RX ADMIN — FENTANYL CITRATE 25 MCG: 50 INJECTION, SOLUTION INTRAMUSCULAR; INTRAVENOUS at 11:35

## 2018-02-05 RX ADMIN — MIDAZOLAM HYDROCHLORIDE 1 MG: 1 INJECTION, SOLUTION INTRAMUSCULAR; INTRAVENOUS at 11:35

## 2018-02-05 RX ADMIN — HYDRALAZINE HYDROCHLORIDE 10 MG: 20 INJECTION INTRAMUSCULAR; INTRAVENOUS at 15:14

## 2018-02-05 RX ADMIN — BRIMONIDINE TARTRATE 1 DROP: 2 SOLUTION OPHTHALMIC at 22:50

## 2018-02-05 RX ADMIN — SODIUM CHLORIDE 3 ML/KG/HR: 900 INJECTION, SOLUTION INTRAVENOUS at 07:05

## 2018-02-05 NOTE — CONSULTS
CSS Consult    Subjective:      Bianca Meza is a 65 y.o. female who was referred for cardiac evaluation from Dr. Rere Burgess. Patient speaks only [place]. Information obtained from family and chart review. Patient had a chief complaint of shortness of breath worsening over the past 2 weeks. Shortness of breath worse with exertion. This has also been accompanied by chest pain. Troponins were negative in the ED. Some mild ST changes on EKG in ED. Past medical history significant for hypertension, hyperlipidemia, glaucoma, DM, CKD stage 3. Denies smoking and alcohol. Cardiac Testing:    Cardiac catheterization:  3V CAD, moderately elevated LVEDP, normal LVEF    ECHO  Size was normal. Systolic function was normal. Ejection  fraction was estimated to be 60 %. There was mild hypokinesis of the  apical wall(s). Wall thickness was mildly increased. DOPPLER: Doppler  parameters were consistent with abnormal left ventricular relaxation  (grade 1 diastolic dysfunction). RIGHT VENTRICLE: The size was normal. Systolic function was normal.    LEFT ATRIUM: Size was normal.    RIGHT ATRIUM: Size was normal.    MITRAL VALVE: There was annular calcification. There was minimal  thickening. DOPPLER: There was no significant regurgitation. AORTIC VALVE: The valve was trileaflet. DOPPLER: There was no stenosis. There was no significant regurgitation. TRICUSPID VALVE: Normal valve structure. DOPPLER: There was no significant  regurgitation. Insufficient tricuspid regurgitation to estimate pulmonary  artery pressure. PULMONIC VALVE: Not well visualized. AORTA: The root exhibited normal size. PERICARDIUM: Insignificant pericardial effusion and/or pericardial fat was  present.     Past Medical History:   Diagnosis Date    CAD (coronary artery disease)     calcium score 229 - 2011, cath 4/2009 - [facility]    Cataract     CKD (chronic kidney disease) stage 3, GFR 30-59 ml/min     Cystoid macular degeneration of right eye     Diabetes ([address]     Diabetic neuropathy ([facility]     Diastolic dysfunction     Esophageal reflux 4/9/2015    Foot ulcer ([address]     Glaucoma     right    Hypercholesterolemia     Hyperlipidemia 1/23/2015    Hypertension     Macular degeneration     Proliferative diabetic retinopathy ([facility]     Rupture of ulnar collateral ligament of thumb     Sebaceous cyst 4/4/2017    Urge incontinence     Vitreous hemorrhage of both eyes (HCC)      Past Surgical History:   Procedure Laterality Date    CARDIAC SURG PROCEDURE UNLIST      HX CATARACT REMOVAL Left     HX CHOLECYSTECTOMY        Social History   Substance Use Topics    Smoking status: Never Smoker    Smokeless tobacco: Never Used    Alcohol use No      Family History   Problem Relation Age of Onset    Family history unknown: Yes     Prior to Admission medications    Medication Sig Start Date End Date Taking? Authorizing Provider   rosuvastatin (CRESTOR) 40 mg tablet Take 1 Tab by mouth daily. 1/31/18  Yes Michael Solis MD   metFORMIN ER (GLUCOPHAGE XR) 500 mg tablet TAKE 2 TABLETS BY MOUTH DAILY WITH DINNER 1/28/18  Yes Michael Solis MD   lisinopril (PRINIVIL, ZESTRIL) 40 mg tablet Take 1 Tab by mouth daily. 1/22/18  Yes Michael Solis MD   gabapentin (NEURONTIN) 300 mg capsule Take 1 Cap by mouth three (3) times daily. Patient taking differently: Take 300 mg by mouth nightly. Takes only QHS 1/8/18  Yes Michael Solis MD   polyethylene glycol [facility]) 17 gram/dose powder USE 1 TABLESPOONFUL AS DIRECTED BY MOUTH EVERY DAY 7/14/17  Yes Michael Solis MD   esomeprazole (NEXIUM) 20 mg capsule TAKE ONE CAPSULE BY MOUTH DAILY 4/16/17  Yes Michael Solis MD   JANUVIA 100 mg tablet TAKE 1 TABLET BY MOUTH DAILY 4/5/17  Yes MD ISRAEL Momin 0.2-0.5 % drop ophthalmic solution INSTILL 1 DROP IN RIGHT EYE EVERY MORNING AND NIGHT.  3/16/17  Yes Historical Provider   dicyclomine (BENTYL) 10 mg capsule Take 1 Cap by mouth three (3) times daily as needed. For cramping 3/20/17  Yes Fabricio Bains MD   hydroCHLOROthiazide (HYDRODIURIL) 25 mg tablet TAKE 1 TABLET BY MOUTH EVERY DAY 4/30 3/19/17  Yes Fabricio Bains MD   naproxen sodium (ALEVE) 220 mg tablet Take 1 Tab by mouth two (2) times daily (with meals). X 1 week as directed 1/6/17  Yes Fabricio Bains MD   aspirin delayed-release 81 mg tablet Take 81 mg by mouth daily. Yes Phys MD Sybil   ergocalciferol (ERGOCALCIFEROL) 50,000 unit capsule TAKE ONE CAPSULE BY MOUTH EVERY 7 DAYS 1/31/18   Fabricio Bains MD   Insulin Lisp & Lisp Prot, Hum, (HUMALOG MIX 75-25 KWIKPEN) 100 unit/mL (75-25) inpn 34 units with breakfast and 32 units with dinner  Patient taking differently: 33 units with breakfast and 33 units with dinner 1/22/18   Fabricio Bains MD       Allergies   Allergen Reactions    Ibuprofen Unknown (comments)    Norvasc [Amlodipine] Other (comments)     LE swelling     Review of Systems:   Consititutional: Denies fever or chills. Eyes:  Denies use of glasses or vision problems(cataracts). ENT:  Denies hearing or swallowing difficulty. CV: + CP, -claudication, +HTN. Resp: + dyspnea, -productive cough. : Denies dialysis or kidney problems. GI: Denies ulcers, esophageal strictures, liver problems. M/S: Denies joint or bone problems, or implanted artificial hardware. Skin: Denies varicose veins, edema. Neuro: Denies strokes, or TIAs. Psych: Denies anxiety or depression. Endocrine: Denies thyroid problems or diabetes. Heme/Lymphatic: Denies easy bruising or lymphedema.      Objective:     VS:   Visit Vitals    /55 (BP 1 Location: Right arm, BP Patient Position: At rest)    Pulse 66    Temp 97.6 °F (36.4 °C)    Resp 16    Ht 5' 6\" (1.676 m)    Wt 212 lb 15.4 oz (96.6 kg)    SpO2 98%    BMI 34.37 kg/m2     Physical Exam:    General appearance: alert, cooperative, no distress  Head: normocephalic, without obvious abnormality; atraumatic  Eyes: conjunctivae/corneas clear; EOM's intact. Nose: nares normal; no drainage. Neck: no carotid bruit and no JVD  Lungs: clear to auscultation bilaterally  Heart: regular rate and rhythm; no murmur  Abdomen: soft, non-tender; bowel sounds normal  Extremities: moves all extremities; no weakness. Skin: Skin color normal; No varicose veins or edema. Neurologic: Grossly normal      Labs:   Recent Labs      02/05/18   0614   02/04/18   0110   02/03/18   0445   WBC   --    --    --    --   7.4   HGB   --    --    --    --   12.1   HCT   --    --    --    --   36.3   PLT   --    --    --    --   116*   NA   --    --   140   --   143   K   --    --   3.8   --   4.2   BUN   --    --   36*   --   37*   CREA   --    --   1.08*   --   1.05*   GLU   --    --   174*   --   129*   GLUCPOC  250*   < >   --    < >   --    INR   --    --   1.1   --    --     < > = values in this interval not displayed. CXR - no acute disease    EKG - NSR       Assessment:     Principal Problem:    Chest pain (2/2/2018)    Active Problems:    Unstable angina ([address] (2/3/2018)        Plan:     STS Risk Calculator V2.81 - Discussed by surgeon with patient. Procedure: CAB Only     Risk of Mortality: 2.589%   Morbidity or Mortality: 18.9%   Long Length of Stay: 10.622%   Short Length of Stay: 20.947%   Permanent Stroke: 1.591%   Prolonged Ventilation: 11.901%   DSW Infection: 0.958%   Renal Failure: 7.331%   Reoperation: 5.59%     Treatment Plan:    1. Multivessel CAD with stable angina: Will discuss case with Dr. Veronika Metzger. Preop testing started. Plan for surgery second case 2-6-18. 2. CKD Stage 3: Monitor Creatinine  3. IDDM: On metformin and insulin at home. Check A1C   4. Hypertension: Home meds  5. Hyperlipidemia: On Statin  6.  Glaucoma: Home eye drops    Signed By: MANUEL Lopez     February 5, 2018      Saw patient, agree with above  Risk of morbidity and mortality - high  Medical decision making - high complexity    Medical decision making     1. Multivessel CAD - CABG tomorrow 2nd case  2. CKD Stage 3: Monitor   3. IDDM: SSI  4. Hypertension: Home meds  5. Hyperlipidemia: Statin  6.  Glaucoma: Home eye drops

## 2018-02-05 NOTE — PROGRESS NOTES
Bedside shift change report given to ELADIO Bangura (oncoming nurse) by ELADIO Zhou (offgoing nurse). Report included the following information SBAR, Kardex, Intake/Output, MAR, Accordion and Cardiac Rhythm NSR.

## 2018-02-05 NOTE — CDMP QUERY
Patient is noted to have a BMI of 34.37 kg/m 2  Please clarify if this patient is:     =>Morbidly obese  =>Obese   =>Overweight  =>Other explanation of clinical findings  =>Unable to determine (no explanation for clinical findings)    Presentation: Weight: 99.8 kg (220 lb) &   Height: 5' 6\" (1.676 m) = BMI: 34.37 kg/m 2    Please clarify and document your clinical opinion in the progress notes and discharge summary, including the definitive and or presumptive diagnosis, (suspected or probable), related to the above clinical findings. Please include clinical findings supporting your diagnosis.      Thank you,    Nino Galvin, RN, BSN, [facility], [address]  [phone]

## 2018-02-05 NOTE — PROGRESS NOTES
Problem: Falls - Risk of  Goal: *Absence of Falls  Document Sania Fall Risk and appropriate interventions in the flowsheet.    Outcome: Progressing Towards Goal  Fall Risk Interventions:            Medication Interventions: Teach patient to arise slowly         History of Falls Interventions: Door open when patient unattended

## 2018-02-05 NOTE — PROCEDURES
Cardiac Catheterization Procedure Note   Patient: Mj Davila  MRN: [id number]  SSN: [id number]   YOB: 1940 Age: 65 y.o.   Sex: female    Date of Procedure: 2/5/2018   Pre-procedure Diagnosis: Chest pain CCS Class III  Post-procedure Diagnosis: Coronary Artery Disease  Procedure: Left Heart Cath, Coronary Angiography, LV angiography, Moderate Sedation  :  Dr. Chloé Arenas MD    Assistant(s):  None  Anesthesia: Moderate Sedation   Estimated Blood Loss: Less than 10 mL   Specimens Removed: None  Findings: 3V CAD, moderately elevated LVEDP, normal LVEF  Complications: None   Implants:  None  Signed by:  Chloé Arenas MD  2/5/2018  12:36 PM

## 2018-02-05 NOTE — PROGRESS NOTES
1240:  SHEATH PULL NOTE:    Patient informed of procedure with questions answered with review. Sheath site prepped with Chloraprep swab. 6 fr sheath in right femoral artery pulled by ANA James RN. Hand hold and clotting pad, with manual compression to site. No bleeding, no hematoma, no pain at site. Hemostasis obtained with hand hold/manual compression at site. Patient tolerated well. No change in status. Handhold for 20 minutes. No change at site. Sterile guaze and transparent dressing applied to site. No bleeding, no hematoma, no pain/discomfort at site. Groin instructions provided with review. Continue to monitor procedure site and patient status. *Advised patient to keep head flat and extremity flat to decrease risk of bleeding. *Recommended that patient not drink for ONE HOUR post sheath pull completion. *Recommended that patient not eat for TWO HOURS post sheath pull completion. *Instructed patient on rationale for delay of PO products to decrease risk for aspiration and if additional treatment to procedure site is required. Patient verbalized understanding of instructions with review.

## 2018-02-05 NOTE — PROGRESS NOTES
0809:  Cardiac Cath Lab Recovery Arrival Note:      Cristopher Sahu arrived to Cardiac Cath Lab, Recovery Area. Staff introduced to patient. Patient identifiers verified with NAME and DATE OF BIRTH. Procedure verified with patient. Consent forms reviewed and signed by patient or authorized representative and verified. Allergies verified. Patient informed of procedure and plan of care. Questions answered with review. Patient prepped for procedure, per orders from physician, prior to arrival.    Patient on cardiac monitor, non-invasive blood pressure, SPO2 monitor. Patient is A&Ox 4. Patient reports no complaints. Patient in stretcher, in low position, with side rails up, call bell within reach, patient instructed to call of assistance as needed. Patient prep in: [facility] Recovery Area, Bed# 10. Family in: waiting room. Prep by: ANA Garza

## 2018-02-05 NOTE — PROGRESS NOTES
0720  TRANSFER - OUT REPORT:    Verbal report given to Cath lab(name) on Karis Zabala  being transferred to Cath lab(unit) for ordered procedure       Report consisted of patients Situation, Background, Assessment and   Recommendations(SBAR). Information from the following report(s) SBAR, Kardex and Cardiac Rhythm NSR was reviewed with the receiving nurse. Lines:   Peripheral IV 02/02/18 Left Antecubital (Active)   Site Assessment Clean, dry, & intact 2/5/2018  4:00 AM   Phlebitis Assessment 0 2/5/2018  4:00 AM   Infiltration Assessment 0 2/5/2018  4:00 AM   Dressing Status Clean, dry, & intact 2/5/2018  4:00 AM   Dressing Type Tape;Transparent 2/5/2018  4:00 AM   Hub Color/Line Status Pink; Infusing 2/5/2018  4:00 AM   Action Taken Open ports on tubing capped 2/5/2018  4:00 AM   Alcohol Cap Used Yes 2/5/2018  4:00 AM        Opportunity for questions and clarification was provided. Patient transported with:   Monitor  Registered Nurse              0730  Bedside shift change report given to Codi Isidro RN (oncoming nurse) by Faith (offgoing nurse). Report included the following information SBAR, Kardex and Cardiac Rhythm NSR. Problem: Falls - Risk of  Goal: *Absence of Falls  Document Sania Fall Risk and appropriate interventions in the flowsheet. Outcome: Progressing Towards Goal  Fall Risk Interventions:            Medication Interventions: Evaluate medications/consider consulting pharmacy, Patient to call before getting OOB, Teach patient to arise slowly         History of Falls Interventions: Door open when patient unattended        Problem: AMI: Day 2  Goal: Respiratory  Outcome: Progressing Towards Goal  Respiratory status assess, vitals, and mental status assessed Q4 hours. Frequent turning and repositioning as well as elevation of H.O.B. Encouraged. Incentive spirometry encouraged with every assessment. Pt verbalizes understanding of plan.       Goal: Treatments/Interventions/Procedures  Outcome: Progressing Towards Goal  Pt on continuous cardiac monitor  Goal: *Hemodynamically stable  Outcome: Progressing Towards Goal  Pt remains hemodynamically stable

## 2018-02-05 NOTE — PROGRESS NOTES
12:38 patient has not been assigned on [facility]; unit will call back for report, Cath lab will call [facility] with their report.   Shaina Yan

## 2018-02-05 NOTE — PROGRESS NOTES
1218:  TRANSFER - IN REPORT:    Verbal report received from [address] [facility] from the Cardiac Cath lab, for routine progression of care. Report consisted of patients Situation, Background, Assessment and Recommendations(SBAR). Information from the following report(s) Procedure Summary, Intake/Output, MAR and Recent Results was reviewed with the receiving clinician. Opportunity for questions and clarification was provided. Assessment completed upon patients arrival to [address] [place] [address] and care assumed. Cardiac Cath Lab Recovery Arrival Note:     Jamila Johnston arrived to [facility] recovery area. Patient procedure= LHC. Patient on cardiac monitor, non-invasive blood pressure, Patient status doing well without problems. Patient is A&Ox 4. Patient reports no complaints. Procedure site without any bleeding and no hematoma.

## 2018-02-05 NOTE — PROGRESS NOTES
TRANSFER - OUT REPORT:    Verbal report given to ELADIO Vizcarra(name) on Karis Zabala  being transferred to [facility](unit) for routine progression of care       Report consisted of patients Situation, Background, Assessment and   Recommendations(SBAR). Information from the following report(s) Procedure Summary, Intake/Output, MAR and Recent Results was reviewed with the receiving nurse. Lines:   Peripheral IV 02/02/18 Left Antecubital (Active)   Site Assessment Clean, dry, & intact 2/5/2018  4:00 AM   Phlebitis Assessment 0 2/5/2018  4:00 AM   Infiltration Assessment 0 2/5/2018  4:00 AM   Dressing Status Clean, dry, & intact 2/5/2018  4:00 AM   Dressing Type Tape;Transparent 2/5/2018  4:00 AM   Hub Color/Line Status Pink; Infusing 2/5/2018  4:00 AM   Action Taken Open ports on tubing capped 2/5/2018  4:00 AM   Alcohol Cap Used Yes 2/5/2018  4:00 AM        Opportunity for questions and clarification was provided.       Patient transported with:   Monitor  Registered Nurse

## 2018-02-05 NOTE — DIABETES MGMT
DTC Progress Note    Recommendations/ Comments: BG's > 200 mg/dL today. BG's have ranged from 116 mg/dl to 306 mg/dl since admission  Noted order in place to begin insulin gtt in preparation for cardiac surgery 2/6/2017  NPO    Current hospital DM medication:   Lantus 10 units given this morning  Lispro correction scale, normal sensitivity    Insulin gtt to begin soon    Chart reviewed on Karis Zabala. Patient is a 65 y.o. female with known DM on the following at home:   Metformin 500 mg (2) tabs at supper   Januvia 100 mg once a day   Humalog Mix 75/25, 64 units each AM and 32 units each PM - listed but not taking this    A1c:   Lab Results   Component Value Date/Time    Hemoglobin A1c 7.8 02/02/2018 04:23 PM    Hemoglobin A1c 7.7 03/21/2016 08:09 AM       Recent Glucose Results: Lab Results   Component Value Date/Time    GLUCPOC 250 (H) 02/05/2018 06:14 AM    GLUCPOC 203 (H) 02/04/2018 10:19 PM    GLUCPOC 278 (H) 02/04/2018 05:28 PM        Lab Results   Component Value Date/Time    Creatinine 1.08 02/04/2018 01:10 AM     Estimated Creatinine Clearance: 50.4 mL/min (based on Cr of 1.08). Active Orders   Diet    DIET NPO    DIET NPO        PO intake: Patient Vitals for the past 72 hrs:   % Diet Eaten   02/05/18 0400 0 %   02/05/18 0000 0 %   02/04/18 2000 0 %   02/04/18 1147 75 %       Will continue to follow as needed.     Thank you  Miriam Caballero RN, CDE

## 2018-02-05 NOTE — PROGRESS NOTES
Cardiology Progress Note  2018     Admit Date: 2018  Admit Diagnosis: Chest pain  Unstable angina ([facility]  CC: none currently    Assessment:   Principal Problem:    Chest pain (2018)    Active Problems:    Unstable angina ([facility] (2/3/2018)      Plan:     Cont current cardiac meds  NPO for [facility] today  No issues on tele    Subjective:      Bradenkarl Gomesgood feeling well this AM      Objective:    Physical Exam:  Overall VSSAF;    Visit Vitals    /59 (BP 1 Location: Right arm, BP Patient Position: At rest)    Pulse 65    Temp 97.6 °F (36.4 °C)    Resp 17    Ht 5' 6\" (1.676 m)    Wt 96.6 kg (212 lb 15.4 oz)    SpO2 98%    BMI 34.37 kg/m2     Temp (24hrs), Av.6 °F (36.4 °C), Min:97.5 °F (36.4 °C), Max:97.7 °F (36.5 °C)    Patient Vitals for the past 8 hrs:   Pulse   18 0809 65    Patient Vitals for the past 8 hrs:   Resp   18 0809 17    Patient Vitals for the past 8 hrs:   BP   18 0809 159/59       1901 -  0700  In: 4422.5 [P.O.:1170; I.V.:3252.5]  Out: -       General Appearance: Well developed, well nourished, no acute distress. Ears/Nose/Mouth/Throat:   Normal MM; anicteric. JVP: WNL   Resp:   Lungs clear to auscultation bilaterally. Nl resp effort. Cardiovascular:  RRR, S1, S2 normal, no new murmur. No gallop or rub. Abdomen:   Soft, non-tender, bowel sounds are present. Extremities: No edema bilaterally. Skin:  Neuro: Warm and dry.   A/O x3, grossly nonfocal                         Data Review:       Labs:   Recent Results (from the past 24 hour(s))   GLUCOSE, POC    Collection Time: 18 11:37 AM   Result Value Ref Range    Glucose (POC) 262 (H) 65 - 100 mg/dL    Performed by Francy Pillai, POC    Collection Time: 18  5:28 PM   Result Value Ref Range    Glucose (POC) 278 (H) 65 - 100 mg/dL    Performed by Misha Palmer    GLUCOSE, POC    Collection Time: 18 10:19 PM   Result Value Ref Range    Glucose (POC) 203 (H) 65 - 100 mg/dL    Performed by MONTANA Amaro    Collection Time: 02/05/18  6:14 AM   Result Value Ref Range    Glucose (POC) 250 (H) 65 - 100 mg/dL    Performed by Gee Chandler       Current medications reviewed       Lucy Clemente MD

## 2018-02-05 NOTE — PROGRESS NOTES
Cardiac Cath Lab Procedure Area Arrival Note:    Nancy Martinez arrived to Cardiac Cath Lab, Procedure Area. Patient identifiers verified with NAME and DATE OF BIRTH. Procedure verified with patient. Consent forms verified. Allergies verified. Patient informed of procedure and plan of care. Questions answered with review. Patient voiced understanding of procedure and plan of care. Patient on cardiac monitor, non-invasive blood pressure, SPO2 monitor. On O2 @ 2 lpm via NC.  IV of NS on pump at 154 ml/hr. Patient status doing well without problems. Patient is A&Ox 4. Patient reports no pain. Patient medicated during procedure with orders obtained and verified by Dr. Yeni Whitley. Refer to patients Cardiac Cath Lab PROCEDURE REPORT for vital signs, assessment, status, and response during procedure, printed at end of case. Printed report on chart or scanned into chart. TRANSFER - OUT REPORT:    Verbal report given to AGUSTIN Mejia on Karis Zabala being transferred to cath lab recovery for routine progression of care       Report consisted of patients Situation, Background, Assessment and   Recommendations(SBAR). Information from the following report(s) Procedure Summary was reviewed with the receiving nurse. Opportunity for questions and clarification was provided.

## 2018-02-06 ENCOUNTER — ANESTHESIA EVENT (OUTPATIENT)
Dept: CARDIOTHORACIC SURGERY | Age: 78
DRG: 234 | End: 2018-02-06
Payer: MEDICARE

## 2018-02-06 ENCOUNTER — ANESTHESIA (OUTPATIENT)
Dept: CARDIOTHORACIC SURGERY | Age: 78
DRG: 234 | End: 2018-02-06
Payer: MEDICARE

## 2018-02-06 ENCOUNTER — APPOINTMENT (OUTPATIENT)
Dept: GENERAL RADIOLOGY | Age: 78
DRG: 234 | End: 2018-02-06
Attending: NURSE PRACTITIONER
Payer: MEDICARE

## 2018-02-06 PROBLEM — Z95.1 S/P CABG X 4: Status: ACTIVE | Noted: 2018-02-06

## 2018-02-06 LAB
ADMINISTERED INITIALS, ADMINIT: NORMAL
ALBUMIN SERPL-MCNC: 2.9 G/DL (ref 3.5–5)
ALBUMIN/GLOB SERPL: 1.3 {RATIO} (ref 1.1–2.2)
ALP SERPL-CCNC: 34 U/L (ref 45–117)
ALT SERPL-CCNC: 22 U/L (ref 12–78)
ANION GAP SERPL CALC-SCNC: 6 MMOL/L (ref 5–15)
APTT PPP: 44.8 SEC (ref 22.1–32.5)
ARTERIAL PATENCY WRIST A: YES
AST SERPL-CCNC: 16 U/L (ref 15–37)
BASE EXCESS BLD CALC-SCNC: 3 MMOL/L
BASOPHILS # BLD: 0 K/UL (ref 0–0.1)
BASOPHILS NFR BLD: 0 % (ref 0–1)
BDY SITE: ABNORMAL
BILIRUB SERPL-MCNC: 0.3 MG/DL (ref 0.2–1)
BUN SERPL-MCNC: 22 MG/DL (ref 6–20)
BUN/CREAT SERPL: 22 (ref 12–20)
CALCIUM SERPL-MCNC: 7.9 MG/DL (ref 8.5–10.1)
CHLORIDE SERPL-SCNC: 111 MMOL/L (ref 97–108)
CO2 SERPL-SCNC: 27 MMOL/L (ref 21–32)
CREAT SERPL-MCNC: 1.01 MG/DL (ref 0.55–1.02)
D50 ADMINISTERED, D50ADM: 0 ML
D50 ORDER, D50ORD: 0 ML
DIFFERENTIAL METHOD BLD: ABNORMAL
EOSINOPHIL # BLD: 0.1 K/UL (ref 0–0.4)
EOSINOPHIL NFR BLD: 1 % (ref 0–7)
ERYTHROCYTE [DISTWIDTH] IN BLOOD BY AUTOMATED COUNT: 12.8 % (ref 11.5–14.5)
GAS FLOW.O2 O2 DELIVERY SYS: ABNORMAL L/MIN
GAS FLOW.O2 SETTING OXYMISER: 14 BPM
GLOBULIN SER CALC-MCNC: 2.2 G/DL (ref 2–4)
GLSCOM COMMENTS: NORMAL
GLUCOSE BLD STRIP.AUTO-MCNC: 100 MG/DL (ref 65–100)
GLUCOSE BLD STRIP.AUTO-MCNC: 104 MG/DL (ref 65–100)
GLUCOSE BLD STRIP.AUTO-MCNC: 116 MG/DL (ref 65–100)
GLUCOSE BLD STRIP.AUTO-MCNC: 117 MG/DL (ref 65–100)
GLUCOSE BLD STRIP.AUTO-MCNC: 118 MG/DL (ref 65–100)
GLUCOSE BLD STRIP.AUTO-MCNC: 120 MG/DL (ref 65–100)
GLUCOSE BLD STRIP.AUTO-MCNC: 121 MG/DL (ref 65–100)
GLUCOSE BLD STRIP.AUTO-MCNC: 128 MG/DL (ref 65–100)
GLUCOSE BLD STRIP.AUTO-MCNC: 134 MG/DL (ref 65–100)
GLUCOSE BLD STRIP.AUTO-MCNC: 91 MG/DL (ref 65–100)
GLUCOSE BLD STRIP.AUTO-MCNC: 91 MG/DL (ref 65–100)
GLUCOSE BLD STRIP.AUTO-MCNC: 92 MG/DL (ref 65–100)
GLUCOSE BLD STRIP.AUTO-MCNC: 93 MG/DL (ref 65–100)
GLUCOSE BLD STRIP.AUTO-MCNC: 95 MG/DL (ref 65–100)
GLUCOSE BLD STRIP.AUTO-MCNC: 96 MG/DL (ref 65–100)
GLUCOSE BLD STRIP.AUTO-MCNC: 98 MG/DL (ref 65–100)
GLUCOSE SERPL-MCNC: 124 MG/DL (ref 65–100)
GLUCOSE, GLC: 100 MG/DL
GLUCOSE, GLC: 104 MG/DL
GLUCOSE, GLC: 116 MG/DL
GLUCOSE, GLC: 117 MG/DL
GLUCOSE, GLC: 118 MG/DL
GLUCOSE, GLC: 120 MG/DL
GLUCOSE, GLC: 121 MG/DL
GLUCOSE, GLC: 128 MG/DL
GLUCOSE, GLC: 132 MG/DL
GLUCOSE, GLC: 134 MG/DL
GLUCOSE, GLC: 134 MG/DL
GLUCOSE, GLC: 140 MG/DL
GLUCOSE, GLC: 140 MG/DL
GLUCOSE, GLC: 148 MG/DL
GLUCOSE, GLC: 91 MG/DL
GLUCOSE, GLC: 91 MG/DL
GLUCOSE, GLC: 92 MG/DL
GLUCOSE, GLC: 93 MG/DL
GLUCOSE, GLC: 95 MG/DL
GLUCOSE, GLC: 96 MG/DL
GLUCOSE, GLC: 98 MG/DL
HCO3 BLD-SCNC: 27 MMOL/L (ref 22–26)
HCT VFR BLD AUTO: 24.7 % (ref 35–47)
HCT VFR BLD AUTO: 25.1 % (ref 35–47)
HGB BLD-MCNC: 8.3 G/DL (ref 11.5–16)
HGB BLD-MCNC: 8.5 G/DL (ref 11.5–16)
HIGH TARGET, HITG: 130 MG/DL
IMM GRANULOCYTES # BLD: 0.1 K/UL (ref 0–0.04)
IMM GRANULOCYTES NFR BLD AUTO: 1 % (ref 0–0.5)
INR PPP: 1.3 (ref 0.9–1.1)
INSULIN ADMINSTERED, INSADM: 1 UNITS/HOUR
INSULIN ADMINSTERED, INSADM: 1.2 UNITS/HOUR
INSULIN ADMINSTERED, INSADM: 1.4 UNITS/HOUR
INSULIN ADMINSTERED, INSADM: 1.6 UNITS/HOUR
INSULIN ADMINSTERED, INSADM: 1.8 UNITS/HOUR
INSULIN ADMINSTERED, INSADM: 1.8 UNITS/HOUR
INSULIN ADMINSTERED, INSADM: 1.9 UNITS/HOUR
INSULIN ADMINSTERED, INSADM: 2 UNITS/HOUR
INSULIN ADMINSTERED, INSADM: 2.1 UNITS/HOUR
INSULIN ADMINSTERED, INSADM: 2.5 UNITS/HOUR
INSULIN ADMINSTERED, INSADM: 2.8 UNITS/HOUR
INSULIN ADMINSTERED, INSADM: 2.9 UNITS/HOUR
INSULIN ADMINSTERED, INSADM: 3.2 UNITS/HOUR
INSULIN ADMINSTERED, INSADM: 3.6 UNITS/HOUR
INSULIN ADMINSTERED, INSADM: 4.1 UNITS/HOUR
INSULIN ADMINSTERED, INSADM: 4.4 UNITS/HOUR
INSULIN ADMINSTERED, INSADM: 4.5 UNITS/HOUR
INSULIN ADMINSTERED, INSADM: 4.6 UNITS/HOUR
INSULIN ADMINSTERED, INSADM: 4.9 UNITS/HOUR
INSULIN ADMINSTERED, INSADM: 5.3 UNITS/HOUR
INSULIN ADMINSTERED, INSADM: 6 UNITS/HOUR
INSULIN ORDER, INSORD: 1 UNITS/HOUR
INSULIN ORDER, INSORD: 1.2 UNITS/HOUR
INSULIN ORDER, INSORD: 1.4 UNITS/HOUR
INSULIN ORDER, INSORD: 1.6 UNITS/HOUR
INSULIN ORDER, INSORD: 1.8 UNITS/HOUR
INSULIN ORDER, INSORD: 1.8 UNITS/HOUR
INSULIN ORDER, INSORD: 1.9 UNITS/HOUR
INSULIN ORDER, INSORD: 2 UNITS/HOUR
INSULIN ORDER, INSORD: 2.1 UNITS/HOUR
INSULIN ORDER, INSORD: 2.5 UNITS/HOUR
INSULIN ORDER, INSORD: 2.8 UNITS/HOUR
INSULIN ORDER, INSORD: 2.9 UNITS/HOUR
INSULIN ORDER, INSORD: 3.2 UNITS/HOUR
INSULIN ORDER, INSORD: 3.6 UNITS/HOUR
INSULIN ORDER, INSORD: 4.1 UNITS/HOUR
INSULIN ORDER, INSORD: 4.4 UNITS/HOUR
INSULIN ORDER, INSORD: 4.5 UNITS/HOUR
INSULIN ORDER, INSORD: 4.6 UNITS/HOUR
INSULIN ORDER, INSORD: 4.9 UNITS/HOUR
INSULIN ORDER, INSORD: 5.3 UNITS/HOUR
INSULIN ORDER, INSORD: 6 UNITS/HOUR
LOW TARGET, LOT: 95 MG/DL
LYMPHOCYTES # BLD: 1.4 K/UL (ref 0.8–3.5)
LYMPHOCYTES NFR BLD: 14 % (ref 12–49)
MAGNESIUM SERPL-MCNC: 1.7 MG/DL (ref 1.6–2.4)
MCH RBC QN AUTO: 30.9 PG (ref 26–34)
MCHC RBC AUTO-ENTMCNC: 33.1 G/DL (ref 30–36.5)
MCV RBC AUTO: 93.3 FL (ref 80–99)
MINUTES UNTIL NEXT BG, NBG: 120 MIN
MINUTES UNTIL NEXT BG, NBG: 60 MIN
MONOCYTES # BLD: 0.4 K/UL (ref 0–1)
MONOCYTES NFR BLD: 5 % (ref 5–13)
MULTIPLIER, MUL: 0.03
MULTIPLIER, MUL: 0.04
MULTIPLIER, MUL: 0.05
MULTIPLIER, MUL: 0.05
MULTIPLIER, MUL: 0.06
MULTIPLIER, MUL: 0.06
MULTIPLIER, MUL: 0.07
MULTIPLIER, MUL: 0.08
NEUTS SEG # BLD: 7.6 K/UL (ref 1.8–8)
NEUTS SEG NFR BLD: 80 % (ref 32–75)
NRBC # BLD: 0 K/UL (ref 0–0.01)
NRBC BLD-RTO: 0 PER 100 WBC
ORDER INITIALS, ORDINIT: NORMAL
PCO2 BLD: 41.1 MMHG (ref 35–45)
PEEP RESPIRATORY: 5 CMH2O
PH BLD: 7.42 [PH] (ref 7.35–7.45)
PLATELET # BLD AUTO: 89 K/UL (ref 150–400)
PMV BLD AUTO: 12.4 FL (ref 8.9–12.9)
PO2 BLD: 224 MMHG (ref 80–100)
POTASSIUM SERPL-SCNC: 3.5 MMOL/L (ref 3.5–5.1)
PROT SERPL-MCNC: 5.1 G/DL (ref 6.4–8.2)
PROTHROMBIN TIME: 12.9 SEC (ref 9–11.1)
RBC # BLD AUTO: 2.69 M/UL (ref 3.8–5.2)
SAO2 % BLD: 100 % (ref 92–97)
SERVICE CMNT-IMP: ABNORMAL
SERVICE CMNT-IMP: NORMAL
SODIUM SERPL-SCNC: 144 MMOL/L (ref 136–145)
SPECIMEN TYPE: ABNORMAL
THERAPEUTIC RANGE,PTTT: ABNORMAL SECS (ref 58–77)
VENTILATION MODE VENT: ABNORMAL
VT SETTING VENT: 500 ML
WBC # BLD AUTO: 9.6 K/UL (ref 3.6–11)

## 2018-02-06 PROCEDURE — 77030008771 HC TU NG SALEM SUMP -A: Performed by: ANESTHESIOLOGY

## 2018-02-06 PROCEDURE — 77030012407 HC DRN WND BARD -B: Performed by: THORACIC SURGERY (CARDIOTHORACIC VASCULAR SURGERY)

## 2018-02-06 PROCEDURE — C1751 CATH, INF, PER/CENT/MIDLINE: HCPCS

## 2018-02-06 PROCEDURE — 74011250636 HC RX REV CODE- 250/636

## 2018-02-06 PROCEDURE — 85025 COMPLETE CBC W/AUTO DIFF WBC: CPT | Performed by: NURSE PRACTITIONER

## 2018-02-06 PROCEDURE — 74011000250 HC RX REV CODE- 250

## 2018-02-06 PROCEDURE — 74011000250 HC RX REV CODE- 250: Performed by: THORACIC SURGERY (CARDIOTHORACIC VASCULAR SURGERY)

## 2018-02-06 PROCEDURE — 87070 CULTURE OTHR SPECIMN AEROBIC: CPT | Performed by: THORACIC SURGERY (CARDIOTHORACIC VASCULAR SURGERY)

## 2018-02-06 PROCEDURE — 77030010507 HC ADH SKN DERMBND J&J -B: Performed by: THORACIC SURGERY (CARDIOTHORACIC VASCULAR SURGERY)

## 2018-02-06 PROCEDURE — 76010000110 HC CV SURG 3 TO 3.5 HR: Performed by: THORACIC SURGERY (CARDIOTHORACIC VASCULAR SURGERY)

## 2018-02-06 PROCEDURE — 36415 COLL VENOUS BLD VENIPUNCTURE: CPT | Performed by: NURSE PRACTITIONER

## 2018-02-06 PROCEDURE — 77030018846 HC SOL IRR STRL H20 ICUM -A: Performed by: THORACIC SURGERY (CARDIOTHORACIC VASCULAR SURGERY)

## 2018-02-06 PROCEDURE — 77030011825 HC SUPP SURG PSTOP S2SG -B: Performed by: THORACIC SURGERY (CARDIOTHORACIC VASCULAR SURGERY)

## 2018-02-06 PROCEDURE — C1751 CATH, INF, PER/CENT/MIDLINE: HCPCS | Performed by: ANESTHESIOLOGY

## 2018-02-06 PROCEDURE — 82962 GLUCOSE BLOOD TEST: CPT

## 2018-02-06 PROCEDURE — 74011250637 HC RX REV CODE- 250/637: Performed by: THORACIC SURGERY (CARDIOTHORACIC VASCULAR SURGERY)

## 2018-02-06 PROCEDURE — 77030011255 HC DSG AQUACEL AG BMS -A: Performed by: THORACIC SURGERY (CARDIOTHORACIC VASCULAR SURGERY)

## 2018-02-06 PROCEDURE — 77030034420 HC BN PTTY HEMOSORB ABRY -B: Performed by: THORACIC SURGERY (CARDIOTHORACIC VASCULAR SURGERY)

## 2018-02-06 PROCEDURE — 77030006689 HC BLD OPHTH BVR BD -A: Performed by: THORACIC SURGERY (CARDIOTHORACIC VASCULAR SURGERY)

## 2018-02-06 PROCEDURE — 77030013798 HC KT TRNSDUC PRSSR EDWD -B: Performed by: ANESTHESIOLOGY

## 2018-02-06 PROCEDURE — 94002 VENT MGMT INPAT INIT DAY: CPT

## 2018-02-06 PROCEDURE — 77030007667 HC INSRT SUT HLD MEDT -B: Performed by: THORACIC SURGERY (CARDIOTHORACIC VASCULAR SURGERY)

## 2018-02-06 PROCEDURE — P9045 ALBUMIN (HUMAN), 5%, 250 ML: HCPCS | Performed by: NURSE PRACTITIONER

## 2018-02-06 PROCEDURE — 77030027138 HC INCENT SPIROMETER -A

## 2018-02-06 PROCEDURE — 77030006920 HC BLD STRNL SAW STRY -B: Performed by: THORACIC SURGERY (CARDIOTHORACIC VASCULAR SURGERY)

## 2018-02-06 PROCEDURE — 82803 BLOOD GASES ANY COMBINATION: CPT

## 2018-02-06 PROCEDURE — 74011000258 HC RX REV CODE- 258: Performed by: THORACIC SURGERY (CARDIOTHORACIC VASCULAR SURGERY)

## 2018-02-06 PROCEDURE — B24BZZ4 ULTRASONOGRAPHY OF HEART WITH AORTA, TRANSESOPHAGEAL: ICD-10-PCS | Performed by: ANESTHESIOLOGY

## 2018-02-06 PROCEDURE — 77030020256 HC SOL INJ NACL 0.9%  500ML: Performed by: THORACIC SURGERY (CARDIOTHORACIC VASCULAR SURGERY)

## 2018-02-06 PROCEDURE — 74011250637 HC RX REV CODE- 250/637

## 2018-02-06 PROCEDURE — 74011000250 HC RX REV CODE- 250: Performed by: NURSE PRACTITIONER

## 2018-02-06 PROCEDURE — 021209W BYPASS CORONARY ARTERY, THREE ARTERIES FROM AORTA WITH AUTOLOGOUS VENOUS TISSUE, OPEN APPROACH: ICD-10-PCS | Performed by: THORACIC SURGERY (CARDIOTHORACIC VASCULAR SURGERY)

## 2018-02-06 PROCEDURE — 77030010938 HC CLP LIG TELE -A: Performed by: THORACIC SURGERY (CARDIOTHORACIC VASCULAR SURGERY)

## 2018-02-06 PROCEDURE — 77030018835 HC SOL IRR LR ICUM -A: Performed by: THORACIC SURGERY (CARDIOTHORACIC VASCULAR SURGERY)

## 2018-02-06 PROCEDURE — 77030020747 HC TU INSUF ENDOSC TELE -A: Performed by: THORACIC SURGERY (CARDIOTHORACIC VASCULAR SURGERY)

## 2018-02-06 PROCEDURE — 5A1221Z PERFORMANCE OF CARDIAC OUTPUT, CONTINUOUS: ICD-10-PCS | Performed by: THORACIC SURGERY (CARDIOTHORACIC VASCULAR SURGERY)

## 2018-02-06 PROCEDURE — 85730 THROMBOPLASTIN TIME PARTIAL: CPT | Performed by: NURSE PRACTITIONER

## 2018-02-06 PROCEDURE — 77030019579 HC CBL PACE DISP REMG -B: Performed by: THORACIC SURGERY (CARDIOTHORACIC VASCULAR SURGERY)

## 2018-02-06 PROCEDURE — 85018 HEMOGLOBIN: CPT | Performed by: NURSE PRACTITIONER

## 2018-02-06 PROCEDURE — 77030010605 HC BLWR MR MAL MEDT -B: Performed by: THORACIC SURGERY (CARDIOTHORACIC VASCULAR SURGERY)

## 2018-02-06 PROCEDURE — 77030013798 HC KT TRNSDUC PRSSR EDWD -B: Performed by: THORACIC SURGERY (CARDIOTHORACIC VASCULAR SURGERY)

## 2018-02-06 PROCEDURE — 93880 EXTRACRANIAL BILAT STUDY: CPT

## 2018-02-06 PROCEDURE — 87077 CULTURE AEROBIC IDENTIFY: CPT | Performed by: THORACIC SURGERY (CARDIOTHORACIC VASCULAR SURGERY)

## 2018-02-06 PROCEDURE — 74011250636 HC RX REV CODE- 250/636: Performed by: FAMILY MEDICINE

## 2018-02-06 PROCEDURE — 06BP4ZZ EXCISION OF RIGHT SAPHENOUS VEIN, PERCUTANEOUS ENDOSCOPIC APPROACH: ICD-10-PCS | Performed by: THORACIC SURGERY (CARDIOTHORACIC VASCULAR SURGERY)

## 2018-02-06 PROCEDURE — 77030005402 HC CATH RAD ART LN KT TELE -B

## 2018-02-06 PROCEDURE — 71045 X-RAY EXAM CHEST 1 VIEW: CPT

## 2018-02-06 PROCEDURE — 77030002986 HC SUT PROL J&J -A: Performed by: THORACIC SURGERY (CARDIOTHORACIC VASCULAR SURGERY)

## 2018-02-06 PROCEDURE — 74011636637 HC RX REV CODE- 636/637

## 2018-02-06 PROCEDURE — 65610000003 HC RM ICU SURGICAL

## 2018-02-06 PROCEDURE — 77030013861 HC PNCH AORT CLNCUT QUES -B: Performed by: THORACIC SURGERY (CARDIOTHORACIC VASCULAR SURGERY)

## 2018-02-06 PROCEDURE — 77030002978 HC SUT POLY TELE -A: Performed by: THORACIC SURGERY (CARDIOTHORACIC VASCULAR SURGERY)

## 2018-02-06 PROCEDURE — 77030010389 HC WRE ATR PACE AEMC -B: Performed by: THORACIC SURGERY (CARDIOTHORACIC VASCULAR SURGERY)

## 2018-02-06 PROCEDURE — 77030034848: Performed by: THORACIC SURGERY (CARDIOTHORACIC VASCULAR SURGERY)

## 2018-02-06 PROCEDURE — 77030011640 HC PAD GRND REM COVD -A: Performed by: THORACIC SURGERY (CARDIOTHORACIC VASCULAR SURGERY)

## 2018-02-06 PROCEDURE — 77030003010 HC SUT SURG STL J&J -B: Performed by: THORACIC SURGERY (CARDIOTHORACIC VASCULAR SURGERY)

## 2018-02-06 PROCEDURE — 77030006247 HC LD PCMKR MYOCRD BPLR TEMP MEDT -B: Performed by: THORACIC SURGERY (CARDIOTHORACIC VASCULAR SURGERY)

## 2018-02-06 PROCEDURE — 77030010819: Performed by: THORACIC SURGERY (CARDIOTHORACIC VASCULAR SURGERY)

## 2018-02-06 PROCEDURE — 74011000272 HC RX REV CODE- 272: Performed by: THORACIC SURGERY (CARDIOTHORACIC VASCULAR SURGERY)

## 2018-02-06 PROCEDURE — 77030012390 HC DRN CHST BTL GTNG -B: Performed by: THORACIC SURGERY (CARDIOTHORACIC VASCULAR SURGERY)

## 2018-02-06 PROCEDURE — P9047 ALBUMIN (HUMAN), 25%, 50ML: HCPCS

## 2018-02-06 PROCEDURE — 74011000272 HC RX REV CODE- 272

## 2018-02-06 PROCEDURE — 83735 ASSAY OF MAGNESIUM: CPT | Performed by: NURSE PRACTITIONER

## 2018-02-06 PROCEDURE — 74011250636 HC RX REV CODE- 250/636: Performed by: ANESTHESIOLOGY

## 2018-02-06 PROCEDURE — 02100Z9 BYPASS CORONARY ARTERY, ONE ARTERY FROM LEFT INTERNAL MAMMARY, OPEN APPROACH: ICD-10-PCS | Performed by: THORACIC SURGERY (CARDIOTHORACIC VASCULAR SURGERY)

## 2018-02-06 PROCEDURE — 77030022199 HC SYS HARV VESL GTNG -G1: Performed by: THORACIC SURGERY (CARDIOTHORACIC VASCULAR SURGERY)

## 2018-02-06 PROCEDURE — 80053 COMPREHEN METABOLIC PANEL: CPT | Performed by: NURSE PRACTITIONER

## 2018-02-06 PROCEDURE — 74011250637 HC RX REV CODE- 250/637: Performed by: PHYSICIAN ASSISTANT

## 2018-02-06 PROCEDURE — 77030010796: Performed by: THORACIC SURGERY (CARDIOTHORACIC VASCULAR SURGERY)

## 2018-02-06 PROCEDURE — 74011250636 HC RX REV CODE- 250/636: Performed by: THORACIC SURGERY (CARDIOTHORACIC VASCULAR SURGERY)

## 2018-02-06 PROCEDURE — 74011000258 HC RX REV CODE- 258

## 2018-02-06 PROCEDURE — 85610 PROTHROMBIN TIME: CPT | Performed by: NURSE PRACTITIONER

## 2018-02-06 PROCEDURE — 74011250636 HC RX REV CODE- 250/636: Performed by: NURSE PRACTITIONER

## 2018-02-06 PROCEDURE — 74011000258 HC RX REV CODE- 258: Performed by: NURSE PRACTITIONER

## 2018-02-06 PROCEDURE — 74011250637 HC RX REV CODE- 250/637: Performed by: HOSPITALIST

## 2018-02-06 PROCEDURE — 77030002987 HC SUT PROL J&J -B: Performed by: THORACIC SURGERY (CARDIOTHORACIC VASCULAR SURGERY)

## 2018-02-06 PROCEDURE — 77030020263 HC SOL INJ SOD CL0.9% LFCR 1000ML: Performed by: THORACIC SURGERY (CARDIOTHORACIC VASCULAR SURGERY)

## 2018-02-06 PROCEDURE — 76060000037 HC ANESTHESIA 3 TO 3.5 HR: Performed by: THORACIC SURGERY (CARDIOTHORACIC VASCULAR SURGERY)

## 2018-02-06 PROCEDURE — 77030018719 HC DRSG PTCH ANTIMIC J&J -A

## 2018-02-06 PROCEDURE — 74011250637 HC RX REV CODE- 250/637: Performed by: FAMILY MEDICINE

## 2018-02-06 PROCEDURE — 77030018836 HC SOL IRR NACL ICUM -A: Performed by: THORACIC SURGERY (CARDIOTHORACIC VASCULAR SURGERY)

## 2018-02-06 PROCEDURE — 77030008684 HC TU ET CUF COVD -B: Performed by: ANESTHESIOLOGY

## 2018-02-06 PROCEDURE — 77030005401 HC CATH RAD ARRO -A: Performed by: ANESTHESIOLOGY

## 2018-02-06 PROCEDURE — 77030020782 HC GWN BAIR PAWS FLX 3M -B

## 2018-02-06 PROCEDURE — 77030002933 HC SUT MCRYL J&J -A: Performed by: THORACIC SURGERY (CARDIOTHORACIC VASCULAR SURGERY)

## 2018-02-06 PROCEDURE — 74011250637 HC RX REV CODE- 250/637: Performed by: INTERNAL MEDICINE

## 2018-02-06 PROCEDURE — 77030026438 HC STYL ET INTUB CARD -A: Performed by: ANESTHESIOLOGY

## 2018-02-06 RX ORDER — MAGNESIUM SULFATE HEPTAHYDRATE 40 MG/ML
2 INJECTION, SOLUTION INTRAVENOUS ONCE
Status: DISCONTINUED | OUTPATIENT
Start: 2018-02-06 | End: 2018-02-06 | Stop reason: HOSPADM

## 2018-02-06 RX ORDER — PROTAMINE SULFATE 10 MG/ML
500 INJECTION, SOLUTION INTRAVENOUS ONCE
Status: DISCONTINUED | OUTPATIENT
Start: 2018-02-06 | End: 2018-02-06 | Stop reason: HOSPADM

## 2018-02-06 RX ORDER — SODIUM CHLORIDE 0.9 % (FLUSH) 0.9 %
20 SYRINGE (ML) INJECTION AS NEEDED
Status: DISCONTINUED | OUTPATIENT
Start: 2018-02-06 | End: 2018-02-08

## 2018-02-06 RX ORDER — CEFAZOLIN SODIUM 1 G/3ML
INJECTION, POWDER, FOR SOLUTION INTRAMUSCULAR; INTRAVENOUS AS NEEDED
Status: DISCONTINUED | OUTPATIENT
Start: 2018-02-06 | End: 2018-02-06 | Stop reason: HOSPADM

## 2018-02-06 RX ORDER — MORPHINE SULFATE 2 MG/ML
2 INJECTION, SOLUTION INTRAMUSCULAR; INTRAVENOUS
Status: DISCONTINUED | OUTPATIENT
Start: 2018-02-06 | End: 2018-02-08

## 2018-02-06 RX ORDER — TRANEXAMIC ACID 100 MG/ML
10 INJECTION, SOLUTION INTRAVENOUS ONCE
Status: DISCONTINUED | OUTPATIENT
Start: 2018-02-06 | End: 2018-02-06 | Stop reason: HOSPADM

## 2018-02-06 RX ORDER — SUFENTANIL CITRATE 50 UG/ML
INJECTION EPIDURAL; INTRAVENOUS AS NEEDED
Status: DISCONTINUED | OUTPATIENT
Start: 2018-02-06 | End: 2018-02-06 | Stop reason: HOSPADM

## 2018-02-06 RX ORDER — FAMOTIDINE 20 MG/1
20 TABLET, FILM COATED ORAL EVERY 12 HOURS
Status: DISCONTINUED | OUTPATIENT
Start: 2018-02-07 | End: 2018-02-07

## 2018-02-06 RX ORDER — HEPARIN SODIUM 1000 [USP'U]/ML
2000 INJECTION, SOLUTION INTRAVENOUS; SUBCUTANEOUS ONCE
Status: COMPLETED | OUTPATIENT
Start: 2018-02-06 | End: 2018-02-06

## 2018-02-06 RX ORDER — SODIUM CHLORIDE 0.9 % (FLUSH) 0.9 %
10 SYRINGE (ML) INJECTION EVERY 24 HOURS
Status: DISCONTINUED | OUTPATIENT
Start: 2018-02-06 | End: 2018-02-08

## 2018-02-06 RX ORDER — DIPHENHYDRAMINE HYDROCHLORIDE 50 MG/ML
12.5 INJECTION, SOLUTION INTRAMUSCULAR; INTRAVENOUS AS NEEDED
Status: ACTIVE | OUTPATIENT
Start: 2018-02-06 | End: 2018-02-06

## 2018-02-06 RX ORDER — SODIUM CHLORIDE 0.9 % (FLUSH) 0.9 %
5-10 SYRINGE (ML) INJECTION AS NEEDED
Status: CANCELLED | OUTPATIENT
Start: 2018-02-06

## 2018-02-06 RX ORDER — SODIUM CHLORIDE 0.9 % (FLUSH) 0.9 %
5-10 SYRINGE (ML) INJECTION EVERY 8 HOURS
Status: CANCELLED | OUTPATIENT
Start: 2018-02-06

## 2018-02-06 RX ORDER — ALBUTEROL SULFATE 0.83 MG/ML
2.5 SOLUTION RESPIRATORY (INHALATION)
Status: DISCONTINUED | OUTPATIENT
Start: 2018-02-06 | End: 2018-02-14 | Stop reason: HOSPADM

## 2018-02-06 RX ORDER — NALOXONE HYDROCHLORIDE 0.4 MG/ML
0.4 INJECTION, SOLUTION INTRAMUSCULAR; INTRAVENOUS; SUBCUTANEOUS AS NEEDED
Status: DISCONTINUED | OUTPATIENT
Start: 2018-02-06 | End: 2018-02-14 | Stop reason: HOSPADM

## 2018-02-06 RX ORDER — SODIUM CHLORIDE 9 MG/ML
25 INJECTION, SOLUTION INTRAVENOUS CONTINUOUS
Status: DISCONTINUED | OUTPATIENT
Start: 2018-02-06 | End: 2018-02-06 | Stop reason: HOSPADM

## 2018-02-06 RX ORDER — DESMOPRESSIN ACETATE 4 UG/ML
INJECTION, SOLUTION INTRAVENOUS; SUBCUTANEOUS AS NEEDED
Status: DISCONTINUED | OUTPATIENT
Start: 2018-02-06 | End: 2018-02-06 | Stop reason: HOSPADM

## 2018-02-06 RX ORDER — PROTAMINE SULFATE 10 MG/ML
INJECTION, SOLUTION INTRAVENOUS AS NEEDED
Status: DISCONTINUED | OUTPATIENT
Start: 2018-02-06 | End: 2018-02-06 | Stop reason: HOSPADM

## 2018-02-06 RX ORDER — ALBUMIN HUMAN 50 G/1000ML
SOLUTION INTRAVENOUS AS NEEDED
Status: DISCONTINUED | OUTPATIENT
Start: 2018-02-06 | End: 2018-02-06 | Stop reason: HOSPADM

## 2018-02-06 RX ORDER — MIDAZOLAM HYDROCHLORIDE 1 MG/ML
INJECTION, SOLUTION INTRAMUSCULAR; INTRAVENOUS AS NEEDED
Status: DISCONTINUED | OUTPATIENT
Start: 2018-02-06 | End: 2018-02-06 | Stop reason: HOSPADM

## 2018-02-06 RX ORDER — ONDANSETRON 2 MG/ML
4 INJECTION INTRAMUSCULAR; INTRAVENOUS AS NEEDED
Status: DISCONTINUED | OUTPATIENT
Start: 2018-02-06 | End: 2018-02-07 | Stop reason: ALTCHOICE

## 2018-02-06 RX ORDER — SODIUM CHLORIDE 0.9 % (FLUSH) 0.9 %
10 SYRINGE (ML) INJECTION AS NEEDED
Status: DISCONTINUED | OUTPATIENT
Start: 2018-02-06 | End: 2018-02-08

## 2018-02-06 RX ORDER — SODIUM CHLORIDE 9 MG/ML
9 INJECTION, SOLUTION INTRAVENOUS CONTINUOUS
Status: DISCONTINUED | OUTPATIENT
Start: 2018-02-06 | End: 2018-02-08

## 2018-02-06 RX ORDER — POTASSIUM CHLORIDE 29.8 MG/ML
20 INJECTION INTRAVENOUS ONCE
Status: DISCONTINUED | OUTPATIENT
Start: 2018-02-06 | End: 2018-02-06 | Stop reason: HOSPADM

## 2018-02-06 RX ORDER — SODIUM CHLORIDE, SODIUM LACTATE, POTASSIUM CHLORIDE, CALCIUM CHLORIDE 600; 310; 30; 20 MG/100ML; MG/100ML; MG/100ML; MG/100ML
25 INJECTION, SOLUTION INTRAVENOUS CONTINUOUS
Status: DISCONTINUED | OUTPATIENT
Start: 2018-02-06 | End: 2018-02-06 | Stop reason: HOSPADM

## 2018-02-06 RX ORDER — INSULIN GLARGINE 100 [IU]/ML
1-50 INJECTION, SOLUTION SUBCUTANEOUS
Status: COMPLETED | OUTPATIENT
Start: 2018-02-06 | End: 2018-02-08

## 2018-02-06 RX ORDER — ALBUMIN HUMAN 50 G/1000ML
25 SOLUTION INTRAVENOUS ONCE
Status: DISCONTINUED | OUTPATIENT
Start: 2018-02-06 | End: 2018-02-06 | Stop reason: HOSPADM

## 2018-02-06 RX ORDER — DEXTROSE 50 % IN WATER (D50W) INTRAVENOUS SYRINGE
12.5-25 AS NEEDED
Status: DISCONTINUED | OUTPATIENT
Start: 2018-02-06 | End: 2018-02-09

## 2018-02-06 RX ORDER — MAGNESIUM SULFATE 1 G/100ML
1 INJECTION INTRAVENOUS AS NEEDED
Status: COMPLETED | OUTPATIENT
Start: 2018-02-06 | End: 2018-02-07

## 2018-02-06 RX ORDER — OXYCODONE HYDROCHLORIDE 5 MG/1
5 TABLET ORAL AS NEEDED
Status: DISCONTINUED | OUTPATIENT
Start: 2018-02-06 | End: 2018-02-07 | Stop reason: ALTCHOICE

## 2018-02-06 RX ORDER — SODIUM CHLORIDE 0.9 % (FLUSH) 0.9 %
5-10 SYRINGE (ML) INJECTION AS NEEDED
Status: DISCONTINUED | OUTPATIENT
Start: 2018-02-06 | End: 2018-02-06 | Stop reason: HOSPADM

## 2018-02-06 RX ORDER — SODIUM CHLORIDE 0.9 % (FLUSH) 0.9 %
5-10 SYRINGE (ML) INJECTION AS NEEDED
Status: DISCONTINUED | OUTPATIENT
Start: 2018-02-06 | End: 2018-02-08

## 2018-02-06 RX ORDER — ROCURONIUM BROMIDE 10 MG/ML
INJECTION, SOLUTION INTRAVENOUS AS NEEDED
Status: DISCONTINUED | OUTPATIENT
Start: 2018-02-06 | End: 2018-02-06 | Stop reason: HOSPADM

## 2018-02-06 RX ORDER — NITROGLYCERIN 20 MG/100ML
16.5 INJECTION INTRAVENOUS CONTINUOUS
Status: DISCONTINUED | OUTPATIENT
Start: 2018-02-06 | End: 2018-02-06

## 2018-02-06 RX ORDER — HEPARIN SODIUM 1000 [USP'U]/ML
INJECTION, SOLUTION INTRAVENOUS; SUBCUTANEOUS AS NEEDED
Status: DISCONTINUED | OUTPATIENT
Start: 2018-02-06 | End: 2018-02-06 | Stop reason: HOSPADM

## 2018-02-06 RX ORDER — ONDANSETRON 2 MG/ML
4 INJECTION INTRAMUSCULAR; INTRAVENOUS
Status: DISCONTINUED | OUTPATIENT
Start: 2018-02-06 | End: 2018-02-14 | Stop reason: HOSPADM

## 2018-02-06 RX ORDER — BACITRACIN 500 UNIT/G
1 PACKET (EA) TOPICAL AS NEEDED
Status: DISCONTINUED | OUTPATIENT
Start: 2018-02-06 | End: 2018-02-14 | Stop reason: HOSPADM

## 2018-02-06 RX ORDER — SODIUM CHLORIDE 9 MG/ML
INJECTION, SOLUTION INTRAVENOUS
Status: DISCONTINUED | OUTPATIENT
Start: 2018-02-06 | End: 2018-02-06 | Stop reason: HOSPADM

## 2018-02-06 RX ORDER — HEPARIN SOD,PORCINE/0.9 % NACL 30K/1000ML
50-1000 INTRAVENOUS SOLUTION INTRAVENOUS AS NEEDED
Status: DISCONTINUED | OUTPATIENT
Start: 2018-02-06 | End: 2018-02-06 | Stop reason: HOSPADM

## 2018-02-06 RX ORDER — PAPAVERINE HYDROCHLORIDE 30 MG/ML
10 INJECTION INTRAMUSCULAR; INTRAVENOUS ONCE
Status: COMPLETED | OUTPATIENT
Start: 2018-02-06 | End: 2018-02-06

## 2018-02-06 RX ORDER — MORPHINE SULFATE 10 MG/ML
2 INJECTION, SOLUTION INTRAMUSCULAR; INTRAVENOUS
Status: DISCONTINUED | OUTPATIENT
Start: 2018-02-06 | End: 2018-02-07 | Stop reason: ALTCHOICE

## 2018-02-06 RX ORDER — MUPIROCIN 20 MG/G
OINTMENT TOPICAL 2 TIMES DAILY
Status: DISPENSED | OUTPATIENT
Start: 2018-02-06 | End: 2018-02-11

## 2018-02-06 RX ORDER — FACIAL-BODY WIPES
10 EACH TOPICAL DAILY PRN
Status: DISCONTINUED | OUTPATIENT
Start: 2018-02-06 | End: 2018-02-14 | Stop reason: HOSPADM

## 2018-02-06 RX ORDER — DOPAMINE HYDROCHLORIDE 320 MG/100ML
5-20 INJECTION, SOLUTION INTRAVENOUS
Status: DISCONTINUED | OUTPATIENT
Start: 2018-02-06 | End: 2018-02-06

## 2018-02-06 RX ORDER — POTASSIUM CHLORIDE 29.8 MG/ML
20 INJECTION INTRAVENOUS
Status: DISPENSED | OUTPATIENT
Start: 2018-02-06 | End: 2018-02-07

## 2018-02-06 RX ORDER — POLYETHYLENE GLYCOL 3350 17 G/17G
17 POWDER, FOR SOLUTION ORAL DAILY
Status: DISCONTINUED | OUTPATIENT
Start: 2018-02-07 | End: 2018-02-14 | Stop reason: HOSPADM

## 2018-02-06 RX ORDER — FENTANYL CITRATE 50 UG/ML
50 INJECTION, SOLUTION INTRAMUSCULAR; INTRAVENOUS AS NEEDED
Status: DISCONTINUED | OUTPATIENT
Start: 2018-02-06 | End: 2018-02-06 | Stop reason: HOSPADM

## 2018-02-06 RX ORDER — NITROGLYCERIN 20 MG/100ML
INJECTION INTRAVENOUS
Status: DISCONTINUED | OUTPATIENT
Start: 2018-02-06 | End: 2018-02-06

## 2018-02-06 RX ORDER — MIDAZOLAM HYDROCHLORIDE 1 MG/ML
0.5 INJECTION, SOLUTION INTRAMUSCULAR; INTRAVENOUS
Status: DISCONTINUED | OUTPATIENT
Start: 2018-02-06 | End: 2018-02-07 | Stop reason: ALTCHOICE

## 2018-02-06 RX ORDER — DOBUTAMINE HYDROCHLORIDE 200 MG/100ML
0-10 INJECTION INTRAVENOUS
Status: DISCONTINUED | OUTPATIENT
Start: 2018-02-06 | End: 2018-02-08

## 2018-02-06 RX ORDER — SODIUM CHLORIDE 0.9 % (FLUSH) 0.9 %
10 SYRINGE (ML) INJECTION EVERY 8 HOURS
Status: DISCONTINUED | OUTPATIENT
Start: 2018-02-06 | End: 2018-02-08

## 2018-02-06 RX ORDER — SODIUM CHLORIDE, SODIUM LACTATE, POTASSIUM CHLORIDE, CALCIUM CHLORIDE 600; 310; 30; 20 MG/100ML; MG/100ML; MG/100ML; MG/100ML
INJECTION, SOLUTION INTRAVENOUS
Status: DISCONTINUED | OUTPATIENT
Start: 2018-02-06 | End: 2018-02-06 | Stop reason: HOSPADM

## 2018-02-06 RX ORDER — FENTANYL CITRATE 50 UG/ML
25 INJECTION, SOLUTION INTRAMUSCULAR; INTRAVENOUS
Status: DISCONTINUED | OUTPATIENT
Start: 2018-02-06 | End: 2018-02-07 | Stop reason: ALTCHOICE

## 2018-02-06 RX ORDER — MAGNESIUM SULFATE 100 %
4 CRYSTALS MISCELLANEOUS AS NEEDED
Status: DISCONTINUED | OUTPATIENT
Start: 2018-02-06 | End: 2018-02-09

## 2018-02-06 RX ORDER — MORPHINE SULFATE 10 MG/ML
4 INJECTION, SOLUTION INTRAMUSCULAR; INTRAVENOUS
Status: DISCONTINUED | OUTPATIENT
Start: 2018-02-06 | End: 2018-02-07 | Stop reason: SDUPTHER

## 2018-02-06 RX ORDER — OXYCODONE AND ACETAMINOPHEN 5; 325 MG/1; MG/1
2 TABLET ORAL
Status: DISCONTINUED | OUTPATIENT
Start: 2018-02-06 | End: 2018-02-14 | Stop reason: HOSPADM

## 2018-02-06 RX ORDER — OXYCODONE AND ACETAMINOPHEN 5; 325 MG/1; MG/1
1 TABLET ORAL
Status: DISCONTINUED | OUTPATIENT
Start: 2018-02-06 | End: 2018-02-14 | Stop reason: HOSPADM

## 2018-02-06 RX ORDER — POTASSIUM CHLORIDE 29.8 MG/ML
20 INJECTION INTRAVENOUS
Status: DISCONTINUED | OUTPATIENT
Start: 2018-02-06 | End: 2018-02-08

## 2018-02-06 RX ORDER — AMOXICILLIN 250 MG
1 CAPSULE ORAL 2 TIMES DAILY
Status: DISCONTINUED | OUTPATIENT
Start: 2018-02-07 | End: 2018-02-14 | Stop reason: HOSPADM

## 2018-02-06 RX ORDER — LIDOCAINE HYDROCHLORIDE 10 MG/ML
0.1 INJECTION, SOLUTION EPIDURAL; INFILTRATION; INTRACAUDAL; PERINEURAL AS NEEDED
Status: DISCONTINUED | OUTPATIENT
Start: 2018-02-06 | End: 2018-02-06 | Stop reason: HOSPADM

## 2018-02-06 RX ORDER — INSULIN LISPRO 100 [IU]/ML
INJECTION, SOLUTION INTRAVENOUS; SUBCUTANEOUS
Status: DISCONTINUED | OUTPATIENT
Start: 2018-02-06 | End: 2018-02-09

## 2018-02-06 RX ORDER — SODIUM CHLORIDE 0.9 % (FLUSH) 0.9 %
5-10 SYRINGE (ML) INJECTION EVERY 8 HOURS
Status: DISCONTINUED | OUTPATIENT
Start: 2018-02-06 | End: 2018-02-08

## 2018-02-06 RX ORDER — PROPOFOL 10 MG/ML
INJECTION, EMULSION INTRAVENOUS
Status: DISCONTINUED | OUTPATIENT
Start: 2018-02-06 | End: 2018-02-06

## 2018-02-06 RX ORDER — DEXMEDETOMIDINE HYDROCHLORIDE 4 UG/ML
INJECTION, SOLUTION INTRAVENOUS
Status: DISCONTINUED | OUTPATIENT
Start: 2018-02-06 | End: 2018-02-06 | Stop reason: HOSPADM

## 2018-02-06 RX ORDER — SODIUM CHLORIDE, SODIUM LACTATE, POTASSIUM CHLORIDE, CALCIUM CHLORIDE 600; 310; 30; 20 MG/100ML; MG/100ML; MG/100ML; MG/100ML
100 INJECTION, SOLUTION INTRAVENOUS CONTINUOUS
Status: DISCONTINUED | OUTPATIENT
Start: 2018-02-06 | End: 2018-02-08

## 2018-02-06 RX ORDER — ALBUMIN HUMAN 50 G/1000ML
12.5 SOLUTION INTRAVENOUS
Status: DISCONTINUED | OUTPATIENT
Start: 2018-02-06 | End: 2018-02-08

## 2018-02-06 RX ORDER — ROPIVACAINE HYDROCHLORIDE 5 MG/ML
30 INJECTION, SOLUTION EPIDURAL; INFILTRATION; PERINEURAL AS NEEDED
Status: DISCONTINUED | OUTPATIENT
Start: 2018-02-06 | End: 2018-02-06 | Stop reason: HOSPADM

## 2018-02-06 RX ORDER — LIDOCAINE HYDROCHLORIDE 20 MG/ML
INJECTION, SOLUTION EPIDURAL; INFILTRATION; INTRACAUDAL; PERINEURAL AS NEEDED
Status: DISCONTINUED | OUTPATIENT
Start: 2018-02-06 | End: 2018-02-06 | Stop reason: HOSPADM

## 2018-02-06 RX ORDER — PROPOFOL 10 MG/ML
INJECTION, EMULSION INTRAVENOUS AS NEEDED
Status: DISCONTINUED | OUTPATIENT
Start: 2018-02-06 | End: 2018-02-06 | Stop reason: HOSPADM

## 2018-02-06 RX ORDER — AMIODARONE HYDROCHLORIDE 200 MG/1
400 TABLET ORAL EVERY 12 HOURS
Status: DISCONTINUED | OUTPATIENT
Start: 2018-02-07 | End: 2018-02-14 | Stop reason: HOSPADM

## 2018-02-06 RX ORDER — INSULIN LISPRO 100 [IU]/ML
INJECTION, SOLUTION INTRAVENOUS; SUBCUTANEOUS
Status: DISCONTINUED | OUTPATIENT
Start: 2018-02-06 | End: 2018-02-14 | Stop reason: HOSPADM

## 2018-02-06 RX ORDER — CHLORHEXIDINE GLUCONATE 1.2 MG/ML
10 RINSE ORAL 2 TIMES DAILY
Status: DISCONTINUED | OUTPATIENT
Start: 2018-02-06 | End: 2018-02-08

## 2018-02-06 RX ORDER — FENTANYL CITRATE 50 UG/ML
25 INJECTION, SOLUTION INTRAMUSCULAR; INTRAVENOUS ONCE
Status: CANCELLED | OUTPATIENT
Start: 2018-02-06 | End: 2018-02-06

## 2018-02-06 RX ORDER — MIDAZOLAM HYDROCHLORIDE 1 MG/ML
1 INJECTION, SOLUTION INTRAMUSCULAR; INTRAVENOUS ONCE
Status: CANCELLED | OUTPATIENT
Start: 2018-02-06 | End: 2018-02-06

## 2018-02-06 RX ORDER — SODIUM CHLORIDE 450 MG/100ML
10 INJECTION, SOLUTION INTRAVENOUS CONTINUOUS
Status: DISCONTINUED | OUTPATIENT
Start: 2018-02-06 | End: 2018-02-08

## 2018-02-06 RX ORDER — ONDANSETRON 2 MG/ML
INJECTION INTRAMUSCULAR; INTRAVENOUS AS NEEDED
Status: DISCONTINUED | OUTPATIENT
Start: 2018-02-06 | End: 2018-02-06 | Stop reason: HOSPADM

## 2018-02-06 RX ORDER — PHENYLEPHRINE HCL IN 0.9% NACL 0.4MG/10ML
SYRINGE (ML) INTRAVENOUS AS NEEDED
Status: DISCONTINUED | OUTPATIENT
Start: 2018-02-06 | End: 2018-02-06 | Stop reason: HOSPADM

## 2018-02-06 RX ORDER — HYDROMORPHONE HYDROCHLORIDE 1 MG/ML
0.2 INJECTION, SOLUTION INTRAMUSCULAR; INTRAVENOUS; SUBCUTANEOUS
Status: DISCONTINUED | OUTPATIENT
Start: 2018-02-06 | End: 2018-02-07 | Stop reason: ALTCHOICE

## 2018-02-06 RX ORDER — MIDAZOLAM HYDROCHLORIDE 1 MG/ML
1 INJECTION, SOLUTION INTRAMUSCULAR; INTRAVENOUS
Status: DISCONTINUED | OUTPATIENT
Start: 2018-02-06 | End: 2018-02-08

## 2018-02-06 RX ORDER — SUFENTANIL CITRATE 50 UG/ML
INJECTION EPIDURAL; INTRAVENOUS
Status: DISCONTINUED | OUTPATIENT
Start: 2018-02-06 | End: 2018-02-06 | Stop reason: HOSPADM

## 2018-02-06 RX ORDER — SODIUM CHLORIDE 0.9 % (FLUSH) 0.9 %
5-10 SYRINGE (ML) INJECTION EVERY 8 HOURS
Status: DISCONTINUED | OUTPATIENT
Start: 2018-02-06 | End: 2018-02-06 | Stop reason: HOSPADM

## 2018-02-06 RX ORDER — GUAIFENESIN 100 MG/5ML
81 LIQUID (ML) ORAL DAILY
Status: DISCONTINUED | OUTPATIENT
Start: 2018-02-07 | End: 2018-02-14 | Stop reason: HOSPADM

## 2018-02-06 RX ORDER — SODIUM CHLORIDE, SODIUM LACTATE, POTASSIUM CHLORIDE, CALCIUM CHLORIDE 600; 310; 30; 20 MG/100ML; MG/100ML; MG/100ML; MG/100ML
25 INJECTION, SOLUTION INTRAVENOUS CONTINUOUS
Status: CANCELLED | OUTPATIENT
Start: 2018-02-06 | End: 2018-02-07

## 2018-02-06 RX ORDER — MIDAZOLAM HYDROCHLORIDE 1 MG/ML
1 INJECTION, SOLUTION INTRAMUSCULAR; INTRAVENOUS AS NEEDED
Status: DISCONTINUED | OUTPATIENT
Start: 2018-02-06 | End: 2018-02-06 | Stop reason: HOSPADM

## 2018-02-06 RX ORDER — CEFAZOLIN SODIUM/WATER 2 G/20 ML
2 SYRINGE (ML) INTRAVENOUS EVERY 6 HOURS
Status: COMPLETED | OUTPATIENT
Start: 2018-02-06 | End: 2018-02-08

## 2018-02-06 RX ORDER — ACETAMINOPHEN 325 MG/1
650 TABLET ORAL EVERY 4 HOURS
Status: DISPENSED | OUTPATIENT
Start: 2018-02-06 | End: 2018-02-08

## 2018-02-06 RX ORDER — LANOLIN ALCOHOL/MO/W.PET/CERES
400 CREAM (GRAM) TOPICAL 2 TIMES DAILY
Status: DISCONTINUED | OUTPATIENT
Start: 2018-02-07 | End: 2018-02-14 | Stop reason: HOSPADM

## 2018-02-06 RX ORDER — CEFAZOLIN SODIUM IN 0.9 % NACL 2 G/100 ML
PLASTIC BAG, INJECTION (ML) INTRAVENOUS AS NEEDED
Status: DISCONTINUED | OUTPATIENT
Start: 2018-02-06 | End: 2018-02-06 | Stop reason: HOSPADM

## 2018-02-06 RX ADMIN — DESMOPRESSIN ACETATE 28 MCG: 4 INJECTION, SOLUTION INTRAVENOUS; SUBCUTANEOUS at 16:19

## 2018-02-06 RX ADMIN — Medication 10 ML: at 06:18

## 2018-02-06 RX ADMIN — FAMOTIDINE 20 MG: 10 INJECTION, SOLUTION INTRAVENOUS at 21:50

## 2018-02-06 RX ADMIN — MIDAZOLAM HYDROCHLORIDE 2 MG: 1 INJECTION, SOLUTION INTRAMUSCULAR; INTRAVENOUS at 13:50

## 2018-02-06 RX ADMIN — SODIUM CHLORIDE, SODIUM LACTATE, POTASSIUM CHLORIDE, CALCIUM CHLORIDE: 600; 310; 30; 20 INJECTION, SOLUTION INTRAVENOUS at 13:52

## 2018-02-06 RX ADMIN — ISOSORBIDE MONONITRATE 30 MG: 30 TABLET, EXTENDED RELEASE ORAL at 08:56

## 2018-02-06 RX ADMIN — TRANEXAMIC ACID 1 MG/KG/HR: 100 INJECTION, SOLUTION INTRAVENOUS at 14:24

## 2018-02-06 RX ADMIN — SUFENTANIL CITRATE 15 MCG: 50 INJECTION EPIDURAL; INTRAVENOUS at 14:08

## 2018-02-06 RX ADMIN — SUFENTANIL CITRATE 0.3 MCG/KG/HR: 50 INJECTION EPIDURAL; INTRAVENOUS at 13:52

## 2018-02-06 RX ADMIN — SODIUM CHLORIDE, SODIUM LACTATE, POTASSIUM CHLORIDE, AND CALCIUM CHLORIDE 25 ML/HR: 600; 310; 30; 20 INJECTION, SOLUTION INTRAVENOUS at 10:45

## 2018-02-06 RX ADMIN — MAGNESIUM SULFATE 1 G: 1 INJECTION INTRAVENOUS at 19:11

## 2018-02-06 RX ADMIN — PROPOFOL 50 MG: 10 INJECTION, EMULSION INTRAVENOUS at 14:05

## 2018-02-06 RX ADMIN — ROCURONIUM BROMIDE 60 MG: 10 INJECTION, SOLUTION INTRAVENOUS at 14:04

## 2018-02-06 RX ADMIN — LIDOCAINE HYDROCHLORIDE 80 MG: 20 INJECTION, SOLUTION EPIDURAL; INFILTRATION; INTRACAUDAL; PERINEURAL at 14:03

## 2018-02-06 RX ADMIN — PROTAMINE SULFATE 250 MG: 10 INJECTION, SOLUTION INTRAVENOUS at 16:05

## 2018-02-06 RX ADMIN — TIMOLOL MALEATE 1 DROP: 5 SOLUTION OPHTHALMIC at 22:47

## 2018-02-06 RX ADMIN — PROPOFOL 50 MG: 10 INJECTION, EMULSION INTRAVENOUS at 14:06

## 2018-02-06 RX ADMIN — ASPIRIN 81 MG: 81 TABLET, COATED ORAL at 08:57

## 2018-02-06 RX ADMIN — Medication 2 G: at 14:20

## 2018-02-06 RX ADMIN — SODIUM CHLORIDE: 9 INJECTION, SOLUTION INTRAVENOUS at 13:52

## 2018-02-06 RX ADMIN — AMIODARONE HYDROCHLORIDE 400 MG: 200 TABLET ORAL at 08:57

## 2018-02-06 RX ADMIN — MORPHINE SULFATE 4 MG: 2 INJECTION, SOLUTION INTRAMUSCULAR; INTRAVENOUS at 17:52

## 2018-02-06 RX ADMIN — POTASSIUM CHLORIDE 20 MEQ: 400 INJECTION, SOLUTION INTRAVENOUS at 20:52

## 2018-02-06 RX ADMIN — HEPARIN SODIUM 45000 UNITS: 1000 INJECTION, SOLUTION INTRAVENOUS; SUBCUTANEOUS at 14:50

## 2018-02-06 RX ADMIN — MUPIROCIN: 20 OINTMENT TOPICAL at 08:57

## 2018-02-06 RX ADMIN — ROCURONIUM BROMIDE 20 MG: 10 INJECTION, SOLUTION INTRAVENOUS at 15:37

## 2018-02-06 RX ADMIN — Medication 10 ML: at 21:53

## 2018-02-06 RX ADMIN — Medication 40 MCG: at 14:50

## 2018-02-06 RX ADMIN — FENTANYL CITRATE 25 MCG: 50 INJECTION, SOLUTION INTRAMUSCULAR; INTRAVENOUS at 11:00

## 2018-02-06 RX ADMIN — ONDANSETRON 4 MG: 2 INJECTION INTRAMUSCULAR; INTRAVENOUS at 16:45

## 2018-02-06 RX ADMIN — ALBUMIN HUMAN 250 ML: 50 SOLUTION INTRAVENOUS at 16:56

## 2018-02-06 RX ADMIN — SODIUM CHLORIDE 10 ML/HR: 450 INJECTION, SOLUTION INTRAVENOUS at 17:54

## 2018-02-06 RX ADMIN — SUFENTANIL CITRATE 10 MCG: 50 INJECTION EPIDURAL; INTRAVENOUS at 14:39

## 2018-02-06 RX ADMIN — DEXMEDETOMIDINE HYDROCHLORIDE 0.2 MCG/KG/HR: 4 INJECTION, SOLUTION INTRAVENOUS at 15:31

## 2018-02-06 RX ADMIN — DEXMEDETOMIDINE HYDROCHLORIDE 0.4 MCG/KG/HR: 100 INJECTION, SOLUTION INTRAVENOUS at 21:23

## 2018-02-06 RX ADMIN — AMIODARONE HYDROCHLORIDE 1 MG/MIN: 50 INJECTION, SOLUTION INTRAVENOUS at 21:22

## 2018-02-06 RX ADMIN — ROSUVASTATIN CALCIUM 40 MG: 40 TABLET ORAL at 08:56

## 2018-02-06 RX ADMIN — BRIMONIDINE TARTRATE 1 DROP: 2 SOLUTION OPHTHALMIC at 22:46

## 2018-02-06 RX ADMIN — SODIUM CHLORIDE, SODIUM LACTATE, POTASSIUM CHLORIDE, AND CALCIUM CHLORIDE 25 ML/HR: 600; 310; 30; 20 INJECTION, SOLUTION INTRAVENOUS at 13:46

## 2018-02-06 RX ADMIN — TIMOLOL MALEATE 1 DROP: 5 SOLUTION OPHTHALMIC at 08:57

## 2018-02-06 RX ADMIN — PROPOFOL 50 MG: 10 INJECTION, EMULSION INTRAVENOUS at 14:03

## 2018-02-06 RX ADMIN — HEPARIN SODIUM 2000 UNITS: 1000 INJECTION, SOLUTION INTRAVENOUS; SUBCUTANEOUS at 14:32

## 2018-02-06 RX ADMIN — ALBUMIN (HUMAN) 12.5 G: 12.5 INJECTION, SOLUTION INTRAVENOUS at 18:12

## 2018-02-06 RX ADMIN — SUFENTANIL CITRATE 15 MCG: 50 INJECTION EPIDURAL; INTRAVENOUS at 14:03

## 2018-02-06 RX ADMIN — Medication 80 MCG: at 16:00

## 2018-02-06 RX ADMIN — PROPOFOL 50 MG: 10 INJECTION, EMULSION INTRAVENOUS at 14:08

## 2018-02-06 RX ADMIN — Medication 2 G: at 17:55

## 2018-02-06 RX ADMIN — POTASSIUM CHLORIDE 20 MEQ: 400 INJECTION, SOLUTION INTRAVENOUS at 21:53

## 2018-02-06 RX ADMIN — ALBUMIN HUMAN 250 ML: 50 SOLUTION INTRAVENOUS at 16:25

## 2018-02-06 RX ADMIN — BRIMONIDINE TARTRATE 1 DROP: 2 SOLUTION OPHTHALMIC at 08:57

## 2018-02-06 RX ADMIN — SUFENTANIL CITRATE 10 MCG: 50 INJECTION EPIDURAL; INTRAVENOUS at 14:45

## 2018-02-06 RX ADMIN — CHLORHEXIDINE GLUCONATE 15 ML: 1.2 RINSE ORAL at 08:57

## 2018-02-06 RX ADMIN — METOPROLOL TARTRATE 25 MG: 25 TABLET ORAL at 08:57

## 2018-02-06 RX ADMIN — ALBUMIN (HUMAN) 12.5 G: 12.5 INJECTION, SOLUTION INTRAVENOUS at 18:03

## 2018-02-06 RX ADMIN — SODIUM CHLORIDE 75 ML/HR: 900 INJECTION, SOLUTION INTRAVENOUS at 05:18

## 2018-02-06 RX ADMIN — Medication 40 MCG: at 16:28

## 2018-02-06 NOTE — PROCEDURES
[address]  PULMONARY FUNCTION    Ever Splinter  MR#: [id number]  : 1940  ACCOUNT #: [de-identified]   DATE OF SERVICE: 2018    CLINICAL INDICATION:  Preoperative evaluation. Spirometry was performed. Spirometry reveals severe airflow obstruction. The vital capacity is severely reduced. It may be secondary to air trapping or concurrent restrictive physiology. Lung volumes are recommended if clinically indicated. Flow volume loop is consistent with a combined obstructive and restrictive pattern.       MD DANIEL Thompson / BREANNA  D: 2018 12:33     T: 2018 13:00  JOB #: [id number]

## 2018-02-06 NOTE — PROGRESS NOTES
Cardiac Surgery Care Coordinator-  Met with David Pena and her daughter in pre-op holding, Introduced role of the Cardiac Surgery Co-. Reviewed plan of care and began pre-op education. Discussed day of surgery expectations for the pt and family. Reinforced sternal precautions and 5 lb weight restrictions. Encouraged David Pena and her family to verbalize and offered emotional support. 12- Met with the family provided update and offered emotional   Will continue to follow and update throughout the day. 36- Met with family after Dr Claudetta Green update, they are without questions at this time. Family instructed to go to 4th floor waiting room at [phone] per surgeon. Will update bedside nurse. 18- Briefly escorted family to the bedside in CV[facility], they will return for a visit at 200, reviewed plan of care and offered emotional support. Will continue to follow for educational and emotional support.   Davey Roberto RN

## 2018-02-06 NOTE — PROGRESS NOTES
9540: TRANSFER - OUT REPORT:    Verbal report given to Mary N Yonathan Valdivia rn(name) on Karis Zabala  being transferred to holding(unit) for pre op    Report consisted of patients Situation, Background, Assessment and   Recommendations(SBAR). Information from the following report(s) SBAR, Kardex, ED Summary, Procedure Summary, Intake/Output, MAR and Recent Results was reviewed with the receiving nurse. Lines:   Peripheral IV 02/02/18 Left Antecubital (Active)   Site Assessment Clean, dry, & intact 2/6/2018  8:09 AM   Phlebitis Assessment 0 2/6/2018  8:09 AM   Infiltration Assessment 0 2/6/2018  8:09 AM   Dressing Status Clean, dry, & intact 2/6/2018  8:09 AM   Dressing Type Tape;Transparent 2/6/2018  8:09 AM   Hub Color/Line Status Pink; Infusing 2/6/2018  8:09 AM   Action Taken Open ports on tubing capped 2/6/2018  8:09 AM   Alcohol Cap Used Yes 2/6/2018  8:09 AM        Opportunity for questions and clarification was provided.       Patient transported with:   Registered Nurse

## 2018-02-06 NOTE — PROGRESS NOTES
TRANSFER - IN REPORT:    Verbal report received from ROCK(name) on Karis Zabala  being received from CVSU(unit) for ordered procedure      Report consisted of patients Situation, Background, Assessment and   Recommendations(SBAR). Information from the following report(s) SBAR, Kardex, Intake/Output, MAR and Recent Results was reviewed with the receiving nurse. Opportunity for questions and clarification was provided. Assessment completed upon patients arrival to unit and care assumed.

## 2018-02-06 NOTE — PERIOP NOTES
TRANSFER - OUT REPORT:    Verbal report given to Tabby Serrano on patient Joey Storey  being transferred to CVICU for routine progression of care s/p cardiac surgery    Report consisted of patients Situation, Background, Assessment and   Recommendations(SBAR). Information from the following report(s) OR Summary, was reviewed with the receiving nurse. Opportunity for questions and clarification was provided. Pt transported to CVICU by CRNA, RN, perfusion, and PA. A-line, EKG and O2 monitored. Pt bagged at 100% FIO2. Pichardo and chest tubes x4 patent and draining.

## 2018-02-06 NOTE — PROCEDURES
[address] [facility]  [place]    Gabriella Juarez  MR#: [id number]  : 1940  ACCOUNT #: [de-identified]   DATE OF SERVICE: 2018    PROCEDURE:  Intraoperative epiaortic and transesophageal echocardiography. INDICATIONS FOR PROCEDURE:  The patient is a 78-year-old female undergoing coronary artery bypass grafting in need of transesophageal and epiaortic echocardiography. We were requested by the surgeon to perform this service and is deemed medically necessary for the safe conduct of anesthesia. DESCRIPTION OF PROCEDURE:  After atraumatic probe insertion, the 4-chamber view was evaluated with the following findings. The left atrium was mildly enlarged without masses or thrombus. The left atrial appendage was free of thrombus. The pulse wave Doppler signal from the left upper pulmonary vein showed antegrade flow only. The mitral valve was mildly thickened with minimal calcifications. There was no retraction or prolapse. Color flow Doppler at all angles indicated a competent mitral valve. The left ventricle showed mild to moderate concentric hypertrophy with good contractility throughout. The right ventricle also showed good contractility and septal and free wall thickening during systole. The tricuspid valve was competent. The right atrium was minimally enlarged without masses or thrombus. The interatrial septum was intact using color flow Doppler. The aortic valve was trileaflet and showed no evidence of stenosis or insufficiency. The descending thoracic aorta from the distal aortic arch to the diaphragm showed grade IV to grade V atheromatous plaque throughout. Short axis view of the left ventricle showed vigorous activity from base to apex. Estimated ejection fraction 60%. Epiaortic echocardiography was performed by Dr. Thad Huang and interpreted by me.   Short and long axis views of the ascending aorta from the sinotubular junction to the proximal aortic arch were evaluated with the following findings. There was intimal thickening only in the visualized segments. All of these findings were communicated with the surgeon. Just after separation from cardiopulmonary bypass, the valve exam was repeated and found to be unchanged. The ventricular exam was also serially examine and showed vigorous activity with near 80-90 ejection fraction. Volume was corrected and subsequent filling of the left ventricle was improved. Overall ejection fraction remained approximately 60-65%. After chest closure, the entire exam was repeated, in fact, and found to be unchanged. MD CHANTALE Bowen / BAHMAN  D: 02/06/2018 17:11     T: 02/06/2018 17:46  JOB #: [id number]

## 2018-02-06 NOTE — PROGRESS NOTES
Hospitalist Progress Note                               Hansel Sosa MD                                     Answering service: [phone]                               OR [phone] from in house phone                               Cell: [phone]              Date of Service:  2018  NAME:  Santana Arroyo  :  1940  MRN:  [id number]            Interval history / Subjective:     Tamea Si apprehensive about the surgery tomorrow. Daughetr and grandson in the room,supportive. Assessment & Plan:   Possibly UA,  -Cardiac enzymes negative,EKG inverted T wave in one of the EKG  -Check Echo  -PE and DVT RULED out. -[facility] 3 vesselv CAD. CABG in AM    Hypertension,daughter notes BP has been running high and pcp has increased lisinopril,she is on hctz.  -She has bump in creatinine,she is on hctz and lisinopril,decrease lisinopril and add imdur    Dehydration,bump in creatinine  -Iv fluids. Decreased lisinopril dose,metformin on hold. -Avoid NSAIDS    DM, fairly controlled. A1C 7.8  -Accucheks. Humalog SS. Lantus while here,can resume home regimen on discharge. Diet:diabetic  Code status: full  DVT prophylaxis: lovenox  Care Plan discussed with,pt and dtr      Hospital Problems  Date Reviewed: 2018          Codes Class Noted POA    Unstable angina ([facility] [address] ICD-10-CM: I20.0  ICD-9-CM: 411.1  2/3/2018 Unknown        * (Principal)Chest pain ICD-10-CM: R07.9  ICD-9-CM: 786.50  2018 Unknown                Review of Systems:   A comprehensive review of systems was negative except for that written in the HPI. Physical Examination:      Last 24hrs VS reviewed since prior progress note.  Most recent are:  Visit Vitals    /58 (BP 1 Location: Left arm, BP Patient Position: At rest)    Pulse (!) 57    Temp 97 °F (36.1 °C)    Resp 18    Ht 5' 6\" (1.676 m)    Wt 97.1 kg (214 lb 1.1 oz)    SpO2 96%    BMI 34.55 kg/m2 Constitutional:  No acute distress, cooperative, pleasant    Eyes: Non icteric,non pallor,no erythema,discharge,PERRLA   ENT:  Oral mucous moist, oropharynx benign. Neck supple,    Resp:  CTA bilaterally. No wheezing/rhonchi/rales. No accessory muscle use   CV:  Regular rhythm, normal rate, no murmurs, gallops, rubs    GI:  Soft, non distended, non tender. normoactive bowel sounds, no hepatosplenomegaly    :  No CVA or suprapubic tenderness   Skin  :  No erythema,rash,bullae,dipigmentation     Musculoskeletal:  No edema, warm, 2+ pulses throughout    Neurologic:  AAOx3, CN II-XII reviewed. Moves all extremities. Psych:  Good insight, Not anxious nor agitated. Intake/Output Summary (Last 24 hours) at 02/05/18 1916  Last data filed at 02/05/18 0400   Gross per 24 hour   Intake           896.25 ml   Output                0 ml   Net           896.25 ml          Data Review:    Review and/or order of clinical lab test  Review and/or order of tests in the radiology section of CPT  Review and/or order of tests in the medicine section of CPT      Labs:     Recent Labs      02/05/18 1700 02/03/18   0445   WBC  6.6  7.4   HGB  11.7  12.1   HCT  34.7*  36.3   PLT  121*  116*     Recent Labs      02/05/18 1700 02/04/18   0110  02/03/18   0445   NA  140  140  143   K  3.7  3.8  4.2   CL  107  107  107   CO2  25  27  28   BUN  25*  36*  37*   CREA  0.85  1.08*  1.05*   GLU  212*  174*  129*   CA  9.0  8.5  9.1   MG  1.5*   --    --      Recent Labs      02/05/18 1700   SGOT  13*   ALT  25   AP  57   TBILI  0.3   TP  6.7   ALB  3.1*   GLOB  3.6     Recent Labs      02/05/18 1700  02/04/18   0110   INR  1.1  1.1   PTP  10.7  11.4*   APTT  23.5  29.1      No results for input(s): FE, TIBC, PSAT, FERR in the last 72 hours. No results found for: FOL, RBCF   No results for input(s): PH, PCO2, PO2 in the last 72 hours.   Recent Labs      02/03/18   0445  02/02/18   8971   TROIQ  <0.04  <0.04     Lab Results   Component Value Date/Time    Cholesterol, total 208 02/03/2018 04:45 AM    HDL Cholesterol 41 02/03/2018 04:45 AM    LDL, calculated 124.4 02/03/2018 04:45 AM    Triglyceride 213 02/03/2018 04:45 AM    CHOL/HDL Ratio 5.1 02/03/2018 04:45 AM     Lab Results   Component Value Date/Time    Glucose (POC) 278 02/05/2018 04:52 PM    Glucose (POC) 250 02/05/2018 06:14 AM    Glucose (POC) 203 02/04/2018 10:19 PM    Glucose (POC) 278 02/04/2018 05:28 PM    Glucose (POC) 262 02/04/2018 11:37 AM     Lab Results   Component Value Date/Time    Color YELLOW/STRAW 01/16/2017 10:39 PM    Appearance CLOUDY 01/16/2017 10:39 PM    Specific gravity 1.015 01/16/2017 10:39 PM    Specific gravity 1.010 12/14/2014 11:32 AM    pH (UA) 7.0 01/16/2017 10:39 PM    Protein NEGATIVE  01/16/2017 10:39 PM    Glucose NEGATIVE  01/16/2017 10:39 PM    Ketone NEGATIVE  01/16/2017 10:39 PM    Bilirubin NEGATIVE  01/16/2017 10:39 PM    Urobilinogen 0.2 01/16/2017 10:39 PM    Nitrites NEGATIVE  01/16/2017 10:39 PM    Leukocyte Esterase SMALL 01/16/2017 10:39 PM    Epithelial cells FEW 01/16/2017 10:39 PM    Bacteria NEGATIVE  01/16/2017 10:39 PM    WBC 5-10 01/16/2017 10:39 PM    RBC 0-5 01/16/2017 10:39 PM         Medications Reviewed:     Current Facility-Administered Medications   Medication Dose Route Frequency    insulin regular (NOVOLIN R, HUMULIN R) 100 Units in 0.9% sodium chloride 100 mL infusion  1-50 Units/hr IntraVENous TITRATE    glucose chewable tablet 16 g  4 Tab Oral PRN    dextrose (D50W) injection syrg 12.5-25 g  12.5-25 g IntraVENous PRN    glucagon (GLUCAGEN) injection 1 mg  1 mg IntraMUSCular PRN    sodium chloride (NS) flush 5-10 mL  5-10 mL IntraVENous Q8H    sodium chloride (NS) flush 5-10 mL  5-10 mL IntraVENous PRN    sodium phosphate (FLEET'S) enema 118 mL  1 Enema Rectal PRN    amiodarone (CORDARONE) tablet 400 mg  400 mg Oral Q12H    ceFAZolin (ANCEF) 2 g/20 mL in sterile water IV syringe  2 g IntraVENous ON CALL TO OR    chlorhexidine (PERIDEX) 0.12 % mouthwash 15 mL  15 mL Oral Q12H    mupirocin (BACTROBAN) 2 % ointment   Both Nostrils BID    metoprolol tartrate (LOPRESSOR) tablet 25 mg  25 mg Oral Q12H    aspirin delayed-release tablet 81 mg  81 mg Oral DAILY    brimonidine (ALPHAGAN) 0.2 % ophthalmic solution 1 Drop  1 Drop Right Eye BID    dicyclomine (BENTYL) capsule 10 mg  10 mg Oral TID PRN    gabapentin (NEURONTIN) capsule 300 mg  300 mg Oral QHS    rosuvastatin (CRESTOR) tablet 40 mg  40 mg Oral DAILY    sodium chloride (NS) flush 5-10 mL  5-10 mL IntraVENous Q8H    sodium chloride (NS) flush 5-10 mL  5-10 mL IntraVENous PRN    0.9% sodium chloride infusion  75 mL/hr IntraVENous CONTINUOUS    nitroglycerin (NITROSTAT) tablet 0.4 mg  0.4 mg SubLINGual Q5MIN PRN    acetaminophen (TYLENOL) tablet 650 mg  650 mg Oral Q4H PRN    pantoprazole (PROTONIX) tablet 40 mg  40 mg Oral ACB    timolol (TIMOPTIC) 0.5 % ophthalmic solution 1 Drop  1 Drop Right Eye BID    influenza vaccine 2017-18 (3 yrs+)(PF) (FLUZONE QUAD/FLUARIX QUAD) injection 0.5 mL  0.5 mL IntraMUSCular PRIOR TO DISCHARGE    isosorbide mononitrate ER (IMDUR) tablet 30 mg  30 mg Oral DAILY    enoxaparin (LOVENOX) injection 40 mg  40 mg SubCUTAneous Q24H    hydrALAZINE (APRESOLINE) 20 mg/mL injection 10 mg  10 mg IntraVENous Q6H PRN     ______________________________________________________________________  EXPECTED LENGTH OF STAY: 1d 21h  ACTUAL LENGTH OF STAY:          2                 Jeremiah Self MD

## 2018-02-06 NOTE — PROCEDURES
[facility]  *** FINAL REPORT ***    Name: Martina Herzog  MRN: [id number]    Inpatient  : 02 Mar 1940  HIS Order #: [id number]  [id number] [facility] Visit #: [id number]  Date: 2018    TYPE OF TEST: Peripheral Arterial Testing    REASON FOR TEST  Pre-op    Right Leg  Segmentals: Abnormal                     mmHg  Brachial         123  High thigh  Low thigh  Calf  Posterior tibial 141  Dorsalis pedis   139  Peroneal  Metatarsal  Toe pressure      95  Doppler:  PVR:  Ankle/Brachial: 0.74    Left Leg  Segmentals: Abnormal                     mmHg  Brachial         191  High thigh  Low thigh  Calf  Posterior tibial 183  Dorsalis pedis   160  Peroneal  Metatarsal  Toe pressure  Doppler:  PVR:  Ankle/Brachial: 0.96    INTERPRETATION/FINDINGS  PROCEDURE:  Evaluation of lower extremity arteries with systolic blood   pressure measurement at the ankle and brachial level and calculation  of the ankle/brachial pressure index (ROSEMARY). Unless otherwise  indicated, the exam also includes pulse volume recording (PVR)  plethysmography at the ankle level. FINDINGS:  ROSEMARY is mildly abnormal on the right (0.74) and normal on  the left (0.96). PVR waveforms are normal at the ankle bilaterally. IMPRESSION:  Mild right lower extremity arterial obstruction. No  evidence of hemodynamically significant left lower extremity arterial  obstruction. ADDITIONAL COMMENTS    I have personally reviewed the data relevant to the interpretation of  this  study.     TECHNOLOGIST: STEVEN Gardner, ZEINAB  Signed: 2018 03:16 PM    PHYSICIAN: Jillian Lopez MD  Signed: 2018 09:26 AM

## 2018-02-06 NOTE — ANESTHESIA PREPROCEDURE EVALUATION
Anesthetic History   No history of anesthetic complications            Review of Systems / Medical History  Patient summary reviewed, nursing notes reviewed and pertinent labs reviewed    Pulmonary  Within defined limits                 Neuro/Psych   Within defined limits           Cardiovascular    Hypertension: well controlled          CAD    Exercise tolerance: >4 METS     GI/Hepatic/Renal         Renal disease: CRI       Endo/Other    Diabetes: using insulin         Other Findings              Physical Exam    Airway  Mallampati: II  TM Distance: > 6 cm  Neck ROM: normal range of motion   Mouth opening: Normal     Cardiovascular  Regular rate and rhythm,  S1 and S2 normal,  no murmur, click, rub, or gallop             Dental    Dentition: Edentulous, Full lower dentures and Full upper dentures     Pulmonary  Breath sounds clear to auscultation               Abdominal  GI exam deferred       Other Findings            Anesthetic Plan    ASA: 4  Anesthesia type: general    Monitoring Plan: Arterial line, BIS, CVP, [place] and [place]      Induction: Intravenous  Anesthetic plan and risks discussed with: Patient and Son / Daughter      All questions answered

## 2018-02-06 NOTE — BRIEF OP NOTE
BRIEF OP NOTE  Pre-Op Diagnosis: CAD    Post-Op Diagnosis: CAD      Procedure:   On Pump CORONARY ARTERY BYPASS GRAFTING X 4 WITH NAIR to LAD, Sequential LSVG OM1 and OM2, LSVG to RCA   [facility]    Surgeon: Emely Bustillos MD    Assistant(s): Lela Moore PA-C    Anesthesia: General     Infusions: Amiodarone, precedex, insulin,     Estimated Blood Loss: 550ml    Cell Saver: 450ml    Specimens: * No specimens in log *    Drains and pacing wires: 2 atrial wires, 1 bipolar ventricular wire, 4 devendra drains    Complications: None    Findings: CAD    Implants: * No implants in log *

## 2018-02-06 NOTE — PROCEDURES
[facility]  *** FINAL REPORT ***    Name: Sonali Serrano  MRN: [id number]    Inpatient  : 02 Mar 1940  HIS Order #: [id number]  [address] [facility] Visit #: [id number]  Date: 2018    TYPE OF TEST: Cerebrovascular Duplex    REASON FOR TEST  Pre-op    Right Carotid:-             Proximal               Mid                 Distal  cm/s  Systolic  Diastolic  Systolic  Diastolic  Systolic  Diastolic  CCA:     25.6       8.0                            91.0      15.0  Bulb:  ECA:    157.0       7.0  ICA:    104.0      19.0                            64.0      11.0  ICA/CCA:  1.1       1.3    ICA Stenosis: <50%    Right Vertebral:-  Finding: Antegrade  Sys:       46.0  Tammy:        7.0    Right Subclavian:    Left Carotid:-            Proximal                Mid                 Distal  cm/s  Systolic  Diastolic  Systolic  Diastolic  Systolic  Diastolic  CCA:     33.3      12.0                           123.0      18.0  Bulb:  ECA:    169.0       8.0  ICA:    102.0      16.0                            86.0      20.0  ICA/CCA:  0.8       0.9    ICA Stenosis: <50%    Left Vertebral:-  Finding: Antegrade  Sys:       79.0  Tammy:       15.0    Left Subclavian:    INTERPRETATION/FINDINGS  PROCEDURE:  Color duplex ultrasound imaging of extracranial  cerebrovascular arteries. FINDINGS:       Right:  Internal carotid velocity is within normal limits. There   is narrowing of the internal carotid flow channel on color Doppler  imaging and non-calcific plaque on B-mode imaging, consistent with  less than 50 percent stenosis. The common and external carotid  arteries are patent and without evidence of hemodynamically  significant stenosis. Left:  Internal carotid velocity is within normal limits. There  is narrowing of the internal carotid flow channel on color Doppler  imaging and non-calcific plaque on B-mode imaging, consistent with  less than 50 percent stenosis.   The common and external carotid  arteries are patent and without evidence of hemodynamically  significant stenosis. IMPRESSION:  Consistent with less than 50% stenosis of the right  internal carotid and less than 50% stenosis of the left internal  carotid. Reversal of flow is observed in the right vertebral artery,  consistent with a proximal subclavian artery obstruction. The left  vertebral flow is antegrade. ADDITIONAL COMMENTS    I have personally reviewed the data relevant to the interpretation of  this  study.     TECHNOLOGIST: STEVEN Powell, ZEINAB  Signed: 02/06/2018 12:37 AM    PHYSICIAN: Chele Mg MD  Signed: 02/07/2018 09:25 AM

## 2018-02-06 NOTE — PROGRESS NOTES
We will sign off on Mr. Moo Orozco since he is headed to the OR. Thank you for the opportunity to care for this patient.     Srikanth Rivera [facility]  [phone]

## 2018-02-06 NOTE — PROGRESS NOTES
1415: TRANSFER - IN REPORT:    Verbal report received from Heide(name) on Karis Zabala  being received from Cath lab(unit) for routine progression of care. Update also received from [facility] in [facility], the unit from which the patient originated prior to her cath. Report consisted of patients Situation, Background, Assessment and Recommendations(SBAR). Information from the following report(s) SBAR, Procedure Summary, Intake/Output, MAR, Accordion, Recent Results, Med Rec Status and Cardiac Rhythm NSR was reviewed with the receiving nurse. Opportunity for questions and clarification was provided. Assessment completed upon patients arrival to unit and care assumed. 1445: Patient arrived on unit, placed on tele, VS & Assessment performed; family at bedside    1515: TR Band at 7ml, 3ml of air removed for 4ml remaining    1545: TR Band at 4ml, 3 ml of air removed for 1ml remaining    1645: TR Band at 1ml, 1ml of air removed for 0ml remaining    1745: TR Band removed, no bleeding at site. Dressed with 4x4, transparent film    1800: patient moved to room 467 (private room) from 469(pod). Patient's daughter will be staying overnight, patient is being placed on insulin drip, and patient speaks [place] requiring a . 1900: Consents signed and placed on chart; Urinalysis sent; pre-op PFTs, ABGs, lab work completed. Still awaiting vascular for dopplers    2000: Bedside shift change report given to Nahid (oncoming nurse) by Hansel Hernandes (offgoing nurse). Report included the following information SBAR, Intake/Output, MAR, Accordion, Recent Results, Med Rec Status and Cardiac Rhythm NSR.

## 2018-02-06 NOTE — PROGRESS NOTES
1930: Bedside and Verbal shift change report given to Nahid RN (oncoming nurse) by Janice Braden RN (offgoing nurse). Report included the following information SBAR, Kardex, Intake/Output, MAR and Recent Results. 0700: Patient had uneventful night. 0730: Bedside and Verbal shift change report given to Holden Little RN (oncoming nurse) by Stefano Muniz RN (offgoing nurse). Report included the following information SBAR, Kardex, Intake/Output, MAR and Recent Results.

## 2018-02-07 ENCOUNTER — APPOINTMENT (OUTPATIENT)
Dept: GENERAL RADIOLOGY | Age: 78
DRG: 234 | End: 2018-02-07
Attending: NURSE PRACTITIONER
Payer: MEDICARE

## 2018-02-07 LAB
ABO + RH BLD: NORMAL
ADMINISTERED INITIALS, ADMINIT: NORMAL
ALBUMIN SERPL-MCNC: 3.2 G/DL (ref 3.5–5)
ALBUMIN SERPL-MCNC: 3.4 G/DL (ref 3.5–5)
ALBUMIN/GLOB SERPL: 1.3 {RATIO} (ref 1.1–2.2)
ALBUMIN/GLOB SERPL: 1.5 {RATIO} (ref 1.1–2.2)
ALP SERPL-CCNC: 33 U/L (ref 45–117)
ALP SERPL-CCNC: 34 U/L (ref 45–117)
ALT SERPL-CCNC: 23 U/L (ref 12–78)
ALT SERPL-CCNC: 24 U/L (ref 12–78)
ANION GAP SERPL CALC-SCNC: 8 MMOL/L (ref 5–15)
ANION GAP SERPL CALC-SCNC: 8 MMOL/L (ref 5–15)
ARTERIAL PATENCY WRIST A: ABNORMAL
AST SERPL-CCNC: 22 U/L (ref 15–37)
AST SERPL-CCNC: 24 U/L (ref 15–37)
ATRIAL RATE: 68 BPM
BASE DEFICIT BLD-SCNC: 1 MMOL/L
BDY SITE: ABNORMAL
BILIRUB SERPL-MCNC: 0.3 MG/DL (ref 0.2–1)
BILIRUB SERPL-MCNC: 0.4 MG/DL (ref 0.2–1)
BLD PROD TYP BPU: NORMAL
BLD PROD TYP BPU: NORMAL
BLOOD GROUP ANTIBODIES SERPL: NORMAL
BPU ID: NORMAL
BPU ID: NORMAL
BUN SERPL-MCNC: 23 MG/DL (ref 6–20)
BUN SERPL-MCNC: 24 MG/DL (ref 6–20)
BUN/CREAT SERPL: 20 (ref 12–20)
BUN/CREAT SERPL: 22 (ref 12–20)
CALCIUM SERPL-MCNC: 7.9 MG/DL (ref 8.5–10.1)
CALCIUM SERPL-MCNC: 8 MG/DL (ref 8.5–10.1)
CALCULATED P AXIS, ECG09: 36 DEGREES
CALCULATED R AXIS, ECG10: 6 DEGREES
CALCULATED T AXIS, ECG11: 38 DEGREES
CHLORIDE SERPL-SCNC: 111 MMOL/L (ref 97–108)
CHLORIDE SERPL-SCNC: 112 MMOL/L (ref 97–108)
CO2 SERPL-SCNC: 25 MMOL/L (ref 21–32)
CO2 SERPL-SCNC: 25 MMOL/L (ref 21–32)
CREAT SERPL-MCNC: 1.06 MG/DL (ref 0.55–1.02)
CREAT SERPL-MCNC: 1.21 MG/DL (ref 0.55–1.02)
CROSSMATCH RESULT,%XM: NORMAL
CROSSMATCH RESULT,%XM: NORMAL
D50 ADMINISTERED, D50ADM: 0 ML
D50 ORDER, D50ORD: 0 ML
DIAGNOSIS, 93000: NORMAL
ERYTHROCYTE [DISTWIDTH] IN BLOOD BY AUTOMATED COUNT: 12.8 % (ref 11.5–14.5)
GAS FLOW.O2 O2 DELIVERY SYS: ABNORMAL L/MIN
GLOBULIN SER CALC-MCNC: 2.2 G/DL (ref 2–4)
GLOBULIN SER CALC-MCNC: 2.4 G/DL (ref 2–4)
GLSCOM COMMENTS: NORMAL
GLUCOSE BLD STRIP.AUTO-MCNC: 108 MG/DL (ref 65–100)
GLUCOSE BLD STRIP.AUTO-MCNC: 111 MG/DL (ref 65–100)
GLUCOSE BLD STRIP.AUTO-MCNC: 113 MG/DL (ref 65–100)
GLUCOSE BLD STRIP.AUTO-MCNC: 115 MG/DL (ref 65–100)
GLUCOSE BLD STRIP.AUTO-MCNC: 124 MG/DL (ref 65–100)
GLUCOSE BLD STRIP.AUTO-MCNC: 124 MG/DL (ref 65–100)
GLUCOSE BLD STRIP.AUTO-MCNC: 128 MG/DL (ref 65–100)
GLUCOSE BLD STRIP.AUTO-MCNC: 129 MG/DL (ref 65–100)
GLUCOSE BLD STRIP.AUTO-MCNC: 130 MG/DL (ref 65–100)
GLUCOSE BLD STRIP.AUTO-MCNC: 135 MG/DL (ref 65–100)
GLUCOSE BLD STRIP.AUTO-MCNC: 135 MG/DL (ref 65–100)
GLUCOSE BLD STRIP.AUTO-MCNC: 150 MG/DL (ref 65–100)
GLUCOSE BLD STRIP.AUTO-MCNC: 161 MG/DL (ref 65–100)
GLUCOSE BLD STRIP.AUTO-MCNC: 178 MG/DL (ref 65–100)
GLUCOSE BLD STRIP.AUTO-MCNC: 98 MG/DL (ref 65–100)
GLUCOSE SERPL-MCNC: 113 MG/DL (ref 65–100)
GLUCOSE SERPL-MCNC: 120 MG/DL (ref 65–100)
GLUCOSE, GLC: 108 MG/DL
GLUCOSE, GLC: 111 MG/DL
GLUCOSE, GLC: 113 MG/DL
GLUCOSE, GLC: 115 MG/DL
GLUCOSE, GLC: 124 MG/DL
GLUCOSE, GLC: 124 MG/DL
GLUCOSE, GLC: 128 MG/DL
GLUCOSE, GLC: 129 MG/DL
GLUCOSE, GLC: 130 MG/DL
GLUCOSE, GLC: 135 MG/DL
GLUCOSE, GLC: 135 MG/DL
GLUCOSE, GLC: 150 MG/DL
GLUCOSE, GLC: 161 MG/DL
GLUCOSE, GLC: 178 MG/DL
GLUCOSE, GLC: 98 MG/DL
HCO3 BLD-SCNC: 24.4 MMOL/L (ref 22–26)
HCT VFR BLD AUTO: 25.6 % (ref 35–47)
HGB BLD-MCNC: 8.4 G/DL (ref 11.5–16)
HIGH TARGET, HITG: 130 MG/DL
INSULIN ADMINSTERED, INSADM: 11.2 UNITS/HOUR
INSULIN ADMINSTERED, INSADM: 11.9 UNITS/HOUR
INSULIN ADMINSTERED, INSADM: 3.9 UNITS/HOUR
INSULIN ADMINSTERED, INSADM: 4.5 UNITS/HOUR
INSULIN ADMINSTERED, INSADM: 4.6 UNITS/HOUR
INSULIN ADMINSTERED, INSADM: 5.2 UNITS/HOUR
INSULIN ADMINSTERED, INSADM: 5.5 UNITS/HOUR
INSULIN ADMINSTERED, INSADM: 5.7 UNITS/HOUR
INSULIN ADMINSTERED, INSADM: 6.1 UNITS/HOUR
INSULIN ADMINSTERED, INSADM: 6.2 UNITS/HOUR
INSULIN ADMINSTERED, INSADM: 6.4 UNITS/HOUR
INSULIN ADMINSTERED, INSADM: 7.7 UNITS/HOUR
INSULIN ADMINSTERED, INSADM: 8.2 UNITS/HOUR
INSULIN ADMINSTERED, INSADM: 8.3 UNITS/HOUR
INSULIN ADMINSTERED, INSADM: 9.1 UNITS/HOUR
INSULIN ORDER, INSORD: 11.2 UNITS/HOUR
INSULIN ORDER, INSORD: 11.9 UNITS/HOUR
INSULIN ORDER, INSORD: 3.9 UNITS/HOUR
INSULIN ORDER, INSORD: 4.5 UNITS/HOUR
INSULIN ORDER, INSORD: 4.6 UNITS/HOUR
INSULIN ORDER, INSORD: 5.2 UNITS/HOUR
INSULIN ORDER, INSORD: 5.5 UNITS/HOUR
INSULIN ORDER, INSORD: 5.7 UNITS/HOUR
INSULIN ORDER, INSORD: 6.1 UNITS/HOUR
INSULIN ORDER, INSORD: 6.2 UNITS/HOUR
INSULIN ORDER, INSORD: 6.4 UNITS/HOUR
INSULIN ORDER, INSORD: 7.7 UNITS/HOUR
INSULIN ORDER, INSORD: 8.2 UNITS/HOUR
INSULIN ORDER, INSORD: 8.3 UNITS/HOUR
INSULIN ORDER, INSORD: 9.1 UNITS/HOUR
LOW TARGET, LOT: 95 MG/DL
MAGNESIUM SERPL-MCNC: 1.9 MG/DL (ref 1.6–2.4)
MAGNESIUM SERPL-MCNC: 1.9 MG/DL (ref 1.6–2.4)
MCH RBC QN AUTO: 31 PG (ref 26–34)
MCHC RBC AUTO-ENTMCNC: 32.8 G/DL (ref 30–36.5)
MCV RBC AUTO: 94.5 FL (ref 80–99)
MINUTES UNTIL NEXT BG, NBG: 120 MIN
MINUTES UNTIL NEXT BG, NBG: 60 MIN
MULTIPLIER, MUL: 0.08
MULTIPLIER, MUL: 0.09
MULTIPLIER, MUL: 0.1
MULTIPLIER, MUL: 0.11
MULTIPLIER, MUL: 0.12
NRBC # BLD: 0 K/UL (ref 0–0.01)
NRBC BLD-RTO: 0 PER 100 WBC
O2/TOTAL GAS SETTING VFR VENT: 50 %
ORDER INITIALS, ORDINIT: NORMAL
P-R INTERVAL, ECG05: 168 MS
PCO2 BLD: 42.2 MMHG (ref 35–45)
PEEP RESPIRATORY: 5 CMH2O
PH BLD: 7.37 [PH] (ref 7.35–7.45)
PLATELET # BLD AUTO: 96 K/UL (ref 150–400)
PMV BLD AUTO: 13 FL (ref 8.9–12.9)
PO2 BLD: 110 MMHG (ref 80–100)
POTASSIUM SERPL-SCNC: 3.8 MMOL/L (ref 3.5–5.1)
POTASSIUM SERPL-SCNC: 3.8 MMOL/L (ref 3.5–5.1)
PRESSURE SUPPORT SETTING VENT: 5 CMH2O
PROT SERPL-MCNC: 5.6 G/DL (ref 6.4–8.2)
PROT SERPL-MCNC: 5.6 G/DL (ref 6.4–8.2)
Q-T INTERVAL, ECG07: 432 MS
QRS DURATION, ECG06: 92 MS
QTC CALCULATION (BEZET), ECG08: 459 MS
RBC # BLD AUTO: 2.71 M/UL (ref 3.8–5.2)
SAO2 % BLD: 98 % (ref 92–97)
SERVICE CMNT-IMP: ABNORMAL
SERVICE CMNT-IMP: NORMAL
SODIUM SERPL-SCNC: 144 MMOL/L (ref 136–145)
SODIUM SERPL-SCNC: 145 MMOL/L (ref 136–145)
SPECIMEN EXP DATE BLD: NORMAL
SPECIMEN TYPE: ABNORMAL
STATUS OF UNIT,%ST: NORMAL
STATUS OF UNIT,%ST: NORMAL
TOTAL RESP. RATE, ITRR: 13
UNIT DIVISION, %UDIV: 0
UNIT DIVISION, %UDIV: 0
VENTILATION MODE VENT: ABNORMAL
VENTRICULAR RATE, ECG03: 68 BPM
WBC # BLD AUTO: 10.3 K/UL (ref 3.6–11)

## 2018-02-07 PROCEDURE — 74011250637 HC RX REV CODE- 250/637: Performed by: NURSE PRACTITIONER

## 2018-02-07 PROCEDURE — 65660000000 HC RM CCU STEPDOWN

## 2018-02-07 PROCEDURE — 36415 COLL VENOUS BLD VENIPUNCTURE: CPT | Performed by: NURSE PRACTITIONER

## 2018-02-07 PROCEDURE — 74011250636 HC RX REV CODE- 250/636: Performed by: THORACIC SURGERY (CARDIOTHORACIC VASCULAR SURGERY)

## 2018-02-07 PROCEDURE — P9045 ALBUMIN (HUMAN), 5%, 250 ML: HCPCS | Performed by: NURSE PRACTITIONER

## 2018-02-07 PROCEDURE — 71045 X-RAY EXAM CHEST 1 VIEW: CPT

## 2018-02-07 PROCEDURE — 93005 ELECTROCARDIOGRAM TRACING: CPT

## 2018-02-07 PROCEDURE — 80053 COMPREHEN METABOLIC PANEL: CPT | Performed by: THORACIC SURGERY (CARDIOTHORACIC VASCULAR SURGERY)

## 2018-02-07 PROCEDURE — 85027 COMPLETE CBC AUTOMATED: CPT | Performed by: NURSE PRACTITIONER

## 2018-02-07 PROCEDURE — 80053 COMPREHEN METABOLIC PANEL: CPT | Performed by: NURSE PRACTITIONER

## 2018-02-07 PROCEDURE — 74011250636 HC RX REV CODE- 250/636: Performed by: NURSE PRACTITIONER

## 2018-02-07 PROCEDURE — 74011000250 HC RX REV CODE- 250: Performed by: THORACIC SURGERY (CARDIOTHORACIC VASCULAR SURGERY)

## 2018-02-07 PROCEDURE — 82962 GLUCOSE BLOOD TEST: CPT

## 2018-02-07 PROCEDURE — 51798 US URINE CAPACITY MEASURE: CPT

## 2018-02-07 PROCEDURE — 83735 ASSAY OF MAGNESIUM: CPT | Performed by: THORACIC SURGERY (CARDIOTHORACIC VASCULAR SURGERY)

## 2018-02-07 PROCEDURE — 83735 ASSAY OF MAGNESIUM: CPT | Performed by: NURSE PRACTITIONER

## 2018-02-07 RX ORDER — TIMOLOL MALEATE 5 MG/ML
1 SOLUTION/ DROPS OPHTHALMIC 2 TIMES DAILY
Status: DISCONTINUED | OUTPATIENT
Start: 2018-02-08 | End: 2018-02-14 | Stop reason: HOSPADM

## 2018-02-07 RX ORDER — PANTOPRAZOLE SODIUM 40 MG/1
40 TABLET, DELAYED RELEASE ORAL
Status: DISCONTINUED | OUTPATIENT
Start: 2018-02-07 | End: 2018-02-14 | Stop reason: HOSPADM

## 2018-02-07 RX ORDER — ROSUVASTATIN CALCIUM 40 MG/1
40 TABLET, COATED ORAL
Status: DISCONTINUED | OUTPATIENT
Start: 2018-02-07 | End: 2018-02-14 | Stop reason: HOSPADM

## 2018-02-07 RX ORDER — ALBUMIN HUMAN 50 G/1000ML
12.5 SOLUTION INTRAVENOUS ONCE
Status: COMPLETED | OUTPATIENT
Start: 2018-02-07 | End: 2018-02-07

## 2018-02-07 RX ORDER — MORPHINE SULFATE 4 MG/ML
4 INJECTION, SOLUTION INTRAMUSCULAR; INTRAVENOUS
Status: DISCONTINUED | OUTPATIENT
Start: 2018-02-07 | End: 2018-02-14 | Stop reason: HOSPADM

## 2018-02-07 RX ORDER — HYDRALAZINE HYDROCHLORIDE 20 MG/ML
10 INJECTION INTRAMUSCULAR; INTRAVENOUS
Status: DISCONTINUED | OUTPATIENT
Start: 2018-02-07 | End: 2018-02-10

## 2018-02-07 RX ADMIN — TIMOLOL MALEATE 1 DROP: 5 SOLUTION OPHTHALMIC at 09:52

## 2018-02-07 RX ADMIN — POLYETHYLENE GLYCOL 3350 17 G: 17 POWDER, FOR SOLUTION ORAL at 09:56

## 2018-02-07 RX ADMIN — MUPIROCIN: 20 OINTMENT TOPICAL at 17:11

## 2018-02-07 RX ADMIN — MORPHINE SULFATE 4 MG: 4 INJECTION, SOLUTION INTRAMUSCULAR; INTRAVENOUS at 19:29

## 2018-02-07 RX ADMIN — ONDANSETRON 4 MG: 2 INJECTION INTRAMUSCULAR; INTRAVENOUS at 01:42

## 2018-02-07 RX ADMIN — ASPIRIN 81 MG 81 MG: 81 TABLET ORAL at 09:44

## 2018-02-07 RX ADMIN — CHLORHEXIDINE GLUCONATE 10 ML: 1.2 RINSE ORAL at 09:53

## 2018-02-07 RX ADMIN — AMIODARONE HYDROCHLORIDE 400 MG: 200 TABLET ORAL at 17:04

## 2018-02-07 RX ADMIN — Medication 2 G: at 17:09

## 2018-02-07 RX ADMIN — Medication 10 ML: at 17:01

## 2018-02-07 RX ADMIN — MORPHINE SULFATE 2 MG: 2 INJECTION, SOLUTION INTRAMUSCULAR; INTRAVENOUS at 03:00

## 2018-02-07 RX ADMIN — MUPIROCIN: 20 OINTMENT TOPICAL at 10:11

## 2018-02-07 RX ADMIN — ALBUMIN (HUMAN) 12.5 G: 12.5 INJECTION, SOLUTION INTRAVENOUS at 10:30

## 2018-02-07 RX ADMIN — MORPHINE SULFATE 4 MG: 4 INJECTION, SOLUTION INTRAMUSCULAR; INTRAVENOUS at 23:09

## 2018-02-07 RX ADMIN — MAGNESIUM SULFATE 1 G: 1 INJECTION INTRAVENOUS at 08:28

## 2018-02-07 RX ADMIN — PANTOPRAZOLE SODIUM 40 MG: 40 TABLET, DELAYED RELEASE ORAL at 09:44

## 2018-02-07 RX ADMIN — Medication 10 ML: at 13:31

## 2018-02-07 RX ADMIN — AMIODARONE HYDROCHLORIDE 1 MG/MIN: 50 INJECTION, SOLUTION INTRAVENOUS at 05:48

## 2018-02-07 RX ADMIN — ONDANSETRON 4 MG: 2 INJECTION INTRAMUSCULAR; INTRAVENOUS at 07:35

## 2018-02-07 RX ADMIN — MORPHINE SULFATE 4 MG: 10 INJECTION, SOLUTION INTRAMUSCULAR; INTRAVENOUS at 09:44

## 2018-02-07 RX ADMIN — Medication 10 ML: at 06:00

## 2018-02-07 RX ADMIN — ACETAMINOPHEN 650 MG: 325 TABLET, FILM COATED ORAL at 17:04

## 2018-02-07 RX ADMIN — CHLORHEXIDINE GLUCONATE 10 ML: 1.2 RINSE ORAL at 17:11

## 2018-02-07 RX ADMIN — MORPHINE SULFATE 4 MG: 4 INJECTION, SOLUTION INTRAMUSCULAR; INTRAVENOUS at 17:00

## 2018-02-07 RX ADMIN — Medication 2 G: at 11:17

## 2018-02-07 RX ADMIN — ACETAMINOPHEN 650 MG: 325 TABLET, FILM COATED ORAL at 21:52

## 2018-02-07 RX ADMIN — DOCUSATE SODIUM AND SENNOSIDES 1 TABLET: 8.6; 5 TABLET, FILM COATED ORAL at 09:44

## 2018-02-07 RX ADMIN — OXYCODONE AND ACETAMINOPHEN 2 TABLET: 5; 325 TABLET ORAL at 11:11

## 2018-02-07 RX ADMIN — POTASSIUM CHLORIDE 20 MEQ: 400 INJECTION, SOLUTION INTRAVENOUS at 09:40

## 2018-02-07 RX ADMIN — TIMOLOL MALEATE 1 DROP: 5 SOLUTION OPHTHALMIC at 21:56

## 2018-02-07 RX ADMIN — ROSUVASTATIN CALCIUM 40 MG: 40 TABLET ORAL at 21:52

## 2018-02-07 RX ADMIN — Medication 400 MG: at 17:04

## 2018-02-07 RX ADMIN — Medication 10 ML: at 13:32

## 2018-02-07 RX ADMIN — Medication 10 ML: at 21:53

## 2018-02-07 RX ADMIN — Medication 2 G: at 00:11

## 2018-02-07 RX ADMIN — BRIMONIDINE TARTRATE 1 DROP: 2 SOLUTION OPHTHALMIC at 09:52

## 2018-02-07 RX ADMIN — Medication 2 G: at 07:00

## 2018-02-07 RX ADMIN — MORPHINE SULFATE 4 MG: 4 INJECTION, SOLUTION INTRAMUSCULAR; INTRAVENOUS at 13:10

## 2018-02-07 RX ADMIN — MORPHINE SULFATE 4 MG: 10 INJECTION, SOLUTION INTRAMUSCULAR; INTRAVENOUS at 04:00

## 2018-02-07 RX ADMIN — POTASSIUM CHLORIDE 20 MEQ: 400 INJECTION, SOLUTION INTRAVENOUS at 11:25

## 2018-02-07 RX ADMIN — BRIMONIDINE TARTRATE 1 DROP: 2 SOLUTION OPHTHALMIC at 21:55

## 2018-02-07 RX ADMIN — HYDRALAZINE HYDROCHLORIDE 10 MG: 20 INJECTION INTRAMUSCULAR; INTRAVENOUS at 11:11

## 2018-02-07 RX ADMIN — AMIODARONE HYDROCHLORIDE 1 MG/MIN: 50 INJECTION, SOLUTION INTRAVENOUS at 11:32

## 2018-02-07 RX ADMIN — PROCHLORPERAZINE EDISYLATE 5 MG: 5 INJECTION INTRAMUSCULAR; INTRAVENOUS at 09:31

## 2018-02-07 RX ADMIN — DOCUSATE SODIUM AND SENNOSIDES 1 TABLET: 8.6; 5 TABLET, FILM COATED ORAL at 17:04

## 2018-02-07 NOTE — PROGRESS NOTES
Physical Therapy  2.7.18    1000:  Orders received, acknowledged, and appreciated. Chart reviewed in preparation for PT evaluation. Spoke with bedside nurse and reporting BP issues and difficulty with translating via blue phone. Recommended that PT wait for  and/or daughter for assistance. 1125:    at bedside with RN. RN attempting IV access. Will follow up as able this afternoon. Recommendation for Nursing: Patient to complete as able in order to maintain strength, endurance, and independence: Bed in chair position 3x daily, preferably for meals, and self mobilization in the bed for toileting needs and adequate pressure relief with adherence to sternal precautions. Once Egress test performed, recommend OOB to chair with A x 2 and gait belt. Thank you for your assistance.      Randal Morrison, PT, DPT

## 2018-02-07 NOTE — PROGRESS NOTES
Orders received, chart reviewed and family present in room to translate however patient is being de-lined and will go to step down. Will f/u this pm for OT evaluation.

## 2018-02-07 NOTE — PROGRESS NOTES
TRANSFER - IN REPORT:    Verbal report received from SeanRN(name) on Karis Zabala  being received from CVICU(unit) for routine progression of care      Report consisted of patients Situation, Background, Assessment and   Recommendations(SBAR). Information from the following report(s) SBAR, Kardex, Recent Results, Med Rec Status and Cardiac Rhythm NSR was reviewed with the receiving nurse. Opportunity for questions and clarification was provided. Assessment completed upon patients arrival to unit and care assumed. 1500: Pt arrived to CVSU. VS and assessment performed. Pt placed on telemetry. 1930:Bedside and Verbal shift change report given to Zak Ham ([facility]) (oncoming nurse) by Yady Diaz (offgoing nurse). Report included the following information SBAR, Kardex, Recent Results, Med Rec Status and Cardiac Rhythm NSR.

## 2018-02-07 NOTE — CDMP QUERY
Documentation of Acute Renal Injury on CKD-3 is noted in the progress notes. Currently the patient does not meet RIFLE criteria (BSV approved) to support this diagnosis. If you are using another criteria to support this diagnosis, please document this in your progress note. Otherwise, please document in the progress notes the clinical indicators that support this diagnosis or state that the diagnosis has been ruled out or further clarified as Acute renal insufficiency. RIFLE  (BSV Approved)    RISK:  Increased SCr x 1.5 or GFR decrease > 25% (within 7 days)    INJURY:  Increased SCr x 2.0 or GFR decreased > 50%    FAILURE:  Increased SCr x 3.0 or GFR decrease > 75% or SCr >4.0 mg/dL or acute increase >0.5 mg/dL    LOSS:  Persistent acute renal failure = complete loss of kidney function > 4 weeks    END STAGE:  End stage of kidney disease > 3 months      AKIN    STAGE  1:  Increase in SCr >/= 0.3 mg/dL or >/= 150% to 200% (1.5 to 2-fold) from baseline (within 48 hours)    STAGE  2:  Increase in SCr to more than 200% to 300% (>2-3 fold) from baseline    STAGE  3:  Increase in SCr to more than 300% (>3-fold) from baseline or SCr >/= 4.0 mg/dL with an acute increase of at least 0.5 mg/dL or initiation of renal replacement therapy      KDIGO    STAGE  1:  Increase in SCr by >/= 0.3 mg/dL within 48 hours or increase in SCr 1.5 to 1.9 times baseline which is known or presumed to have occurred within the prior 7 days    STAGE  2:  Increase in SCr to 2.0 to 2.9 times baseline    STAGE  3:  Increase in SCr to 3.0 times baseline or increase in SCr to >/= baseline or increase in SCr to >/= 4.0 mg/dL or initiation of renal replacement therapy      Please clarify and document your clinical opinion in the progress notes and discharge summary including the definitive and/or presumptive diagnosis, (suspected or probable), related to the above clinical findings.  Please include clinical findings supporting your diagnosis.     Thank you,    Trista Nelson, RN, BSN, [facility], [address]  [phone]

## 2018-02-07 NOTE — DIABETES MGMT
DTC Cardiac Surgery Progress Note     Recommendations/ Comments: Pt arrived to CVICU at 1636 on 2-6-2018. Consider continuing insulin gtt for at least 48hrs post-op and eating 50% solid foods then,  1) transition off gtt per [employer] Protocol   2) continue accu-checks and humalog correctional insulin ac & hs   3) ADA/AHA diet as diet advanced  4) she may require daily basal insulin in addition to transition dose. Wt based basal dose is Lantus 20 units daily. DTC will continue to follow and provide receommendations    Insulin gtt should not be stopped until after 1636 on 2-8-2016 to complete 48hr post-op time frame. Currently on insulin gtt. At 1024  mg/dl, rate 6.1 units/hr, received 28.1 units over the past 6 hours. Chart reviewed on Karis Zabala. Patient is 65 y.o. female s/p Cardiac surgery CABG  POD 1        A1c:   Lab Results   Component Value Date/Time    Hemoglobin A1c 8.0 (H) 02/05/2018 05:00 PM         Recent Glucose Results: Lab Results   Component Value Date/Time     (H) 02/07/2018 03:14 AM     (H) 02/07/2018 12:16 AM     (H) 02/06/2018 05:30 PM    GLUCPOC 135 (H) 02/07/2018 10:22 AM    GLUCPOC 124 (H) 02/07/2018 08:13 AM    GLUCPOC 115 (H) 02/07/2018 05:51 AM        Lab Results   Component Value Date/Time    Creatinine 1.21 (H) 02/07/2018 03:14 AM     Estimated Creatinine Clearance: 45.7 mL/min (based on Cr of 1.21). Active Orders   Diet    DIET CLEAR LIQUID        PO intake: Patient Vitals for the past 72 hrs:   % Diet Eaten   02/05/18 0400 0 %   02/05/18 0000 0 %   02/04/18 2000 0 %   02/04/18 1147 75 %       Will continue to follow as needed. Thank you.   Fili Wilson RN, CDE

## 2018-02-07 NOTE — PROGRESS NOTES
0800- Report received on Ms. Zabala. She is currently resting     0940 -  service to explain swallow screen,     1005-  services to explain that she got pain  Meds, nausea medication, to do the Bactroban nasal swab and CHG mouthwash. She told  services she may not be able to swish and spit, does not feel well. 1130 - Arterial line dc'd as ordered. Manual pressure to the site for 10 minutes, occlusive dressing in place. [address] catheter dc'd as ordered. No issues. 1345 - Dual lumen MAC dc'd as ordered. Manual pressure to the site for 10 minutes. Occlusive dressing in place. 1415 - TRANSFER - OUT REPORT:    Verbal report given to Tori(name) on Karis Zabala  being transferred to CVSU(unit) for routine progression of care       Report consisted of patients Situation, Background, Assessment and   Recommendations(SBAR). Information from the following report(s) SBAR, Kardex, OR Summary, Intake/Output, MAR, Accordion, Recent Results and Cardiac Rhythm NSR was reviewed with the receiving nurse. Lines:   Peripheral IV 02/02/18 Left Antecubital (Active)   Site Assessment Clean, dry, & intact 2/7/2018  3:01 PM   Phlebitis Assessment 0 2/7/2018  3:01 PM   Infiltration Assessment 0 2/7/2018  3:01 PM   Dressing Status Clean, dry, & intact 2/7/2018  3:01 PM   Dressing Type Tape;Transparent 2/7/2018  3:01 PM   Hub Color/Line Status Pink;Capped 2/7/2018  3:01 PM   Action Taken Open ports on tubing capped 2/7/2018  3:01 PM   Alcohol Cap Used Yes 2/7/2018  3:01 PM       Peripheral IV 02/07/18 Left Hand (Active)   Site Assessment Clean, dry, & intact 2/7/2018  3:01 PM   Phlebitis Assessment 0 2/7/2018  3:01 PM   Infiltration Assessment 0 2/7/2018  3:01 PM   Dressing Status Clean, dry, & intact 2/7/2018  3:01 PM   Dressing Type Tape;Transparent 2/7/2018  3:01 PM   Hub Color/Line Status Blue; Infusing 2/7/2018  3:01 PM   Action Taken Open ports on tubing capped 2/7/2018 3:01 PM   Alcohol Cap Used Yes 2/7/2018  3:01 PM        Opportunity for questions and clarification was provided.       Patient transported with:   Monitor  O2 @ 2 liters  Patient-specific medications from Pharmacy  Registered Nurse

## 2018-02-07 NOTE — PROGRESS NOTES
1800 Primary Nurse Nydia Tillman RN and ELADIO Walden performed a dual skin assessment on this patient No impairment noted

## 2018-02-07 NOTE — PROGRESS NOTES
Problem: Falls - Risk of  Goal: *Absence of Falls  Document Sania Fall Risk and appropriate interventions in the flowsheet.    Outcome: Progressing Towards Goal  Fall Risk Interventions:  Mobility Interventions: Communicate number of staff needed for ambulation/transfer, OT consult for ADLs, Patient to call before getting OOB, Strengthening exercises (ROM-active/passive), PT Consult for mobility concerns, PT Consult for assist device competence         Medication Interventions: Evaluate medications/consider consulting pharmacy, Patient to call before getting OOB, Teach patient to arise slowly    Elimination Interventions: Call light in reach, Patient to call for help with toileting needs, Toilet paper/wipes in reach, Toileting schedule/hourly rounds    History of Falls Interventions: Door open when patient unattended, Room close to nurse's station        Problem: CABG: Post-Op Day 1  Goal: *Hemodynamically stable without vasoactive medications  Outcome: Progressing Towards Goal  Patient Vitals for the past 12 hrs:   Temp Pulse Resp BP SpO2   02/07/18 1501 98.6 °F (37 °C) 77 18 (!) 149/33 94 %   02/07/18 1400 - 76 20 (!) 141/38 94 %   02/07/18 1300 - 75 16 134/40 96 %   02/07/18 1200 - 76 17 (!) 129/39 94 %   02/07/18 1100 - 67 (!) 6 - 97 %   02/07/18 1000 - 71 13 - 95 %   02/07/18 0900 - 74 16 - 96 %   02/07/18 0800 99 °F (37.2 °C) 74 17 - 96 %

## 2018-02-07 NOTE — PROGRESS NOTES
[facility] ICU Progress Note    Admit Date: 2018  POD:  1 Day Post-Op    Procedure:  Procedure(s):  CORONARY ARTERY BYPASS GRAFTING X 4 WITH LIMA, BHARGAV, ECC, JUANJO AND EPIAORTIC U/S BY DR Rosibel Brandt        Subjective:   Pt seen with Dr. Sudarshan Myers. Tmax 98.1, 2L NC. Amio and insulin gtts. NSR. Nauseated. Objective:   Vitals:  Blood pressure 102/45, pulse 68, temperature 98.1 °F (36.7 °C), resp. rate 13, height 5' 6\" (1.676 m), weight 215 lb 2.7 oz (97.6 kg), SpO2 97 %. Temp (24hrs), Av.4 °F (36.3 °C), Min:96.8 °F (36 °C), Max:98.1 °F (36.7 °C)    Hemodynamics:   CO: CO (l/min): 5.1 l/min   CI: CI (l/min/m2): 2.5 l/min/m2   CVP: CVP (mmHg): 6 mmHg (18)   SVR: SVR (dyne*sec)/cm5: 809 (dyne*sec)/cm5 (18 5128)   PAP Systolic: PAP Systolic: 26 (60/60/81 2122)   PAP Diastolic: PAP Diastolic: 9 (51/21/38 8697)   PVR:     SV02: SVO2 (%): 69 % (18)   SCV02:      EKG/Rhythm:  NSR 67    Extubation Date / Time: 2330    CT Output: 376 ml    Oxygen Therapy:  Oxygen Therapy  O2 Sat (%): 97 % (18)  Pulse via Oximetry: 68 beats per minute (18)  O2 Device: Endotracheal tube;Ventilator (18)  O2 Flow Rate (L/min): 2 l/min (18 1316)  FIO2 (%): 50 % (18)    CXR: IMPRESSION:   Interval extubation. Left basilar atelectasis. Admission Weight: Last Weight   Weight: 220 lb (99.8 kg) Weight: 215 lb 2.7 oz (97.6 kg)     Intake / Output / Drain:  Current Shift:    Last 24 hrs.:   Intake/Output Summary (Last 24 hours) at 18 0813  Last data filed at 18 0600   Gross per 24 hour   Intake          2426.54 ml   Output             1586 ml   Net           840.54 ml       EXAM:  General:  Sitting up in bed, nauseated                                                                                            Lungs:   Dimished to auscultation bilaterally. Incision:  Dsg cdi   Heart:  Regular rate and rhythm, S1, S2 normal, no murmur, click, rub or gallop. Abdomen:   Soft, non-tender. Bowel sounds hypoactive. No masses,  No organomegaly. Extremities:  No edema. PPP. Neurologic:  Gross motor and sensory apparatus intact. Labs: Recent Labs      18   0551   18   0314   18   1730   WBC   --    --   10.3   --   9.6   HGB   --    --   8.4*   < >  8.3*   HCT   --    --   25.6*   < >  25.1*   PLT   --    --   96*   --   89*   NA   --    --   145   < >  144   K   --    --   3.8   < >  3.5   BUN   --    --   24*   < >  22*   CREA   --    --   1.21*   < >  1.01   GLU   --    --   113*   < >  124*   GLUCPOC  115*   < >   --    < >   --    INR   --    --    --    --   1.3*    < > = values in this interval not displayed. Assessment:     Principal Problem:    Chest pain (2018)    Active Problems:    Unstable angina ([address] (2/3/2018)      S/P CABG x 4 (2018)      Overview: On Pump CORONARY ARTERY BYPASS GRAFTING X 4 WITH NAIR to LAD, Sequential       LSVG OM1 and OM2, LSVG to RCA       [facility]         Plan/Recommendations/Medical Decision Makin. CAD s/p CABG: On ASA, start statin, no BB until appropriate  2. Atelectasis: Wean O2 for sats >92%, encourage I/S  3. Nausea: Cont PRN zofran, start PRN compazine. 4. Post op anemia s/t acute blood loss: Hgb 8.4, monitor CT output and H&H  5. Thromocytopenia: Plt 96, change pepcid to protonix, monitor  6. LUCIA: Cr 1.21, monitor U/O  7. IDDM: Cont insulin gtt per protocol, DTC consult  8. H HTN: Resume meds when appropriate  9. Hx glaucoma: cont eye drops  10. HLD: Resume statin  11. Obesity: BMI 34, encourage heart healthy diet and weight loss when appropriate  12. Dispo: PT/OT, transfer to [facility] later today.       Signed By: Kenyetta Car NP

## 2018-02-07 NOTE — OP NOTES
[address] [facility] OP NOTE    Sharon Reyes  MR#: [id number]  : 1940  ACCOUNT #: [de-identified]   DATE OF SERVICE: 2018    PREOPERATIVE DIAGNOSES:    1. Ejection fraction of 60%. 2.  Last creatinine level 0.85.   3.  Symptoms on admission:  Unstable angina. 4.  Symptoms at surgery:  Unstable angina. 5.  Severe chronic lung disease (note the patient had a FEV1 of 0.67, which is only 39% of predicted, consistent with severe chronic lung disease). 6.  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 7.  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 8.  Diabetes mellitus requiring insulin. 9.  Hypertension. 10.  Three-vessel coronary artery disease. 11.  Urgent coronary artery bypass grafting due to severe disease. POSTOPERATIVE DIAGNOSES:  1. Ejection fraction of 60%. 2.  Last creatinine level 0.85.   3.  Symptoms on admission:  Unstable angina. 4.  Symptoms at surgery:  Unstable angina. 5.  Severe chronic lung disease (note the patient had a FEV1 of 0.67, which is only 39% of predicted, consistent with severe chronic lung disease). 6.  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    7.  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 8.  Diabetes mellitus requiring insulin. 9.  Hypertension. 10.  Three-vessel coronary artery disease. 11.  Urgent coronary artery bypass grafting due to severe disease. PROCEDURES PERFORMED:    1. CABG x4 (LIMA to LAD; saphenous vein graft to OM1 and OM2; saphenous vein graft to right coronary artery). 2.  Endoscopic vein harvest from right lower extremity. COMPLICATIONS:  None. SPECIMENS:  None. ANESTHESIA:  General endotracheal anesthesia. TOTAL CARDIOPULMONARY BYPASS TIME:  52 minutes. TOTAL CROSSCLAMP TIME:  46 minutes. SURGEON:  Nikia Salinas MD    ASSISTANT:  MANUEL Braswell    ESTIMATED BLOOD LOSS:  50 mL. IMPLANTS:  .    DESCRIPTION OF PROCEDURE:  The patient is a very pleasant 65-year-old woman recently diagnosed with multivessel coronary artery disease. She is now being brought to the operating room to undergo coronary artery bypass grafting. The patient brought to the operating room, had a right radial A-line placed without complication. A Pyote-Derrell catheter placed without complication. Underwent general endotracheal anesthesia without complication. Next, her chest, abdomen and lower extremities were prepped and draped in usual sterile fashion. A midline incision was made over the patient's sternum, cautery dissection used to dissect down to sternal bone. Sternal bone was opened with a sternal saw and the pleural space was entered. During this portion of the procedure, endoscopic vein harvesting was performed in the right lower extremity without complication. Once the left pleural space was entered, left internal mammary artery was carefully dissected off the chest wall, taking care to place proximal clips on each portion of the internal mammary artery branch.   Once this was completely dissected off the chest wall, an appropriate dose of heparin was given 3 minutes passed, 2 large distal clips were placed and the artery was cut off the chest wall. There appeared to be good flow at this stage. Next, the pericardium was opened. Pericardial sutures were applied. Once the ACT was above 460, 2 concentric pursestrings were placed on the ascending aorta and we cannulated with normal air perfusor. This was deaired and hooked up to the cardiopulmonary bypass circuit. Then made a small V in the pericardium, matured the left internal mammary artery to length in an appropriate site of the left anterior descending artery. Then placed another pursestring suture in the right atrium, placed a 2-stage cannula. This was deaired and hooked to up to the cardiopulmonary bypass circuit. We went on with cardiopulmonary bypass, placed antegrade cardioplegia, needle de-aired the cardioplegia line, hooked up, brought the pump flow down, placed the cross-clamp, brought the pump flow back up again. Gave 750 mL of initial cardioplegia. The heart went quiescent at approximately 400 mL. We then tilted the heart up, dissected the OM2 vessel with a 69 blade and with a 75 blade further extended the arteriotomy with forward and reverse Ring. Then performed saphenous vein graft to OM2 anastomosis using a 7-0 Prolene suture. Then measured vein graft to length at appropriate site on the OM1 vessel, dissected the OM1 vessel with a 69 blade, and with a 75 blade and further extended the arteriotomy with forward and reverse Ring. The saphenous vein graft to OM1 anastomosis with 7-0 Prolene suture. We then measured the vein graft to the length to appropriate site on the ascending aorta, cut it. I then gave a dose of cardioplegia. I then dissected out the right coronary artery with a 69 blade, and with a 75 blade further extended the arteriotomy with forward and reverse Ring. Then performed saphenous vein graft to right coronary anastomosis with 7-0 Prolene suture.   Then measured vein graft length to appropriate site on the ascending aorta and cut it and then made a small nick on the ascending aorta using a 4.0 punch x2, then performed a proximal anastomosis to my right-sided graft. Care was taken to de-air the ascending aorta before tying this down. I then gave a dose of cardioplegia. We then dissected out the left anterior descending artery with 69 blade and with a 75 blade further extended the arteriotomy with forward and reverse Ring. Then performed LIMA to LAD anastomosis using a 7-0 Prolene suture. Then made another small nick in the ascending aorta using 4.0 punch x1 and performed proximal anastomosis on my left-sided graft. Care was taken to de-air the ascending aorta before tying this down. Then brought the head of the bed down, brought the pump flow down, removed the crossclamp, brought the pump flow back up again. Once the temperature reached 36.2, we successfully came off cardiopulmonary bypass. Placed A and V wire, as well as the [facility] locators and Cristopher drains. Once protamine dose was finished, all anastomotic sites were inspected, noted to be hemostatic. Cannulation sites were inspected and hemostatic as well. Wire was used to close the sternum. Vicryl sutures were used to close subcutaneous tissue. Of note, we also measured the flows in all the grafts and they were excellent. I was present during the entire procedure.       MD VERA Holden / Wilver Mejia  D: 02/07/2018 10:20     T: 02/07/2018 13:07  JOB #: [id number]

## 2018-02-07 NOTE — PROGRESS NOTES
CM received a call from transitional care manager with [facility] who will follow patient when she is discharged- her contact information is Bhanu Caba [phone]. Noted patient speaks Bosnian per record- CM staff will follow for transitions of care.   MICHI Graham

## 2018-02-07 NOTE — PROGRESS NOTES
2000: report from Rachel Egan RN.     2100: pt opens eyes to voice. 2125: Cherrie Christiano decreased to 50%     2247: Used intrepter to explain ventilator weaning process. 2255: pt placed on cpap per RT    2315: ABGs sampled. See results    8873: extubated to 4L nasal cannula    0800: Bedside shift change report given to Antony Camarillo RN (oncoming nurse) by Leonor Andujar RN (offgoing nurse). Report included the following information SBAR, OR Summary, Intake/Output, MAR, Recent Results and Cardiac Rhythm NSR.

## 2018-02-07 NOTE — ANESTHESIA POSTPROCEDURE EVALUATION
Post-Anesthesia Evaluation and Assessment    Patient: Cristopher Sahu MRN: [id number]  SSN: [id number]    YOB: 1940  Age: 65 y.o. Sex: female       Cardiovascular Function/Vital Signs  Visit Vitals    /40    Pulse 75    Temp 37.2 °C (99 °F)    Resp 16    Ht 5' 6\" (1.676 m)    Wt 100.1 kg (220 lb 10.9 oz)    SpO2 96%    BMI 35.62 kg/m2       Patient is status post general anesthesia for Procedure(s):  CORONARY ARTERY BYPASS GRAFTING X 4 WITH LIMA, [place], ECC, JUANJO AND EPIAORTIC U/S BY DR Coni Orosco. Nausea/Vomiting: None    Postoperative hydration reviewed and adequate. Pain:  Pain Scale 1: Behavioral Pain Scale (BPS) (02/07/18 0940)  Pain Intensity 1: 7 (02/07/18 0940)   Managed    Neurological Status:   Neuro  Neurologic State: Drowsy (02/07/18 0800)  Orientation Level: Oriented X4 (02/07/18 1000)  Cognition: Follows commands (02/07/18 0800)  Speech: Clear (02/07/18 0800)  Assessment L Pupil: Round;Sluggish (02/06/18 2000)  Size L Pupil (mm): 1 (02/06/18 2000)  Assessment R Pupil: Round;Sluggish (02/06/18 2000)  Size R Pupil (mm): 1 (02/06/18 2000)  LUE Motor Response: Purposeful (02/07/18 0800)  LLE Motor Response: Purposeful (02/07/18 0800)  RUE Motor Response: Purposeful (02/07/18 0800)  RLE Motor Response: Purposeful (02/07/18 0800)   sedated    Mental Status and Level of Consciousness: sedated    Pulmonary Status:   O2 Device: Nasal cannula (02/07/18 0800)   Adequate oxygenation and airway patent    Complications related to anesthesia: None    Post-anesthesia assessment completed.  No concerns    Signed By: Blaise Jaramillo MD     February 7, 2018

## 2018-02-08 ENCOUNTER — HOME HEALTH ADMISSION (OUTPATIENT)
Dept: HOME HEALTH SERVICES | Facility: HOME HEALTH | Age: 78
End: 2018-02-08
Payer: MEDICARE

## 2018-02-08 ENCOUNTER — APPOINTMENT (OUTPATIENT)
Dept: GENERAL RADIOLOGY | Age: 78
DRG: 234 | End: 2018-02-08
Attending: NURSE PRACTITIONER
Payer: MEDICARE

## 2018-02-08 ENCOUNTER — TELEPHONE (OUTPATIENT)
Dept: INTERNAL MEDICINE CLINIC | Age: 78
End: 2018-02-08

## 2018-02-08 LAB
ADMINISTERED INITIALS, ADMINIT: NORMAL
ALBUMIN SERPL-MCNC: 3.2 G/DL (ref 3.5–5)
ALBUMIN/GLOB SERPL: 1.2 {RATIO} (ref 1.1–2.2)
ALP SERPL-CCNC: 39 U/L (ref 45–117)
ALT SERPL-CCNC: 15 U/L (ref 12–78)
ANION GAP SERPL CALC-SCNC: 8 MMOL/L (ref 5–15)
AST SERPL-CCNC: 27 U/L (ref 15–37)
BACTERIA SPEC CULT: NORMAL
BACTERIA SPEC CULT: NORMAL
BILIRUB SERPL-MCNC: 0.3 MG/DL (ref 0.2–1)
BUN SERPL-MCNC: 30 MG/DL (ref 6–20)
BUN/CREAT SERPL: 23 (ref 12–20)
CALCIUM SERPL-MCNC: 8.4 MG/DL (ref 8.5–10.1)
CHLORIDE SERPL-SCNC: 110 MMOL/L (ref 97–108)
CO2 SERPL-SCNC: 25 MMOL/L (ref 21–32)
CREAT SERPL-MCNC: 1.3 MG/DL (ref 0.55–1.02)
D50 ADMINISTERED, D50ADM: 0 ML
D50 ADMINISTERED, D50ADM: 10 ML
D50 ADMINISTERED, D50ADM: 9 ML
D50 ORDER, D50ORD: 0 ML
D50 ORDER, D50ORD: 10 ML
D50 ORDER, D50ORD: 9 ML
ERYTHROCYTE [DISTWIDTH] IN BLOOD BY AUTOMATED COUNT: 13.5 % (ref 11.5–14.5)
GLOBULIN SER CALC-MCNC: 2.7 G/DL (ref 2–4)
GLSCOM COMMENTS: NORMAL
GLUCOSE BLD STRIP.AUTO-MCNC: 102 MG/DL (ref 65–100)
GLUCOSE BLD STRIP.AUTO-MCNC: 107 MG/DL (ref 65–100)
GLUCOSE BLD STRIP.AUTO-MCNC: 116 MG/DL (ref 65–100)
GLUCOSE BLD STRIP.AUTO-MCNC: 116 MG/DL (ref 65–100)
GLUCOSE BLD STRIP.AUTO-MCNC: 118 MG/DL (ref 65–100)
GLUCOSE BLD STRIP.AUTO-MCNC: 133 MG/DL (ref 65–100)
GLUCOSE BLD STRIP.AUTO-MCNC: 139 MG/DL (ref 65–100)
GLUCOSE BLD STRIP.AUTO-MCNC: 167 MG/DL (ref 65–100)
GLUCOSE BLD STRIP.AUTO-MCNC: 181 MG/DL (ref 65–100)
GLUCOSE BLD STRIP.AUTO-MCNC: 198 MG/DL (ref 65–100)
GLUCOSE BLD STRIP.AUTO-MCNC: 200 MG/DL (ref 65–100)
GLUCOSE BLD STRIP.AUTO-MCNC: 211 MG/DL (ref 65–100)
GLUCOSE BLD STRIP.AUTO-MCNC: 220 MG/DL (ref 65–100)
GLUCOSE BLD STRIP.AUTO-MCNC: 286 MG/DL (ref 65–100)
GLUCOSE BLD STRIP.AUTO-MCNC: 324 MG/DL (ref 65–100)
GLUCOSE BLD STRIP.AUTO-MCNC: 74 MG/DL (ref 65–100)
GLUCOSE BLD STRIP.AUTO-MCNC: 77 MG/DL (ref 65–100)
GLUCOSE BLD STRIP.AUTO-MCNC: 99 MG/DL (ref 65–100)
GLUCOSE SERPL-MCNC: 73 MG/DL (ref 65–100)
GLUCOSE, GLC: 102 MG/DL
GLUCOSE, GLC: 107 MG/DL
GLUCOSE, GLC: 116 MG/DL
GLUCOSE, GLC: 116 MG/DL
GLUCOSE, GLC: 133 MG/DL
GLUCOSE, GLC: 139 MG/DL
GLUCOSE, GLC: 167 MG/DL
GLUCOSE, GLC: 181 MG/DL
GLUCOSE, GLC: 198 MG/DL
GLUCOSE, GLC: 200 MG/DL
GLUCOSE, GLC: 211 MG/DL
GLUCOSE, GLC: 220 MG/DL
GLUCOSE, GLC: 74 MG/DL
GLUCOSE, GLC: 77 MG/DL
GLUCOSE, GLC: 99 MG/DL
GRAM STN SPEC: NORMAL
GRAM STN SPEC: NORMAL
HCT VFR BLD AUTO: 24.7 % (ref 35–47)
HGB BLD-MCNC: 8.2 G/DL (ref 11.5–16)
HIGH TARGET, HITG: 130 MG/DL
INSULIN ADMINSTERED, INSADM: 0 UNITS/HOUR
INSULIN ADMINSTERED, INSADM: 0 UNITS/HOUR
INSULIN ADMINSTERED, INSADM: 10.8 UNITS/HOUR
INSULIN ADMINSTERED, INSADM: 13.3 UNITS/HOUR
INSULIN ADMINSTERED, INSADM: 13.7 UNITS/HOUR
INSULIN ADMINSTERED, INSADM: 13.7 UNITS/HOUR
INSULIN ADMINSTERED, INSADM: 14.9 UNITS/HOUR
INSULIN ADMINSTERED, INSADM: 16.7 UNITS/HOUR
INSULIN ADMINSTERED, INSADM: 18.9 UNITS/HOUR
INSULIN ADMINSTERED, INSADM: 4.7 UNITS/HOUR
INSULIN ADMINSTERED, INSADM: 4.7 UNITS/HOUR
INSULIN ADMINSTERED, INSADM: 5.7 UNITS/HOUR
INSULIN ADMINSTERED, INSADM: 6.2 UNITS/HOUR
INSULIN ADMINSTERED, INSADM: 7.2 UNITS/HOUR
INSULIN ADMINSTERED, INSADM: 8.3 UNITS/HOUR
INSULIN ORDER, INSORD: 0 UNITS/HOUR
INSULIN ORDER, INSORD: 0 UNITS/HOUR
INSULIN ORDER, INSORD: 10.8 UNITS/HOUR
INSULIN ORDER, INSORD: 13.3 UNITS/HOUR
INSULIN ORDER, INSORD: 13.7 UNITS/HOUR
INSULIN ORDER, INSORD: 13.7 UNITS/HOUR
INSULIN ORDER, INSORD: 14.9 UNITS/HOUR
INSULIN ORDER, INSORD: 16.7 UNITS/HOUR
INSULIN ORDER, INSORD: 18.9 UNITS/HOUR
INSULIN ORDER, INSORD: 4.7 UNITS/HOUR
INSULIN ORDER, INSORD: 4.7 UNITS/HOUR
INSULIN ORDER, INSORD: 5.7 UNITS/HOUR
INSULIN ORDER, INSORD: 6.2 UNITS/HOUR
INSULIN ORDER, INSORD: 7.2 UNITS/HOUR
INSULIN ORDER, INSORD: 8.3 UNITS/HOUR
LOW TARGET, LOT: 95 MG/DL
MAGNESIUM SERPL-MCNC: 2.2 MG/DL (ref 1.6–2.4)
MCH RBC QN AUTO: 31.7 PG (ref 26–34)
MCHC RBC AUTO-ENTMCNC: 33.2 G/DL (ref 30–36.5)
MCV RBC AUTO: 95.4 FL (ref 80–99)
MINUTES UNTIL NEXT BG, NBG: 120 MIN
MINUTES UNTIL NEXT BG, NBG: 120 MIN
MINUTES UNTIL NEXT BG, NBG: 15 MIN
MINUTES UNTIL NEXT BG, NBG: 15 MIN
MINUTES UNTIL NEXT BG, NBG: 60 MIN
MULTIPLIER, MUL: 0.08
MULTIPLIER, MUL: 0.08
MULTIPLIER, MUL: 0.09
MULTIPLIER, MUL: 0.1
MULTIPLIER, MUL: 0.1
MULTIPLIER, MUL: 0.11
MULTIPLIER, MUL: 0.12
MULTIPLIER, MUL: 0.13
MULTIPLIER, MUL: 0.13
MULTIPLIER, MUL: 0.14
MULTIPLIER, MUL: 0.15
NRBC # BLD: 0 K/UL (ref 0–0.01)
NRBC BLD-RTO: 0 PER 100 WBC
ORDER INITIALS, ORDINIT: NORMAL
PLATELET # BLD AUTO: 102 K/UL (ref 150–400)
PMV BLD AUTO: 12.4 FL (ref 8.9–12.9)
POTASSIUM SERPL-SCNC: 4.1 MMOL/L (ref 3.5–5.1)
PROT SERPL-MCNC: 5.9 G/DL (ref 6.4–8.2)
RBC # BLD AUTO: 2.59 M/UL (ref 3.8–5.2)
SERVICE CMNT-IMP: ABNORMAL
SERVICE CMNT-IMP: NORMAL
SODIUM SERPL-SCNC: 143 MMOL/L (ref 136–145)
WBC # BLD AUTO: 12.9 K/UL (ref 3.6–11)

## 2018-02-08 PROCEDURE — 80053 COMPREHEN METABOLIC PANEL: CPT | Performed by: NURSE PRACTITIONER

## 2018-02-08 PROCEDURE — 74011250637 HC RX REV CODE- 250/637: Performed by: NURSE PRACTITIONER

## 2018-02-08 PROCEDURE — 83735 ASSAY OF MAGNESIUM: CPT | Performed by: NURSE PRACTITIONER

## 2018-02-08 PROCEDURE — 77010033678 HC OXYGEN DAILY

## 2018-02-08 PROCEDURE — 97530 THERAPEUTIC ACTIVITIES: CPT

## 2018-02-08 PROCEDURE — 74011636637 HC RX REV CODE- 636/637: Performed by: NURSE PRACTITIONER

## 2018-02-08 PROCEDURE — 82962 GLUCOSE BLOOD TEST: CPT

## 2018-02-08 PROCEDURE — 97165 OT EVAL LOW COMPLEX 30 MIN: CPT

## 2018-02-08 PROCEDURE — 97110 THERAPEUTIC EXERCISES: CPT

## 2018-02-08 PROCEDURE — G8987 SELF CARE CURRENT STATUS: HCPCS

## 2018-02-08 PROCEDURE — 74011250636 HC RX REV CODE- 250/636: Performed by: NURSE PRACTITIONER

## 2018-02-08 PROCEDURE — G8978 MOBILITY CURRENT STATUS: HCPCS

## 2018-02-08 PROCEDURE — 51798 US URINE CAPACITY MEASURE: CPT

## 2018-02-08 PROCEDURE — 71045 X-RAY EXAM CHEST 1 VIEW: CPT

## 2018-02-08 PROCEDURE — 74011000250 HC RX REV CODE- 250: Performed by: THORACIC SURGERY (CARDIOTHORACIC VASCULAR SURGERY)

## 2018-02-08 PROCEDURE — 97161 PT EVAL LOW COMPLEX 20 MIN: CPT

## 2018-02-08 PROCEDURE — 74011250636 HC RX REV CODE- 250/636: Performed by: THORACIC SURGERY (CARDIOTHORACIC VASCULAR SURGERY)

## 2018-02-08 PROCEDURE — 85027 COMPLETE CBC AUTOMATED: CPT | Performed by: NURSE PRACTITIONER

## 2018-02-08 PROCEDURE — 65660000000 HC RM CCU STEPDOWN

## 2018-02-08 PROCEDURE — 74011000258 HC RX REV CODE- 258: Performed by: THORACIC SURGERY (CARDIOTHORACIC VASCULAR SURGERY)

## 2018-02-08 PROCEDURE — G8979 MOBILITY GOAL STATUS: HCPCS

## 2018-02-08 PROCEDURE — 36415 COLL VENOUS BLD VENIPUNCTURE: CPT | Performed by: NURSE PRACTITIONER

## 2018-02-08 PROCEDURE — G8988 SELF CARE GOAL STATUS: HCPCS

## 2018-02-08 PROCEDURE — 74011636637 HC RX REV CODE- 636/637: Performed by: THORACIC SURGERY (CARDIOTHORACIC VASCULAR SURGERY)

## 2018-02-08 RX ORDER — METOPROLOL TARTRATE 25 MG/1
12.5 TABLET, FILM COATED ORAL EVERY 12 HOURS
Status: DISCONTINUED | OUTPATIENT
Start: 2018-02-08 | End: 2018-02-09

## 2018-02-08 RX ORDER — SODIUM CHLORIDE 0.9 % (FLUSH) 0.9 %
5-10 SYRINGE (ML) INJECTION AS NEEDED
Status: DISCONTINUED | OUTPATIENT
Start: 2018-02-08 | End: 2018-02-14 | Stop reason: HOSPADM

## 2018-02-08 RX ORDER — SODIUM CHLORIDE 0.9 % (FLUSH) 0.9 %
5-10 SYRINGE (ML) INJECTION EVERY 8 HOURS
Status: DISCONTINUED | OUTPATIENT
Start: 2018-02-08 | End: 2018-02-14 | Stop reason: HOSPADM

## 2018-02-08 RX ADMIN — METOPROLOL TARTRATE 12.5 MG: 25 TABLET ORAL at 20:18

## 2018-02-08 RX ADMIN — SODIUM CHLORIDE 13.7 UNITS/HR: 900 INJECTION, SOLUTION INTRAVENOUS at 11:32

## 2018-02-08 RX ADMIN — Medication 400 MG: at 18:00

## 2018-02-08 RX ADMIN — MORPHINE SULFATE 4 MG: 4 INJECTION, SOLUTION INTRAMUSCULAR; INTRAVENOUS at 13:56

## 2018-02-08 RX ADMIN — PANTOPRAZOLE SODIUM 40 MG: 40 TABLET, DELAYED RELEASE ORAL at 07:38

## 2018-02-08 RX ADMIN — METOPROLOL TARTRATE 12.5 MG: 25 TABLET ORAL at 15:00

## 2018-02-08 RX ADMIN — ASPIRIN 81 MG 81 MG: 81 TABLET ORAL at 09:08

## 2018-02-08 RX ADMIN — BRIMONIDINE TARTRATE 1 DROP: 2 SOLUTION OPHTHALMIC at 21:57

## 2018-02-08 RX ADMIN — Medication 2 G: at 00:30

## 2018-02-08 RX ADMIN — Medication 10 ML: at 08:00

## 2018-02-08 RX ADMIN — Medication 10 ML: at 21:58

## 2018-02-08 RX ADMIN — Medication 2 G: at 16:21

## 2018-02-08 RX ADMIN — MUPIROCIN: 20 OINTMENT TOPICAL at 09:00

## 2018-02-08 RX ADMIN — INSULIN GLARGINE 50 UNITS: 100 INJECTION, SOLUTION SUBCUTANEOUS at 16:21

## 2018-02-08 RX ADMIN — Medication 10 ML: at 06:49

## 2018-02-08 RX ADMIN — MORPHINE SULFATE 4 MG: 4 INJECTION, SOLUTION INTRAMUSCULAR; INTRAVENOUS at 20:18

## 2018-02-08 RX ADMIN — AMIODARONE HYDROCHLORIDE 400 MG: 200 TABLET ORAL at 09:08

## 2018-02-08 RX ADMIN — POLYETHYLENE GLYCOL 3350 17 G: 17 POWDER, FOR SOLUTION ORAL at 09:08

## 2018-02-08 RX ADMIN — ROSUVASTATIN CALCIUM 40 MG: 40 TABLET ORAL at 22:00

## 2018-02-08 RX ADMIN — Medication 2 G: at 06:30

## 2018-02-08 RX ADMIN — AMIODARONE HYDROCHLORIDE 0.5 MG/MIN: 50 INJECTION, SOLUTION INTRAVENOUS at 11:33

## 2018-02-08 RX ADMIN — AMIODARONE HYDROCHLORIDE 400 MG: 200 TABLET ORAL at 20:18

## 2018-02-08 RX ADMIN — TIMOLOL MALEATE 1 DROP: 5 SOLUTION OPHTHALMIC at 21:00

## 2018-02-08 RX ADMIN — DOCUSATE SODIUM AND SENNOSIDES 1 TABLET: 8.6; 5 TABLET, FILM COATED ORAL at 09:08

## 2018-02-08 RX ADMIN — MUPIROCIN: 20 OINTMENT TOPICAL at 18:00

## 2018-02-08 RX ADMIN — Medication 400 MG: at 09:08

## 2018-02-08 RX ADMIN — Medication 10 ML: at 13:56

## 2018-02-08 RX ADMIN — BRIMONIDINE TARTRATE 1 DROP: 2 SOLUTION OPHTHALMIC at 09:10

## 2018-02-08 RX ADMIN — DOCUSATE SODIUM AND SENNOSIDES 1 TABLET: 8.6; 5 TABLET, FILM COATED ORAL at 18:00

## 2018-02-08 RX ADMIN — INSULIN LISPRO 3 UNITS: 100 INJECTION, SOLUTION INTRAVENOUS; SUBCUTANEOUS at 21:56

## 2018-02-08 RX ADMIN — TIMOLOL MALEATE 1 DROP: 5 SOLUTION OPHTHALMIC at 08:00

## 2018-02-08 NOTE — PROGRESS NOTES
Patient is S/P CABG - lives with her daughter - spoke with son and then spoke with [place] daughter - patient speaks [place]- son confirmed PCP and address,PCP on file- family will be able to provide 24/7 support upon discharge to home - patient was independent with ADL's prior to admit [facility] - agreeable to home care services - choice obtained and referral made to [address] [place] [address]- family will provide needed transportation. MICHI Rodriguez    Care Management Interventions  PCP Verified by CM:  Yes  Transition of Care Consult (CM Consult): [address]: Yes  MyChart Signup: No  Discharge Durable Medical Equipment: No (has no DME)  Physical Therapy Consult: Yes  Occupational Therapy Consult: Yes  Speech Therapy Consult: No  Current Support Network: Relative's Home  Confirm Follow Up Transport: Family  Plan discussed with Pt/Family/Caregiver: Yes  Freedom of Choice Offered: Yes  Discharge Location  Discharge Placement: Home with home health

## 2018-02-08 NOTE — PROGRESS NOTES
Problem: Mobility Impaired (Adult and Pediatric)  Goal: *Acute Goals and Plan of Care (Insert Text)  Physical Therapy Goals  Initiated 2/8/2018  1. Patient will move from supine to sit and sit to supine  in bed with minimal assistance/contact guard assist within 5 days. 2.  Patient will perform sit to/from stand with supervision/set-up within 5 days. 3.  Patient will ambulate 150 feet with least restrictive assistive device and supervision/set-up within 5 days. 4.  Patient will ascend/descend 5 stairs with handrail(s) per home needs with contact guard assist within 5 days. 5.  Patient will perform cardiac exercises per protocol with independence within 5 days. 6.  Patient will verbally and functionally recall 3/3 sternal precautions within 5 days. physical Therapy EVALUATION  Patient: Shan Owens (73 y.o. female)  Date: 2/8/2018  Primary Diagnosis: Chest pain  Unstable angina ([facility] [address]  unknown  Procedure(s) (LRB):  CORONARY ARTERY BYPASS GRAFTING X 4 WITH LIMA, [place], ECC, JUANJO AND EPIAORTIC U/S BY DR Tory Tan (N/A) 2 Days Post-Op   Precautions:  Fall, Sternal, chest tube    ASSESSMENT :  Based on the objective data described below, the patient presents with generally decreased strength, balance, ROM, and increased dizziness/pain with position change resulting in impaired functional mobility from baseline status. Pt normally living in [address] [place] [address] with DTR and not using DME. DTR present for session and very helpful with physical assistance and encouragement as well as with translation (English is not pt's first language). Pt agreeable to bed mobility, transfers, gait, toileting, and then OOB to chair for dinner time. Pt in NAD when left and no significant hypotension noted despite complaints/symptoms. RN notified of session events. Patient will benefit from skilled intervention to address the above impairments.   Patients rehabilitation potential is considered to be Good  Factors which may influence rehabilitation potential include:   []         None noted  []         Mental ability/status  []         Medical condition  []         Home/family situation and support systems  [x]         Safety awareness  [x]         Pain tolerance/management  []         Other:      PLAN :  Recommendations and Planned Interventions:  [x]           Bed Mobility Training             []    Neuromuscular Re-Education  [x]           Transfer Training                   []    Orthotic/Prosthetic Training  [x]           Gait Training                         []    Modalities  [x]           Therapeutic Exercises           []    Edema Management/Control  [x]           Therapeutic Activities            [x]    Patient and Family Training/Education  []           Other (comment):    Frequency/Duration: Patient will be followed by physical therapy  daily to address goals. Discharge Recommendations: Home Health  Further Equipment Recommendations for Discharge: NA     SUBJECTIVE:   Patient pleasant and agreeable though speaking through DTR most of the time. She reports some posterior lateral flank pain.      OBJECTIVE DATA SUMMARY:   HISTORY:    Past Medical History:   Diagnosis Date    CAD (coronary artery disease)     calcium score 229 - 2011, cath 4/2009 - [facility]    Cataract     CKD (chronic kidney disease) stage 3, GFR 30-59 ml/min     Cystoid macular degeneration of right eye     Diabetes ([address]     Diabetic neuropathy (HCC)     Diastolic dysfunction     Esophageal reflux 4/9/2015    Foot ulcer ([address]     Glaucoma     right    Hypercholesterolemia     Hyperlipidemia 1/23/2015    Hypertension     Macular degeneration     Proliferative diabetic retinopathy ([address]     PVD (peripheral vascular disease) ([address]     Rupture of ulnar collateral ligament of thumb     Sebaceous cyst 4/4/2017    Urge incontinence     Vitreous hemorrhage of both eyes ([address]      Past Surgical History:   Procedure Laterality Date    CARDIAC SURG PROCEDURE UNLIST      HX CATARACT REMOVAL Left     HX CHOLECYSTECTOMY       Prior Level of Function/Home Situation: living with DTR and not using DME  Personal factors and/or comorbidities impacting plan of care: supportive family    Home Situation  Home Environment: Private residence  # Steps to Enter: 5  One/Two Story Residence: One story  Living Alone: No  Support Systems: Child(keya)  Patient Expects to be Discharged to[de-identified] Private residence  Current DME Used/Available at Home: Shower chair  Tub or Shower Type: Shower    EXAMINATION/PRESENTATION/DECISION MAKING:   Critical Behavior:  Neurologic State: Alert  Orientation Level: Oriented X4  Cognition: Follows commands  Safety/Judgement: Fall prevention  Hearing: Auditory  Auditory Impairment: None  Range Of Motion:  AROM: Generally decreased, functional           PROM:  (NT)           Strength:    Strength: Generally decreased, functional                    Tone & Sensation:   Tone: Normal              Sensation:  (NT)               Coordination:  Coordination: Generally decreased, functional  Vision:   Acuity:  (impaired R acuity due to macular degeneration)  Functional Mobility:  Bed Mobility:  Rolling: Moderate assistance; Additional time (to left)  Supine to Sit: Moderate assistance; Additional time;Assist x2  Sit to Supine:  (NT)  Scooting: Minimum assistance; Additional time (cues for no pushing with UEs)  Transfers:  Sit to Stand: Minimum assistance; Additional time (with rocking)  Stand to Sit: Minimum assistance; Additional time (cues for using cardiac bear and not hold IV pole)        Bed to Chair: Minimum assistance              Balance:   Sitting: Impaired; Without support  Sitting - Static: Good (unsupported)  Sitting - Dynamic: Fair (occasional)  Standing: Impaired; With support  Standing - Static: Fair  Standing - Dynamic : Fair  Ambulation/Gait Training:  Distance (ft): 6 Feet (ft) (x2)  Assistive Device:  (HHA vs IV pole support (discouraged))  Ambulation - Level of Assistance: Minimal assistance; Additional time;Assist x2     Gait Description (WDL): Exceptions to WDL  Gait Abnormalities: Antalgic;Decreased step clearance;Trunk sway increased; Path deviations        Base of Support: Widened;Center of gravity altered        Step Length: Right shortened;Left shortened     Stairs:     Stairs - Level of Assistance:  (Unable)        Therapeutic Exercises:   Cardiac therex performed earlier in day with OT    Functional Measure:  Timed up and go:    Timed Get Up And Go Test:  (Unable)     Timed Up and Go and G-code impairment scale:  Percentage of Impairment CH    0%   CI    1-19% CJ    20-39% CK    40-59% CL    60-79% CM    80-99% CN     100%   Timed   Score 0-56 10 11-12 13-14 15-16 17-18 19 20       < than 10 seconds=Normal  Greater then 13.5 seconds (in elderly)=Increased fall risk   Steph Cormier, Cris GUSTAFSON. Predicting the probability for falls in community dwelling older adults using the Timed Up and Go Test. Phys Ther. 2000;80:896-903. G codes: In compliance with CMSs Claims Based Outcome Reporting, the following G-code set was chosen for this patient based on their primary functional limitation being treated: The outcome measure chosen to determine the severity of the functional limitation was the TUG with a score of Unable/x which was correlated with the impairment scale.     ? Mobility - Walking and Moving Around:     - CURRENT STATUS: CN - 100% impaired, limited or restricted    - GOAL STATUS: CM - 80%-99% impaired, limited or restricted    - D/C STATUS:  ---------------To be determined---------------   Pain:  Pain Scale 1: Numeric (0 - 10)  Pain Intensity 1: c/o pain though no # provided  Activity Tolerance:   Supine:  122/60 HR 80 SpO2 96%  Sittin/55 HR 87  Sitting with APs:  108/52  HR 82  Sitting post activity:  109/70 HR 79 SpO2 96%  Please refer to the flowsheet for vital signs taken during this treatment. After treatment:   [x]         Patient left in no apparent distress sitting up in chair  []         Patient left in no apparent distress in bed  [x]         Call bell left within reach  [x]         Nursing notified  [x]         Caregiver present  []         Bed alarm activated    COMMUNICATION/EDUCATION:   The patients plan of care was discussed with: Registered Nurse. [x]         Fall prevention education was provided and the patient/caregiver indicated understanding. [x]         Patient/family have participated as able in goal setting and plan of care. [x]         Patient/family agree to work toward stated goals and plan of care. []         Patient understands intent and goals of therapy, but is neutral about his/her participation. []         Patient is unable to participate in goal setting and plan of care.     Thank you for this referral.  Brit Avery   Time Calculation: 21 mins

## 2018-02-08 NOTE — PROGRESS NOTES
0730:  Bedside shift change report given to Rosalinda Lal (oncoming nurse) by Derwin Hodgkin (offgoing nurse). Report included the following information SBAR, Kardex, MAR and Recent Results. 1621:  Pt eating 50% of meals and is at 48 hours PO.  50 units of Lantus given per protocol. Will turn off Insulin gtt at 1821.      1930:  Bedside shift change report given to Amina (oncoming nurse) by Derwin Hodgkin (offgoing nurse). Report included the following information SBAR, Kardex, MAR and Recent Results.

## 2018-02-08 NOTE — PROGRESS NOTES
1930: Bedside and Verbal shift change report given to Nehal Clemente RN (oncoming nurse) by Selene Xie RN (offgoing nurse). Report included the following information SBAR, Kardex, Intake/Output, MAR, Recent Results and Cardiac Rhythm NSR.   2100: Bladder scanned patient for 150ml. Patient has no urge to void. Will reassess in 6 hours. PO fluids encouraged. 0330: Patient has not voided. Bladder scanned patient for 467ml. Will encourage patient to void. 0500: Patient voided 500ml. 0730: Bedside and Verbal shift change report given to Josué Jordan RN (oncoming nurse) by Nehal Clemente RN (offgoing nurse). Report included the following information SBAR, Kardex, Intake/Output, MAR, Recent Results and Cardiac Rhythm NSR. Problem: Falls - Risk of  Goal: *Absence of Falls  Document Sania Fall Risk and appropriate interventions in the flowsheet.    Outcome: Progressing Towards Goal  Fall Risk Interventions:  Mobility Interventions: Communicate number of staff needed for ambulation/transfer         Medication Interventions: Patient to call before getting OOB, Teach patient to arise slowly    Elimination Interventions: Call light in reach    History of Falls Interventions: Door open when patient unattended, Room close to nurse's station        Problem: Pressure Injury - Risk of  Goal: *Prevention of pressure ulcer  Outcome: Progressing Towards Goal   02/07/18 2000   Wound Prevention and Protection Methods   Orientation of Wound Prevention Posterior   Location of Wound Prevention Sacrum/Coccyx   Dressing Present  No   Read Only, Retired: Wound Treatment (non-mechanical)   Wound Offloading (Prevention Methods) Bed, pressure redistribution/air;Bed, pressure reduction mattress;Pillows;Repositioning;Turning

## 2018-02-08 NOTE — PROGRESS NOTES
Problem: Discharge Planning  Goal: *Discharge to safe environment  Outcome: Progressing Towards Goal  See cm notes.  MICHI Holcomb

## 2018-02-08 NOTE — PROGRESS NOTES
[facility] Progress Note    Admit Date: 2018  POD:  2 Day Post-Op    Procedure:  Procedure(s):  CORONARY ARTERY BYPASS GRAFTING X 4 WITH LIMA, BHARGAV, ECC, JUANJO AND EPIAORTIC U/S BY DR Blanquita Mas        Subjective:   Pt seen with Dr. Zana Morales. Tmax 99, 2L NC. Amio and insulin gtts. NSR. Up in chair, wants to get back to bed. Objective:   Vitals:  Blood pressure 117/43, pulse 77, temperature 98.9 °F (37.2 °C), resp. rate 18, height 5' 6\" (1.676 m), weight 223 lb 1.7 oz (101.2 kg), SpO2 94 %. Temp (24hrs), Av.5 °F (36.9 °C), Min:98.2 °F (36.8 °C), Max:98.9 °F (37.2 °C)    EKG/Rhythm:  NSR 67    CT Output: 470 ml    Oxygen Therapy:  Oxygen Therapy  O2 Sat (%): 94 % (18 0503)  Pulse via Oximetry: 67 beats per minute (18 1100)  O2 Device: Nasal cannula (18 0503)  O2 Flow Rate (L/min): 2 l/min (18 0503)  FIO2 (%): 50 % (18 2256)    CXR: IMPRESSION:   Postop CABG.    Minimal bibasilar atelectasis/small pleural effusions. Admission Weight: Last Weight   Weight: 220 lb (99.8 kg) Weight: 223 lb 1.7 oz (101.2 kg)     Intake / Output / Drain:  Current Shift:  0701 -  1900  In: -   Out: 120 [Drains:120]  Last 24 hrs.:     Intake/Output Summary (Last 24 hours) at 18 1001  Last data filed at 18 0805   Gross per 24 hour   Intake             1254 ml   Output             1065 ml   Net              189 ml       EXAM:  General:  Sitting up in chair, tired. Lungs:   Clear upper, Dimished bases to auscultation bilaterally. Incision:  Dsg cdi   Heart:  Regular rate and rhythm, S1, S2 normal, no murmur, click, rub or gallop. Abdomen:   Soft, non-tender. Bowel sounds hypoactive. No masses,  No organomegaly. Extremities:  No edema. PPP. Neurologic:  Gross motor and sensory apparatus intact.      Labs:   Recent Labs      18   0910   18   0511   18   1730   WBC   --    -- 12.9*   < >  9.6   HGB   --    --   8.2*   < >  8.3*   HCT   --    --   24.7*   < >  25.1*   PLT   --    --   102*   < >  89*   NA   --    --   143   < >  144   K   --    --   4.1   < >  3.5   BUN   --    --   30*   < >  22*   CREA   --    --   1.30*   < >  1.01   GLU   --    --   73   < >  124*   GLUCPOC  198*   < >   --    < >   --    INR   --    --    --    --   1.3*    < > = values in this interval not displayed. Assessment:     Principal Problem:    Chest pain (2018)    Active Problems:    Unstable angina ([address] (2/3/2018)      S/P CABG x 4 (2018)      Overview: On Pump CORONARY ARTERY BYPASS GRAFTING X 4 WITH NAIR to LAD, Sequential       LSVG OM1 and OM2, LSVG to RCA       [facility]         Plan/Recommendations/Medical Decision Makin. CAD s/p CABG: On ASA, start statin, start BB today. 2. Atelectasis: Wean O2 for sats >92%, encourage I/S  3. Nausea: improved, tolerating clear liquids, advance diet as tolerated. Cont PRN zofran and compazine. 4. Post op anemia s/t acute blood loss: Hgb 8.2, monitor CT output and H&H  5. Thromocytopenia: Plt 102, improving, monitor  6. LUCIA: Cr 1.3, monitor U/O, encourage PO intake. 7. IDDM: Cont insulin gtt per protocol, DTC consult  8. Hx HTN: start BB, monitor. 9. Hx glaucoma: cont eye drops  10. HLD: Cont statin  11. Obesity: BMI 34, encourage heart healthy diet and weight loss when appropriate  12. Dispo: PT/OT, cont current care.       Signed By: Edy Hill NP

## 2018-02-08 NOTE — DIABETES MGMT
[facility] Cardiac Surgery Progress Note     Recommendations/ Comments: Pt arrived to CVICU at 1636 on 2-6-2018. Consider continuing insulin gtt for at least 48hrs post-op and eating 50% solid foods then,  1) transition off gtt per [employer] Protocol   2) continue accu-checks and humalog correctional insulin ac & hs   3) ADA/AHA diet as diet advanced  4) she may require daily basal insulin in addition to transition dose. Wt based basal dose is Lantus 20 units daily. Noted pt with elevated blood sugars this morning, question if pt drank a sweet beverage (pt on clear liquid diet)    If appropriate, consider:  - Adding no concentrated sweet restriction to current diet     Please obtain PO intake    Insulin gtt should not be stopped until after 1636 on 2-8-2016 to complete 48hr post-op time frame. Currently on insulin gtt. At 0910  mg/dl, rate 14.9 units/hr, received 51.3 units over the past 6 hours. Chart reviewed on [facility]is Zabala. Patient is 65 y.o. female s/p Cardiac surgery CABG  POD 2        A1c:   Lab Results   Component Value Date/Time    Hemoglobin A1c 8.0 (H) 02/05/2018 05:00 PM         Recent Glucose Results:   Lab Results   Component Value Date/Time    GLU 73 02/08/2018 05:11 AM    GLUCPOC 198 (H) 02/08/2018 09:10 AM    GLUCPOC 200 (H) 02/08/2018 07:54 AM    GLUCPOC 211 (H) 02/08/2018 06:46 AM        Lab Results   Component Value Date/Time    Creatinine 1.30 (H) 02/08/2018 05:11 AM     Estimated Creatinine Clearance: 42.8 mL/min (based on Cr of 1.3). Active Orders   Diet    DIET CLEAR LIQUID        PO intake: No data found. Will continue to follow as needed. Thank you.   Kole Ballard, [address] [place] [address]  [facility]  Pager: [phone]

## 2018-02-08 NOTE — PROGRESS NOTES
Problem: Self Care Deficits Care Plan (Adult)  Goal: *Acute Goals and Plan of Care (Insert Text)  Occupational Therapy Goals  Initiated 2/8/2018  1. Patient will perform ADLs standing 5 mins without fatigue or LOB with supervision/set-up within 7 day(s). 2.  Patient will perform lower body ADLs with supervision/set-up within 7 day(s). 3.  Patient will perform bathing with supervision/set-up within 7 day(s). 4.  Patient will perform toilet transfers with supervision/set-up within 7 day(s). 5.  Patient will perform all aspects of toileting with supervision/set-up within 7 day(s). 6.  Patient will participate in cardiac/sternal upper extremity therapeutic exercise/activities to increase independence with ADLs with supervision/set-up for 5 minutes within 7 day(s). Occupational Therapy EVALUATION  Patient: Bibiana Ramos (73 y.o. female)  Date: 2/8/2018  Primary Diagnosis: Chest pain  Unstable angina ([address]  unknown  Procedure(s) (LRB):  CORONARY ARTERY BYPASS GRAFTING X 4 WITH LIMA, LESVGH, ECC, JUANJO AND EPIAORTIC U/S BY DR Sushila Zavala (N/A) 2 Days Post-Op   Precautions:  Sternal    ASSESSMENT :  Based on the objective data described below, the patient presents with impaired cardiopulmonary endurance, sternal precautions, pain at sternum, impaired functional mobility, and impaired ADL independence s/p CABG x4 POD2. Patient received supine in bed, daughter and son present for translation (patient speaks [place], not [place]). Patient alert, Ox4, and agreeable to therapy. Patient receptive to education on sternal precautions, activity recommendations, and role of OT. Patient instructed log rolling, and practiced rolling/ supine-sit with mod-A for lowering BLE and raising trunk. Patient receptive to cardiac exercises and completed 3-5 reps for each exercise sitting EOB.   Patient instructed in sit-stand while maintaining sternal precautions (anterior weight shifting, hugging bear as no not use arms for transfer), and completed sit-stand/ bed-chair with min-A. Patient reports mild dizziness sitting EOB, BP stable. Patient indep PTA and lives with daughter, who is supportive and available 24/7. Anticipate d/c home pending progress. Patient will benefit from skilled intervention to address the above impairments. Patients rehabilitation potential is considered to be Good  Factors which may influence rehabilitation potential include:   [x]             None noted  []             Mental ability/status  []             Medical condition  []             Home/family situation and support systems  []             Safety awareness  []             Pain tolerance/management  []             Other:      PLAN :  Recommendations and Planned Interventions:  [x]               Self Care Training                  [x]        Therapeutic Activities  [x]               Functional Mobility Training    []        Cognitive Retraining  [x]               Therapeutic Exercises           [x]        Endurance Activities  [x]               Balance Training                   []        Neuromuscular Re-Education  []               Visual/Perceptual Training     [x]   Home Safety Training  [x]               Patient Education                 [x]        Family Training/Education  []               Other (comment):    Frequency/Duration: Patient will be followed by occupational therapy 5 times a week to address goals. Discharge Recommendations: home with daughter pending progress  Further Equipment Recommendations for Discharge: TBD     SUBJECTIVE:   Patient stated I'm okay. \"    The patient stated 0/3 sternal precautions. Reviewed all 3 with patient.     OBJECTIVE DATA SUMMARY:   HISTORY:   Past Medical History:   Diagnosis Date    CAD (coronary artery disease)     calcium score 229 - 2011, cath 4/2009 - [facility]    Cataract     CKD (chronic kidney disease) stage 3, GFR 30-59 ml/min     Cystoid macular degeneration of right eye     Diabetes [facility] [address]  Diabetic neuropathy (HCC)     Diastolic dysfunction     Esophageal reflux 4/9/2015    Foot ulcer ([address]     Glaucoma     right    Hypercholesterolemia     Hyperlipidemia 1/23/2015    Hypertension     Macular degeneration     Proliferative diabetic retinopathy ([address]     PVD (peripheral vascular disease) ([address]     Rupture of ulnar collateral ligament of thumb     Sebaceous cyst 4/4/2017    Urge incontinence     Vitreous hemorrhage of both eyes ([address]      Past Surgical History:   Procedure Laterality Date    CARDIAC SURG PROCEDURE UNLIST      HX CATARACT REMOVAL Left     HX CHOLECYSTECTOMY         Prior Level of Function/Environment/Context: indep in ADLs and IADLs. Lives in [facility] with daughter. Not using AD PTA. Owns shower chair but has not used it. Expanded or extensive additional review of patient history:     Home Situation  Home Environment: Apartment  # Steps to Enter: 5  One/Two Story Residence: One story  Living Alone: No (with daughter)  Support Systems: Child(keya)  Patient Expects to be Discharged to[de-identified] Apartment  Current DME Used/Available at Home: Shower chair  Tub or Shower Type: Shower  []  Right hand dominant   []  Left hand dominant    EXAMINATION OF PERFORMANCE DEFICITS:  Cognitive/Behavioral Status:  Neurologic State: Alert  Orientation Level: Oriented X4  Cognition: Follows commands  Perception: Appears intact  Perseveration: No perseveration noted  Safety/Judgement: Awareness of environment    Skin: visible skin appears intact. Dressing c/d/i    Edema: none noted    Hearing:   Auditory  Auditory Impairment: None    Vision/Perceptual:                 Acuity:  (impaired R acuity due to macular degeneration)  Relies on L side primarily    Range of Motion:    AROM: Generally decreased, functional  PROM: Generally decreased, functional                      Strength:    Strength: Generally decreased, functional                Coordination:  Coordination: Generally decreased, functional  Fine Motor Skills-Upper: Left Intact; Right Intact    Gross Motor Skills-Upper: Left Intact; Right Intact    Tone & Sensation:    Tone: Normal  Sensation: Intact                      Balance:  Sitting: Intact  Standing: Impaired  Standing - Static: Fair  Standing - Dynamic : Fair    Functional Mobility and Transfers for ADLs:  Bed Mobility:  Rolling: Minimum assistance (log rolling)  Supine to Sit: Moderate assistance (for lowering BLE off bed and raising trunk)    Transfers:  Sit to Stand: Minimum assistance (without using BUE d/t sternal precautions)  Bed to Chair: Minimum assistance    ADL Assessment:  Feeding: Independent (inferred)    Oral Facial Hygiene/Grooming: Setup (inferred)    Bathing: Moderate assistance (inferred)    Upper Body Dressing: Moderate assistance (inferred)    Lower Body Dressing: Maximum assistance (inferred)    Toileting: Maximum assistance (inferred)                ADL Intervention and task modifications:            Cognitive Retraining  Safety/Judgement: Awareness of environment    Patient instructed no asymmetrical reaching over head to ensure B UEs when shoulders >90* i.e. reaching in cabinets and dressing. Instruction on upper body dressing techniques of over head, then arms through to decrease pain and unilateral shoulder flexion >90*. Instruction on the benefits of utilizing B UEs during functional tasks i.e. opening the fridge, stepping into the tub. Patient instructed to avoid valsalva maneuvers. Therapeutic Exercises:   Patient instructed on the benefits and demonstrated cardiac exercises while sitting EOB with Minimum assistance. Instructed and indicated understanding on how to progress reps.     CARDIAC   EXERCISE    Sets    Reps    Active  Active Assist    Passive  Self ROM    Comments    Shoulder flexion  1  3   [x]                            []                             []                             []                                Shoulder abduction  1 3 [x]                             []                             []                             []                                Scapular elevation  1  5  [x]                             []                              []                             []                                Scapular retraction  1  5  [x]                             []                             []                             []                                Trunk rotation  1  3  [x]                             []                             []                             []                                Trunk sidebending  1  3 [x]                             []                              []                             []                                        Functional Measure:  Barthel Index:    Bathin  Bladder: 10  Bowels: 10  Groomin  Dressin  Feeding: 10  Mobility: 0  Stairs: 0  Toilet Use: 5  Transfer (Bed to Chair and Back): 10  Total: 55       Barthel and G-code impairment scale:  Percentage of impairment CH  0% CI  1-19% CJ  20-39% CK  40-59% CL  60-79% CM  80-99% CN  100%   Barthel Score 0-100 100 99-80 79-60 59-40 20-39 1-19   0   Barthel Score 0-20 20 17-19 13-16 9-12 5-8 1-4 0      The Barthel ADL Index: Guidelines  1. The index should be used as a record of what a patient does, not as a record of what a patient could do. 2. The main aim is to establish degree of independence from any help, physical or verbal, however minor and for whatever reason. 3. The need for supervision renders the patient not independent. 4. A patient's performance should be established using the best available evidence. Asking the patient, friends/relatives and nurses are the usual sources, but direct observation and common sense are also important. However direct testing is not needed. 5. Usually the patient's performance over the preceding 24-48 hours is important, but occasionally longer periods will be relevant.   6. Middle categories imply that the patient supplies over 50 per cent of the effort. 7. Use of aids to be independent is allowed. Shantel Alcantara., Barthel, D.W. (1784). Functional evaluation: the Barthel Index. [address] (14)2. ROYA El, Patsy Amado., Monty Childress., [place], [facility] (1999). Measuring the change indisability after inpatient rehabilitation; comparison of the responsiveness of the Barthel Index and Functional [place] Measure. Journal of Neurology, Neurosurgery, and Psychiatry, 66(4), 090-833. LEEANN Marroquin.A, FILIBERTO Cunningham, & Marjorie Sexton M.A. (2004.) Assessment of post-stroke quality of life in cost-effectiveness studies: The usefulness of the Barthel Index and the EuroQoL-5D. Quality of Life Research, 13, 513-25         G codes: In compliance with CMSs Claims Based Outcome Reporting, the following G-code set was chosen for this patient based on their primary functional limitation being treated: The outcome measure chosen to determine the severity of the functional limitation was the Barthel index with a score of 55/100 which was correlated with the impairment scale. ?  Self Care:     - CURRENT STATUS: CK - 40%-59% impaired, limited or restricted    - GOAL STATUS: CI - 1%-19% impaired, limited or restricted    - D/C STATUS:  ---------------To be determined---------------     Occupational Therapy Evaluation Charge Determination   History Examination Decision-Making   LOW Complexity : Brief history review  LOW Complexity : 1-3 performance deficits relating to physical, cognitive , or psychosocial skils that result in activity limitations and / or participation restrictions  LOW Complexity : No comorbidities that affect functional and no verbal or physical assistance needed to complete eval tasks       Based on the above components, the patient evaluation is determined to be of the following complexity level: LOW   Pain:  Patient indicated pain in sternum, improving with repositioning and hugging jonathan bear    Activity Tolerance:   VSS    After treatment:   [x] Patient left in no apparent distress sitting up in chair  [] Patient left in no apparent distress in bed  [x] Call bell left within reach  [x] Nursing notified  [x] Caregiver present  [] Bed alarm activated    COMMUNICATION/EDUCATION:   The patients plan of care was discussed with: Registered Nurse. [x] Home safety education was provided and the patient/caregiver indicated understanding. [x] Patient/family have participated as able in goal setting and plan of care. [x] Patient/family agree to work toward stated goals and plan of care. [] Patient understands intent and goals of therapy, but is neutral about his/her participation. [] Patient is unable to participate in goal setting and plan of care. This patients plan of care is appropriate for delegation to \[facility] [place] [facility]\"".     Thank you for this referral.  Domingo Mackey OT  Time Calculation: 40 mins

## 2018-02-08 NOTE — CARDIO/PULMONARY
Cardiac Rehab: CABG education folder at bedside. Met with Woody Peabody  and her son who provides translation to review cardiac surgery post discharge instructions and to discuss participation in the Cardiac Rehab Program.     Educated using teach back method. Reviewed the use of bear for sternal support, daily weight and temperature monitoring, showering restrictions, signs and symptoms of infection at surgery sites, daily walking and arm exercises, and use of incentive spirometer. Woody Peabody had a great deal of difficulty using her IS despite re-instruction. Was able to pull 500 ml on one occasion. Strongly encouraged hourly use with her son Reviewed risk factors for CAD to include the following: family history, elevated BMI, hyperlipidemia, hypertension, diabetes, stress, and smoking. . Discussed Heart Healthy/Low Sodium (2000 mg.) diet. Placed a  red reminder bracelet. Discussed purpose of bracelet, duration of wear, and when to call surgeons office. Discussed Cardiac Rehab Program format, benefits, and encouraged participation. Her son indicated she will discharge to  [facility] so will continue to follow.       Will continue to follow for educational needs and enrollment in the Cardiac Rehab Program. Yaima Johnson RN

## 2018-02-09 ENCOUNTER — APPOINTMENT (OUTPATIENT)
Dept: GENERAL RADIOLOGY | Age: 78
DRG: 234 | End: 2018-02-09
Attending: NURSE PRACTITIONER
Payer: MEDICARE

## 2018-02-09 LAB
ADMINISTERED INITIALS, ADMINIT: NORMAL
ANION GAP SERPL CALC-SCNC: 8 MMOL/L (ref 5–15)
BUN SERPL-MCNC: 42 MG/DL (ref 6–20)
BUN/CREAT SERPL: 30 (ref 12–20)
CALCIUM SERPL-MCNC: 8.2 MG/DL (ref 8.5–10.1)
CHLORIDE SERPL-SCNC: 103 MMOL/L (ref 97–108)
CO2 SERPL-SCNC: 25 MMOL/L (ref 21–32)
CREAT SERPL-MCNC: 1.39 MG/DL (ref 0.55–1.02)
D50 ADMINISTERED, D50ADM: 0 ML
D50 ADMINISTERED, D50ADM: 9 ML
D50 ORDER, D50ORD: 0 ML
D50 ORDER, D50ORD: 9 ML
ERYTHROCYTE [DISTWIDTH] IN BLOOD BY AUTOMATED COUNT: 13.3 % (ref 11.5–14.5)
GLSCOM COMMENTS: NORMAL
GLUCOSE BLD STRIP.AUTO-MCNC: 101 MG/DL (ref 65–100)
GLUCOSE BLD STRIP.AUTO-MCNC: 103 MG/DL (ref 65–100)
GLUCOSE BLD STRIP.AUTO-MCNC: 103 MG/DL (ref 65–100)
GLUCOSE BLD STRIP.AUTO-MCNC: 112 MG/DL (ref 65–100)
GLUCOSE BLD STRIP.AUTO-MCNC: 113 MG/DL (ref 65–100)
GLUCOSE BLD STRIP.AUTO-MCNC: 115 MG/DL (ref 65–100)
GLUCOSE BLD STRIP.AUTO-MCNC: 120 MG/DL (ref 65–100)
GLUCOSE BLD STRIP.AUTO-MCNC: 171 MG/DL (ref 65–100)
GLUCOSE BLD STRIP.AUTO-MCNC: 268 MG/DL (ref 65–100)
GLUCOSE BLD STRIP.AUTO-MCNC: 324 MG/DL (ref 65–100)
GLUCOSE BLD STRIP.AUTO-MCNC: 341 MG/DL (ref 65–100)
GLUCOSE BLD STRIP.AUTO-MCNC: 77 MG/DL (ref 65–100)
GLUCOSE BLD STRIP.AUTO-MCNC: 82 MG/DL (ref 65–100)
GLUCOSE SERPL-MCNC: 286 MG/DL (ref 65–100)
GLUCOSE, GLC: 101 MG/DL
GLUCOSE, GLC: 103 MG/DL
GLUCOSE, GLC: 103 MG/DL
GLUCOSE, GLC: 112 MG/DL
GLUCOSE, GLC: 113 MG/DL
GLUCOSE, GLC: 115 MG/DL
GLUCOSE, GLC: 120 MG/DL
GLUCOSE, GLC: 171 MG/DL
GLUCOSE, GLC: 268 MG/DL
GLUCOSE, GLC: 324 MG/DL
GLUCOSE, GLC: 77 MG/DL
GLUCOSE, GLC: 82 MG/DL
HCT VFR BLD AUTO: 23.2 % (ref 35–47)
HGB BLD-MCNC: 7.7 G/DL (ref 11.5–16)
HIGH TARGET, HITG: 130 MG/DL
INSULIN ADMINSTERED, INSADM: 0 UNITS/HOUR
INSULIN ADMINSTERED, INSADM: 18.6 UNITS/HOUR
INSULIN ADMINSTERED, INSADM: 2.4 UNITS/HOUR
INSULIN ADMINSTERED, INSADM: 32.9 UNITS/HOUR
INSULIN ADMINSTERED, INSADM: 39.1 UNITS/HOUR
INSULIN ADMINSTERED, INSADM: 5.5 UNITS/HOUR
INSULIN ADMINSTERED, INSADM: 5.8 UNITS/HOUR
INSULIN ADMINSTERED, INSADM: 5.8 UNITS/HOUR
INSULIN ADMINSTERED, INSADM: 7 UNITS/HOUR
INSULIN ADMINSTERED, INSADM: 7.4 UNITS/HOUR
INSULIN ADMINSTERED, INSADM: 8 UNITS/HOUR
INSULIN ADMINSTERED, INSADM: 8.9 UNITS/HOUR
INSULIN ORDER, INSORD: 0 UNITS/HOUR
INSULIN ORDER, INSORD: 18.6 UNITS/HOUR
INSULIN ORDER, INSORD: 2.4 UNITS/HOUR
INSULIN ORDER, INSORD: 32.9 UNITS/HOUR
INSULIN ORDER, INSORD: 39.1 UNITS/HOUR
INSULIN ORDER, INSORD: 5.5 UNITS/HOUR
INSULIN ORDER, INSORD: 5.8 UNITS/HOUR
INSULIN ORDER, INSORD: 5.8 UNITS/HOUR
INSULIN ORDER, INSORD: 7 UNITS/HOUR
INSULIN ORDER, INSORD: 7.4 UNITS/HOUR
INSULIN ORDER, INSORD: 8 UNITS/HOUR
INSULIN ORDER, INSORD: 8.9 UNITS/HOUR
LOW TARGET, LOT: 95 MG/DL
MAGNESIUM SERPL-MCNC: 2.3 MG/DL (ref 1.6–2.4)
MCH RBC QN AUTO: 31.6 PG (ref 26–34)
MCHC RBC AUTO-ENTMCNC: 33.2 G/DL (ref 30–36.5)
MCV RBC AUTO: 95.1 FL (ref 80–99)
MINUTES UNTIL NEXT BG, NBG: 120 MIN
MINUTES UNTIL NEXT BG, NBG: 15 MIN
MINUTES UNTIL NEXT BG, NBG: 60 MIN
MULTIPLIER, MUL: 0.11
MULTIPLIER, MUL: 0.13
MULTIPLIER, MUL: 0.15
MULTIPLIER, MUL: 0.16
MULTIPLIER, MUL: 0.17
MULTIPLIER, MUL: 0.17
NRBC # BLD: 0 K/UL (ref 0–0.01)
NRBC BLD-RTO: 0 PER 100 WBC
ORDER INITIALS, ORDINIT: NORMAL
PLATELET # BLD AUTO: 103 K/UL (ref 150–400)
POTASSIUM SERPL-SCNC: 4.6 MMOL/L (ref 3.5–5.1)
RBC # BLD AUTO: 2.44 M/UL (ref 3.8–5.2)
SERVICE CMNT-IMP: ABNORMAL
SERVICE CMNT-IMP: NORMAL
SERVICE CMNT-IMP: NORMAL
SODIUM SERPL-SCNC: 136 MMOL/L (ref 136–145)
WBC # BLD AUTO: 12.4 K/UL (ref 3.6–11)

## 2018-02-09 PROCEDURE — 83735 ASSAY OF MAGNESIUM: CPT | Performed by: NURSE PRACTITIONER

## 2018-02-09 PROCEDURE — 77030012390 HC DRN CHST BTL GTNG -B

## 2018-02-09 PROCEDURE — 80048 BASIC METABOLIC PNL TOTAL CA: CPT | Performed by: NURSE PRACTITIONER

## 2018-02-09 PROCEDURE — 97110 THERAPEUTIC EXERCISES: CPT

## 2018-02-09 PROCEDURE — 36415 COLL VENOUS BLD VENIPUNCTURE: CPT | Performed by: NURSE PRACTITIONER

## 2018-02-09 PROCEDURE — 74011250636 HC RX REV CODE- 250/636: Performed by: NURSE PRACTITIONER

## 2018-02-09 PROCEDURE — 85027 COMPLETE CBC AUTOMATED: CPT | Performed by: NURSE PRACTITIONER

## 2018-02-09 PROCEDURE — 74011000258 HC RX REV CODE- 258: Performed by: NURSE PRACTITIONER

## 2018-02-09 PROCEDURE — 97530 THERAPEUTIC ACTIVITIES: CPT

## 2018-02-09 PROCEDURE — 74011636637 HC RX REV CODE- 636/637: Performed by: NURSE PRACTITIONER

## 2018-02-09 PROCEDURE — 74011250637 HC RX REV CODE- 250/637: Performed by: NURSE PRACTITIONER

## 2018-02-09 PROCEDURE — 71045 X-RAY EXAM CHEST 1 VIEW: CPT

## 2018-02-09 PROCEDURE — 82962 GLUCOSE BLOOD TEST: CPT

## 2018-02-09 PROCEDURE — 65660000000 HC RM CCU STEPDOWN

## 2018-02-09 RX ORDER — FUROSEMIDE 10 MG/ML
40 INJECTION INTRAMUSCULAR; INTRAVENOUS DAILY
Status: DISCONTINUED | OUTPATIENT
Start: 2018-02-09 | End: 2018-02-11

## 2018-02-09 RX ORDER — INSULIN GLARGINE 100 [IU]/ML
60 INJECTION, SOLUTION SUBCUTANEOUS ONCE
Status: DISCONTINUED | OUTPATIENT
Start: 2018-02-09 | End: 2018-02-09

## 2018-02-09 RX ORDER — METOPROLOL TARTRATE 25 MG/1
25 TABLET, FILM COATED ORAL EVERY 12 HOURS
Status: DISCONTINUED | OUTPATIENT
Start: 2018-02-09 | End: 2018-02-10

## 2018-02-09 RX ORDER — INSULIN GLARGINE 100 [IU]/ML
1-50 INJECTION, SOLUTION SUBCUTANEOUS
Status: DISCONTINUED | OUTPATIENT
Start: 2018-02-09 | End: 2018-02-10 | Stop reason: SDUPTHER

## 2018-02-09 RX ORDER — FACIAL-BODY WIPES
10 EACH TOPICAL ONCE
Status: COMPLETED | OUTPATIENT
Start: 2018-02-09 | End: 2018-02-09

## 2018-02-09 RX ORDER — DEXTROSE 50 % IN WATER (D50W) INTRAVENOUS SYRINGE
12.5-25 AS NEEDED
Status: DISCONTINUED | OUTPATIENT
Start: 2018-02-09 | End: 2018-02-14 | Stop reason: HOSPADM

## 2018-02-09 RX ORDER — MAGNESIUM SULFATE 100 %
4 CRYSTALS MISCELLANEOUS AS NEEDED
Status: DISCONTINUED | OUTPATIENT
Start: 2018-02-09 | End: 2018-02-14 | Stop reason: HOSPADM

## 2018-02-09 RX ORDER — INSULIN LISPRO 100 [IU]/ML
INJECTION, SOLUTION INTRAVENOUS; SUBCUTANEOUS
Status: DISCONTINUED | OUTPATIENT
Start: 2018-02-09 | End: 2018-02-10 | Stop reason: ALTCHOICE

## 2018-02-09 RX ADMIN — Medication 400 MG: at 08:24

## 2018-02-09 RX ADMIN — BRIMONIDINE TARTRATE 1 DROP: 2 SOLUTION OPHTHALMIC at 08:25

## 2018-02-09 RX ADMIN — OXYCODONE AND ACETAMINOPHEN 2 TABLET: 5; 325 TABLET ORAL at 21:14

## 2018-02-09 RX ADMIN — MUPIROCIN: 20 OINTMENT TOPICAL at 08:25

## 2018-02-09 RX ADMIN — TIMOLOL MALEATE 1 DROP: 5 SOLUTION OPHTHALMIC at 08:26

## 2018-02-09 RX ADMIN — SODIUM CHLORIDE 39.1 UNITS/HR: 900 INJECTION, SOLUTION INTRAVENOUS at 11:08

## 2018-02-09 RX ADMIN — Medication 10 ML: at 07:00

## 2018-02-09 RX ADMIN — BRIMONIDINE TARTRATE 1 DROP: 2 SOLUTION OPHTHALMIC at 21:17

## 2018-02-09 RX ADMIN — BISACODYL 10 MG: 10 SUPPOSITORY RECTAL at 11:08

## 2018-02-09 RX ADMIN — METOPROLOL TARTRATE 25 MG: 25 TABLET ORAL at 21:14

## 2018-02-09 RX ADMIN — POLYETHYLENE GLYCOL 3350 17 G: 17 POWDER, FOR SOLUTION ORAL at 08:21

## 2018-02-09 RX ADMIN — Medication 400 MG: at 16:57

## 2018-02-09 RX ADMIN — Medication 10 ML: at 21:14

## 2018-02-09 RX ADMIN — METOPROLOL TARTRATE 12.5 MG: 25 TABLET ORAL at 08:24

## 2018-02-09 RX ADMIN — DOCUSATE SODIUM AND SENNOSIDES 1 TABLET: 8.6; 5 TABLET, FILM COATED ORAL at 08:24

## 2018-02-09 RX ADMIN — DOCUSATE SODIUM AND SENNOSIDES 1 TABLET: 8.6; 5 TABLET, FILM COATED ORAL at 16:49

## 2018-02-09 RX ADMIN — Medication 10 ML: at 16:51

## 2018-02-09 RX ADMIN — AMIODARONE HYDROCHLORIDE 400 MG: 200 TABLET ORAL at 21:14

## 2018-02-09 RX ADMIN — ROSUVASTATIN CALCIUM 40 MG: 40 TABLET ORAL at 21:14

## 2018-02-09 RX ADMIN — TIMOLOL MALEATE 1 DROP: 5 SOLUTION OPHTHALMIC at 21:18

## 2018-02-09 RX ADMIN — PANTOPRAZOLE SODIUM 40 MG: 40 TABLET, DELAYED RELEASE ORAL at 07:00

## 2018-02-09 RX ADMIN — FUROSEMIDE 40 MG: 10 INJECTION, SOLUTION INTRAVENOUS at 16:50

## 2018-02-09 RX ADMIN — INSULIN LISPRO 7 UNITS: 100 INJECTION, SOLUTION INTRAVENOUS; SUBCUTANEOUS at 07:00

## 2018-02-09 RX ADMIN — MUPIROCIN: 20 OINTMENT TOPICAL at 16:51

## 2018-02-09 RX ADMIN — AMIODARONE HYDROCHLORIDE 400 MG: 200 TABLET ORAL at 08:22

## 2018-02-09 RX ADMIN — ASPIRIN 81 MG 81 MG: 81 TABLET ORAL at 08:24

## 2018-02-09 RX ADMIN — OXYCODONE AND ACETAMINOPHEN 2 TABLET: 5; 325 TABLET ORAL at 08:33

## 2018-02-09 NOTE — PROGRESS NOTES
Cardiac Surgery Care Coordinator- Met with Marisela Bright and her son, reviewed plan of care and discussed potential discharge date and plan. .  Reinforced sternal precautions and encouraged continued use of the incentive spirometer. Ms. Delicia Washington had difficulty coordinating her efforts to properly use the incentive. Will continue to follow for educational and emotional needs.  Jamarcus Botello RN

## 2018-02-09 NOTE — PROGRESS NOTES
0000: Bedside and Verbal shift change report given to Starr Florentino RN (oncoming nurse) by Andre Avalos RN (offgoing nurse). Report included the following information SBAR, Kardex, Intake/Output, MAR, Recent Results and Cardiac Rhythm NSR.   0730: Bedside and Verbal shift change report given to Lisa Painter RN (oncoming nurse) by Starr Florentino RN (offgoing nurse). Report included the following information SBAR, Kardex, Intake/Output, MAR, Recent Results and Cardiac Rhythm NSR.

## 2018-02-09 NOTE — PROGRESS NOTES
NUTRITION  Pt seen for:     [x]          LOS        RECOMMENDATIONS:   Continue current diet order  Adjust bowel regimen PRN    SUBJECTIVE/OBJECTIVE:   Information obtained from:  Chart reviewed. S/p CABG on 2/6. Diet advanced and tolerating well. Nausea noted earlier but seems to have improved with anti-emetics PRN. Last BM 2/5/18 prior to surgery - complaining of constipation. Bowel regimen rx. Diet:  2000kcal consistent CHO, cardiac  Intake: [x]           Good   Patient Vitals for the past 100 hrs:   % Diet Eaten   02/08/18 1237 100 %   02/08/18 0911 75 %     Weight Changes:   [x]            Stable PTA - #  Last 3 Recorded Weights in this Encounter    02/07/18 0400 02/08/18 0739 02/09/18 0032   Weight: 100.1 kg (220 lb 10.9 oz) 101.2 kg (223 lb 1.7 oz) 100 kg (220 lb 7.4 oz)   1+ BLUE, 2+ BLLE present.     Nutrition Problems Identified:  []      None  [x]           Nausea/Vomiting - improving  [x]           Constipation  PLAN:   []           Obtained/adjusted food preferences/tolerances and/or snacks options   []           Dislikes supplements will try a substitution   [x]           Rescreen per screening protocol    Rescreen:  []            At Nutrition Risk           [x]            Not at Nutrition Risk, rescreen per screening protocol    Christy Millan RD

## 2018-02-09 NOTE — PROGRESS NOTES
1930: Bedside shift change report given to Amina HENDRICKS (oncoming nurse) by Karlos Schrader RN (offgoing nurse). Report included the following information SBAR, Kardex, Procedure Summary, Intake/Output, MAR and Recent Results. 2345: Pt requested blood sugar check. Pt . No insulin due at this time. Will follow up in am with scheduled insulin and note for diabetes educator. 0000: Bedside shift change report given to [address] [place] [address] Avenue (oncoming nurse) by Sheryle Cambridge RN (offgoing nurse). Report included the following information SBAR, Kardex, Intake/Output, MAR and Recent Results.

## 2018-02-09 NOTE — PROGRESS NOTES
Problem: Mobility Impaired (Adult and Pediatric)  Goal: *Acute Goals and Plan of Care (Insert Text)  Physical Therapy Goals  Initiated 2/8/2018  1. Patient will move from supine to sit and sit to supine  in bed with minimal assistance/contact guard assist within 5 days. 2.  Patient will perform sit to/from stand with supervision/set-up within 5 days. 3.  Patient will ambulate 150 feet with least restrictive assistive device and supervision/set-up within 5 days. 4.  Patient will ascend/descend 5 stairs with handrail(s) per home needs with contact guard assist within 5 days. 5.  Patient will perform cardiac exercises per protocol with independence within 5 days. 6.  Patient will verbally and functionally recall 3/3 sternal precautions within 5 days. physical Therapy TREATMENT  Patient: Rylan Alaniz (73 y.o. female)  Date: 2/9/2018  Diagnosis: Chest pain  Unstable angina (HCC)  unknown Chest pain  Procedure(s) (LRB):  CORONARY ARTERY BYPASS GRAFTING X 4 WITH LIMA, LESVGH, ECC, JUANJO AND EPIAORTIC U/S BY DR Ana Gillis (N/A) 3 Days Post-Op  Precautions: Fall, Sternal  Chart, physical therapy assessment, plan of care and goals were reviewed. ASSESSMENT:  Pt seen following RN clearance this morning. Pt continues to report dizziness when up with staff this morning. Orthostatics assessed and found to be + with continued drop in standing despite marching in place. Pt currently requires maxA x2 for getting into bed and modA x2 for balance while performing dynamic standing tasks. Pt is planned for return home with HHPT and family support once medically stable, hopefully she will continue to progress for this goal (as of now, pt acutely appropriate for rehab). Pt back to bed with mother present and RN/OT notified of session events.    Progression toward goals:  []      Improving appropriately and progressing toward goals  [x]      Improving slowly and progressing toward goals  []      Not making progress toward goals and plan of care will be adjusted     PLAN:  Patient continues to benefit from skilled intervention to address the above impairments. Continue treatment per established plan of care. Discharge Recommendations:  Home Health with family support  Further Equipment Recommendations for Discharge:  TBD     SUBJECTIVE:   Patient reported still dizzy via DTR translation. .. The patient stated 3/3 sternal precautions. Reviewed all 3 with patient. OBJECTIVE DATA SUMMARY:   Patient mobilized on continuous portable monitor/telemetry. Critical Behavior:  Neurologic State: Alert  Orientation Level: Oriented X4  Cognition: Appropriate for age attention/concentration, Appropriate safety awareness, Appropriate decision making, Follows commands, Recognition of people/places  Safety/Judgement: Fall prevention  Functional Mobility Training:  Bed Mobility:  Log Rolling: Moderate assistance; Additional time;Assist x2  Supine to Sit: Moderate assistance; Additional time;Assist x2  Sit to Supine: Maximum assistance; Additional time;Assist x2  Scooting: Maximum assistance; Additional time (pt unable to scoot back on EOB but can scoot fwd off)        Transfers:  Sit to Stand: Minimum assistance; Additional time  Stand to Sit: Minimum assistance; Additional time        Bed to Chair:  (NT)                    Balance:  Sitting: Impaired; With support  Sitting - Static: Good (unsupported)  Sitting - Dynamic: Fair (occasional)  Standing: Impaired; With support  Standing - Static: Poor  Standing - Dynamic : Poor  Ambulation/Gait Training:   Marching in place completed x1 minute; back to bed 2* symptoms; modAx2 for balance deficits    Pain:  Pain Scale 1: Numeric (0 - 10)  Pain Intensity 1: 0  Pain Location 1: Incisional  Pain Orientation 1: Anterior  Pain Description 1: Aching  Pain Intervention(s) 1: Medication (see MAR)  Activity Tolerance:   Limited by hypotension and complaints associated  Please refer to the flowsheet for vital signs taken during this treatment.   After treatment:   [] Patient left in no apparent distress sitting up in chair  [x] Patient left in no apparent distress in bed  [x] Call bell left within reach  [x] Nursing notified  [] Caregiver present  [] Bed alarm activated    COMMUNICATION/COLLABORATION:   The patients plan of care was discussed with: Registered Nurse    Ghassan Reyes   Time Calculation: 22 mins

## 2018-02-09 NOTE — PROGRESS NOTES
Justus Avila with [facility] made aware of added orders for home health PT/OT and spoke with Loulou Dotson [phone] to make her aware of plan for home with home health.  MICHI Felix

## 2018-02-09 NOTE — PROGRESS NOTES
Problem: Self Care Deficits Care Plan (Adult)  Goal: *Acute Goals and Plan of Care (Insert Text)  Occupational Therapy Goals  Initiated 2/8/2018  1. Patient will perform ADLs standing 5 mins without fatigue or LOB with supervision/set-up within 7 day(s). 2.  Patient will perform lower body ADLs with supervision/set-up within 7 day(s). 3.  Patient will perform bathing with supervision/set-up within 7 day(s). 4.  Patient will perform toilet transfers with supervision/set-up within 7 day(s). 5.  Patient will perform all aspects of toileting with supervision/set-up within 7 day(s). 6.  Patient will participate in cardiac/sternal upper extremity therapeutic exercise/activities to increase independence with ADLs with supervision/set-up for 5 minutes within 7 day(s). Occupational Therapy TREATMENT  Patient: Shan Owens (73 y.o. female)  Date: 2/9/2018  Diagnosis: Chest pain  Unstable angina (HCC)  unknown Chest pain  Procedure(s) (LRB):  CORONARY ARTERY BYPASS GRAFTING X 4 WITH LIMA, LESVGH, ECC, JUANJO AND EPIAORTIC U/S BY DR Tory Tan (N/A) 3 Days Post-Op  Precautions: Fall, Sternal  Chart, occupational therapy assessment, plan of care, and goals were reviewed. ASSESSMENT:  Based on the objective data described below, the patient presents with impaired cardiopulmonary endurance, sternal precautions, pain at sternum, impaired functional mobility, and impaired ADL independence s/p CABG x4 POD3. Patient received supine in bed, daughter present for translation (patient speaks [place], not [place]). Patient alert, Ox4, and agreeable to therapy. Patient able to recall 3/3 sternal precautions. Supine BP at rest 85/57 (HR 75). After leg pumps in bed, BP 89/33 (HR 75). Nursing notified of low BP, nurse advised not no mobilize patient but gave clearance for in bed exercises. Patient performed shoulder flexion, shoulder shrugs, and trunk side bending supine in bed, 10 reps each exercise.   After exercises, BP 83/42 (HR 73). All BP readings taken with R arm. Nursing present and aware. Patient indep PTA and lives with daughter, who is supportive and available 24/7. Currently recommend rehab for d/c given low BP and limited participation in activity. However, patient may progress to home health if activity tolerance improves. Pending BP, recommend with nursing patient to complete as able in order to maintain strength, endurance and independence: ADLs with supervision/setup, OOB to chair 3x/day and mobilizing to the bathroom for toileting. Thank you for your assistance. Progression toward goals:  []       Improving appropriately and progressing toward goals  [x]       Improving slowly and progressing toward goals  []       Not making progress toward goals and plan of care will be adjusted     PLAN:  Patient continues to benefit from skilled intervention to address the above impairments. Continue treatment per established plan of care. Discharge Recommendations:  Rehab vs. Home health  Further Equipment Recommendations for Discharge:  TBD     SUBJECTIVE:   Patient stated I feel weak and tired.  (per daughter's translation)    The patient stated 3/3 sternal precautions. Reviewed all 3 with patient. OBJECTIVE DATA SUMMARY:   Cognitive/Behavioral Status:  Neurologic State: Alert  Orientation Level: Oriented X4  Cognition: Follows commands  Perception: Appears intact  Perseveration: No perseveration noted  Safety/Judgement: Awareness of environment; Insight into deficits    Functional Mobility and Transfers for ADLs:  Bed Mobility:  Rolling: Moderate assistance; Additional time;Assist x2  Supine to Sit: Moderate assistance; Additional time;Assist x2  Sit to Supine: Maximum assistance; Additional time;Assist x2  Scooting: Maximum assistance; Additional time (pt unable to scoot back on EOB but can scoot fwd off)    Transfers:  Sit to Stand: Minimum assistance; Additional time       Balance:  Sitting: Impaired; With support  Sitting - Static: Good (unsupported)  Sitting - Dynamic: Fair (occasional)  Standing: Impaired; With support  Standing - Static: Poor  Standing - Dynamic : Poor    ADL Intervention:                 Cognitive Retraining  Safety/Judgement: Awareness of environment; Insight into deficits    Patient instructed no asymmetrical reaching over head to ensure B UEs when shoulders >90* i.e. reaching in cabinets and dressing. Instruction on upper body dressing techniques of over head, then arms through to decrease pain and unilateral shoulder flexion >90*. Instruction on the benefits of utilizing B UEs during functional tasks i.e. opening the fridge, stepping into the tub. Therapeutic Exercises:   Patient instructed on the benefits and demonstrated cardiac exercises while supine with Supervision. Instructed and indicated understanding on how to progress reps, sets against gravity, working up to 5 lbs, standing and so on based on surgeon clearance for more weight in prep for basic and instrumental ADLs. Instruction on the use of household items in place of weights as needed. CARDIAC   EXERCISE    Sets    Reps    Active  Active Assist    Passive  Self ROM    Comments    Shoulder flexion  1  10  [x]                            []                             []                             []                                Scapular elevation  1  10  [x]                             []                              []                             []                                Trunk sidebending  1  10 [x]                             []                              []                             []                                          Pain:  Patient does not report pain while supine in bed    Activity Tolerance:   BP supine at rest: 85/57 (HR 75)  BP supine with leg pumps: 89/33 (HR 75)  BP supine after UE exercises: 83/42 (HR 73)  All BP readings taken with R arm. Nursing notified.     Please refer to the flowsheet for vital signs taken during this treatment.   After treatment:   [] Patient left in no apparent distress sitting up in chair  [x] Patient left in no apparent distress in bed  [x] Call bell left within reach  [x] Nursing notified  [x] Caregiver present  [] Bed alarm activated    COMMUNICATION/COLLABORATION:   The patients plan of care was discussed with: Physical Therapist and Registered Nurse    Chery Petersen OT  Time Calculation: 23 mins

## 2018-02-09 NOTE — DIABETES MGMT
DTC Cardiac Surgery Education Note    Recommendations/ Comments: Patient was placed back on the insulin drip due to significant hyperglycemia despite basal insulin given. If appropriate, please consider transitioning off insulin drip at dinner time with 60 units of Lantus. Due to inconsistent po intake, please consider adding Januvia 50 mg daily. If po intake improves, may want to consider pre-meal Humalog, 2 units tid ac to start and titrate as needed. Current hospital DM medication: Currently on insulin drip x 4 hours, rate = 8.9 units/hr. Chart reviewed and initial evaluation complete on Karis Zabala. Patient is a 65 y.o. female with known DM on the following at home:                        Metformin 500 mg (2) tabs at supper                        Januvia 100 mg once a day                        Humalog Mix 75/25, 33 units BID, but sometimes takes up to 38 units per daughter    A1c:   Lab Results   Component Value Date/Time    Hemoglobin A1c 8.0 (H) 02/05/2018 05:00 PM        BG monitoring at home 2 times per day. Patient reports BG levels at home trend: fluctuating a bit. Spoke with patient's daughter regarding home management. Patient lives with her and states she avoids sugary drinks, takes insulin and monitors at least twice a day. Reviewed target blood sugars and when to call MD.    Assessed and instructed patient on the following:   · risk of sternal wound infection/ delayed healing   · interpretation of lab results, blood sugar goals, complications of diabetes mellitus, hypoglycemia prevention and treatment, insulin adjustments, illness management and nutrition.     Provided patient with the following: [x]         Survival skills education materials               [x]         BG guidelines for post-op patients                  [x]         Outpatient DTC contact number    Recent Glucose Results: Lab Results   Component Value Date/Time     (H) 02/09/2018 05:18 AM    GLUCPOC 113 (H) 02/09/2018 02:23 PM    GLUCPOC 171 (H) 02/09/2018 01:06 PM    GLUCPOC 268 (H) 02/09/2018 11:57 AM        Lab Results   Component Value Date/Time    Creatinine 1.39 (H) 02/09/2018 05:18 AM     Estimated Creatinine Clearance: 39.8 mL/min (based on Cr of 1.39). Active Orders   Diet    DIET DIABETIC WITH OPTIONS Consistent Carb 2000kcal; Regular; AHA-LOW-CHOL FAT        PO intake: Patient Vitals for the past 72 hrs:   % Diet Eaten   02/09/18 0821 100 %   02/08/18 1237 100 %   02/08/18 0911 75 %           Will continue to follow as needed. Thank you.   Elvira Galicia, MS, RN, CDE

## 2018-02-09 NOTE — CARDIO/PULMONARY
Cardiac Wellness: Red reminder bracelet brought to pt, she is unavailable with the diabetic educator. Will follow up.

## 2018-02-09 NOTE — PROGRESS NOTES
CSS Progress Note    Admit Date: 2018  POD:  3 Day Post-Op    Procedure:  Procedure(s):  CORONARY ARTERY BYPASS GRAFTING X 4 WITH LIMA, LESVGH, ECC, JUANJO AND EPIAORTIC U/S BY DR Ana Gillis        Subjective:   Pt seen with Dr. Bipin Kay. Tmax 98.7, 1L NC. NSR. In bed, complaining of constipation. Objective:   Vitals:  Blood pressure (!) 82/58, pulse 74, temperature 98.6 °F (37 °C), resp. rate 16, height 5' 6\" (1.676 m), weight 220 lb 7.4 oz (100 kg), SpO2 96 %. Temp (24hrs), Av.5 °F (36.9 °C), Min:98.4 °F (36.9 °C), Max:98.7 °F (37.1 °C)    EKG/Rhythm:  NSR 67    CT Output: 460 ml    Oxygen Therapy:  Oxygen Therapy  O2 Sat (%): 96 % (18 08)  Pulse via Oximetry: 67 beats per minute (18 1100)  O2 Device: Room air (18 08)  O2 Flow Rate (L/min): 1 l/min (18 1633)  FIO2 (%): 50 % (18 2256)    CXR: IMPRESSION:  1. Mild perihilar interstitial edema  2. Decreasing atelectasis at the left lung base. Admission Weight: Last Weight   Weight: 220 lb (99.8 kg) Weight: 220 lb 7.4 oz (100 kg)     Intake / Output / Drain:  Current Shift:  0701 -  1900  In: 240 [P.O.:240]  Out: 50 [Drains:50]  Last 24 hrs.:     Intake/Output Summary (Last 24 hours) at 18 1024  Last data filed at 18 2118   Gross per 24 hour   Intake           806.03 ml   Output              790 ml   Net            16.03 ml       EXAM:  General:  In bed, complaining of constipation. Lungs:   Clear upper, Dimished bases to auscultation bilaterally. Incision:  Dsg cdi   Heart:  Regular rate and rhythm, S1, S2 normal, no murmur, click, rub or gallop. Abdomen:   Soft, non-tender. Bowel sounds active. No masses,  No organomegaly. Extremities:  1+ edema. PPP. Neurologic:  Gross motor and sensory apparatus intact.      Labs:   Recent Labs      18   0648  18   0518   18   1730   WBC   --   12.4*   < > 9. 6   HGB   --   7.7*   < >  8.3*   HCT   --   23.2*   < >  25.1*   PLT   --   103*   < >  89*   NA   --   136   < >  144   K   --   4.6   < >  3.5   BUN   --   42*   < >  22*   CREA   --   1.39*   < >  1.01   GLU   --   286*   < >  124*   GLUCPOC  341*   --    < >   --    INR   --    --    --   1.3*    < > = values in this interval not displayed. Assessment:     Principal Problem:    Chest pain (2018)    Active Problems:    Unstable angina ([address] (2/3/2018)      S/P CABG x 4 (2018)      Overview: On Pump CORONARY ARTERY BYPASS GRAFTING X 4 WITH NAIR to LAD, Sequential       LSVG OM1 and OM2, LSVG to RCA       [facility]         Plan/Recommendations/Medical Decision Makin. CAD s/p CABG: On ASA, statin, BB today. 2. Atelectasis: Wean O2 for sats >92%, encourage I/S  3. Nausea: improved, tolerating regular diet. Cont PRN zofran and compazine. 4. Post op anemia s/t acute blood loss: Hgb 7.7, monitor CT output and H&H  5. Thromocytopenia: Plt 103, stable, monitor  6. LUCIA: Cr 1.39, monitor U/O, encourage PO intake, give IV lasix. 7. IDDM: BS 350s, resume insulin gtt. Monitor BS, discussed with DTC. 8. Hx HTN: cont BB, monitor. 9. Hx glaucoma: cont eye drops  10. HLD: Cont statin  11. Obesity: BMI 34, encourage heart healthy diet and weight loss when appropriate  12. Dispo: PT/OT, cont current care, leave in CT today monitor output.       Signed By: Angelique Hamlin NP

## 2018-02-10 ENCOUNTER — APPOINTMENT (OUTPATIENT)
Dept: GENERAL RADIOLOGY | Age: 78
DRG: 234 | End: 2018-02-10
Attending: NURSE PRACTITIONER
Payer: MEDICARE

## 2018-02-10 LAB
ADMINISTERED INITIALS, ADMINIT: NORMAL
ANION GAP SERPL CALC-SCNC: 7 MMOL/L (ref 5–15)
BUN SERPL-MCNC: 51 MG/DL (ref 6–20)
BUN/CREAT SERPL: 35 (ref 12–20)
CALCIUM SERPL-MCNC: 8.3 MG/DL (ref 8.5–10.1)
CHLORIDE SERPL-SCNC: 105 MMOL/L (ref 97–108)
CO2 SERPL-SCNC: 28 MMOL/L (ref 21–32)
CREAT SERPL-MCNC: 1.44 MG/DL (ref 0.55–1.02)
D50 ADMINISTERED, D50ADM: 0 ML
D50 ADMINISTERED, D50ADM: 9 ML
D50 ORDER, D50ORD: 0 ML
D50 ORDER, D50ORD: 9 ML
ERYTHROCYTE [DISTWIDTH] IN BLOOD BY AUTOMATED COUNT: 13.3 % (ref 11.5–14.5)
GLSCOM COMMENTS: NORMAL
GLUCOSE BLD STRIP.AUTO-MCNC: 105 MG/DL (ref 65–100)
GLUCOSE BLD STRIP.AUTO-MCNC: 109 MG/DL (ref 65–100)
GLUCOSE BLD STRIP.AUTO-MCNC: 110 MG/DL (ref 65–100)
GLUCOSE BLD STRIP.AUTO-MCNC: 114 MG/DL (ref 65–100)
GLUCOSE BLD STRIP.AUTO-MCNC: 123 MG/DL (ref 65–100)
GLUCOSE BLD STRIP.AUTO-MCNC: 125 MG/DL (ref 65–100)
GLUCOSE BLD STRIP.AUTO-MCNC: 129 MG/DL (ref 65–100)
GLUCOSE BLD STRIP.AUTO-MCNC: 142 MG/DL (ref 65–100)
GLUCOSE BLD STRIP.AUTO-MCNC: 152 MG/DL (ref 65–100)
GLUCOSE BLD STRIP.AUTO-MCNC: 169 MG/DL (ref 65–100)
GLUCOSE BLD STRIP.AUTO-MCNC: 178 MG/DL (ref 65–100)
GLUCOSE BLD STRIP.AUTO-MCNC: 189 MG/DL (ref 65–100)
GLUCOSE BLD STRIP.AUTO-MCNC: 78 MG/DL (ref 65–100)
GLUCOSE BLD STRIP.AUTO-MCNC: 90 MG/DL (ref 65–100)
GLUCOSE BLD STRIP.AUTO-MCNC: 93 MG/DL (ref 65–100)
GLUCOSE BLD STRIP.AUTO-MCNC: 93 MG/DL (ref 65–100)
GLUCOSE SERPL-MCNC: 158 MG/DL (ref 65–100)
GLUCOSE, GLC: 109 MG/DL
GLUCOSE, GLC: 110 MG/DL
GLUCOSE, GLC: 114 MG/DL
GLUCOSE, GLC: 123 MG/DL
GLUCOSE, GLC: 125 MG/DL
GLUCOSE, GLC: 129 MG/DL
GLUCOSE, GLC: 142 MG/DL
GLUCOSE, GLC: 152 MG/DL
GLUCOSE, GLC: 169 MG/DL
GLUCOSE, GLC: 178 MG/DL
GLUCOSE, GLC: 189 MG/DL
GLUCOSE, GLC: 78 MG/DL
GLUCOSE, GLC: 90 MG/DL
GLUCOSE, GLC: 93 MG/DL
GLUCOSE, GLC: 93 MG/DL
HCT VFR BLD AUTO: 23.3 % (ref 35–47)
HGB BLD-MCNC: 7.8 G/DL (ref 11.5–16)
HIGH TARGET, HITG: 130 MG/DL
INSULIN ADMINSTERED, INSADM: 0 UNITS/HOUR
INSULIN ADMINSTERED, INSADM: 13.8 UNITS/HOUR
INSULIN ADMINSTERED, INSADM: 14.9 UNITS/HOUR
INSULIN ADMINSTERED, INSADM: 16.4 UNITS/HOUR
INSULIN ADMINSTERED, INSADM: 2.3 UNITS/HOUR
INSULIN ADMINSTERED, INSADM: 2.8 UNITS/HOUR
INSULIN ADMINSTERED, INSADM: 3.3 UNITS/HOUR
INSULIN ADMINSTERED, INSADM: 3.3 UNITS/HOUR
INSULIN ADMINSTERED, INSADM: 4.3 UNITS/HOUR
INSULIN ADMINSTERED, INSADM: 5.2 UNITS/HOUR
INSULIN ADMINSTERED, INSADM: 5.4 UNITS/HOUR
INSULIN ADMINSTERED, INSADM: 7 UNITS/HOUR
INSULIN ADMINSTERED, INSADM: 8.9 UNITS/HOUR
INSULIN ADMINSTERED, INSADM: 9 UNITS/HOUR
INSULIN ADMINSTERED, INSADM: 9.5 UNITS/HOUR
INSULIN ORDER, INSORD: 0 UNITS/HOUR
INSULIN ORDER, INSORD: 13.8 UNITS/HOUR
INSULIN ORDER, INSORD: 14.9 UNITS/HOUR
INSULIN ORDER, INSORD: 16.4 UNITS/HOUR
INSULIN ORDER, INSORD: 2.3 UNITS/HOUR
INSULIN ORDER, INSORD: 2.8 UNITS/HOUR
INSULIN ORDER, INSORD: 3.3 UNITS/HOUR
INSULIN ORDER, INSORD: 3.3 UNITS/HOUR
INSULIN ORDER, INSORD: 4.3 UNITS/HOUR
INSULIN ORDER, INSORD: 5.2 UNITS/HOUR
INSULIN ORDER, INSORD: 5.4 UNITS/HOUR
INSULIN ORDER, INSORD: 7 UNITS/HOUR
INSULIN ORDER, INSORD: 8.9 UNITS/HOUR
INSULIN ORDER, INSORD: 9 UNITS/HOUR
INSULIN ORDER, INSORD: 9.5 UNITS/HOUR
LOW TARGET, LOT: 95 MG/DL
MAGNESIUM SERPL-MCNC: 2.4 MG/DL (ref 1.6–2.4)
MCH RBC QN AUTO: 31.8 PG (ref 26–34)
MCHC RBC AUTO-ENTMCNC: 33.5 G/DL (ref 30–36.5)
MCV RBC AUTO: 95.1 FL (ref 80–99)
MINUTES UNTIL NEXT BG, NBG: 15 MIN
MINUTES UNTIL NEXT BG, NBG: 60 MIN
MULTIPLIER, MUL: 0.07
MULTIPLIER, MUL: 0.09
MULTIPLIER, MUL: 0.1
MULTIPLIER, MUL: 0.11
MULTIPLIER, MUL: 0.12
MULTIPLIER, MUL: 0.13
MULTIPLIER, MUL: 0.14
MULTIPLIER, MUL: 0.14
NRBC # BLD: 0.02 K/UL (ref 0–0.01)
NRBC BLD-RTO: 0.2 PER 100 WBC
ORDER INITIALS, ORDINIT: NORMAL
PLATELET # BLD AUTO: 131 K/UL (ref 150–400)
PMV BLD AUTO: 12.9 FL (ref 8.9–12.9)
POTASSIUM SERPL-SCNC: 3.9 MMOL/L (ref 3.5–5.1)
RBC # BLD AUTO: 2.45 M/UL (ref 3.8–5.2)
SERVICE CMNT-IMP: ABNORMAL
SERVICE CMNT-IMP: NORMAL
SODIUM SERPL-SCNC: 140 MMOL/L (ref 136–145)
WBC # BLD AUTO: 9.3 K/UL (ref 3.6–11)

## 2018-02-10 PROCEDURE — 74011250637 HC RX REV CODE- 250/637: Performed by: NURSE PRACTITIONER

## 2018-02-10 PROCEDURE — 74011636637 HC RX REV CODE- 636/637: Performed by: NURSE PRACTITIONER

## 2018-02-10 PROCEDURE — 85027 COMPLETE CBC AUTOMATED: CPT | Performed by: NURSE PRACTITIONER

## 2018-02-10 PROCEDURE — 74011250636 HC RX REV CODE- 250/636: Performed by: NURSE PRACTITIONER

## 2018-02-10 PROCEDURE — 83735 ASSAY OF MAGNESIUM: CPT | Performed by: NURSE PRACTITIONER

## 2018-02-10 PROCEDURE — 74011000258 HC RX REV CODE- 258: Performed by: NURSE PRACTITIONER

## 2018-02-10 PROCEDURE — 36415 COLL VENOUS BLD VENIPUNCTURE: CPT | Performed by: NURSE PRACTITIONER

## 2018-02-10 PROCEDURE — 97116 GAIT TRAINING THERAPY: CPT

## 2018-02-10 PROCEDURE — 82962 GLUCOSE BLOOD TEST: CPT

## 2018-02-10 PROCEDURE — 80048 BASIC METABOLIC PNL TOTAL CA: CPT | Performed by: NURSE PRACTITIONER

## 2018-02-10 PROCEDURE — 65660000000 HC RM CCU STEPDOWN

## 2018-02-10 PROCEDURE — 71045 X-RAY EXAM CHEST 1 VIEW: CPT

## 2018-02-10 RX ORDER — INSULIN GLARGINE 100 [IU]/ML
60 INJECTION, SOLUTION SUBCUTANEOUS ONCE
Status: COMPLETED | OUTPATIENT
Start: 2018-02-10 | End: 2018-02-10

## 2018-02-10 RX ORDER — METOPROLOL TARTRATE 50 MG/1
50 TABLET ORAL EVERY 12 HOURS
Status: DISCONTINUED | OUTPATIENT
Start: 2018-02-10 | End: 2018-02-12

## 2018-02-10 RX ORDER — HYDRALAZINE HYDROCHLORIDE 20 MG/ML
20 INJECTION INTRAMUSCULAR; INTRAVENOUS
Status: DISCONTINUED | OUTPATIENT
Start: 2018-02-10 | End: 2018-02-14 | Stop reason: HOSPADM

## 2018-02-10 RX ADMIN — FUROSEMIDE 40 MG: 10 INJECTION, SOLUTION INTRAVENOUS at 09:06

## 2018-02-10 RX ADMIN — BRIMONIDINE TARTRATE 1 DROP: 2 SOLUTION OPHTHALMIC at 21:37

## 2018-02-10 RX ADMIN — Medication 10 ML: at 21:38

## 2018-02-10 RX ADMIN — ASPIRIN 81 MG 81 MG: 81 TABLET ORAL at 09:06

## 2018-02-10 RX ADMIN — TIMOLOL MALEATE 1 DROP: 5 SOLUTION OPHTHALMIC at 09:14

## 2018-02-10 RX ADMIN — METOPROLOL TARTRATE 50 MG: 50 TABLET ORAL at 09:05

## 2018-02-10 RX ADMIN — ROSUVASTATIN CALCIUM 40 MG: 40 TABLET ORAL at 21:37

## 2018-02-10 RX ADMIN — SODIUM CHLORIDE 9.5 UNITS/HR: 900 INJECTION, SOLUTION INTRAVENOUS at 05:36

## 2018-02-10 RX ADMIN — MUPIROCIN: 20 OINTMENT TOPICAL at 17:39

## 2018-02-10 RX ADMIN — OXYCODONE AND ACETAMINOPHEN 2 TABLET: 5; 325 TABLET ORAL at 09:12

## 2018-02-10 RX ADMIN — Medication 10 ML: at 14:15

## 2018-02-10 RX ADMIN — DOCUSATE SODIUM AND SENNOSIDES 1 TABLET: 8.6; 5 TABLET, FILM COATED ORAL at 09:06

## 2018-02-10 RX ADMIN — HYDRALAZINE HYDROCHLORIDE 10 MG: 20 INJECTION INTRAMUSCULAR; INTRAVENOUS at 03:38

## 2018-02-10 RX ADMIN — BRIMONIDINE TARTRATE 1 DROP: 2 SOLUTION OPHTHALMIC at 09:15

## 2018-02-10 RX ADMIN — MUPIROCIN: 20 OINTMENT TOPICAL at 09:15

## 2018-02-10 RX ADMIN — METOPROLOL TARTRATE 50 MG: 50 TABLET ORAL at 21:37

## 2018-02-10 RX ADMIN — Medication 400 MG: at 09:06

## 2018-02-10 RX ADMIN — POLYETHYLENE GLYCOL 3350 17 G: 17 POWDER, FOR SOLUTION ORAL at 09:07

## 2018-02-10 RX ADMIN — Medication 10 ML: at 09:06

## 2018-02-10 RX ADMIN — AMIODARONE HYDROCHLORIDE 400 MG: 200 TABLET ORAL at 09:05

## 2018-02-10 RX ADMIN — OXYCODONE AND ACETAMINOPHEN 2 TABLET: 5; 325 TABLET ORAL at 17:38

## 2018-02-10 RX ADMIN — TIMOLOL MALEATE 1 DROP: 5 SOLUTION OPHTHALMIC at 21:37

## 2018-02-10 RX ADMIN — INSULIN GLARGINE 60 UNITS: 100 INJECTION, SOLUTION SUBCUTANEOUS at 16:13

## 2018-02-10 RX ADMIN — SODIUM CHLORIDE 8.9 UNITS/HR: 900 INJECTION, SOLUTION INTRAVENOUS at 10:33

## 2018-02-10 RX ADMIN — AMIODARONE HYDROCHLORIDE 400 MG: 200 TABLET ORAL at 21:37

## 2018-02-10 RX ADMIN — DOCUSATE SODIUM AND SENNOSIDES 1 TABLET: 8.6; 5 TABLET, FILM COATED ORAL at 17:35

## 2018-02-10 RX ADMIN — PANTOPRAZOLE SODIUM 40 MG: 40 TABLET, DELAYED RELEASE ORAL at 07:29

## 2018-02-10 RX ADMIN — Medication 10 ML: at 06:00

## 2018-02-10 RX ADMIN — Medication 400 MG: at 17:35

## 2018-02-10 RX ADMIN — OXYCODONE AND ACETAMINOPHEN 2 TABLET: 5; 325 TABLET ORAL at 23:54

## 2018-02-10 NOTE — PROGRESS NOTES
CSS Progress Note    Admit Date: 2018  POD:  3 Day Post-Op    Procedure:  Procedure(s):  CORONARY ARTERY BYPASS GRAFTING X 4 WITH LIMA, LESVCHUNG, ECC, JUANJO AND EPIAORTIC U/S BY DR Blanquita Mas        Subjective:   Pt seen with Dr. Zana Morales. Tmax 98.9, RA. NSR. In bed, complaining of pain from CT site. Objective:   Vitals:  Blood pressure (!) 153/38, pulse 77, temperature 98 °F (36.7 °C), resp. rate 18, height 5' 6\" (1.676 m), weight 222 lb 14.2 oz (101.1 kg), SpO2 97 %. Temp (24hrs), Av.4 °F (36.9 °C), Min:98 °F (36.7 °C), Max:98.9 °F (37.2 °C)    EKG/Rhythm:  NSR 67    CT Output: 230ml    Oxygen Therapy:  Oxygen Therapy  O2 Sat (%): 97 % (02/10/18 0846)  Pulse via Oximetry: 67 beats per minute (18 1100)  O2 Device: Room air (02/10/18 0316)  O2 Flow Rate (L/min): 1 l/min (18 1633)  FIO2 (%): 50 % (18 2256)    CXR: IMPRESSION:     Decreased diffuse interstitial opacities, left basilar opacity, and small  pleural effusions. Admission Weight: Last Weight   Weight: 220 lb (99.8 kg) Weight: 222 lb 14.2 oz (101.1 kg)     Intake / Output / Drain:  Current Shift:    Last 24 hrs.:     Intake/Output Summary (Last 24 hours) at 02/10/18 1118  Last data filed at 02/10/18 2447   Gross per 24 hour   Intake           795.47 ml   Output             1580 ml   Net          -784.53 ml       EXAM:  General:  In bed, complaining of constipation. Lungs:   Clear upper, Dimished bases to auscultation bilaterally. Incision:  Dsg cdi   Heart:  Regular rate and rhythm, S1, S2 normal, no murmur, click, rub or gallop. Abdomen:   Soft, non-tender. Bowel sounds active. No masses,  No organomegaly. Extremities:  1+ edema. PPP. Neurologic:  Gross motor and sensory apparatus intact.      Labs:   Recent Labs      02/10/18   1032   02/10/18   0347   WBC   --    --   9.3   HGB   --    --   7.8*   HCT   --    --   23.3*   PLT   --    -- 131*   NA   --    --   140   K   --    --   3.9   BUN   --    --   51*   CREA   --    --   1.44*   GLU   --    --   158*   GLUCPOC  142*   < >   --     < > = values in this interval not displayed. Assessment:     Principal Problem:    Chest pain (2018)    Active Problems:    Unstable angina ([address] (2/3/2018)      S/P CABG x 4 (2018)      Overview: On Pump CORONARY ARTERY BYPASS GRAFTING X 4 WITH NAIR to LAD, Sequential       LSVG OM1 and OM2, LSVG to RCA       [facility]         Plan/Recommendations/Medical Decision Makin. CAD s/p CABG: On ASA, statin, increase BB today. 2. Atelectasis: Wean O2 for sats >92%, encourage I/S  3. Nausea: improved, tolerating regular diet. Cont PRN zofran and compazine. 4. Post op anemia s/t acute blood loss: Hgb 7.8, monitor H&H  5. Thromocytopenia: improved, Plt 131, monitor  6. LUCIA: Cr 1.44, monitor U/O, encourage PO intake, cont IV lasix  7. IDDM: give lantus at dinner and stop insulin gtt 2 hours after lantus. Monitor BS, discussed with DTC. 8. Hx HTN: increase BB, PRN hydralazine, monitor. 9. Hx glaucoma: cont eye drops  10. HLD: Cont statin  11. Obesity: BMI 34, encourage heart healthy diet and weight loss when appropriate  12. Dispo: PT/OT, cont current care, DC CT today.       Signed By: Anne Marie Santillan NP

## 2018-02-10 NOTE — PROGRESS NOTES
7430 - blood glucose is 78, insulin gtt held x 15 minutes per [employer], patient is eating breakfast with daughter. 0827 - blood glucose 114, insulin gtt resumed at 3.3 per glucostabilizer. PRAVIN Umanzor 53 NP at the bedside, chest tube removed, patient on bedrest per hospital policy.

## 2018-02-10 NOTE — CARDIO/PULMONARY
Cardiac Rehab: CABG education folder at bedside. Met with Karis Zabaal and her supportive daughter, Alfonzo Young, to review cardiac surgery post discharge instructions and to discuss participation in the Cardiac Rehab Program.     Educated using teach back method. Reviewed and reinforced the use of bear for sternal support, daily weight and temperature monitoring, showering restrictions, signs and symptoms of infection at surgery sites, daily walking and arm exercises, and use of incentive spirometer. Bibiana Ramos was napping on and off post CT removal today and so did not demonstrate use of incentive spirometer. Discussed Heart Healthy/Low Sodium (2000 mg.) diet. Red reminder bracelet is on right wrist.  Discussed purpose of bracelet, duration of wear, and when to call surgeons office. Discussed Cardiac Rehab Program format, benefits, and encouraged participation when ready. Alfonzo Young says she will bring her mother to her appointments.     Will continue to follow for educational needs and enrollment in the Cardiac Rehab Program. Eduarda Neil RN

## 2018-02-10 NOTE — PROGRESS NOTES
0730:Bedside shift change report given to Alverto Stern (oncoming nurse) by Tahira Cleary (offgoing nurse). Report included the following information SBAR, Kardex, Procedure Summary, Intake/Output, MAR, Recent Results, Med Rec Status and Cardiac Rhythm NSR   1419: Low SBP, Jessica NP notified, per NP BPs on the right arm are not accurate, take BPs on the Left arm    1900:  BM today   RA   Prn Percocet for pain   Appetite Fair, eating at least 50%   CT drainage container was changed        1930: Bedside shift change report given to [address] (oncoming nurse) by Alverto Stern (offgoing nurse). Report included the following information SBAR, Kardex, Procedure Summary, Intake/Output, MAR, Med Rec Status, Cardiac Rhythm NSR and Alarm Parameters .

## 2018-02-10 NOTE — PROGRESS NOTES
Problem: Mobility Impaired (Adult and Pediatric)  Goal: *Acute Goals and Plan of Care (Insert Text)  Physical Therapy Goals  Initiated 2/8/2018  1. Patient will move from supine to sit and sit to supine  in bed with minimal assistance/contact guard assist within 5 days. 2.  Patient will perform sit to/from stand with supervision/set-up within 5 days. 3.  Patient will ambulate 150 feet with least restrictive assistive device and supervision/set-up within 5 days. 4.  Patient will ascend/descend 5 stairs with handrail(s) per home needs with contact guard assist within 5 days. 5.  Patient will perform cardiac exercises per protocol with independence within 5 days. 6.  Patient will verbally and functionally recall 3/3 sternal precautions within 5 days. physical Therapy TREATMENT  Patient: Elizabeth Sears (73 y.o. female)  Date: 2/10/2018  Diagnosis: Chest pain  Unstable angina (HCC)  unknown Chest pain  Procedure(s) (LRB):  CORONARY ARTERY BYPASS GRAFTING X 4 WITH LIMA, LESVGH, ECC, JUANJO AND EPIAORTIC U/S BY DR Charity Mejia (N/A) 4 Days Post-Op  Precautions: Fall, Sternal  Chart, physical therapy assessment, plan of care and goals were reviewed. ASSESSMENT:  Patient with improved progress compared to last session. The patient's daughter was present for translation throughout. She required ~moderate assistance for bed mobility and transfers except for sit to supine that required maximum assist. Patient able to tolerate increased gait distance but demonstrated increased LOB with distance requiring min-moderate assistance to prevent a fall. The patient's daughter verbalized understanding of precautions and log roll technique to transfer OOB. The patient's family prefers to take her home. If this is the case, recommend 24 hour supervision for mobility and use of a RW for balance with HHPT to wean from DME.   Progression toward goals:  [x]    Improving appropriately and progressing toward goals  []    Improving slowly and progressing toward goals  []    Not making progress toward goals and plan of care will be adjusted     PLAN:  Patient continues to benefit from skilled intervention to address the above impairments. Continue treatment per established plan of care. Discharge Recommendations:  Home Health  Further Equipment Recommendations for Discharge:  Rolling walker if discharged home (may be able to borrow)     SUBJECTIVE:   Patient stated Ana Alvarado you.     OBJECTIVE DATA SUMMARY:   Critical Behavior:  Neurologic State: Anesthetized  Orientation Level: Oriented X4  Cognition: Appropriate decision making, Appropriate for age attention/concentration, Appropriate safety awareness, Follows commands  Safety/Judgement: Awareness of environment, Insight into deficits  Functional Mobility Training:  Bed Mobility:  Rolling: Moderate assistance; Additional time  Supine to Sit: Moderate assistance; Additional time  Sit to Supine: Maximum assistance;Assist x2  Scooting: Moderate assistance        Transfers:  Sit to Stand: Minimum assistance; Additional time  Stand to Sit: Minimum assistance; Additional time                             Balance:  Sitting: Impaired; With support  Sitting - Static: Good (unsupported)  Sitting - Dynamic: Fair (occasional)  Standing: Impaired; With support  Standing - Static: Poor  Standing - Dynamic : Poor  Ambulation/Gait Training:  Distance (ft): 120 Feet (ft)  Assistive Device: Gait belt (hand hold)  Ambulation - Level of Assistance: Moderate assistance;Minimal assistance        Gait Abnormalities: Antalgic;Decreased step clearance; Path deviations        Base of Support: Widened        Step Length: Left shortened;Right shortened    Pain:  Pain Scale 1: Numeric (0 - 10)  Pain Intensity 1: 2  Pain Location 1: Chest  Pain Orientation 1: Anterior  Pain Description 1: Aching  Pain Intervention(s) 1: Medication (see MAR)  Activity Tolerance:       After treatment:   []    Patient left in no apparent distress sitting up in chair  [x]    Patient left in no apparent distress in bed  [x]    Call bell left within reach  [x]    Nursing notified  [x]    Caregiver present  []    Bed alarm activated    COMMUNICATION/COLLABORATION:   The patients plan of care was discussed with: Registered Nurse    Garcia Braden, PT, DPT   Time Calculation: 17 mins

## 2018-02-10 NOTE — PROGRESS NOTES
1930: Bedside shift change report given to [address] [place] [address] Bere (oncoming nurse) by Eboni Dey (offgoing nurse). Report included the following information SBAR, Intake/Output, MAR and Cardiac Rhythm NSR.     0730: Bedside shift change report given to Janet (oncoming nurse) by [address] [place] [address] Bere (offgoing nurse). Report included the following information SBAR, ED Summary, Intake/Output, MAR and Cardiac Rhythm NSr.     Problem: Falls - Risk of  Goal: *Absence of Falls  Document Sania Fall Risk and appropriate interventions in the flowsheet. Outcome: Progressing Towards Goal  Fall Risk Interventions:  Mobility Interventions: Communicate number of staff needed for ambulation/transfer, OT consult for ADLs, Patient to call before getting OOB         Medication Interventions: Assess postural VS orthostatic hypotension, Patient to call before getting OOB    Elimination Interventions: Call light in reach, Patient to call for help with toileting needs, Toileting schedule/hourly rounds    History of Falls Interventions: Door open when patient unattended, Room close to nurse's station        Problem: AMI: Day 3  Goal: Activity/Safety  Outcome: Progressing Towards Goal  Pt up with 1 assist.  Goal: Nutrition/Diet  Outcome: Progressing Towards Goal  Pt on a cardiac diet. Goal: Respiratory  Outcome: Progressing Towards Goal  Pt on room air. Goal: *Hemodynamically stable  Outcome: Progressing Towards Goal  Patient Vitals for the past 12 hrs:   Temp Pulse Resp BP SpO2   02/10/18 0002 98.3 °F (36.8 °C) 69 18 156/41 95 %   02/09/18 1953 98.7 °F (37.1 °C) 80 18 168/43 93 %   02/09/18 1648 98 °F (36.7 °C) 77 16 (!) 158/31 98 %   02/09/18 1442 - 73 - 132/80 96 %   02/09/18 1436 - 75 - 117/53 97 %       Problem: CABG: Post-Op Day 4  Goal: Activity/Safety  Outcome: Progressing Towards Goal  Pt up with 1 person assist.  Goal: Nutrition/Diet  Outcome: Progressing Towards Goal  Pt on a cardiac diet.    Goal: *Hemodynamically stable  Outcome: Progressing Towards Goal  Patient Vitals for the past 12 hrs:   Temp Pulse Resp BP SpO2   02/10/18 0002 98.3 °F (36.8 °C) 69 18 156/41 95 %   02/09/18 1953 98.7 °F (37.1 °C) 80 18 168/43 93 %   02/09/18 1648 98 °F (36.7 °C) 77 16 (!) 158/31 98 %   02/09/18 1442 - 73 - 132/80 96 %   02/09/18 1436 - 75 - 117/53 97 %       Problem: Pressure Injury - Risk of  Goal: *Prevention of pressure ulcer  Outcome: Progressing Towards Goal   02/09/18 1953   Wound Prevention and Protection Methods   Orientation of Wound Prevention Posterior   Location of Wound Prevention Sacrum/Coccyx   Dressing Present  No   Read Only, Retired: Wound Treatment (non-mechanical)   Wound Offloading (Prevention Methods) Bed, pressure redistribution/air;Blankets;Pillows;Repositioning;Turning

## 2018-02-11 ENCOUNTER — APPOINTMENT (OUTPATIENT)
Dept: GENERAL RADIOLOGY | Age: 78
DRG: 234 | End: 2018-02-11
Attending: NURSE PRACTITIONER
Payer: MEDICARE

## 2018-02-11 LAB
ANION GAP SERPL CALC-SCNC: 7 MMOL/L (ref 5–15)
BUN SERPL-MCNC: 46 MG/DL (ref 6–20)
BUN/CREAT SERPL: 33 (ref 12–20)
CALCIUM SERPL-MCNC: 8 MG/DL (ref 8.5–10.1)
CHLORIDE SERPL-SCNC: 105 MMOL/L (ref 97–108)
CO2 SERPL-SCNC: 30 MMOL/L (ref 21–32)
CREAT SERPL-MCNC: 1.39 MG/DL (ref 0.55–1.02)
ERYTHROCYTE [DISTWIDTH] IN BLOOD BY AUTOMATED COUNT: 13.7 % (ref 11.5–14.5)
GLUCOSE BLD STRIP.AUTO-MCNC: 192 MG/DL (ref 65–100)
GLUCOSE BLD STRIP.AUTO-MCNC: 205 MG/DL (ref 65–100)
GLUCOSE BLD STRIP.AUTO-MCNC: 215 MG/DL (ref 65–100)
GLUCOSE BLD STRIP.AUTO-MCNC: 219 MG/DL (ref 65–100)
GLUCOSE BLD STRIP.AUTO-MCNC: 222 MG/DL (ref 65–100)
GLUCOSE BLD STRIP.AUTO-MCNC: 53 MG/DL (ref 65–100)
GLUCOSE SERPL-MCNC: 196 MG/DL (ref 65–100)
HCT VFR BLD AUTO: 25.5 % (ref 35–47)
HGB BLD-MCNC: 8.2 G/DL (ref 11.5–16)
MAGNESIUM SERPL-MCNC: 2.3 MG/DL (ref 1.6–2.4)
MCH RBC QN AUTO: 30.7 PG (ref 26–34)
MCHC RBC AUTO-ENTMCNC: 32.2 G/DL (ref 30–36.5)
MCV RBC AUTO: 95.5 FL (ref 80–99)
NRBC # BLD: 0.03 K/UL (ref 0–0.01)
NRBC BLD-RTO: 0.3 PER 100 WBC
PLATELET # BLD AUTO: 201 K/UL (ref 150–400)
PMV BLD AUTO: 12.1 FL (ref 8.9–12.9)
POTASSIUM SERPL-SCNC: 3.5 MMOL/L (ref 3.5–5.1)
RBC # BLD AUTO: 2.67 M/UL (ref 3.8–5.2)
SERVICE CMNT-IMP: ABNORMAL
SODIUM SERPL-SCNC: 142 MMOL/L (ref 136–145)
WBC # BLD AUTO: 11 K/UL (ref 3.6–11)

## 2018-02-11 PROCEDURE — 80048 BASIC METABOLIC PNL TOTAL CA: CPT | Performed by: NURSE PRACTITIONER

## 2018-02-11 PROCEDURE — 65660000000 HC RM CCU STEPDOWN

## 2018-02-11 PROCEDURE — 71046 X-RAY EXAM CHEST 2 VIEWS: CPT

## 2018-02-11 PROCEDURE — 74011636637 HC RX REV CODE- 636/637: Performed by: NURSE PRACTITIONER

## 2018-02-11 PROCEDURE — 97116 GAIT TRAINING THERAPY: CPT

## 2018-02-11 PROCEDURE — 74011250637 HC RX REV CODE- 250/637: Performed by: NURSE PRACTITIONER

## 2018-02-11 PROCEDURE — 74011000250 HC RX REV CODE- 250: Performed by: NURSE PRACTITIONER

## 2018-02-11 PROCEDURE — 83735 ASSAY OF MAGNESIUM: CPT | Performed by: NURSE PRACTITIONER

## 2018-02-11 PROCEDURE — 85027 COMPLETE CBC AUTOMATED: CPT | Performed by: NURSE PRACTITIONER

## 2018-02-11 PROCEDURE — 36415 COLL VENOUS BLD VENIPUNCTURE: CPT | Performed by: NURSE PRACTITIONER

## 2018-02-11 PROCEDURE — 82962 GLUCOSE BLOOD TEST: CPT

## 2018-02-11 RX ORDER — AMLODIPINE BESYLATE 5 MG/1
2.5 TABLET ORAL DAILY
Status: DISCONTINUED | OUTPATIENT
Start: 2018-02-11 | End: 2018-02-14 | Stop reason: HOSPADM

## 2018-02-11 RX ORDER — INSULIN GLARGINE 100 [IU]/ML
50 INJECTION, SOLUTION SUBCUTANEOUS DAILY
Status: DISCONTINUED | OUTPATIENT
Start: 2018-02-11 | End: 2018-02-12

## 2018-02-11 RX ORDER — FUROSEMIDE 40 MG/1
40 TABLET ORAL DAILY
Status: DISCONTINUED | OUTPATIENT
Start: 2018-02-11 | End: 2018-02-14 | Stop reason: HOSPADM

## 2018-02-11 RX ORDER — POTASSIUM CHLORIDE 750 MG/1
40 TABLET, FILM COATED, EXTENDED RELEASE ORAL DAILY
Status: DISCONTINUED | OUTPATIENT
Start: 2018-02-11 | End: 2018-02-14 | Stop reason: HOSPADM

## 2018-02-11 RX ADMIN — POTASSIUM CHLORIDE 40 MEQ: 750 TABLET, EXTENDED RELEASE ORAL at 09:04

## 2018-02-11 RX ADMIN — OXYCODONE AND ACETAMINOPHEN 2 TABLET: 5; 325 TABLET ORAL at 09:13

## 2018-02-11 RX ADMIN — Medication 400 MG: at 09:04

## 2018-02-11 RX ADMIN — POLYETHYLENE GLYCOL 3350 17 G: 17 POWDER, FOR SOLUTION ORAL at 09:04

## 2018-02-11 RX ADMIN — BRIMONIDINE TARTRATE 1 DROP: 2 SOLUTION OPHTHALMIC at 21:06

## 2018-02-11 RX ADMIN — INSULIN LISPRO 3 UNITS: 100 INJECTION, SOLUTION INTRAVENOUS; SUBCUTANEOUS at 17:27

## 2018-02-11 RX ADMIN — TIMOLOL MALEATE 1 DROP: 5 SOLUTION OPHTHALMIC at 21:06

## 2018-02-11 RX ADMIN — AMIODARONE HYDROCHLORIDE 400 MG: 200 TABLET ORAL at 21:06

## 2018-02-11 RX ADMIN — METOPROLOL TARTRATE 50 MG: 50 TABLET ORAL at 09:04

## 2018-02-11 RX ADMIN — DOCUSATE SODIUM AND SENNOSIDES 1 TABLET: 8.6; 5 TABLET, FILM COATED ORAL at 17:27

## 2018-02-11 RX ADMIN — INSULIN GLARGINE 50 UNITS: 100 INJECTION, SOLUTION SUBCUTANEOUS at 17:26

## 2018-02-11 RX ADMIN — SITAGLIPTIN 50 MG: 25 TABLET, FILM COATED ORAL at 09:04

## 2018-02-11 RX ADMIN — INSULIN LISPRO 3 UNITS: 100 INJECTION, SOLUTION INTRAVENOUS; SUBCUTANEOUS at 07:06

## 2018-02-11 RX ADMIN — TIMOLOL MALEATE 1 DROP: 5 SOLUTION OPHTHALMIC at 09:10

## 2018-02-11 RX ADMIN — AMIODARONE HYDROCHLORIDE 400 MG: 200 TABLET ORAL at 09:04

## 2018-02-11 RX ADMIN — Medication 400 MG: at 17:27

## 2018-02-11 RX ADMIN — FUROSEMIDE 40 MG: 40 TABLET ORAL at 09:04

## 2018-02-11 RX ADMIN — Medication 10 ML: at 21:06

## 2018-02-11 RX ADMIN — ASPIRIN 81 MG 81 MG: 81 TABLET ORAL at 09:04

## 2018-02-11 RX ADMIN — Medication 10 ML: at 07:07

## 2018-02-11 RX ADMIN — OXYCODONE AND ACETAMINOPHEN 2 TABLET: 5; 325 TABLET ORAL at 18:55

## 2018-02-11 RX ADMIN — INSULIN LISPRO 3 UNITS: 100 INJECTION, SOLUTION INTRAVENOUS; SUBCUTANEOUS at 12:18

## 2018-02-11 RX ADMIN — DOCUSATE SODIUM AND SENNOSIDES 1 TABLET: 8.6; 5 TABLET, FILM COATED ORAL at 09:04

## 2018-02-11 RX ADMIN — ROSUVASTATIN CALCIUM 40 MG: 40 TABLET ORAL at 21:06

## 2018-02-11 RX ADMIN — PANTOPRAZOLE SODIUM 40 MG: 40 TABLET, DELAYED RELEASE ORAL at 07:07

## 2018-02-11 RX ADMIN — DEXTROSE MONOHYDRATE 25 G: 500 INJECTION PARENTERAL at 03:29

## 2018-02-11 RX ADMIN — METOPROLOL TARTRATE 50 MG: 50 TABLET ORAL at 21:06

## 2018-02-11 RX ADMIN — BRIMONIDINE TARTRATE 1 DROP: 2 SOLUTION OPHTHALMIC at 09:10

## 2018-02-11 RX ADMIN — Medication 10 ML: at 14:44

## 2018-02-11 RX ADMIN — AMLODIPINE BESYLATE 2.5 MG: 5 TABLET ORAL at 14:43

## 2018-02-11 NOTE — PROGRESS NOTES
1930: Bedside shift change report given to [address] [place] [address] Bere (oncoming nurse) by Jessica Rodriguez (offgoing nurse). Report included the following information SBAR, Intake/Output, MAR and Cardiac Rhythm NSR.     0315: Walked in to pt room to get vital signs and pt appears in distress and very diaphoretic. Pt states feeling lightheaded and not feeling very well. Pt sitting up drinking some OJ and eating PB fariba crackers that another nurse had brought her a few minutes before entering the room. 0322: Pt BG checked and 53. 0330: D50 given per protocol. 0340: Recheck . Will continue to monitor. 0730: Bedside shift change report given to Janet (oncoming nurse) by [address] [place] [address] Bere (offgoing nurse). Report included the following information SBAR, Intake/Output, MAR and Cardiac Rhythm NSR. Problem: Falls - Risk of  Goal: *Absence of Falls  Document Sania Fall Risk and appropriate interventions in the flowsheet. Outcome: Progressing Towards Goal  Fall Risk Interventions:  Mobility Interventions: Communicate number of staff needed for ambulation/transfer, OT consult for ADLs, Patient to call before getting OOB, PT Consult for mobility concerns         Medication Interventions: Patient to call before getting OOB, Teach patient to arise slowly    Elimination Interventions: Call light in reach, Patient to call for help with toileting needs, Toileting schedule/hourly rounds    History of Falls Interventions: Consult care management for discharge planning, Room close to nurse's station        Problem: CABG: Post-Op Day 5  Goal: Activity/Safety  Outcome: Progressing Towards Goal  Pt up with 1 assist.  Goal: Nutrition/Diet  Outcome: Progressing Towards Goal  Pt on a cardiac diet.     Problem: CABG: Discharge Outcomes  Goal: *Hemodynamically stable  Outcome: Progressing Towards Goal  Patient Vitals for the past 12 hrs:   Temp Pulse Resp BP SpO2   02/10/18 1941 98.6 °F (37 °C) 74 18 154/40 96 %   02/10/18 1522 - 70 - - -   02/10/18 1515 98.2 °F (36.8 °C) 73 16 141/59 96 %   02/10/18 1140 98.4 °F (36.9 °C) 67 16 145/40 97 %     Goal: *Stable cardiac rhythm  Outcome: Progressing Towards Goal  Pt on a cardiac diet. Problem: Pressure Injury - Risk of  Goal: *Prevention of pressure ulcer  Outcome: Progressing Towards Goal   02/10/18 1941   Wound Prevention and Protection Methods   Orientation of Wound Prevention Posterior   Location of Wound Prevention Sacrum/Coccyx   Dressing Present  No   Read Only, Retired: Wound Treatment (non-mechanical)   Wound Offloading (Prevention Methods) Bed, pressure redistribution/air;Blankets;Repositioning;Pillows; Turning

## 2018-02-11 NOTE — PROGRESS NOTES
CSS Progress Note    Admit Date: 2018  POD:  3 Day Post-Op    Procedure:  Procedure(s):  CORONARY ARTERY BYPASS GRAFTING X 4 WITH LIMA, LESVCHUNG, ECC, JUANJO AND EPIAORTIC U/S BY DR Javy Shoemaker        Subjective:   Pt seen with Dr. Milady Rome. Tmax 98.6, RA. NSR. In bed, feels better this morning. Objective:   Vitals:  Blood pressure 148/55, pulse 82, temperature 98.3 °F (36.8 °C), resp. rate 16, height 5' 6\" (1.676 m), weight 219 lb 12.8 oz (99.7 kg), SpO2 97 %. Temp (24hrs), Av.2 °F (36.8 °C), Min:97.8 °F (36.6 °C), Max:98.6 °F (37 °C)    EKG/Rhythm:  NSR 67    Oxygen Therapy:  Oxygen Therapy  O2 Sat (%): 97 % (18 0822)  Pulse via Oximetry: 67 beats per minute (18 1100)  O2 Device: Room air (18 0333)  O2 Flow Rate (L/min): 1 l/min (18 1633)  FIO2 (%): 50 % (18 2256)    CXR: IMPRESSION:      Small bilateral postoperative pleural effusions. Admission Weight: Last Weight   Weight: 220 lb (99.8 kg) Weight: 219 lb 12.8 oz (99.7 kg)     Intake / Output / Drain:  Current Shift:    Last 24 hrs.:     Intake/Output Summary (Last 24 hours) at 18 1010  Last data filed at 18 0333   Gross per 24 hour   Intake              600 ml   Output              925 ml   Net             -325 ml       EXAM:  General:  In bed, less pain today. Lungs:   Clear upper, Dimished bases to auscultation bilaterally. Incision:  Dsg cdi   Heart:  Regular rate and rhythm, S1, S2 normal, no murmur, click, rub or gallop. Abdomen:   Soft, non-tender. Bowel sounds active. No masses,  No organomegaly. Extremities:  1+ edema. PPP. Neurologic:  Gross motor and sensory apparatus intact.      Labs:   Recent Labs      18   0641  18   0348   WBC   --   11.0   HGB   --   8.2*   HCT   --   25.5*   PLT   --   201   NA   --   142   K   --   3.5   BUN   --   46*   CREA   --   1.39*   GLU   --   196*   GLUCPOC  219*   -- Assessment:     Principal Problem:    Chest pain (2018)    Active Problems:    Unstable angina ([address] (2/3/2018)      S/P CABG x 4 (2018)      Overview: On Pump CORONARY ARTERY BYPASS GRAFTING X 4 WITH NAIR to LAD, Sequential       LSVG OM1 and OM2, LSVG to RCA       [facility]         Plan/Recommendations/Medical Decision Makin. CAD s/p CABG: On ASA, statin, BB   2. Atelectasis: Wean O2 for sats >92%, encourage I/S  3. Nausea: improved, tolerating regular diet. Cont PRN zofran and compazine. 4. Post op anemia s/t acute blood loss: Hgb 8.2, monitor H&H  5. Thromocytopenia: improved, Plt 201, monitor  6. LUCIA: Cr 1.39, monitor U/O,  [place]  Lasix to PO  7. IDDM: low BS overnight, decrease lantus start januvia, monitor. 8. Hx HTN: cont BB, PRN hydralazine, monitor. Start norvasc. 9. Hx glaucoma: cont eye drops  10. HLD: Cont statin  11. Obesity: BMI 34, encourage heart healthy diet and weight loss when appropriate  12. Dispo: PT/OT, cont current care, dc home in the next couple days.       Signed By: Cristina Serrano NP

## 2018-02-11 NOTE — PROGRESS NOTES
0830 - transported to radiology. 3671 - patient back on cvsu, tele in place and verified with monitor tech.

## 2018-02-11 NOTE — PROGRESS NOTES
Problem: Mobility Impaired (Adult and Pediatric)  Goal: *Acute Goals and Plan of Care (Insert Text)  Physical Therapy Goals  Initiated 2/8/2018  1. Patient will move from supine to sit and sit to supine  in bed with minimal assistance/contact guard assist within 5 days. 2.  Patient will perform sit to/from stand with supervision/set-up within 5 days. 3.  Patient will ambulate 150 feet with least restrictive assistive device and supervision/set-up within 5 days. 4.  Patient will ascend/descend 5 stairs with handrail(s) per home needs with contact guard assist within 5 days. 5.  Patient will perform cardiac exercises per protocol with independence within 5 days. 6.  Patient will verbally and functionally recall 3/3 sternal precautions within 5 days. physical Therapy TREATMENT  Patient: Elizabeth Sears (73 y.o. female)  Date: 2/11/2018  Diagnosis: Chest pain  Unstable angina (HCC)  unknown Chest pain  Procedure(s) (LRB):  CORONARY ARTERY BYPASS GRAFTING X 4 WITH LIMA, LESVGH, ECC, JUANJO AND EPIAORTIC U/S BY DR Charity Mejia (N/A) 5 Days Post-Op  Precautions: Fall, Sternal  Chart, physical therapy assessment, plan of care and goals were reviewed. ASSESSMENT:  Received clearance from FRIEDA Lopez for rolling walker if pt uses it for balance only. The use of rolling walker assist pt with balance and decrease assist level. Pt's daughter present and is active in pt's care. Pt fatigued with increase gait and required assistance with walker management. Pt is planning on returned home with HHPT and family assistance. Pt will need a rolling walker to be ordered on Monday. Progression toward goals:  [x]      Improving appropriately and progressing toward goals  []      Improving slowly and progressing toward goals  []      Not making progress toward goals and plan of care will be adjusted     PLAN:  Patient continues to benefit from skilled intervention to address the above impairments.   Continue treatment per established plan of care. Discharge Recommendations:  Home Health with assistance  Further Equipment Recommendations for Discharge:  Rolling walker- order was written and placed on equipment order form to be ordered on Monday. Will need to be followed up with     SUBJECTIVE:   Patient stated Thank you.     OBJECTIVE DATA SUMMARY:   Critical Behavior:  Neurologic State: Alert  Orientation Level: Oriented X4  Cognition: Appropriate decision making, Appropriate for age attention/concentration, Appropriate safety awareness, Follows commands  Safety/Judgement: Awareness of environment, Insight into deficits  Functional Mobility Training:  Bed Mobility:        Sit to Supine: Minimum assistance; Moderate assistance;Assist x2            Transfers:  Sit to Stand: Contact guard assistance  Stand to Sit: Contact guard assistance                             Balance:  Sitting: Impaired  Sitting - Static: Good (unsupported)  Standing: Impaired  Standing - Static: Good  Standing - Dynamic : Fair  Ambulation/Gait Training:  Distance (ft): 200 Feet (ft)  Assistive Device: Gait belt;Walker, rolling  Ambulation - Level of Assistance: Contact guard assistance;Minimal assistance        Gait Abnormalities: Decreased step clearance                 Step Length: Left shortened;Right shortened                    Stairs:             Neuro Re-Education:    Therapeutic Exercises:     Pain:  Pain Scale 1: Numeric (0 - 10)  Pain Intensity 1: 2  Pain Location 1: Chest  Pain Orientation 1: Anterior  Pain Description 1: Aching  Pain Intervention(s) 1: Medication (see MAR)  Activity Tolerance:   Limited   Please refer to the flowsheet for vital signs taken during this treatment.   After treatment:   [] Patient left in no apparent distress sitting up in chair  [x] Patient left in no apparent distress in bed  [x] Call bell left within reach  [x] Nursing notified  [x] Caregiver present  [] Bed alarm activated    COMMUNICATION/COLLABORATION:   The patients plan of care was discussed with: Registered Nurse    Julianne Gross, PTA   Time Calculation: 18 mins

## 2018-02-12 LAB
ANION GAP SERPL CALC-SCNC: 2 MMOL/L (ref 5–15)
BUN SERPL-MCNC: 38 MG/DL (ref 6–20)
BUN/CREAT SERPL: 29 (ref 12–20)
CALCIUM SERPL-MCNC: 8.3 MG/DL (ref 8.5–10.1)
CHLORIDE SERPL-SCNC: 105 MMOL/L (ref 97–108)
CO2 SERPL-SCNC: 33 MMOL/L (ref 21–32)
CREAT SERPL-MCNC: 1.32 MG/DL (ref 0.55–1.02)
ERYTHROCYTE [DISTWIDTH] IN BLOOD BY AUTOMATED COUNT: 13.6 % (ref 11.5–14.5)
GLUCOSE BLD STRIP.AUTO-MCNC: 210 MG/DL (ref 65–100)
GLUCOSE BLD STRIP.AUTO-MCNC: 220 MG/DL (ref 65–100)
GLUCOSE BLD STRIP.AUTO-MCNC: 252 MG/DL (ref 65–100)
GLUCOSE BLD STRIP.AUTO-MCNC: 63 MG/DL (ref 65–100)
GLUCOSE BLD STRIP.AUTO-MCNC: 98 MG/DL (ref 65–100)
GLUCOSE SERPL-MCNC: 61 MG/DL (ref 65–100)
HCT VFR BLD AUTO: 25.5 % (ref 35–47)
HGB BLD-MCNC: 8.3 G/DL (ref 11.5–16)
MAGNESIUM SERPL-MCNC: 2.4 MG/DL (ref 1.6–2.4)
MCH RBC QN AUTO: 31.4 PG (ref 26–34)
MCHC RBC AUTO-ENTMCNC: 32.5 G/DL (ref 30–36.5)
MCV RBC AUTO: 96.6 FL (ref 80–99)
NRBC # BLD: 0 K/UL (ref 0–0.01)
NRBC BLD-RTO: 0 PER 100 WBC
PLATELET # BLD AUTO: 165 K/UL (ref 150–400)
PMV BLD AUTO: 11.9 FL (ref 8.9–12.9)
POTASSIUM SERPL-SCNC: 4.8 MMOL/L (ref 3.5–5.1)
RBC # BLD AUTO: 2.64 M/UL (ref 3.8–5.2)
SERVICE CMNT-IMP: ABNORMAL
SERVICE CMNT-IMP: NORMAL
SODIUM SERPL-SCNC: 140 MMOL/L (ref 136–145)
WBC # BLD AUTO: 9.8 K/UL (ref 3.6–11)

## 2018-02-12 PROCEDURE — 74011250637 HC RX REV CODE- 250/637: Performed by: NURSE PRACTITIONER

## 2018-02-12 PROCEDURE — 83735 ASSAY OF MAGNESIUM: CPT | Performed by: NURSE PRACTITIONER

## 2018-02-12 PROCEDURE — 74011250636 HC RX REV CODE- 250/636: Performed by: NURSE PRACTITIONER

## 2018-02-12 PROCEDURE — 80048 BASIC METABOLIC PNL TOTAL CA: CPT | Performed by: NURSE PRACTITIONER

## 2018-02-12 PROCEDURE — 74011636637 HC RX REV CODE- 636/637: Performed by: NURSE PRACTITIONER

## 2018-02-12 PROCEDURE — 85027 COMPLETE CBC AUTOMATED: CPT | Performed by: NURSE PRACTITIONER

## 2018-02-12 PROCEDURE — 97110 THERAPEUTIC EXERCISES: CPT

## 2018-02-12 PROCEDURE — 65660000000 HC RM CCU STEPDOWN

## 2018-02-12 PROCEDURE — 36415 COLL VENOUS BLD VENIPUNCTURE: CPT | Performed by: NURSE PRACTITIONER

## 2018-02-12 PROCEDURE — 97535 SELF CARE MNGMENT TRAINING: CPT

## 2018-02-12 PROCEDURE — 82962 GLUCOSE BLOOD TEST: CPT

## 2018-02-12 RX ORDER — METOPROLOL TARTRATE 50 MG/1
100 TABLET ORAL EVERY 12 HOURS
Status: DISCONTINUED | OUTPATIENT
Start: 2018-02-12 | End: 2018-02-14 | Stop reason: HOSPADM

## 2018-02-12 RX ORDER — INSULIN GLARGINE 100 [IU]/ML
45 INJECTION, SOLUTION SUBCUTANEOUS DAILY
Status: DISCONTINUED | OUTPATIENT
Start: 2018-02-12 | End: 2018-02-14 | Stop reason: HOSPADM

## 2018-02-12 RX ORDER — INSULIN LISPRO 100 [IU]/ML
2 INJECTION, SOLUTION INTRAVENOUS; SUBCUTANEOUS
Status: DISCONTINUED | OUTPATIENT
Start: 2018-02-12 | End: 2018-02-13

## 2018-02-12 RX ADMIN — INSULIN LISPRO 3 UNITS: 100 INJECTION, SOLUTION INTRAVENOUS; SUBCUTANEOUS at 17:38

## 2018-02-12 RX ADMIN — TIMOLOL MALEATE 1 DROP: 5 SOLUTION OPHTHALMIC at 09:04

## 2018-02-12 RX ADMIN — AMIODARONE HYDROCHLORIDE 400 MG: 200 TABLET ORAL at 08:51

## 2018-02-12 RX ADMIN — HYDRALAZINE HYDROCHLORIDE 20 MG: 20 INJECTION INTRAMUSCULAR; INTRAVENOUS at 03:58

## 2018-02-12 RX ADMIN — POLYETHYLENE GLYCOL 3350 17 G: 17 POWDER, FOR SOLUTION ORAL at 08:51

## 2018-02-12 RX ADMIN — INSULIN LISPRO 2 UNITS: 100 INJECTION, SOLUTION INTRAVENOUS; SUBCUTANEOUS at 12:58

## 2018-02-12 RX ADMIN — Medication 10 ML: at 14:36

## 2018-02-12 RX ADMIN — PANTOPRAZOLE SODIUM 40 MG: 40 TABLET, DELAYED RELEASE ORAL at 06:42

## 2018-02-12 RX ADMIN — METOPROLOL TARTRATE 50 MG: 50 TABLET ORAL at 08:51

## 2018-02-12 RX ADMIN — FUROSEMIDE 40 MG: 40 TABLET ORAL at 08:51

## 2018-02-12 RX ADMIN — DOCUSATE SODIUM AND SENNOSIDES 1 TABLET: 8.6; 5 TABLET, FILM COATED ORAL at 17:39

## 2018-02-12 RX ADMIN — OXYCODONE AND ACETAMINOPHEN 1 TABLET: 5; 325 TABLET ORAL at 22:10

## 2018-02-12 RX ADMIN — Medication 10 ML: at 03:58

## 2018-02-12 RX ADMIN — OXYCODONE AND ACETAMINOPHEN 2 TABLET: 5; 325 TABLET ORAL at 09:04

## 2018-02-12 RX ADMIN — POTASSIUM CHLORIDE 40 MEQ: 750 TABLET, EXTENDED RELEASE ORAL at 08:51

## 2018-02-12 RX ADMIN — Medication 10 ML: at 06:06

## 2018-02-12 RX ADMIN — INSULIN LISPRO 2 UNITS: 100 INJECTION, SOLUTION INTRAVENOUS; SUBCUTANEOUS at 17:39

## 2018-02-12 RX ADMIN — TIMOLOL MALEATE 1 DROP: 5 SOLUTION OPHTHALMIC at 21:00

## 2018-02-12 RX ADMIN — Medication 10 ML: at 22:00

## 2018-02-12 RX ADMIN — METOPROLOL TARTRATE 100 MG: 50 TABLET ORAL at 22:10

## 2018-02-12 RX ADMIN — BRIMONIDINE TARTRATE 1 DROP: 2 SOLUTION OPHTHALMIC at 21:00

## 2018-02-12 RX ADMIN — AMLODIPINE BESYLATE 2.5 MG: 5 TABLET ORAL at 08:51

## 2018-02-12 RX ADMIN — ROSUVASTATIN CALCIUM 40 MG: 40 TABLET ORAL at 22:10

## 2018-02-12 RX ADMIN — INSULIN LISPRO 3 UNITS: 100 INJECTION, SOLUTION INTRAVENOUS; SUBCUTANEOUS at 22:00

## 2018-02-12 RX ADMIN — AMIODARONE HYDROCHLORIDE 400 MG: 200 TABLET ORAL at 22:10

## 2018-02-12 RX ADMIN — INSULIN LISPRO 3 UNITS: 100 INJECTION, SOLUTION INTRAVENOUS; SUBCUTANEOUS at 12:52

## 2018-02-12 RX ADMIN — INSULIN GLARGINE 45 UNITS: 100 INJECTION, SOLUTION SUBCUTANEOUS at 17:38

## 2018-02-12 RX ADMIN — Medication 400 MG: at 17:39

## 2018-02-12 RX ADMIN — OXYCODONE AND ACETAMINOPHEN 2 TABLET: 5; 325 TABLET ORAL at 17:39

## 2018-02-12 RX ADMIN — Medication 400 MG: at 08:51

## 2018-02-12 RX ADMIN — DOCUSATE SODIUM AND SENNOSIDES 1 TABLET: 8.6; 5 TABLET, FILM COATED ORAL at 08:51

## 2018-02-12 RX ADMIN — BRIMONIDINE TARTRATE 1 DROP: 2 SOLUTION OPHTHALMIC at 09:05

## 2018-02-12 RX ADMIN — OXYCODONE AND ACETAMINOPHEN 2 TABLET: 5; 325 TABLET ORAL at 04:00

## 2018-02-12 RX ADMIN — SITAGLIPTIN 50 MG: 25 TABLET, FILM COATED ORAL at 08:51

## 2018-02-12 RX ADMIN — ASPIRIN 81 MG 81 MG: 81 TABLET ORAL at 08:51

## 2018-02-12 NOTE — PROGRESS NOTES
Cardiac Surgery Care Coordinator-  Met with Rylan Alaniz and her son. Encouraged continued use of the incentive spirometer and reinforced sternal precautions. Will continue to follow.  Jordan Alan RN

## 2018-02-12 NOTE — PROGRESS NOTES
Problem: Self Care Deficits Care Plan (Adult)  Goal: *Acute Goals and Plan of Care (Insert Text)  Occupational Therapy Goals  Initiated 2018  1. Patient will perform ADLs standing 5 mins without fatigue or LOB with supervision/set-up within 7 day(s). 2.  Patient will perform lower body ADLs with supervision/set-up within 7 day(s). 3.  Patient will perform bathing with supervision/set-up within 7 day(s). 4.  Patient will perform toilet transfers with supervision/set-up within 7 day(s). 5.  Patient will perform all aspects of toileting with supervision/set-up within 7 day(s). 6.  Patient will participate in cardiac/sternal upper extremity therapeutic exercise/activities to increase independence with ADLs with supervision/set-up for 5 minutes within 7 day(s). Occupational Therapy TREATMENT  Patient: Светлана Bull (73 y.o. female)  Date: 2018  Diagnosis: Chest pain  Unstable angina (HCC)  unknown Chest pain  Procedure(s) (LRB):  CORONARY ARTERY BYPASS GRAFTING X 4 WITH LIMA, LESVGH, ECC, JUANJO AND EPIAORTIC U/S BY DR Aylin King (N/A) 6 Days Post-Op  Precautions: Fall, Sternal  Chart, occupational therapy assessment, plan of care, and goals were reviewed. ASSESSMENT:  Based on the objective data described below, the patient presents with impaired cardiopulmonary endurance, sternal precautions, pain at sternum, impaired functional mobility, and impaired ADL independence s/p CABG x4 POD6.  Patient received sitting in chair, daughter present for translation (patient speaks [place], not [place]).  Patient alert, Ox4, and agreeable to therapy.  Patient able to recall 3/3 sternal precautions. Patient demonstrates improved tolerance to activity and functional mobility. Patient with orthostatic hypotension (BP sittin/50, BP standin/65 with patient reporting mild dizziness, BP standing while marching in place: 109/75 with dizziness improved), nursing notified.  Patient currently requires CGA for sit-stand while maintaining precautions, ambulates to bathroom with RW and CGA, performs grooming at sink with CGA, and toileting/ toilet transfers with CGA. Patient instructed to use RW just for balance and not to bear weight through UE. Patient attempted to use AE for LB dressing but had difficulty maintaining sternal precautions with AE, so was advised not us use it. Patient and daughter receptive to education on activity recommendations, ADL modifications, and home safety. Patient indep PTA and lives with daughter, who is supportive and available 24/7. Recommend d/c to home setting with family support. Progression toward goals:  [x]       Improving appropriately and progressing toward goals  []       Improving slowly and progressing toward goals  []       Not making progress toward goals and plan of care will be adjusted     PLAN:  Patient continues to benefit from skilled intervention to address the above impairments. Continue treatment per established plan of care. Discharge Recommendations:  Home with family support  Further Equipment Recommendations for Discharge:  none     SUBJECTIVE:   Patient stated I feel a little dizzy.  Per daughter's translation. When patient standing    OBJECTIVE DATA SUMMARY:   Cognitive/Behavioral Status:  Neurologic State: Alert  Orientation Level: Oriented X4  Cognition: Appropriate decision making; Appropriate for age attention/concentration; Appropriate safety awareness; Follows commands  Perception: Appears intact  Perseveration: No perseveration noted  Safety/Judgement: Awareness of environment; Insight into deficits    Functional Mobility and Transfers for ADLs:    Transfers:  Sit to Stand: Contact guard assistance  Functional Transfers  Toilet Transfer : Contact guard assistance (verbal cues not to push on RW)    Balance:  Sitting: Intact  Standing: Impaired  Standing - Static: Fair  Standing - Dynamic : Fair    ADL Intervention:       Grooming  Washing Hands: Contact guard assistance (standing at sink with RW)  Brushing Teeth: Contact guard assistance (standing at sink with RW)         Lower Body Dressing Assistance  Socks: Total assistance (dependent) (attempted LB AE, unable to maintain sternal precautions)    Toileting  Bladder Hygiene: Contact guard assistance (standing with RW)  Clothing Management: Contact guard assistance (standing with RW)    Cognitive Retraining  Safety/Judgement: Awareness of environment; Insight into deficits       Pain:  Patient does not report pain    Activity Tolerance:   Vitals:    02/12/18 1410 02/12/18 1415 02/12/18 1420   BP: 120/50 100/63 109/75   BP 1 Location: Left arm Left arm Left arm   BP Patient Position: Sitting Standing Standing;During activity   Pulse: 73       Please refer to the flowsheet for vital signs taken during this treatment.   After treatment:   [x] Patient left in no apparent distress sitting up in chair  [] Patient left in no apparent distress in bed  [x] Call bell left within reach  [x] Nursing notified  [x] Caregiver present  [] Bed alarm activated    COMMUNICATION/COLLABORATION:   The patients plan of care was discussed with: Physical Therapist and Registered Nurse     Francine Vang OT  Time Calculation: 38 mins

## 2018-02-12 NOTE — PROGRESS NOTES
Physical Therapy  2/12/2018    Chart reviewed and discussed POC w/ supervising PT. Pt seen for cardiac UE exercises, able to do so actively w/ min verbal/tactile cuing for technique/form. Pt able to complete 2 sets of 7-8 reps each (shoulder flex, shoulder elevation, scap retraction). Pt remained in bed at completion of session as was received. PT to continue to follow for progression of OOB activity as able and appropriate.      Diane Milton, PTA    Time: 12 minutes

## 2018-02-12 NOTE — PROGRESS NOTES
1930: Bedside and Verbal shift change report given to ELADIO Whitfield (oncoming nurse) by Ramya Hogan RN (offgoing nurse). Report included the following information SBAR, Kardex, Intake/Output, MAR and Recent Results. 8889: [facility]    Patient with hypoglycemic episode(s) at 6283 (time) on 2/12/18 (date). BG value(s) pre-treatment 61    Was patient symptomatic? [x] yes, [] no  Patient was treated with the following rescue medications/treatments: [] D50                [] Glucose tablets                [] Glucagon                [x] 4oz juice                [] 6oz reg soda                [] 8oz low fat milk  BG value post-treatment: 98  Once BG treated and value greater than 80mg/dl, pt was provided with the following:  [x] snack  [] meal    0700: Patient had uneventful night. Up to chair and bathed with CHG. Gown and linens changed. 0730: Bedside and Verbal shift change report given to Jonas Mejia RN (oncoming nurse) by Jonathan Nickerson RN (offgoing nurse). Report included the following information SBAR, Kardex, Intake/Output, MAR and Recent Results.

## 2018-02-12 NOTE — DIABETES MGMT
[facility] Cardiac Progress Note    Recommendations/ Comments: Chart reviewed for hypoglycemia, BG 63 mg/dl this morning at 0638 and 53 mg/dl at 0322  Noted Lantus was decreased to 45 units daily. Hypoglycemia likely due to renal status. If appropriate, consider:  - Decreasing Lantus to 40 units daily    Current hospital DM medication: Lantus 45 units daily, Humalog 2 units AC and Humalog for correction, normal sensitivity scale     Chart reviewed on Karis Zabala.     Patient is a 65 y.o. female with known DM on the following at home:                        Metformin 500 mg (2) tabs at supper                        Januvia 100 mg once a day                        Humalog Mix 75/25, 64 units each AM and 32 units each PM - listed but not taking this    A1c:   Lab Results   Component Value Date/Time    Hemoglobin A1c 8.0 (H) 02/05/2018 05:00 PM    Hemoglobin A1c 7.8 (H) 02/02/2018 04:23 PM       Recent Glucose Results: Lab Results   Component Value Date/Time    GLU 61 (L) 02/12/2018 03:48 AM    GLUCPOC 210 (H) 02/12/2018 11:22 AM    GLUCPOC 98 02/12/2018 06:59 AM    GLUCPOC 63 (L) 02/12/2018 06:38 AM        Lab Results   Component Value Date/Time    Creatinine 1.32 (H) 02/12/2018 03:48 AM     Estimated Creatinine Clearance: 41.8 mL/min (based on Cr of 1.32). Active Orders   Diet    DIET DIABETIC WITH OPTIONS Consistent Carb 2000kcal; Regular; AHA-LOW-CHOL FAT        PO intake: Patient Vitals for the past 72 hrs:   % Diet Eaten   02/12/18 0844 65 %   02/11/18 1751 100 %   02/11/18 0904 50 %   02/10/18 1415 75 %   02/10/18 0846 75 %   02/09/18 1824 100 %       Will continue to follow as needed.     Thank you    Phani Cornell RD  [facility]  Pager: [phone]

## 2018-02-12 NOTE — PROGRESS NOTES
0730 Bedside shift change report given to Deepak Tovar RN (oncoming nurse) by Clifton Escalera RN (offgoing nurse). Report included the following information SBAR, Kardex, ED Summary, Procedure Summary, Intake/Output, MAR, Accordion, Recent Results and Cardiac Rhythm NSR.   1900 Patient had uneventful shift. Up to chair for all meals. Ambulated in the hallway 2 times. 1930 Bedside shift change report given to ELADIO Victoria (oncoming nurse) by Deepak Tovar RN (offgoing nurse). Report included the following information SBAR, Kardex, ED Summary, Procedure Summary, Intake/Output and MAR. Problem: Falls - Risk of  Goal: *Absence of Falls  Document Sania Fall Risk and appropriate interventions in the flowsheet.    Outcome: Progressing Towards Goal  Fall Risk Interventions:  Mobility Interventions: Communicate number of staff needed for ambulation/transfer, OT consult for ADLs, Patient to call before getting OOB, PT Consult for mobility concerns, PT Consult for assist device competence, Strengthening exercises (ROM-active/passive), Utilize walker, cane, or other assitive device, Utilize gait belt for transfers/ambulation         Medication Interventions: Evaluate medications/consider consulting pharmacy, Teach patient to arise slowly, Patient to call before getting OOB, Utilize gait belt for transfers/ambulation    Elimination Interventions: Call light in reach, Patient to call for help with toileting needs, Toilet paper/wipes in reach, Toileting schedule/hourly rounds    History of Falls Interventions: Evaluate medications/consider consulting pharmacy        Problem: CABG: Discharge Outcomes  Goal: *Hemodynamically stable  Outcome: Progressing Towards Goal  Patient Vitals for the past 12 hrs:   Temp Pulse Resp BP SpO2   02/12/18 1124 98.8 °F (37.1 °C) 66 18 (!) 113/27 98 %   02/12/18 0844 98.1 °F (36.7 °C) 86 17 (!) 119/35 95 %   02/12/18 0344 98.7 °F (37.1 °C) 76 20 (!) 178/36 97 %       Goal: *Stable cardiac rhythm  Outcome: Progressing Towards Goal  Patient is in NSR  Goal: *Lungs clear or at baseline  Outcome: Progressing Towards Goal  Lungs clear to auscultation. Goal: *Optimal pain control at patient's stated goal  Outcome: Progressing Towards Goal  Pain managed with PRN medications.      Problem: Pressure Injury - Risk of  Goal: *Prevention of pressure ulcer  Outcome: Progressing Towards Goal   02/12/18 0844   Wound Prevention and Protection Methods   Orientation of Wound Prevention Posterior   Location of Wound Prevention Sacrum/Coccyx   Dressing Present  No   Read Only, Retired: Wound Treatment (non-mechanical)   Wound Offloading (Prevention Methods) Bed, pressure reduction mattress;Pillows;Repositioning

## 2018-02-12 NOTE — PROGRESS NOTES
CSS Progress Note    Admit Date: 2018  POD:  3 Day Post-Op    Procedure:  Procedure(s):  CORONARY ARTERY BYPASS GRAFTING X 4 WITH LIMA, BHARGAV, ECC, JUANJO AND EPIAORTIC U/S BY DR Lory Rodríguez        Subjective:   Pt seen with Dr. Deandra Elmore. Tmax 98.6, RA. NSR. In bed, feels better this morning. Objective:   Vitals:  Blood pressure (!) 119/35, pulse 86, temperature 98.1 °F (36.7 °C), resp. rate 17, height 5' 6\" (1.676 m), weight 219 lb 9.3 oz (99.6 kg), SpO2 95 %. Temp (24hrs), Av.4 °F (36.9 °C), Min:98.1 °F (36.7 °C), Max:98.7 °F (37.1 °C)    EKG/Rhythm:  NSR 67    Oxygen Therapy:  Oxygen Therapy  O2 Sat (%): 95 % (18 0844)  Pulse via Oximetry: 67 beats per minute (18 1100)  O2 Device: Room air (18 0350)  O2 Flow Rate (L/min): 1 l/min (18 1633)  FIO2 (%): 50 % (18 2256)    CXR: IMPRESSION:      Small bilateral postoperative pleural effusions. Admission Weight: Last Weight   Weight: 220 lb (99.8 kg) Weight: 219 lb 9.3 oz (99.6 kg)     Intake / Output / Drain:  Current Shift:  0701 -  1900  In: 240 [P.O.:240]  Out: -   Last 24 hrs.:     Intake/Output Summary (Last 24 hours) at 18 0947  Last data filed at 18 0844   Gross per 24 hour   Intake              600 ml   Output              900 ml   Net             -300 ml       EXAM:  General:  Up in chair eating breakfast.                                                                             Lungs:   Clear upper, Dimished bases to auscultation bilaterally. Incision:  Dsg cdi   Heart:  Regular rate and rhythm, S1, S2 normal, no murmur, click, rub or gallop. Abdomen:   Soft, non-tender. Bowel sounds active. No masses,  No organomegaly. Extremities:  1+ edema. PPP. Neurologic:  Gross motor and sensory apparatus intact.      Labs:   Recent Labs      18   0659   18   0348   WBC   --    --   9.8   HGB   --    --   8.3*   HCT   --    --   25.5*   PLT   --    --   165   NA   --    --   140   K   -- --   4.8   BUN   --    --   38*   CREA   --    --   1.32*   GLU   --    --   61*   GLUCPOC  98   < >   --     < > = values in this interval not displayed. Assessment:     Principal Problem:    Chest pain (2018)    Active Problems:    Unstable angina ([address] (2/3/2018)      S/P CABG x 4 (2018)      Overview: On Pump CORONARY ARTERY BYPASS GRAFTING X 4 WITH NAIR to LAD, Sequential       LSVG OM1 and OM2, LSVG to RCA       [facility]         Plan/Recommendations/Medical Decision Makin. CAD s/p CABG: On ASA, statin, increase BB today  2. Atelectasis: Wean O2 for sats >92%, encourage I/S  3. Nausea: resolved  4. Post op anemia s/t acute blood loss: Hgb 8.3, monitor H&H  5. Thromocytopenia: improved, Plt 201, monitor  6. LUCIA: Cr 1.39, monitor U/O,  Cont PO lasix  7. IDDM: low BS overnight, decrease lantus cont januvia, DTC following. 8. Hx HTN: increse BB, PRN hydralazine, monitor. cont norvasc. 9. Hx glaucoma: cont eye drops  10. HLD: Cont statin  11. Obesity: BMI 34, encourage heart healthy diet and weight loss when appropriate  12. Dispo: PT/OT, cont current care, dc home in the next couple days.       Signed By: Ezequiel Torreso, NP

## 2018-02-13 LAB
ANION GAP SERPL CALC-SCNC: 6 MMOL/L (ref 5–15)
BUN SERPL-MCNC: 41 MG/DL (ref 6–20)
BUN/CREAT SERPL: 29 (ref 12–20)
CALCIUM SERPL-MCNC: 8.6 MG/DL (ref 8.5–10.1)
CHLORIDE SERPL-SCNC: 103 MMOL/L (ref 97–108)
CO2 SERPL-SCNC: 28 MMOL/L (ref 21–32)
CREAT SERPL-MCNC: 1.43 MG/DL (ref 0.55–1.02)
ERYTHROCYTE [DISTWIDTH] IN BLOOD BY AUTOMATED COUNT: 13.8 % (ref 11.5–14.5)
GLUCOSE BLD STRIP.AUTO-MCNC: 125 MG/DL (ref 65–100)
GLUCOSE BLD STRIP.AUTO-MCNC: 188 MG/DL (ref 65–100)
GLUCOSE BLD STRIP.AUTO-MCNC: 208 MG/DL (ref 65–100)
GLUCOSE BLD STRIP.AUTO-MCNC: 210 MG/DL (ref 65–100)
GLUCOSE SERPL-MCNC: 115 MG/DL (ref 65–100)
HCT VFR BLD AUTO: 28.3 % (ref 35–47)
HGB BLD-MCNC: 9.2 G/DL (ref 11.5–16)
MAGNESIUM SERPL-MCNC: 2.2 MG/DL (ref 1.6–2.4)
MCH RBC QN AUTO: 31.3 PG (ref 26–34)
MCHC RBC AUTO-ENTMCNC: 32.5 G/DL (ref 30–36.5)
MCV RBC AUTO: 96.3 FL (ref 80–99)
NRBC # BLD: 0.02 K/UL (ref 0–0.01)
NRBC BLD-RTO: 0.2 PER 100 WBC
PLATELET # BLD AUTO: 223 K/UL (ref 150–400)
PMV BLD AUTO: 11.4 FL (ref 8.9–12.9)
POTASSIUM SERPL-SCNC: 4.8 MMOL/L (ref 3.5–5.1)
RBC # BLD AUTO: 2.94 M/UL (ref 3.8–5.2)
SERVICE CMNT-IMP: ABNORMAL
SODIUM SERPL-SCNC: 137 MMOL/L (ref 136–145)
WBC # BLD AUTO: 11.7 K/UL (ref 3.6–11)

## 2018-02-13 PROCEDURE — 65660000000 HC RM CCU STEPDOWN

## 2018-02-13 PROCEDURE — 74011250637 HC RX REV CODE- 250/637: Performed by: NURSE PRACTITIONER

## 2018-02-13 PROCEDURE — 85027 COMPLETE CBC AUTOMATED: CPT | Performed by: NURSE PRACTITIONER

## 2018-02-13 PROCEDURE — 97530 THERAPEUTIC ACTIVITIES: CPT

## 2018-02-13 PROCEDURE — 97116 GAIT TRAINING THERAPY: CPT

## 2018-02-13 PROCEDURE — 74011636637 HC RX REV CODE- 636/637: Performed by: NURSE PRACTITIONER

## 2018-02-13 PROCEDURE — 36415 COLL VENOUS BLD VENIPUNCTURE: CPT | Performed by: NURSE PRACTITIONER

## 2018-02-13 PROCEDURE — 80048 BASIC METABOLIC PNL TOTAL CA: CPT | Performed by: NURSE PRACTITIONER

## 2018-02-13 PROCEDURE — 83735 ASSAY OF MAGNESIUM: CPT | Performed by: NURSE PRACTITIONER

## 2018-02-13 PROCEDURE — 82962 GLUCOSE BLOOD TEST: CPT

## 2018-02-13 RX ORDER — INSULIN LISPRO 100 [IU]/ML
3 INJECTION, SOLUTION INTRAVENOUS; SUBCUTANEOUS
Status: DISCONTINUED | OUTPATIENT
Start: 2018-02-13 | End: 2018-02-14 | Stop reason: HOSPADM

## 2018-02-13 RX ADMIN — Medication 400 MG: at 17:21

## 2018-02-13 RX ADMIN — DOCUSATE SODIUM AND SENNOSIDES 1 TABLET: 8.6; 5 TABLET, FILM COATED ORAL at 17:21

## 2018-02-13 RX ADMIN — Medication 400 MG: at 08:11

## 2018-02-13 RX ADMIN — INSULIN GLARGINE 45 UNITS: 100 INJECTION, SOLUTION SUBCUTANEOUS at 17:21

## 2018-02-13 RX ADMIN — OXYCODONE AND ACETAMINOPHEN 1 TABLET: 5; 325 TABLET ORAL at 09:00

## 2018-02-13 RX ADMIN — AMIODARONE HYDROCHLORIDE 400 MG: 200 TABLET ORAL at 22:23

## 2018-02-13 RX ADMIN — PANTOPRAZOLE SODIUM 40 MG: 40 TABLET, DELAYED RELEASE ORAL at 07:30

## 2018-02-13 RX ADMIN — FUROSEMIDE 40 MG: 40 TABLET ORAL at 08:10

## 2018-02-13 RX ADMIN — METOPROLOL TARTRATE 100 MG: 50 TABLET ORAL at 08:10

## 2018-02-13 RX ADMIN — OXYCODONE AND ACETAMINOPHEN 2 TABLET: 5; 325 TABLET ORAL at 17:21

## 2018-02-13 RX ADMIN — METOPROLOL TARTRATE 100 MG: 50 TABLET ORAL at 22:23

## 2018-02-13 RX ADMIN — POLYETHYLENE GLYCOL 3350 17 G: 17 POWDER, FOR SOLUTION ORAL at 08:13

## 2018-02-13 RX ADMIN — TIMOLOL MALEATE 1 DROP: 5 SOLUTION OPHTHALMIC at 08:13

## 2018-02-13 RX ADMIN — DOCUSATE SODIUM AND SENNOSIDES 1 TABLET: 8.6; 5 TABLET, FILM COATED ORAL at 08:10

## 2018-02-13 RX ADMIN — BRIMONIDINE TARTRATE 1 DROP: 2 SOLUTION OPHTHALMIC at 08:12

## 2018-02-13 RX ADMIN — TIMOLOL MALEATE 1 DROP: 5 SOLUTION OPHTHALMIC at 22:24

## 2018-02-13 RX ADMIN — INSULIN LISPRO 3 UNITS: 100 INJECTION, SOLUTION INTRAVENOUS; SUBCUTANEOUS at 17:22

## 2018-02-13 RX ADMIN — INSULIN LISPRO 2 UNITS: 100 INJECTION, SOLUTION INTRAVENOUS; SUBCUTANEOUS at 22:23

## 2018-02-13 RX ADMIN — POTASSIUM CHLORIDE 40 MEQ: 750 TABLET, EXTENDED RELEASE ORAL at 08:11

## 2018-02-13 RX ADMIN — INSULIN LISPRO 2 UNITS: 100 INJECTION, SOLUTION INTRAVENOUS; SUBCUTANEOUS at 11:57

## 2018-02-13 RX ADMIN — Medication 10 ML: at 06:00

## 2018-02-13 RX ADMIN — BRIMONIDINE TARTRATE 1 DROP: 2 SOLUTION OPHTHALMIC at 22:24

## 2018-02-13 RX ADMIN — ASPIRIN 81 MG 81 MG: 81 TABLET ORAL at 08:11

## 2018-02-13 RX ADMIN — INSULIN LISPRO 2 UNITS: 100 INJECTION, SOLUTION INTRAVENOUS; SUBCUTANEOUS at 08:10

## 2018-02-13 RX ADMIN — INSULIN LISPRO 3 UNITS: 100 INJECTION, SOLUTION INTRAVENOUS; SUBCUTANEOUS at 17:20

## 2018-02-13 RX ADMIN — ROSUVASTATIN CALCIUM 40 MG: 40 TABLET ORAL at 22:22

## 2018-02-13 RX ADMIN — Medication 10 ML: at 22:23

## 2018-02-13 RX ADMIN — AMLODIPINE BESYLATE 2.5 MG: 5 TABLET ORAL at 08:10

## 2018-02-13 RX ADMIN — SITAGLIPTIN 50 MG: 25 TABLET, FILM COATED ORAL at 08:10

## 2018-02-13 RX ADMIN — AMIODARONE HYDROCHLORIDE 400 MG: 200 TABLET ORAL at 08:10

## 2018-02-13 NOTE — PROGRESS NOTES
CM informed Cameron Orta with [facility] Coordinator) of anticipated discharge date to home tomorrow with [facility] - INPATIENT. Notified Calos Mckeon with [facility] of anticipated discharge to home tomorrow as well.   MICHI Reilly 79.4

## 2018-02-13 NOTE — CARDIO/PULMONARY
Cardiac Rehab: CABG education folder at bedside. Met with Celina William and her daughter to review cardiac surgery post discharge instructions and to discuss participation in the Cardiac Rehab Program. Her daughter provides translation. Educated using teach back method. Reviewed the use of bear for sternal support, daily weight and temperature monitoring, showering restrictions, signs and symptoms of infection at surgery sites, daily walking and arm exercises, and use of incentive spirometer. Celina William was able to demonstrate proper use of incentive spirometer, achieving 500 ml. Reviewed risk factors for CAD to include the following: family history, elevated BMI, hyperlipidemia, hypertension, diabetes, stress, and smoking. Discussed Heart Healthy/Low Sodium (2000 mg.) diet. Red reminder bracelet is in place. Discussed purpose of bracelet, duration of wear, and when to call surgeons office. Discussed Cardiac Rehab Program format, benefits, and encouraged participation. Initial Cardiac Rehab Program intake appointment scheduled for 3/8/2018 and appointment information is on the AVS. General questions answered. Karis Zabala verbalized understanding.       Simona Ramirez RN

## 2018-02-13 NOTE — PROGRESS NOTES
Problem: Mobility Impaired (Adult and Pediatric)  Goal: *Acute Goals and Plan of Care (Insert Text)  Physical Therapy Goals  Initiated 2/8/2018  1. Patient will move from supine to sit and sit to supine  in bed with minimal assistance/contact guard assist within 5 days. 2.  Patient will perform sit to/from stand with supervision/set-up within 5 days. 3.  Patient will ambulate 150 feet with least restrictive assistive device and supervision/set-up within 5 days. 4.  Patient will ascend/descend 5 stairs with handrail(s) per home needs with contact guard assist within 5 days. 5.  Patient will perform cardiac exercises per protocol with independence within 5 days. 6.  Patient will verbally and functionally recall 3/3 sternal precautions within 5 days. physical Therapy TREATMENT  Patient: Jerry Ambrose (73 y.o. female)  Date: 2/13/2018  Diagnosis: Chest pain  Unstable angina (HCC)  unknown Chest pain  Procedure(s) (LRB):  CORONARY ARTERY BYPASS GRAFTING X 4 WITH LIMA, LESVGH, ECC, JUANJO AND EPIAORTIC U/S BY DR Leon Corona (N/A) 7 Days Post-Op  Precautions: Fall, Sternal  Chart, physical therapy assessment, plan of care and goals were reviewed. ASSESSMENT:  Patient's daughter present throughout session, assisting with translation and par-taking in education. Patient continues to complain of right hand burning/tingling and right axillary pain (possible brachial plexus injury?). Reviewed 5-lb weight lifting restrictions, use of bear for sternal support while coughing/sneezing, use of incentive spirometer, role of HHPT, cardiac exercises and frequency of performance, signs/symptoms of DVTs/pneumonia, and importance of frequent mobilization; both the patient and her daughter verbalized understanding. Instructed patient's daughter on how to assist with bed mobility - able to provide minimal assistance with good body mechanics.  Patient completed gait training with rolling walker (utilized for balance only); this PT provided intermittent walker management cues which patient's daughter able to enforce following. Overall, patient completed gait training with CLOSE standby-assist/CGA (provided daughter with gait belt to utilize within her home). Initiated stair training; patient required left railing and HHA on the right for assistance (overall minimal assist for ascending; moderate assist for descending); suggested open palm on railing to prevent pulling/pushing. Since patient's daughter would like to take her home, recommend 24 hour supervision for mobility and use of a RW for balance with HHPT to wean from DME safely. Progression toward goals:  [x]      Improving appropriately and progressing toward goals  []      Improving slowly and progressing toward goals  []      Not making progress toward goals and plan of care will be adjusted     PLAN:  Patient continues to benefit from skilled intervention to address the above impairments. Continue treatment per established plan of care. Discharge Recommendations:  HHPT + 24/7 supervision/assistance   Further Equipment Recommendations for Discharge:  RW (CMS delivered to patient's room already)     SUBJECTIVE:   Patient stated Thank you.    The patient stated 3/3 sternal precautions. Reviewed all 3 with patient. Intermittent verbal cuing required for adherence, particularly during sit <> stand transfers. OBJECTIVE DATA SUMMARY:   Patient mobilized on continuous portable monitor/telemetry.   Critical Behavior:  Neurologic State: Alert  Orientation Level: Oriented X4  Cognition: Appropriate decision making, Appropriate for age attention/concentration, Appropriate safety awareness, Follows commands  Safety/Judgement: Awareness of environment, Insight into deficits  Functional Mobility Training:  Bed Mobility:  Log    Supine to Sit: Assist x1;Minimum assistance (Daughter able to assist; Appropriate body mechanics)   Scooting: Contact guard assistance  Transfers:  Sit to Stand: Supervision  Stand to Sit: Supervision   Balance:  Sitting: Intact  Standing: Intact; With support  Standing - Static: Good;Constant support (RW for balance)  Standing - Dynamic : Good (With RW support)  Ambulation/Gait Training:  Distance (ft): 200 Feet (ft)  Assistive Device: Gait belt;Walker, rolling  Ambulation - Level of Assistance: Stand-by asssistance (CLOSE)  Gait Abnormalities: Antalgic;Decreased step clearance  Base of Support: Widened  Step Length: Right shortened;Left shortened    Stairs:  Number of Stairs Trained: 3  Stairs - Level of Assistance: Minimum assistance  Rail Use: Left  (Open palm; Rail for balance only)    Therapeutic Exercises:   Patient instructed on the benefits and demonstrated cardiac exercises while seated in chair with concurrent demonstration. Instructed and indicated understanding on how to progress reps and sets against gravity, working up to 5 lbs and so on based on surgeon clearance for more weight in prep for functional activity. Can use household items for weights.      CARDIAC  EXERCISE   Sets   Reps   Active Active Assist   Passive Self ROM   Comments   Shoulder flexion  5 [x]                                            []                                            []                                            []                                               Shoulder abduction      5 [x]                                            []                                            []                                            []                                               Scapular elevation  5 [x]                                            []                                            []                                            []                                               Scapular retraction  5 [x]                                            []                                            []                                            [] Trunk rotation  5 [x]                                            []                                            []                                            []                                               Trunk sidebending  5 [x]                                            []                                            []                                            []                                                  []                                            []                                            []                                            []                                                 Pain:  Pain Scale 1: Numeric (0 - 10)  Pain Intensity 1: 3  Pain Location 1: Chest  Pain Orientation 1: Medial  Pain Description 1: Aching;Constant  Pain Intervention(s) 1: Medication (see MAR)     Activity Tolerance:   Please refer to the flowsheet for vital signs taken during this treatment.   After treatment:   [x] Patient left in no apparent distress sitting up in chair  [] Patient left in no apparent distress in bed  [x] Call bell left within reach  [x] Nursing notified  [] Caregiver present  [] Bed alarm activated    COMMUNICATION/COLLABORATION:   The patients plan of care was discussed with: Registered Nurse    Tenzin Jiménez PT, DPT   Time Calculation: 23 mins

## 2018-02-13 NOTE — PROGRESS NOTES
Cardiac Surgery Care Coordinator- Met with Panchito Crow and her daughter. Reviewed plan of care and discussed discharge date. Mrs Laurent Hughes stated she would be ready tomorrow to be discharged. Using her daughter as  at her request, reviewed discharge plan. Reinforced sternal precautions and encouraged continued use of the incentive spirometer. Reviewed goals for the day and emphasized the importance of increased activity to meet discharge goals. Red reminder bracelet on right wrist, reviewed purpose of the bracelet and when to call MD.  Mrs Schuyler Santoyo daughter Isa Medley stated that she lives with them full time. Will continue to follow for educational and emotional needs.  Umu Acuña RN

## 2018-02-13 NOTE — PROGRESS NOTES
CSS FLOOR Progress Note    Admit Date: 2018  POD: 7 Days Post-Op      Procedure:  Procedure(s):  CORONARY ARTERY BYPASS GRAFTING X 4 WITH LIMA, LESVGH, ECC, JUANJO AND EPIAORTIC U/S BY DR Ten Recinos    Subjective:   Pt seen with Dr. Madonna Arriaga, pt c/o R hand numbness and chest soreness    Objective:     Visit Vitals    /40 (BP 1 Location: Left arm, BP Patient Position: Sitting)    Pulse 75    Temp 98 °F (36.7 °C)    Resp 18    Ht 5' 6\" (1.676 m)    Wt 217 lb 9.5 oz (98.7 kg)    SpO2 96%    BMI 35.12 kg/m2     Temp (24hrs), Av.4 °F (36.9 °C), Min:98 °F (36.7 °C), Max:98.9 °F (37.2 °C)      Last 24hr Input/Output:    Intake/Output Summary (Last 24 hours) at 18  Last data filed at 18   Gross per 24 hour   Intake                0 ml   Output              300 ml   Net             -300 ml        EKG/Rhythm: NSR in 70's     Oxygen: RA    Admission Weight: Last Weight   Weight: 220 lb (99.8 kg) Weight: 217 lb 9.5 oz (98.7 kg)       EXAM:  General: Pleasant, in no apparent distress   Lungs:   Slightly diminished bases bilat    Incision:  No erythema, drainage or swelling. Heart:  Regular rate and rhythm, S1, S2 normal, no murmur, click, rub or gallop. Abdomen:   Soft, non-tender. Bowel sounds normal. No masses,  No organomegaly. Extremities:  No edema. PPP   Neurologic:  Gross motor and sensory apparatus intact. Activity: ad trena    Diet:  Heart healthy     Lab Data Reviewed: Recent Labs      18   WBC   --   11.7*   HGB   --   9.2*   HCT   --   28.3*   PLT   --   223   NA   --   137   K   --   4.8   BUN   --   41*   CREA   --   1.43*   GLU   --   115*   GLUCPOC  125*   --          Assessment:     Principal Problem:    Chest pain (2018)    Active Problems:    Unstable angina ([address] (2/3/2018)      S/P CABG x 4 (2018)      Overview:  On Pump CORONARY ARTERY BYPASS GRAFTING X 4 WITH NAIR to LAD, Sequential       LSVG OM1 and OM2, LSVG to RCA [facility]         Plan/Recommendations/Medical Decision Makin. CAD s/p CABG: On ASA, statin, BB  2. Atelectasis: off O2, encourage I/S  3. Nausea: resolved  4. Post op anemia s/t acute blood loss: Hgb 9.2, monitor H&H  5. Thromocytopenia: resolved  6. LUCIA: Cr 1.43, monitor U/O,  Cont PO lasix  7. IDDM: BS better, cont lantus, mealtime insulin and januvia, DTC following. 8. Hx HTN: cont BB, norvasc  9. Hx glaucoma: cont eye drops  10. HLD: Cont statin  11. Obesity: BMI 34, encourage heart healthy diet and weight loss when appropriate  12. R hand numbness: consistent w/ nerve injury postop, hand exercises  13.  Dispo: PT/OT, pt wants to stay one more day - reinforced she would be discharged tomorrow, pt and family in agreement    Signed By: Dennys Martin NP

## 2018-02-13 NOTE — DIABETES MGMT
DTC Cardiac Progress Note    Recommendations/ Comments: Chart reviewed for hyperglycemia, but patient was hypoglycemic yesterday am.  If patient is eating at least 50%, consider:  - Increasing pre-meal Lispro to 3 units tid ac (currently 2 units)    Current hospital DM medication: Lantus 45 units daily, Humalog 2 units AC and Humalog for correction, normal sensitivity scale       Patient is a 65 y.o. female with known DM on the following at home:                        Metformin 500 mg (2) tabs at supper                        Januvia 100 mg once a day                        Humalog Mix 75/25, 64 units each AM and 32 units each PM - listed but not taking this    A1c:   Lab Results   Component Value Date/Time    Hemoglobin A1c 8.0 (H) 02/05/2018 05:00 PM    Hemoglobin A1c 7.8 (H) 02/02/2018 04:23 PM       Recent Glucose Results:   Lab Results   Component Value Date/Time     (H) 02/13/2018 04:49 AM    GLUCPOC 188 (H) 02/13/2018 11:17 AM    GLUCPOC 125 (H) 02/13/2018 07:05 AM    GLUCPOC 252 (H) 02/12/2018 09:29 PM        Lab Results   Component Value Date/Time    Creatinine 1.43 (H) 02/13/2018 04:49 AM     Estimated Creatinine Clearance: 38.4 mL/min (based on Cr of 1.43). Active Orders   Diet    DIET DIABETIC WITH OPTIONS Consistent Carb 2000kcal; Regular; AHA-LOW-CHOL FAT        PO intake:   Patient Vitals for the past 72 hrs:   % Diet Eaten   02/13/18 0449 0 %   02/12/18 2355 0 %   02/12/18 1935 0 %   02/12/18 0844 65 %   02/11/18 1751 100 %   02/11/18 0904 50 %   02/10/18 1415 75 %       Will continue to follow as needed.     Thank you  Eliot Malhotra, MS, RN, CDE

## 2018-02-13 NOTE — ROUTINE PROCESS
1935 Bedside shift change report given to [address] [place] [address] Jeffery, RN (oncoming nurse) by Elvira Cavanaugh RN (offgoing nurse). Report included the following information SBAR, Recent Results and Med Rec Status.     [id number]

## 2018-02-14 VITALS
RESPIRATION RATE: 16 BRPM | HEART RATE: 67 BPM | TEMPERATURE: 98.4 F | WEIGHT: 217.37 LBS | BODY MASS INDEX: 34.93 KG/M2 | SYSTOLIC BLOOD PRESSURE: 130 MMHG | OXYGEN SATURATION: 95 % | HEIGHT: 66 IN | DIASTOLIC BLOOD PRESSURE: 67 MMHG

## 2018-02-14 DIAGNOSIS — Z95.1 S/P CABG X 4: Primary | ICD-10-CM

## 2018-02-14 DIAGNOSIS — E11.21 TYPE 2 DIABETES MELLITUS WITH NEPHROPATHY (HCC): ICD-10-CM

## 2018-02-14 LAB
ANION GAP SERPL CALC-SCNC: 5 MMOL/L (ref 5–15)
BUN SERPL-MCNC: 38 MG/DL (ref 6–20)
BUN/CREAT SERPL: 28 (ref 12–20)
CALCIUM SERPL-MCNC: 8.3 MG/DL (ref 8.5–10.1)
CHLORIDE SERPL-SCNC: 102 MMOL/L (ref 97–108)
CO2 SERPL-SCNC: 30 MMOL/L (ref 21–32)
CREAT SERPL-MCNC: 1.38 MG/DL (ref 0.55–1.02)
ERYTHROCYTE [DISTWIDTH] IN BLOOD BY AUTOMATED COUNT: 14 % (ref 11.5–14.5)
GLUCOSE BLD STRIP.AUTO-MCNC: 128 MG/DL (ref 65–100)
GLUCOSE BLD STRIP.AUTO-MCNC: 219 MG/DL (ref 65–100)
GLUCOSE SERPL-MCNC: 130 MG/DL (ref 65–100)
HCT VFR BLD AUTO: 25.5 % (ref 35–47)
HGB BLD-MCNC: 8.3 G/DL (ref 11.5–16)
MAGNESIUM SERPL-MCNC: 2.2 MG/DL (ref 1.6–2.4)
MCH RBC QN AUTO: 31.6 PG (ref 26–34)
MCHC RBC AUTO-ENTMCNC: 32.5 G/DL (ref 30–36.5)
MCV RBC AUTO: 97 FL (ref 80–99)
NRBC # BLD: 0 K/UL (ref 0–0.01)
NRBC BLD-RTO: 0 PER 100 WBC
PLATELET # BLD AUTO: 209 K/UL (ref 150–400)
PMV BLD AUTO: 11.3 FL (ref 8.9–12.9)
POTASSIUM SERPL-SCNC: 4.8 MMOL/L (ref 3.5–5.1)
RBC # BLD AUTO: 2.63 M/UL (ref 3.8–5.2)
SERVICE CMNT-IMP: ABNORMAL
SERVICE CMNT-IMP: ABNORMAL
SODIUM SERPL-SCNC: 137 MMOL/L (ref 136–145)
WBC # BLD AUTO: 9.3 K/UL (ref 3.6–11)

## 2018-02-14 PROCEDURE — 83735 ASSAY OF MAGNESIUM: CPT | Performed by: NURSE PRACTITIONER

## 2018-02-14 PROCEDURE — 74011636637 HC RX REV CODE- 636/637: Performed by: NURSE PRACTITIONER

## 2018-02-14 PROCEDURE — 97110 THERAPEUTIC EXERCISES: CPT

## 2018-02-14 PROCEDURE — 80048 BASIC METABOLIC PNL TOTAL CA: CPT | Performed by: NURSE PRACTITIONER

## 2018-02-14 PROCEDURE — 74011250637 HC RX REV CODE- 250/637: Performed by: NURSE PRACTITIONER

## 2018-02-14 PROCEDURE — 82962 GLUCOSE BLOOD TEST: CPT

## 2018-02-14 PROCEDURE — 85027 COMPLETE CBC AUTOMATED: CPT | Performed by: NURSE PRACTITIONER

## 2018-02-14 PROCEDURE — 97530 THERAPEUTIC ACTIVITIES: CPT

## 2018-02-14 PROCEDURE — 36415 COLL VENOUS BLD VENIPUNCTURE: CPT | Performed by: NURSE PRACTITIONER

## 2018-02-14 RX ORDER — AMOXICILLIN 250 MG
1 CAPSULE ORAL 2 TIMES DAILY
Qty: 60 TAB | Refills: 1 | Status: SHIPPED | OUTPATIENT
Start: 2018-02-14 | End: 2020-06-09

## 2018-02-14 RX ORDER — METOPROLOL TARTRATE 100 MG/1
100 TABLET ORAL 2 TIMES DAILY
Qty: 60 TAB | Refills: 1 | Status: SHIPPED | OUTPATIENT
Start: 2018-02-14 | End: 2018-04-10 | Stop reason: DRUGHIGH

## 2018-02-14 RX ORDER — POTASSIUM CHLORIDE 1500 MG/1
20 TABLET, FILM COATED, EXTENDED RELEASE ORAL DAILY
Qty: 7 TAB | Refills: 0 | Status: SHIPPED | OUTPATIENT
Start: 2018-02-15 | End: 2018-03-06

## 2018-02-14 RX ORDER — OXYCODONE AND ACETAMINOPHEN 5; 325 MG/1; MG/1
1-2 TABLET ORAL
Qty: 40 TAB | Refills: 0 | Status: SHIPPED | OUTPATIENT
Start: 2018-02-14 | End: 2018-03-06 | Stop reason: ALTCHOICE

## 2018-02-14 RX ORDER — INSULIN LISPRO 100 [IU]/ML
5 INJECTION, SOLUTION INTRAVENOUS; SUBCUTANEOUS
Qty: 1 VIAL | Refills: 1 | Status: SHIPPED
Start: 2018-02-14 | End: 2018-02-14

## 2018-02-14 RX ORDER — INSULIN GLARGINE 100 [IU]/ML
48 INJECTION, SOLUTION SUBCUTANEOUS
Qty: 15 ADJUSTABLE DOSE PRE-FILLED PEN SYRINGE | Refills: 1 | Status: SHIPPED | OUTPATIENT
Start: 2018-02-14 | End: 2018-02-19 | Stop reason: SDUPTHER

## 2018-02-14 RX ORDER — AMIODARONE HYDROCHLORIDE 200 MG/1
TABLET ORAL
Qty: 84 TAB | Refills: 0 | Status: SHIPPED | OUTPATIENT
Start: 2018-02-14 | End: 2018-04-10 | Stop reason: ALTCHOICE

## 2018-02-14 RX ORDER — INSULIN LISPRO 100 [IU]/ML
5 INJECTION, SOLUTION INTRAVENOUS; SUBCUTANEOUS
Qty: 9 ADJUSTABLE DOSE PRE-FILLED PEN SYRINGE | Refills: 1 | Status: SHIPPED | OUTPATIENT
Start: 2018-02-14 | End: 2018-02-19 | Stop reason: SDUPTHER

## 2018-02-14 RX ORDER — INSULIN GLARGINE 100 [IU]/ML
48 INJECTION, SOLUTION SUBCUTANEOUS
Qty: 1 VIAL | Refills: 1 | Status: SHIPPED | OUTPATIENT
Start: 2018-02-14 | End: 2018-04-10 | Stop reason: SDUPTHER

## 2018-02-14 RX ORDER — AMLODIPINE BESYLATE 2.5 MG/1
2.5 TABLET ORAL DAILY
Qty: 30 TAB | Refills: 1 | Status: SHIPPED | OUTPATIENT
Start: 2018-02-15 | End: 2018-04-10 | Stop reason: DRUGHIGH

## 2018-02-14 RX ORDER — INSULIN LISPRO 100 [IU]/ML
5 INJECTION, SOLUTION INTRAVENOUS; SUBCUTANEOUS
Qty: 1 VIAL | Refills: 1 | Status: SHIPPED
Start: 2018-02-14 | End: 2018-05-11 | Stop reason: ALTCHOICE

## 2018-02-14 RX ORDER — FUROSEMIDE 40 MG/1
40 TABLET ORAL DAILY
Qty: 7 TAB | Refills: 0 | Status: SHIPPED | OUTPATIENT
Start: 2018-02-14 | End: 2018-02-21

## 2018-02-14 RX ADMIN — AMIODARONE HYDROCHLORIDE 400 MG: 200 TABLET ORAL at 09:01

## 2018-02-14 RX ADMIN — METOPROLOL TARTRATE 100 MG: 50 TABLET ORAL at 09:01

## 2018-02-14 RX ADMIN — FUROSEMIDE 40 MG: 40 TABLET ORAL at 09:02

## 2018-02-14 RX ADMIN — DOCUSATE SODIUM AND SENNOSIDES 1 TABLET: 8.6; 5 TABLET, FILM COATED ORAL at 09:01

## 2018-02-14 RX ADMIN — Medication 10 ML: at 07:08

## 2018-02-14 RX ADMIN — POTASSIUM CHLORIDE 40 MEQ: 750 TABLET, EXTENDED RELEASE ORAL at 09:01

## 2018-02-14 RX ADMIN — INSULIN LISPRO 3 UNITS: 100 INJECTION, SOLUTION INTRAVENOUS; SUBCUTANEOUS at 12:07

## 2018-02-14 RX ADMIN — TIMOLOL MALEATE 1 DROP: 5 SOLUTION OPHTHALMIC at 09:06

## 2018-02-14 RX ADMIN — BRIMONIDINE TARTRATE 1 DROP: 2 SOLUTION OPHTHALMIC at 09:05

## 2018-02-14 RX ADMIN — SITAGLIPTIN 50 MG: 25 TABLET, FILM COATED ORAL at 09:01

## 2018-02-14 RX ADMIN — INSULIN LISPRO 3 UNITS: 100 INJECTION, SOLUTION INTRAVENOUS; SUBCUTANEOUS at 12:06

## 2018-02-14 RX ADMIN — ASPIRIN 81 MG 81 MG: 81 TABLET ORAL at 09:02

## 2018-02-14 RX ADMIN — AMLODIPINE BESYLATE 2.5 MG: 5 TABLET ORAL at 09:02

## 2018-02-14 RX ADMIN — POLYETHYLENE GLYCOL 3350 17 G: 17 POWDER, FOR SOLUTION ORAL at 09:07

## 2018-02-14 RX ADMIN — PANTOPRAZOLE SODIUM 40 MG: 40 TABLET, DELAYED RELEASE ORAL at 07:08

## 2018-02-14 RX ADMIN — Medication 400 MG: at 09:02

## 2018-02-14 NOTE — PROGRESS NOTES
1930: Bedside and Verbal shift change report given to Que Rey RN (oncoming nurse) by Anita Brito RN (offgoing nurse). Report included the following information SBAR, Kardex, Intake/Output, MAR, Recent Results and Cardiac Rhythm NSR.   0730: Bedside and Verbal shift change report given to Serjio Thao RN (oncoming nurse) by Que Rey RN (offgoing nurse). Report included the following information SBAR, Kardex, Intake/Output, MAR, Recent Results and Cardiac Rhythm NSR. Problem: Falls - Risk of  Goal: *Absence of Falls  Document Sania Fall Risk and appropriate interventions in the flowsheet.    Outcome: Progressing Towards Goal  Fall Risk Interventions:  Mobility Interventions: Communicate number of staff needed for ambulation/transfer         Medication Interventions: Patient to call before getting OOB, Teach patient to arise slowly    Elimination Interventions: Call light in reach    History of Falls Interventions: Bed/chair exit alarm, Consult care management for discharge planning, Door open when patient unattended, Evaluate medications/consider consulting pharmacy        Problem: Pressure Injury - Risk of  Goal: *Prevention of pressure ulcer  Outcome: Progressing Towards Goal   02/13/18 2019   Wound Prevention and Protection Methods   Orientation of Wound Prevention Posterior   Location of Wound Prevention Sacrum/Coccyx   Dressing Present  No   Read Only, Retired: Wound Treatment (non-mechanical)   Wound Offloading (Prevention Methods) Bed, pressure redistribution/air;Bed, pressure reduction mattress;Pillows;Repositioning;Turning

## 2018-02-14 NOTE — DISCHARGE SUMMARY
d  [facility] Discharge Summary     Patient ID:  Shan Owens  [id number]  65 y.o.  1940    Admit date: 2/2/2018    Discharge date: 2/14/2018     Admitting Physician: Jeremiah Self MD     Referring Cardiologist:  Dr. Evelio Aldridge    PCP:  Loni Damon MD    Admitting Diagnoses: CAD    Discharge Diagnoses:     Hospital Problems  Date Reviewed: 2/6/2018          Codes Class Noted POA    S/P CABG x 4 ICD-10-CM: Z95.1  ICD-9-CM: V45.81  2/6/2018 Unknown    Overview Signed 2/6/2018  4:57 PM by MANUEL Vargas     On Pump CORONARY ARTERY BYPASS GRAFTING X 4 WITH NAIR to LAD, Sequential LSVG OM1 and OM2, LSVG to RCA   [facility]             Unstable angina ([facility] [address] ICD-10-CM: I20.0  ICD-9-CM: 411.1  2/3/2018 Unknown        * (Principal)Chest pain ICD-10-CM: R07.9  ICD-9-CM: 786.50  2/2/2018 Unknown              Discharged Condition: good    Disposition: home, see patient instructions for treatment and plan    Procedures for this admission:  Procedure(s):  CORONARY ARTERY BYPASS GRAFTING X 4 WITH LIMA, LESVGH, ECC, JUANJO AND EPIAORTIC U/S BY  [facility]    Discharge Medications:      My Medications      START taking these medications       Instructions Each Dose to Equal   Morning Noon Evening Bedtime    amiodarone 200 mg tablet   Commonly known as:  CORDARONE       Your last dose was: Your next dose is: Take 400 mg by mouth twice daily for 2 weeks, then take 400 mg by mouth daily for 2 weeks. Then you are done with this medication. amLODIPine 2.5 mg tablet   Commonly known as:  Larayne Agustín   Start taking on:  2/15/2018       Your last dose was: Your next dose is: Take 1 Tab by mouth daily. 2.5 mg                        furosemide 40 mg tablet   Commonly known as:  LASIX       Your last dose was: Your next dose is: Take 1 Tab by mouth daily for 7 days.     40 mg                        insulin glargine 100 unit/mL injection   Commonly known as: LANTUS       Your last dose was: Your next dose is:              48 Units by SubCUTAneous route nightly. 48 Units                        insulin lispro 100 unit/mL injection   Commonly known as:  HUMALOG       Your last dose was: Your next dose is:              5 Units by SubCUTAneous route Before breakfast, lunch, and dinner. 5 Units                        metoprolol tartrate 100 mg IR tablet   Commonly known as:  LOPRESSOR       Your last dose was: Your next dose is: Take 1 Tab by mouth two (2) times a day. 100 mg                        oxyCODONE-acetaminophen 5-325 mg per tablet   Commonly known as:  PERCOCET       Your last dose was: Your next dose is: Take 1-2 Tabs by mouth every four (4) hours as needed. Max Daily Amount: 12 Tabs. 1-2 Tab                        potassium chloride SR 20 mEq tablet   Commonly known as:  K-TAB   Start taking on:  2/15/2018       Your last dose was: Your next dose is: Take 1 Tab by mouth daily. 20 mEq                        senna-docusate 8.6-50 mg per tablet   Commonly known as:  PERICOLACE       Your last dose was: Your next dose is: Take 1 Tab by mouth two (2) times a day. Take until having regular bowel movements. 1 Tab                          CHANGE how you take these medications       Instructions Each Dose to Equal   Morning Noon Evening Bedtime    gabapentin 300 mg capsule   Commonly known as:  NEURONTIN   What changed:    - when to take this  - additional instructions       Your last dose was: Your next dose is: Take 1 Cap by mouth three (3) times daily. 300 mg                          CONTINUE taking these medications       Instructions Each Dose to Equal   Morning Noon Evening Bedtime    aspirin delayed-release 81 mg tablet       Your last dose was: Your next dose is: Take 81 mg by mouth daily.     81 mg COMBIGAN 0.2-0.5 % Drop ophthalmic solution   Generic drug:  brimonidine-timolol       Your last dose was: Your next dose is:              INSTILL 1 DROP IN RIGHT EYE EVERY MORNING AND NIGHT. dicyclomine 10 mg capsule   Commonly known as:  BENTYL       Your last dose was: Your next dose is: Take 1 Cap by mouth three (3) times daily as needed. For cramping    10 mg                        ergocalciferol 50,000 unit capsule   Commonly known as:  ERGOCALCIFEROL       Your last dose was: Your next dose is:              TAKE ONE CAPSULE BY MOUTH EVERY 7 DAYS                         esomeprazole 20 mg capsule   Commonly known as:  Ever Blonder       Your last dose was: Your next dose is:              TAKE ONE CAPSULE BY MOUTH DAILY                         polyethylene glycol 17 gram/dose powder   Commonly known as:  MIRALAX       Your last dose was: Your next dose is:              USE 1 TABLESPOONFUL AS DIRECTED BY MOUTH EVERY DAY                         rosuvastatin 40 mg tablet   Commonly known as:  CRESTOR       Your last dose was: Your next dose is: Take 1 Tab by mouth daily.     40 mg                          STOP taking these medications          amoxicillin-clavulanate 875-125 mg per tablet   Commonly known as:  AUGMENTIN           hydroCHLOROthiazide 25 mg tablet   Commonly known as:  HYDRODIURIL           Insulin Lisp & Lisp Prot (Hum) 100 unit/mL (75-25) Inpn   Commonly known as:  HumaLOG Mix 75-25 KwikPen           JANUVIA 100 mg tablet   Generic drug:  SITagliptin           lisinopril 40 mg tablet   Commonly known as:  PRINIVIL, ZESTRIL           metFORMIN  mg tablet   Commonly known as:  GLUCOPHAGE XR           naproxen sodium 220 mg tablet   Commonly known as:  ALEVE                Where to Get Your Medications      These medications were sent to [facility]/pharmacy #[id number]- [place], [address] [facility], [address] [address]     Phone:  [phone]     amiodarone 200 mg tablet    amLODIPine 2.5 mg tablet    furosemide 40 mg tablet    insulin glargine 100 unit/mL injection    metoprolol tartrate 100 mg IR tablet    potassium chloride SR 20 mEq tablet    senna-docusate 8.6-50 mg per tablet         Information on where to get these meds will be given to you by the nurse or doctor. ! Ask your nurse or doctor about these medications     insulin lispro 100 unit/mL injection    oxyCODONE-acetaminophen 5-325 mg per tablet           HPI: Mj Davila is a 65 y.o. female who was referred for cardiac evaluation from Dr. Lazaro Parks. Patient speaks only [place]. Information obtained from family and chart review. Patient had a chief complaint of shortness of breath worsening over the past 2 weeks. Shortness of breath worse with exertion. This has also been accompanied by chest pain. Troponins were negative in the ED. Some mild ST changes on EKG in ED. Past medical history significant for hypertension, hyperlipidemia, glaucoma, DM, CKD stage 3. Denies smoking and alcohol. Hospital Course: The patient underwent a CABGx4 by Dr. Thad Huang on 2/6/18 -see op note for details. The patient was transferred to the ICU in stable condition on amiodarone, insulin, and precedex gtts. She was extubated on 2/6/18 at 2330. She was hemodynamically stable and transferred to stepdown on POD 1. After medical optimization and working with PT/OT she is being discharged home. POD#1: 1. CAD s/p CABG: On ASA, start statin, no BB until appropriate  2. Atelectasis: Wean O2 for sats >92%, encourage I/S  3. Nausea: Cont PRN zofran, start PRN compazine. 4. Post op anemia s/t acute blood loss: Hgb 8.4, monitor CT output and H&H  5. Thromocytopenia: Plt 96, change pepcid to protonix, monitor  6. LUCIA: Cr 1.21, monitor U/O  7.  IDDM: Cont insulin gtt per protocol, DTC consult  8. H HTN: Resume meds when appropriate  9. Hx glaucoma: cont eye drops  10. HLD: Resume statin  11. Obesity: BMI 34, encourage heart healthy diet and weight loss when appropriate  12. Dispo: PT/OT, transfer to stepdown later today. POD#8: 1. CAD s/p CABG: On ASA, statin, BB  2. Atelectasis: off O2, encourage I/S  3. Nausea: resolved  4. Post op anemia s/t acute blood loss: Hgb 9.2, monitor H&H  5. Thromocytopenia: resolved  6. LUCIA: Cr 1.38, monitor, Cont PO lasix  7. IDDM: BS better, cont lantus, mealtime insulin and januvia, DTC following -discussed discharge rec 48 units lantus daily and 5 units lispro premeal, stop januvia and metformin. 8. Hx HTN: cont BB, norvasc  9. Hx glaucoma: cont eye drops  10. HLD: Cont statin  11. Obesity: BMI 34, encourage heart healthy diet and weight loss when appropriate  12. R hand numbness: consistent w/ nerve injury postop, hand exercises  13. Dispo: PT/OT, plan to dc home later today. Referral to outpatient cardiac rehab made. Discharge Vital Signs:   Visit Vitals    /54 (BP 1 Location: Left arm, BP Patient Position: Sitting)    Pulse 77    Temp 98.1 °F (36.7 °C)    Resp 18    Ht 5' 6\" (1.676 m)    Wt 217 lb 6 oz (98.6 kg)    SpO2 95%    BMI 35.09 kg/m2       Labs:   Recent Labs      02/14/18   0637  02/14/18   0332   WBC   --   9.3   HGB   --   8.3*   HCT   --   25.5*   PLT   --   209   NA   --   137   K   --   4.8   BUN   --   38*   CREA   --   1.38*   GLU   --   130*   GLUCPOC  128*   --        Diagnostics: PA/lat: IMPRESSION:      Small bilateral postoperative pleural effusions. Patient Instructions/Follow Up Care:  Discharge instructions were reviewed with the patient and family present. Questions were also answered at this time. Prescriptions and medications were reviewed. The patient has a follow up appointment with the Nurse Practitioner or [de-identified] Assistant on 2/19 and with Dr. Juliann Amado on 3/6.  The patient was also instructed to follow up with her primary care physician as needed. The patient and family were encouraged to call with any questions or concerns.        Signed:  Keri Stover NP  2/14/2018  9:40 AM     Saw patient, agree with above  Risk of morbidity and mortality - high  Medical decision making - high complexity

## 2018-02-14 NOTE — PROGRESS NOTES
Cardiac Surgery Care Coordinator- Met with Marisela Bright and her daughter, reviewed plan of care and offered emotional support. They are preparing for discharge today. Reviewed new medications and provided Ms Delicia Washington with medication education sheets. They are without questions or concerns at this time.  Jamarcus Botello RN

## 2018-02-14 NOTE — PROGRESS NOTES
1438 - received discharge instructions, tele and piv removed per hospital policy, reviewed discharge instructions and medications with patient and daughter, no further questions, discharged home with daughter.

## 2018-02-14 NOTE — PROGRESS NOTES
Problem: Self Care Deficits Care Plan (Adult)  Goal: *Acute Goals and Plan of Care (Insert Text)  Occupational Therapy Goals  Initiated 2/8/2018  1. Patient will perform ADLs standing 5 mins without fatigue or LOB with supervision/set-up within 7 day(s). 2.  Patient will perform lower body ADLs with supervision/set-up within 7 day(s). 3.  Patient will perform bathing with supervision/set-up within 7 day(s). 4.  Patient will perform toilet transfers with supervision/set-up within 7 day(s). 5.  Patient will perform all aspects of toileting with supervision/set-up within 7 day(s). 6.  Patient will participate in cardiac/sternal upper extremity therapeutic exercise/activities to increase independence with ADLs with supervision/set-up for 5 minutes within 7 day(s). Occupational Therapy TREATMENT  Patient: Jody Dixon (73 y.o. female)  Date: 2/14/2018  Diagnosis: Chest pain  Unstable angina (HCC)  unknown Chest pain  Procedure(s) (LRB):  CORONARY ARTERY BYPASS GRAFTING X 4 WITH LIMA, LESVCHUNG, ECC, JUANJO AND EPIAORTIC U/S BY DR Arsen Mendoza (N/A) 8 Days Post-Op  Precautions: Fall, Sternal  Chart, occupational therapy assessment, plan of care, and goals were reviewed. ASSESSMENT:  Based on the objective data described below, the patient presents with impaired cardiopulmonary endurance, sternal precautions, pain at sternum, impaired functional mobility, and impaired ADL independence s/p CABG x4 POD8.  Patient received sitting in chair, daughter present for translation (patient speaks [place], not [place]).  Patient alert, Ox4, and agreeable to therapy.  Patient able to recall 3/3 sternal precautions. Patient reports decreased sensation and weakness in R hand, dorsal and volar, fifth digit and medial 1/2 of 4th (ulnar nerve distribution).   Patient given theraputty and instructed in exercises for individual/ composite flexion/ extension   Patient and daughter receptive to education on activity recommendations, ADL modifications, and home safety. Patient's daughter demonstrates understanding of caregiver assistance and is available 24/7 for supervision/ assistance. Recommend discharge home with family support. Progression toward goals:  [x]       Improving appropriately and progressing toward goals  []       Improving slowly and progressing toward goals  []       Not making progress toward goals and plan of care will be adjusted     PLAN:  Patient continues to benefit from skilled intervention to address the above impairments. Continue treatment per established plan of care. Discharge Recommendations: home with family support  Further Equipment Recommendations for Discharge:  none     SUBJECTIVE:   Patient stated I can't feel much in these two fingers.  (per daughter's translation)    The patient stated 3/3 sternal precautions. Reviewed all 3 with patient. OBJECTIVE DATA SUMMARY:   Cognitive/Behavioral Status:  Neurologic State: Alert  Orientation Level: Oriented X4  Cognition: Appropriate decision making; Appropriate for age attention/concentration; Appropriate safety awareness; Follows commands  Perception: Appears intact  Perseveration: No perseveration noted  Safety/Judgement: Awareness of environment; Insight into deficits;Good awareness of safety precautions    Functional Mobility and Transfers for ADLs:  Bed Mobility:       Transfers:          Balance:  Sitting: Intact    ADL Intervention:                                     Cognitive Retraining  Safety/Judgement: Awareness of environment; Insight into deficits;Good awareness of safety precautions    Therapeutic Exercises:   Patient given yellow theraputty and instructed in exercises for individual/ composite flexion/ extension with R 4th and 5th digits. Instructed to perform exercises twice/ day. Patient and daughter demonstrate understanding.      Pain:  Pain Scale 1: Numeric (0 - 10)  Pain Intensity 1: 0              Activity Tolerance: VSS    After treatment:   [] Patient left in no apparent distress sitting up in chair  [x] Patient left in no apparent distress in bed  [x] Call bell left within reach  [x] Nursing notified  [x] Caregiver present  [] Bed alarm activated    COMMUNICATION/COLLABORATION:   The patients plan of care was discussed with: Physical Therapist and Registered Nurse    Francine Vang OT  Time Calculation: 23 mins

## 2018-02-14 NOTE — DISCHARGE INSTRUCTIONS
Cardiac Surgery Specialist    [address] [facility]                  [place] [address]  Office- [phone]  Fax- [phone]       Office- [phone]  Fax- [phone]  _____________________________________________________________  Dr. Cindi HARRIS-GARRETT Engel PA-C Mariette Baller PA-C Abigail Carolan PA-C     Name:Karis Zabala     Surgery & Date: Procedure(s):  CORONARY ARTERY BYPASS GRAFTING X 4 WITH LIMASammy 32, ECC, JUANJO AND EPIAORTIC U/S BY  [facility]    Discharge Date: 2/14/18    MEDICATIONS:  Please refer to your After Visit Summary for your medication list. If you do not have a prescription for a new medication, you may purchase the medication over the counter. DO NOT TAKE ANY MEDICATIONS THAT ARE NOT ON THIS LIST    INSTRUCTIONS:  NO SMOKING OR TOBACCO PRODUCTS  Follow all the instructions in your discharge book  You may shower. Wash all incisions twice daily with mild soap and water. No lotions, ointments or powder. Call the office immediately for any redness, swelling, or drainage from your incision. Take your temperature daily and call for a temperature of 101 degrees or higher or for any symptoms that make you think you have and infection. Weigh yourself each morning. Call if you gain more than 5 pounds in 48 hours. Use the incentive spirometer 6-8 times a day-10 breaths each time. Use a pillow or your bear to splint your breastbone when coughing or sneezing. If you feel your breast bone clicking or popping, notify the office immediately. Walk several hundred feet several times daily. DIET  Eat an American Heart Association diet. If you are having trouble with your appetite, eat what you can.   Try eating small, frequent meals throughout the day. ACTIVITY  NO DRIVING--you will be evaluated to drive at your follow up visit. Increase your activity by walking several times a day. Stay out of bed most of the day. When sitting, keep your legs elevated. You may ride in a car, but you must get out every hour and walk around. If you ride in a car with an airbag that can not be switched off, put the seat ALL the way back or ride in the back seat. NO LIFTING MORE THAN 5 POUND FOR 1 MONTH, THEN YOU CAN INCREASE TO NO MORE THAN 10 LBS FOR THE 2nd MONTH AND NO MORE THAN 15 LBS FOR THE 3rd MONTH.  YOU WILL NO LONGER HAVE ANY LIFTING RESTRICTIONS AFTER 3 MONTHS. FOLLOW UP  1. Your first follow up appointment will be on 2/19 at 11:30. Our office is located in [address] [place] [address] on floor G-5. Your second follow up appointment will be in four weeks, on 3/6 at 2:15pm. Please call our office at [phone] if you are unable to make either one of these appointments. 2. You will be receiving a call before your 5 day appointment to begin cardiac rehab. They are located in the [address] [place] [address] on [facility]. Their phone number is [phone]. Please call if you have not been contacted 2-3 weeks after discharge from the hospital.  3. We will make an appointment with your cardiologist at your last appointment. 4. Consult you primary care physician regarding your influenza &   pneumovax vaccines. 5.   Please bring all medications with you to your appointment.     Signature:___________________________________________________

## 2018-02-14 NOTE — DIABETES MGMT
DTC Cardiac Surgery Follow Up Education Note    Recommendations/ Comments: Spoke with Michelle Richards NP, earlier this morning regarding blood sugars and discharge medications. Plan is to not have patient go home on PTA insulin (75/25) due fluctuating intake. Also, plan in to discontinue Metformin and Januvia due to renal status. Patient will be discharged on lantus 48 units and humalog 5 units tid ac. Met with patient's daughter again today to review new regimen and target blood sugars/when to call MD.  Written materials were provided on Friday. Current hospital DM medication: Lantus 45 units, Lispro pre-meal 3 units tid ac and Lispro correction, normal sensitivity    Chart reviewed and initial evaluation complete on Karis Marshchong. Patient is a 65 y.o. female with known DM on the following at home:                        Metformin 500 mg (2) tabs at supper                        Januvia 100 mg once a day                        Humalog Mix 75/25, 33 units BID, but sometimes takes up to 38 units per daughter    A1c:   Lab Results   Component Value Date/Time    Hemoglobin A1c 8.0 (H) 02/05/2018 05:00 PM     Recent Glucose Results:   Lab Results   Component Value Date/Time     (H) 02/14/2018 03:32 AM    GLUCPOC 219 (H) 02/14/2018 11:48 AM    GLUCPOC 128 (H) 02/14/2018 06:37 AM    GLUCPOC 208 (H) 02/13/2018 10:20 PM        Lab Results   Component Value Date/Time    Creatinine 1.38 (H) 02/14/2018 03:32 AM     Estimated Creatinine Clearance: 39.8 mL/min (based on Cr of 1.38). Active Orders   Diet    DIET DIABETIC WITH OPTIONS Consistent Carb 2000kcal; Regular; AHA-LOW-CHOL FAT        PO intake:   Patient Vitals for the past 72 hrs:   % Diet Eaten   02/14/18 0900 75 %   02/13/18 1853 75 %   02/13/18 1112 75 %   02/13/18 0757 75 %   02/13/18 0449 0 %   02/12/18 2355 0 %   02/12/18 1935 0 %   02/12/18 0844 65 %   02/11/18 1751 100 %           Will continue to follow as needed. Thank you.   Mary Alvarado Lopez, MS, RN, CDE

## 2018-02-14 NOTE — PROGRESS NOTES
Bedside and Verbal shift change report given to Mar Joiner RN (oncoming nurse) by Briana Adame RN (offgoing nurse).  Report included the following information SBAR, Kardex, ED Summary, Intake/Output, MAR, Accordion, Recent Results and Cardiac Rhythm SR.

## 2018-02-14 NOTE — PROGRESS NOTES
CSS FLOOR Progress Note    Admit Date: 2018  POD: 8 Days Post-Op      Procedure:  Procedure(s):  CORONARY ARTERY BYPASS GRAFTING X 4 WITH LIMA, LESVGH, ECC, JUANJO AND EPIAORTIC U/S BY DR Margaret Walters    Subjective:   Pt seen with Dr. Candi Burch, afebrile, on RA. Sitting up in chair eating breakfast, no complaints. Objective:     Visit Vitals    /54 (BP 1 Location: Left arm, BP Patient Position: Sitting)    Pulse 77    Temp 98.1 °F (36.7 °C)    Resp 18    Ht 5' 6\" (1.676 m)    Wt 217 lb 6 oz (98.6 kg)    SpO2 95%    BMI 35.09 kg/m2     Temp (24hrs), Av °F (36.7 °C), Min:97.7 °F (36.5 °C), Max:98.1 °F (36.7 °C)      Last 24hr Input/Output:    Intake/Output Summary (Last 24 hours) at 18 4462  Last data filed at 18 0321   Gross per 24 hour   Intake              600 ml   Output                0 ml   Net              600 ml        EKG/Rhythm: NSR in 70's     Oxygen: RA    Admission Weight: Last Weight   Weight: 220 lb (99.8 kg) Weight: 217 lb 6 oz (98.6 kg)       EXAM:  General: Pleasant, in no apparent distress   Lungs:   Slightly diminished bases bilat    Incision:  No erythema, drainage or swelling. Heart:  Regular rate and rhythm, S1, S2 normal, no murmur, click, rub or gallop. Abdomen:   Soft, non-tender. Bowel sounds normal. No masses,  No organomegaly. Extremities:  No edema. PPP   Neurologic:  Gross motor and sensory apparatus intact. Activity: ad trena    Diet:  Heart healthy     Lab Data Reviewed:   Recent Labs      18   0637  18   0332   WBC   --   9.3   HGB   --   8.3*   HCT   --   25.5*   PLT   --   209   NA   --   137   K   --   4.8   BUN   --   38*   CREA   --   1.38*   GLU   --   130*   GLUCPOC  128*   --          Assessment:     Principal Problem:    Chest pain (2018)    Active Problems:    Unstable angina ([address] (2/3/2018)      S/P CABG x 4 (2018)      Overview:  On Pump CORONARY ARTERY BYPASS GRAFTING X 4 WITH NAIR to LAD, Sequential       LSVG OM1 and OM2, LSVG to [facility]         Plan/Recommendations/Medical Decision Makin. CAD s/p CABG: On ASA, statin, BB  2. Atelectasis: off O2, encourage I/S  3. Nausea: resolved  4. Post op anemia s/t acute blood loss: Hgb 9.2, monitor H&H  5. Thromocytopenia: resolved  6. LUCIA: Cr 1.38, monitor, Cont PO lasix  7. IDDM: BS better, cont lantus, mealtime insulin and januvia, DTC following -discussed discharge rec 48 units lantus daily and 5 units lispro premeal, stop januvia and metformin. 8. Hx HTN: cont BB, norvasc  9. Hx glaucoma: cont eye drops  10. HLD: Cont statin  11. Obesity: BMI 34, encourage heart healthy diet and weight loss when appropriate  12. R hand numbness: consistent w/ nerve injury postop, hand exercises  13. Dispo: PT/OT, plan to dc home later today.     Signed By: Rolando Salinas NP

## 2018-02-15 ENCOUNTER — HOME CARE VISIT (OUTPATIENT)
Dept: SCHEDULING | Facility: HOME HEALTH | Age: 78
End: 2018-02-15
Payer: MEDICARE

## 2018-02-15 ENCOUNTER — TELEPHONE (OUTPATIENT)
Dept: CARDIOTHORACIC SURGERY | Age: 78
End: 2018-02-15

## 2018-02-15 ENCOUNTER — TELEPHONE (OUTPATIENT)
Dept: CASE MANAGEMENT | Age: 78
End: 2018-02-15

## 2018-02-15 VITALS
RESPIRATION RATE: 18 BRPM | SYSTOLIC BLOOD PRESSURE: 128 MMHG | OXYGEN SATURATION: 98 % | DIASTOLIC BLOOD PRESSURE: 58 MMHG | TEMPERATURE: 98 F | HEART RATE: 65 BPM

## 2018-02-15 VITALS
SYSTOLIC BLOOD PRESSURE: 124 MMHG | HEART RATE: 65 BPM | RESPIRATION RATE: 16 BRPM | DIASTOLIC BLOOD PRESSURE: 70 MMHG | TEMPERATURE: 98 F | OXYGEN SATURATION: 92 %

## 2018-02-15 PROCEDURE — G0299 HHS/HOSPICE OF RN EA 15 MIN: HCPCS

## 2018-02-15 PROCEDURE — 3331090002 HH PPS REVENUE DEBIT

## 2018-02-15 PROCEDURE — G0151 HHCP-SERV OF PT,EA 15 MIN: HCPCS

## 2018-02-15 PROCEDURE — 400013 HH SOC

## 2018-02-15 PROCEDURE — 3331090001 HH PPS REVENUE CREDIT

## 2018-02-15 NOTE — TELEPHONE ENCOUNTER
Cardiac Surgery Discharge - Follow up call placed to Ramon Main. Spoke to her daughter. Reviewed plan of care after discharge and encouraged her to verbalize. Discussed precautions and reviewed medications, they are without questions regarding medications. Encouraged continued use of the incentive spirometer. Confirmed follow up appts and reinforced importance of wearing red reminder bracelet. Ramon Main is without questions or concerns.  Umu Acuña RN

## 2018-02-16 PROCEDURE — 3331090001 HH PPS REVENUE CREDIT

## 2018-02-16 PROCEDURE — 3331090002 HH PPS REVENUE DEBIT

## 2018-02-17 ENCOUNTER — HOME CARE VISIT (OUTPATIENT)
Dept: SCHEDULING | Facility: HOME HEALTH | Age: 78
End: 2018-02-17
Payer: MEDICARE

## 2018-02-17 VITALS — BODY MASS INDEX: 34.12 KG/M2 | WEIGHT: 211.4 LBS

## 2018-02-17 PROCEDURE — 3331090002 HH PPS REVENUE DEBIT

## 2018-02-17 PROCEDURE — G0300 HHS/HOSPICE OF LPN EA 15 MIN: HCPCS

## 2018-02-17 PROCEDURE — 3331090001 HH PPS REVENUE CREDIT

## 2018-02-18 PROCEDURE — 3331090001 HH PPS REVENUE CREDIT

## 2018-02-18 PROCEDURE — 3331090002 HH PPS REVENUE DEBIT

## 2018-02-19 ENCOUNTER — HOME CARE VISIT (OUTPATIENT)
Dept: SCHEDULING | Facility: HOME HEALTH | Age: 78
End: 2018-02-19
Payer: MEDICARE

## 2018-02-19 ENCOUNTER — HOME CARE VISIT (OUTPATIENT)
Dept: HOME HEALTH SERVICES | Facility: HOME HEALTH | Age: 78
End: 2018-02-19
Payer: MEDICARE

## 2018-02-19 ENCOUNTER — OFFICE VISIT (OUTPATIENT)
Dept: CARDIOTHORACIC SURGERY | Age: 78
End: 2018-02-19

## 2018-02-19 VITALS
DIASTOLIC BLOOD PRESSURE: 58 MMHG | WEIGHT: 209 LBS | HEIGHT: 66 IN | OXYGEN SATURATION: 99 % | RESPIRATION RATE: 16 BRPM | HEART RATE: 56 BPM | TEMPERATURE: 97.6 F | SYSTOLIC BLOOD PRESSURE: 134 MMHG | BODY MASS INDEX: 33.59 KG/M2

## 2018-02-19 DIAGNOSIS — Z95.1 S/P CABG X 4: Primary | ICD-10-CM

## 2018-02-19 PROCEDURE — 3331090002 HH PPS REVENUE DEBIT

## 2018-02-19 PROCEDURE — G0151 HHCP-SERV OF PT,EA 15 MIN: HCPCS

## 2018-02-19 PROCEDURE — 3331090001 HH PPS REVENUE CREDIT

## 2018-02-19 NOTE — PROGRESS NOTES
Patient: Dustin Mullins   Age: 65 y.o. Patient Care Team:  Candi Tello MD as PCP - General (Internal Medicine)  Adela Juarez MD (Orthopedic Surgery)  Octavia Weldon MD as Surgeon (General Surgery)  Gem Figueroa RN as [address] [place] [address]  Andrey Rice MD (Cardiology)    Diagnosis: The encounter diagnosis was S/P CABG x 4. Problem List:   Patient Active Problem List   Diagnosis Code    Foot pain M79.673    T2DM (type 2 diabetes mellitus) ([facility] E11.9    CAD (coronary artery disease) I25.10    HTN (hypertension) I10    Proliferative diabetic retinopathy ([facility] H79.8620    CKD (chronic kidney disease) stage 3, GFR 30-59 ml/min N18.3    Macular degeneration H35.30    Cataract H26.9    Diabetic neuropathy ([facility] E11.40    Urge incontinence N39.41    Hyperlipidemia E78.5    Encounter for long-term (current) use of other medications Z79.899    Vitreous hemorrhage of both eyes ([facility] H43.13    Esophageal reflux K21.9    Essential hypertension I10    Advance directive discussed with patient Z70.80    Vitamin D deficiency E55.9    Mixed hyperlipidemia E78.2    Trochanteric bursitis, right hip M70.61    Glaucoma H40.9    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 She is ambulating well, using a walker at times. She has a good appetite. She has had constipation and is taking miralax and pericolase -she had a large BM yesterday and feels better. She has no SOB, minimal edema.     Current Medications:   Current Outpatient Prescriptions   Medication Sig Dispense Refill    amiodarone (CORDARONE) 200 mg tablet Take 400 mg by mouth twice daily for 2 weeks, then take 400 mg by mouth daily for 2 weeks. Then you are done with this medication. 84 Tab 0    amLODIPine (NORVASC) 2.5 mg tablet Take 1 Tab by mouth daily. 30 Tab 1    furosemide (LASIX) 40 mg tablet Take 1 Tab by mouth daily for 7 days. 7 Tab 0    metoprolol tartrate (LOPRESSOR) 100 mg IR tablet Take 1 Tab by mouth two (2) times a day. 60 Tab 1    oxyCODONE-acetaminophen (PERCOCET) 5-325 mg per tablet Take 1-2 Tabs by mouth every four (4) hours as needed. Max Daily Amount: 12 Tabs. 40 Tab 0    potassium chloride SR (K-TAB) 20 mEq tablet Take 1 Tab by mouth daily. 7 Tab 0    senna-docusate (PERICOLACE) 8.6-50 mg per tablet Take 1 Tab by mouth two (2) times a day. Take until having regular bowel movements. 60 Tab 1    insulin glargine (LANTUS) 100 unit/mL injection 48 Units by SubCUTAneous route nightly. 1 Vial 1    insulin lispro (HUMALOG) 100 unit/mL injection 5 Units by SubCUTAneous route Before breakfast, lunch, and dinner. 1 Vial 1    Insulin Syringes, Disposable, 1 mL syrg Use to inject insulin 4 times daily. 100 Syringe 1    ergocalciferol (ERGOCALCIFEROL) 50,000 unit capsule TAKE ONE CAPSULE BY MOUTH EVERY 7 DAYS 13 Cap 3    rosuvastatin (CRESTOR) 40 mg tablet Take 1 Tab by mouth daily. 90 Tab 1    gabapentin (NEURONTIN) 300 mg capsule Take 1 Cap by mouth three (3) times daily. (Patient taking differently: Take 300 mg by mouth nightly. Takes only QHS) 270 Cap 1    polyethylene glycol (MIRALAX) 17 gram/dose powder USE 1 TABLESPOONFUL AS DIRECTED BY MOUTH EVERY  g 5    COMBIGAN 0.2-0.5 % drop ophthalmic solution INSTILL 1 DROP IN RIGHT EYE EVERY MORNING AND NIGHT. 6    aspirin delayed-release 81 mg tablet Take 81 mg by mouth daily.  dicyclomine (BENTYL) 10 mg capsule Take 1 Cap by mouth three (3) times daily as needed.  For cramping (Patient not taking: Reported on 2/15/2018) 30 Cap 1       Vitals: Blood pressure 134/58, pulse (!) 56, temperature 97.6 °F (36.4 °C), temperature source Oral, resp. rate 16, height 5' 6\" (1.676 m), weight 209 lb (94.8 kg), SpO2 99 %. Allergies: is allergic to ibuprofen and norvasc [amlodipine]. Physical Exam:  Wounds: clean, dry, no drainage, healing     Lungs: clear to auscultation bilaterally    Heart: regular rate and rhythm, S1, S2 normal, no murmur, click, rub or gallop    Extremities: normal strength, tone, and muscle mass    Assessment/Plan:   1. CAD s/p CABG: On ASA, statin, BB  2. Nausea: resolved  3. IDDM: Cont 48 units lantus daily and 5 units lispro premeal, BS well controlled at this time. 4. Hx HTN: cont BB, norvasc  5. HLD: Cont statin  6. R hand numbness: consistent w/ nerve injury postop, hand exercises, some improvement  7. Dispo: Cont HH, RTC in 2-3 weeks. Pt is ready to start cardiac rehab.      Rehab - y  Walking: y  Glucometer: y  Antibiotic card for valves: n/a

## 2018-02-20 VITALS
SYSTOLIC BLOOD PRESSURE: 130 MMHG | RESPIRATION RATE: 16 BRPM | DIASTOLIC BLOOD PRESSURE: 70 MMHG | TEMPERATURE: 98 F | OXYGEN SATURATION: 94 % | HEART RATE: 60 BPM

## 2018-02-20 PROCEDURE — 3331090002 HH PPS REVENUE DEBIT

## 2018-02-20 PROCEDURE — 3331090001 HH PPS REVENUE CREDIT

## 2018-02-21 ENCOUNTER — TELEPHONE (OUTPATIENT)
Dept: CARDIAC REHAB | Age: 78
End: 2018-02-21

## 2018-02-21 ENCOUNTER — HOME CARE VISIT (OUTPATIENT)
Dept: SCHEDULING | Facility: HOME HEALTH | Age: 78
End: 2018-02-21
Payer: MEDICARE

## 2018-02-21 VITALS
TEMPERATURE: 97.4 F | OXYGEN SATURATION: 98 % | WEIGHT: 205 LBS | HEART RATE: 57 BPM | BODY MASS INDEX: 33.09 KG/M2 | DIASTOLIC BLOOD PRESSURE: 70 MMHG | RESPIRATION RATE: 14 BRPM | SYSTOLIC BLOOD PRESSURE: 145 MMHG

## 2018-02-21 PROCEDURE — 3331090002 HH PPS REVENUE DEBIT

## 2018-02-21 PROCEDURE — G0299 HHS/HOSPICE OF RN EA 15 MIN: HCPCS

## 2018-02-21 PROCEDURE — 3331090001 HH PPS REVENUE CREDIT

## 2018-02-21 PROCEDURE — G0151 HHCP-SERV OF PT,EA 15 MIN: HCPCS

## 2018-02-21 NOTE — TELEPHONE ENCOUNTER
2/21/2018 Cardiac Rehab: Called Ms. Karis Sagrario to discuss participation in the Cardiac Rehab Program following CABG on February 6, 2018. Confirmed intake appointment on March 8, 2018. Ms. Celina William daughter, Rickey Browning is without questions or concerns. Will call closer to intake for reminder call.  Radhika Angulo

## 2018-02-22 ENCOUNTER — HOME CARE VISIT (OUTPATIENT)
Dept: SCHEDULING | Facility: HOME HEALTH | Age: 78
End: 2018-02-22
Payer: MEDICARE

## 2018-02-22 VITALS
HEART RATE: 53 BPM | DIASTOLIC BLOOD PRESSURE: 63 MMHG | OXYGEN SATURATION: 98 % | RESPIRATION RATE: 16 BRPM | SYSTOLIC BLOOD PRESSURE: 110 MMHG | TEMPERATURE: 97.8 F

## 2018-02-22 VITALS
OXYGEN SATURATION: 96 % | SYSTOLIC BLOOD PRESSURE: 122 MMHG | RESPIRATION RATE: 16 BRPM | DIASTOLIC BLOOD PRESSURE: 80 MMHG | TEMPERATURE: 98 F | HEART RATE: 67 BPM

## 2018-02-22 PROCEDURE — 3331090002 HH PPS REVENUE DEBIT

## 2018-02-22 PROCEDURE — 3331090001 HH PPS REVENUE CREDIT

## 2018-02-22 PROCEDURE — G0152 HHCP-SERV OF OT,EA 15 MIN: HCPCS

## 2018-02-23 ENCOUNTER — HOME CARE VISIT (OUTPATIENT)
Dept: SCHEDULING | Facility: HOME HEALTH | Age: 78
End: 2018-02-23
Payer: MEDICARE

## 2018-02-23 VITALS
OXYGEN SATURATION: 98 % | RESPIRATION RATE: 16 BRPM | DIASTOLIC BLOOD PRESSURE: 70 MMHG | HEART RATE: 57 BPM | SYSTOLIC BLOOD PRESSURE: 122 MMHG | TEMPERATURE: 98 F

## 2018-02-23 PROCEDURE — 3331090002 HH PPS REVENUE DEBIT

## 2018-02-23 PROCEDURE — 3331090001 HH PPS REVENUE CREDIT

## 2018-02-23 PROCEDURE — G0151 HHCP-SERV OF PT,EA 15 MIN: HCPCS

## 2018-02-23 PROCEDURE — G0299 HHS/HOSPICE OF RN EA 15 MIN: HCPCS

## 2018-02-24 VITALS
RESPIRATION RATE: 16 BRPM | BODY MASS INDEX: 32.93 KG/M2 | HEART RATE: 52 BPM | DIASTOLIC BLOOD PRESSURE: 60 MMHG | TEMPERATURE: 97.6 F | OXYGEN SATURATION: 97 % | SYSTOLIC BLOOD PRESSURE: 140 MMHG | WEIGHT: 204 LBS

## 2018-02-24 PROCEDURE — 3331090002 HH PPS REVENUE DEBIT

## 2018-02-24 PROCEDURE — 3331090001 HH PPS REVENUE CREDIT

## 2018-02-25 PROCEDURE — 3331090002 HH PPS REVENUE DEBIT

## 2018-02-25 PROCEDURE — 3331090001 HH PPS REVENUE CREDIT

## 2018-02-26 ENCOUNTER — HOME CARE VISIT (OUTPATIENT)
Dept: SCHEDULING | Facility: HOME HEALTH | Age: 78
End: 2018-02-26
Payer: MEDICARE

## 2018-02-26 PROCEDURE — 3331090001 HH PPS REVENUE CREDIT

## 2018-02-26 PROCEDURE — G0152 HHCP-SERV OF OT,EA 15 MIN: HCPCS

## 2018-02-26 PROCEDURE — 3331090002 HH PPS REVENUE DEBIT

## 2018-02-27 ENCOUNTER — HOME CARE VISIT (OUTPATIENT)
Dept: SCHEDULING | Facility: HOME HEALTH | Age: 78
End: 2018-02-27
Payer: MEDICARE

## 2018-02-27 VITALS
TEMPERATURE: 98 F | DIASTOLIC BLOOD PRESSURE: 60 MMHG | OXYGEN SATURATION: 97 % | HEART RATE: 53 BPM | SYSTOLIC BLOOD PRESSURE: 112 MMHG | RESPIRATION RATE: 16 BRPM

## 2018-02-27 PROCEDURE — 3331090002 HH PPS REVENUE DEBIT

## 2018-02-27 PROCEDURE — G0151 HHCP-SERV OF PT,EA 15 MIN: HCPCS

## 2018-02-27 PROCEDURE — 3331090001 HH PPS REVENUE CREDIT

## 2018-02-28 ENCOUNTER — HOME CARE VISIT (OUTPATIENT)
Dept: SCHEDULING | Facility: HOME HEALTH | Age: 78
End: 2018-02-28
Payer: MEDICARE

## 2018-02-28 PROCEDURE — G0299 HHS/HOSPICE OF RN EA 15 MIN: HCPCS

## 2018-02-28 PROCEDURE — 3331090001 HH PPS REVENUE CREDIT

## 2018-02-28 PROCEDURE — 3331090002 HH PPS REVENUE DEBIT

## 2018-03-01 ENCOUNTER — HOME CARE VISIT (OUTPATIENT)
Dept: SCHEDULING | Facility: HOME HEALTH | Age: 78
End: 2018-03-01
Payer: MEDICARE

## 2018-03-01 ENCOUNTER — TELEPHONE (OUTPATIENT)
Dept: CARDIOTHORACIC SURGERY | Age: 78
End: 2018-03-01

## 2018-03-01 VITALS
SYSTOLIC BLOOD PRESSURE: 149 MMHG | SYSTOLIC BLOOD PRESSURE: 140 MMHG | OXYGEN SATURATION: 98 % | RESPIRATION RATE: 16 BRPM | DIASTOLIC BLOOD PRESSURE: 74 MMHG | WEIGHT: 206.6 LBS | DIASTOLIC BLOOD PRESSURE: 64 MMHG | BODY MASS INDEX: 33.35 KG/M2 | RESPIRATION RATE: 15 BRPM | OXYGEN SATURATION: 96 % | HEART RATE: 55 BPM | TEMPERATURE: 97.8 F | TEMPERATURE: 97.7 F | HEART RATE: 63 BPM

## 2018-03-01 VITALS
TEMPERATURE: 98 F | HEART RATE: 54 BPM | SYSTOLIC BLOOD PRESSURE: 140 MMHG | RESPIRATION RATE: 16 BRPM | DIASTOLIC BLOOD PRESSURE: 80 MMHG | OXYGEN SATURATION: 94 %

## 2018-03-01 PROCEDURE — 3331090003 HH PPS REVENUE ADJ

## 2018-03-01 PROCEDURE — G0152 HHCP-SERV OF OT,EA 15 MIN: HCPCS

## 2018-03-01 PROCEDURE — 3331090002 HH PPS REVENUE DEBIT

## 2018-03-01 PROCEDURE — 3331090001 HH PPS REVENUE CREDIT

## 2018-03-01 PROCEDURE — G0151 HHCP-SERV OF PT,EA 15 MIN: HCPCS

## 2018-03-01 NOTE — TELEPHONE ENCOUNTER
Pt's daughter Mk Paintinger has called [phone]. Her mom is having chest pain and not feeling well.

## 2018-03-01 NOTE — TELEPHONE ENCOUNTER
Called pt's daughter back, pt feeling somewhat better. Now eating some food. Daughter will call office if worsens or persists.

## 2018-03-01 NOTE — TELEPHONE ENCOUNTER
Sean Brown (nurse) has called patient is having chest pain, burning of the chest. Patient blood pressure is also high Her can be reach [phone]

## 2018-03-01 NOTE — TELEPHONE ENCOUNTER
Pt acutely starting feeling weak/nauseous/achy this morning. Daughter stated OT was at home this mroning, and vital signs were good. No temp per thermometer. Pt took percocet about 2 hrs ago and feels a little bit better. Not taking much PO. Sounds viral/flu? Take tylenol every 4-6 hrs, does not need narcotic. Try and stay hydrated. Will call back in couple hrs to check on pt. Daughter stated understanding.

## 2018-03-02 VITALS
SYSTOLIC BLOOD PRESSURE: 122 MMHG | DIASTOLIC BLOOD PRESSURE: 76 MMHG | HEART RATE: 60 BPM | OXYGEN SATURATION: 96 % | RESPIRATION RATE: 16 BRPM | TEMPERATURE: 98 F

## 2018-03-06 ENCOUNTER — OFFICE VISIT (OUTPATIENT)
Dept: CARDIOTHORACIC SURGERY | Age: 78
End: 2018-03-06

## 2018-03-06 VITALS
OXYGEN SATURATION: 97 % | SYSTOLIC BLOOD PRESSURE: 126 MMHG | HEART RATE: 54 BPM | RESPIRATION RATE: 16 BRPM | TEMPERATURE: 98 F | BODY MASS INDEX: 33.51 KG/M2 | DIASTOLIC BLOOD PRESSURE: 52 MMHG | WEIGHT: 208.5 LBS | HEIGHT: 66 IN

## 2018-03-06 DIAGNOSIS — Z95.1 S/P CABG X 4: Primary | ICD-10-CM

## 2018-03-06 RX ORDER — ACETAMINOPHEN 500 MG
500 TABLET ORAL
COMMUNITY

## 2018-03-06 NOTE — PROGRESS NOTES
Patient: Trina Fleischer   Age: 77 y.o. Patient Care Team:  Mandy Lezama MD as PCP - General (Internal Medicine)  Aida Reed MD (Orthopedic Surgery)  Lyly Levi MD as Surgeon (General Surgery)  Liseth Jarvis RN as [address]  Lendia Eisenmenger, MD (Cardiology)    Diagnosis: The encounter diagnosis was S/P CABG x 4. Problem List:   Patient Active Problem List   Diagnosis Code    Foot pain M79.673    T2DM (type 2 diabetes mellitus) ([facility] E11.9    CAD (coronary artery disease) I25.10    HTN (hypertension) I10    Proliferative diabetic retinopathy ([facility] L17.5609    CKD (chronic kidney disease) stage 3, GFR 30-59 ml/min N18.3    Macular degeneration H35.30    Cataract H26.9    Diabetic neuropathy ([address] E11.40    Urge incontinence N39.41    Hyperlipidemia E78.5    Encounter for long-term (current) use of other medications Z79.899    Vitreous hemorrhage of both eyes ([facility] H43.13    Esophageal reflux K21.9    Essential hypertension I10    Advance directive discussed with patient Z70.80    Vitamin D deficiency E55.9    Mixed hyperlipidemia E78.2    Trochanteric bursitis, right hip M70.61    Glaucoma H40.9    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 Current Medications:   Current Outpatient Prescriptions   Medication Sig Dispense Refill    amiodarone (CORDARONE) 200 mg tablet Take 400 mg by mouth twice daily for 2 weeks, then take 400 mg by mouth daily for 2 weeks. Then you are done with this medication. 84 Tab 0    amLODIPine (NORVASC) 2.5 mg tablet Take 1 Tab by mouth daily.  30 Tab 1    metoprolol tartrate (LOPRESSOR) 100 mg IR tablet Take 1 Tab by mouth two (2) times a day. 60 Tab 1    oxyCODONE-acetaminophen (PERCOCET) 5-325 mg per tablet Take 1-2 Tabs by mouth every four (4) hours as needed. Max Daily Amount: 12 Tabs. 40 Tab 0    potassium chloride SR (K-TAB) 20 mEq tablet Take 1 Tab by mouth daily. 7 Tab 0    senna-docusate (PERICOLACE) 8.6-50 mg per tablet Take 1 Tab by mouth two (2) times a day. Take until having regular bowel movements. 60 Tab 1    insulin glargine (LANTUS) 100 unit/mL injection 48 Units by SubCUTAneous route nightly. 1 Vial 1    insulin lispro (HUMALOG) 100 unit/mL injection 5 Units by SubCUTAneous route Before breakfast, lunch, and dinner. 1 Vial 1    Insulin Syringes, Disposable, 1 mL syrg Use to inject insulin 4 times daily. 100 Syringe 1    ergocalciferol (ERGOCALCIFEROL) 50,000 unit capsule TAKE ONE CAPSULE BY MOUTH EVERY 7 DAYS 13 Cap 3    rosuvastatin (CRESTOR) 40 mg tablet Take 1 Tab by mouth daily. 90 Tab 1    gabapentin (NEURONTIN) 300 mg capsule Take 1 Cap by mouth three (3) times daily. (Patient taking differently: Take 300 mg by mouth nightly. Takes only QHS) 270 Cap 1    polyethylene glycol (MIRALAX) 17 gram/dose powder USE 1 TABLESPOONFUL AS DIRECTED BY MOUTH EVERY  g 5    COMBIGAN 0.2-0.5 % drop ophthalmic solution INSTILL 1 DROP IN RIGHT EYE EVERY MORNING AND NIGHT. 6    dicyclomine (BENTYL) 10 mg capsule Take 1 Cap by mouth three (3) times daily as needed. For cramping (Patient not taking: Reported on 2/15/2018) 30 Cap 1    aspirin delayed-release 81 mg tablet Take 81 mg by mouth daily. Vitals: Height 5' 6\" (1.676 m), weight 208 lb 8 oz (94.6 kg). Allergies: is allergic to ibuprofen and norvasc [amlodipine].     Physical Exam:  Wounds: clean, dry, no drainage, healing     Lungs: clear to auscultation bilaterally    Heart: regular rate and rhythm, S1, S2 normal, no murmur, click, rub or gallop    Extremities: normal strength, tone, and muscle mass    Assessment/Plan:   1. CAD s/p CABG: On ASA, statin, BB  2. Nausea: resolved  3. IDDM: Cont 48 units lantus daily and 5 units lispro premeal, BS well controlled at this time. 4. Hx HTN: cont BB, norvasc  5. HLD: Cont statin  6. R hand numbness: consistent w/ nerve injury postop, hand exercises, some improvement  7. FU with cardiology in 2-3 weeks. Pt is ready to start cardiac rehab.      Rehab - y  Walking: y  Glucometer: y  Antibiotic card for valves: n/a

## 2018-03-06 NOTE — PROGRESS NOTES
Patient: Nancy Martinez   Age: 77 y.o. Patient Care Team:  Mary Mayfield MD as PCP - General (Internal Medicine)  Dheeraj Francois MD (Orthopedic Surgery)  Kris Lepe MD as Surgeon (General Surgery)  Donald Medina RN as [address] [place] [address]  Geovanny Ballard MD (Cardiology)    Diagnosis: The encounter diagnosis was S/P CABG x 4. Problem List:   Patient Active Problem List   Diagnosis Code    Foot pain M79.673    T2DM (type 2 diabetes mellitus) ([facility] E11.9    CAD (coronary artery disease) I25.10    HTN (hypertension) I10    Proliferative diabetic retinopathy ([facility] L02.3424    CKD (chronic kidney disease) stage 3, GFR 30-59 ml/min N18.3    Macular degeneration H35.30    Cataract H26.9    Diabetic neuropathy ([address] E11.40    Urge incontinence N39.41    Hyperlipidemia E78.5    Encounter for long-term (current) use of other medications Z79.899    Vitreous hemorrhage of both eyes ([facility] H43.13    Esophageal reflux K21.9    Essential hypertension I10    Advance directive discussed with patient Z70.80    Vitamin D deficiency E55.9    Mixed hyperlipidemia E78.2    Trochanteric bursitis, right hip M70.61    Glaucoma H40.9    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 Pt accompanied by her daughter. She is progressing slowly. Cont to have R hand numbness and chest soreness, taking tylenol OTC. She did not tolerate percocet at home due to nausea. She remains somewhat fatigued, trying to increase activity gradually. Starts cardiac rehab this week. She denies any palpitations, SOB or edema.      Current Medications:   Current Outpatient Prescriptions   Medication Sig Dispense Refill    acetaminophen (TYLENOL EXTRA STRENGTH) 500 mg tablet Take 500 mg by mouth every six (6) hours as needed for Pain.  amiodarone (CORDARONE) 200 mg tablet Take 400 mg by mouth twice daily for 2 weeks, then take 400 mg by mouth daily for 2 weeks. Then you are done with this medication. 84 Tab 0    amLODIPine (NORVASC) 2.5 mg tablet Take 1 Tab by mouth daily. 30 Tab 1    metoprolol tartrate (LOPRESSOR) 100 mg IR tablet Take 1 Tab by mouth two (2) times a day. 60 Tab 1    senna-docusate (PERICOLACE) 8.6-50 mg per tablet Take 1 Tab by mouth two (2) times a day. Take until having regular bowel movements. 60 Tab 1    insulin glargine (LANTUS) 100 unit/mL injection 48 Units by SubCUTAneous route nightly. 1 Vial 1    insulin lispro (HUMALOG) 100 unit/mL injection 5 Units by SubCUTAneous route Before breakfast, lunch, and dinner. 1 Vial 1    Insulin Syringes, Disposable, 1 mL syrg Use to inject insulin 4 times daily. 100 Syringe 1    ergocalciferol (ERGOCALCIFEROL) 50,000 unit capsule TAKE ONE CAPSULE BY MOUTH EVERY 7 DAYS 13 Cap 3    rosuvastatin (CRESTOR) 40 mg tablet Take 1 Tab by mouth daily. 90 Tab 1    gabapentin (NEURONTIN) 300 mg capsule Take 1 Cap by mouth three (3) times daily. (Patient taking differently: Take 300 mg by mouth nightly. Takes only QHS) 270 Cap 1    polyethylene glycol (MIRALAX) 17 gram/dose powder USE 1 TABLESPOONFUL AS DIRECTED BY MOUTH EVERY  g 5    COMBIGAN 0.2-0.5 % drop ophthalmic solution INSTILL 1 DROP IN RIGHT EYE EVERY MORNING AND NIGHT. 6    aspirin delayed-release 81 mg tablet Take 81 mg by mouth daily. Vitals: Blood pressure 126/52, pulse (!) 54, temperature 98 °F (36.7 °C), temperature source Oral, resp. rate 16, height 5' 6\" (1.676 m), weight 208 lb 8 oz (94.6 kg), SpO2 97 %. Allergies: is allergic to ibuprofen and norvasc [amlodipine].     Physical Exam:  Wounds: clean, dry, no drainage    Lungs: clear to auscultation bilaterally    Heart: regular rate and rhythm, S1, S2 normal, no murmur, click, rub or gallop    Extremities: no edema    Assessment/Plan:   1. CAD s/p CABG: On ASA, statin, BB  2. Nausea: resolved  3. IDDM: Cont 48 units lantus daily and 5 units lispro premeal, BS well controlled at this time. 4. Hx HTN: cont BB, norvasc  5. HLD: Cont statin  6. R hand numbness: consistent w/ nerve injury postop, hand exercises, some improvement  7.  F/u with PCP and cardiologist     Rehab - intake set for 3/8/18  Walking: yes  Glucometer: yes

## 2018-03-08 ENCOUNTER — HOSPITAL ENCOUNTER (OUTPATIENT)
Dept: CARDIAC REHAB | Age: 78
Discharge: HOME | End: 2018-03-08
Payer: MEDICARE

## 2018-03-08 VITALS — BODY MASS INDEX: 33.59 KG/M2 | WEIGHT: 209 LBS | HEIGHT: 66 IN

## 2018-03-08 VITALS — BODY MASS INDEX: 33.73 KG/M2 | WEIGHT: 209 LBS

## 2018-03-08 PROCEDURE — 93798 PHYS/QHP OP CAR RHAB W/ECG: CPT

## 2018-03-08 RX ORDER — DICYCLOMINE HYDROCHLORIDE 10 MG/1
10 CAPSULE ORAL AS NEEDED
COMMUNITY
End: 2020-06-09

## 2018-03-08 RX ORDER — HYDROGEN PEROXIDE 3 %
20 SOLUTION, NON-ORAL MISCELLANEOUS DAILY
COMMUNITY
End: 2019-03-25 | Stop reason: ALTCHOICE

## 2018-03-08 NOTE — CARDIO/PULMONARY
Karis Zabala  77 y.o.     Ms. Lupillo Martin presented to cardiac wellness for orientation and exercise tolerance test today with a primary diagnosis of a CABG x 4. Darshana Brar has a history of cardiac cath in 2009, but no intervention. Cardiac risk factors include family history, dyslipidemia, diabetes mellitus, obesity, hypertension, stress and these were reviewed with the patient and her daughter, who lives with patient and is translating at this appointment. Darshana Brar is lives with her daughter. NATHALIE-D, depression score, is 15 and this is considered moderate. Discussed this with patient and daughter - pt has had little appetitie, is now using a walker, and takes a long time to peform most activities. Encouraged pt to reach out to doctor regarding this, but reminded her that she is only 1 month post op a major surgery. Sees Dr. Sana Sen on the 30th of this month. Patient denied chest pain or SOB during 6 minute walk and was in SB(51)/SR without ectopy. Darshana Brar will attend a 60 minute class once a week and exercise 3 days a week in cardiac wellness. Education manual given. EF is  . 60%. Her BP in her left arm is significantly higher than her right - will be faxing Dr. Sana Sen regarding this, as well as pt's depression screener score.     Mirian Lopez, RN  3/8/2018

## 2018-03-08 NOTE — CARDIO/PULMONARY
The following was faxed to Dr. Regina Frank:    Dr. Regina Frank,    Ms. Luiz Galarza (s/p CABG x 4  2/3/18) came in for her first cardiac rehab appointment. Pt/pts daughter reported a few concerns:    1) BP was taken in both arms - L arm 141/76, RT arm 109/56. 2) Extreme fatigue - explained only 1 month post op CABG, metoprolol is new med, possible cause. 3) Lightheaded - reports getting lightheaded, especially with position changes, encouraged moving slowly with getting up. 4) Depression - seems to be situational, but encouraged pt to reach out if this does not improve    Pt has appointment to see you March 30th. Wanted you to be aware of pt and pts daughters concerns.     Thank you,    Ant White RN

## 2018-03-12 ENCOUNTER — HOSPITAL ENCOUNTER (OUTPATIENT)
Dept: CARDIAC REHAB | Age: 78
Discharge: HOME | End: 2018-03-12
Payer: MEDICARE

## 2018-03-12 VITALS — BODY MASS INDEX: 34.22 KG/M2 | WEIGHT: 212 LBS

## 2018-03-12 PROCEDURE — 93798 PHYS/QHP OP CAR RHAB W/ECG: CPT

## 2018-03-14 ENCOUNTER — HOSPITAL ENCOUNTER (OUTPATIENT)
Dept: CARDIAC REHAB | Age: 78
Discharge: HOME | End: 2018-03-14
Payer: MEDICARE

## 2018-03-14 VITALS — WEIGHT: 212.5 LBS | BODY MASS INDEX: 34.3 KG/M2

## 2018-03-14 PROCEDURE — 93798 PHYS/QHP OP CAR RHAB W/ECG: CPT | Performed by: DIETITIAN, REGISTERED

## 2018-03-16 ENCOUNTER — HOSPITAL ENCOUNTER (OUTPATIENT)
Dept: CARDIAC REHAB | Age: 78
Discharge: HOME | End: 2018-03-16
Payer: MEDICARE

## 2018-03-16 VITALS — WEIGHT: 214 LBS | BODY MASS INDEX: 34.54 KG/M2

## 2018-03-16 PROCEDURE — 93798 PHYS/QHP OP CAR RHAB W/ECG: CPT

## 2018-03-19 ENCOUNTER — HOSPITAL ENCOUNTER (OUTPATIENT)
Dept: CARDIAC REHAB | Age: 78
Discharge: HOME | End: 2018-03-19
Payer: MEDICARE

## 2018-03-19 ENCOUNTER — APPOINTMENT (OUTPATIENT)
Dept: CARDIAC REHAB | Age: 78
End: 2018-03-19
Payer: MEDICARE

## 2018-03-19 VITALS — BODY MASS INDEX: 34.86 KG/M2 | WEIGHT: 216 LBS

## 2018-03-19 PROCEDURE — 93798 PHYS/QHP OP CAR RHAB W/ECG: CPT

## 2018-03-21 ENCOUNTER — HOSPITAL ENCOUNTER (OUTPATIENT)
Dept: CARDIAC REHAB | Age: 78
Discharge: HOME | End: 2018-03-21
Payer: MEDICARE

## 2018-03-21 ENCOUNTER — HOSPITAL ENCOUNTER (EMERGENCY)
Age: 78
Discharge: HOME | End: 2018-03-21
Attending: EMERGENCY MEDICINE
Payer: MEDICARE

## 2018-03-21 ENCOUNTER — APPOINTMENT (OUTPATIENT)
Dept: CT IMAGING | Age: 78
End: 2018-03-21
Attending: EMERGENCY MEDICINE
Payer: MEDICARE

## 2018-03-21 VITALS
TEMPERATURE: 97.8 F | BODY MASS INDEX: 34.17 KG/M2 | HEART RATE: 58 BPM | WEIGHT: 212.6 LBS | HEIGHT: 66 IN | RESPIRATION RATE: 16 BRPM | OXYGEN SATURATION: 95 % | DIASTOLIC BLOOD PRESSURE: 78 MMHG | SYSTOLIC BLOOD PRESSURE: 138 MMHG

## 2018-03-21 VITALS — WEIGHT: 212.5 LBS | BODY MASS INDEX: 34.3 KG/M2

## 2018-03-21 DIAGNOSIS — I10 HYPERTENSION, UNSPECIFIED TYPE: Primary | ICD-10-CM

## 2018-03-21 LAB
ALBUMIN SERPL-MCNC: 3.4 G/DL (ref 3.5–5)
ALBUMIN/GLOB SERPL: 0.9 {RATIO} (ref 1.1–2.2)
ALP SERPL-CCNC: 81 U/L (ref 45–117)
ALT SERPL-CCNC: 24 U/L (ref 12–78)
ANION GAP SERPL CALC-SCNC: 6 MMOL/L (ref 5–15)
APPEARANCE UR: CLEAR
AST SERPL-CCNC: 17 U/L (ref 15–37)
BACTERIA URNS QL MICRO: NEGATIVE /HPF
BASOPHILS # BLD: 0 K/UL (ref 0–0.1)
BASOPHILS NFR BLD: 1 % (ref 0–1)
BILIRUB SERPL-MCNC: 0.2 MG/DL (ref 0.2–1)
BILIRUB UR QL: NEGATIVE
BUN SERPL-MCNC: 30 MG/DL (ref 6–20)
BUN/CREAT SERPL: 21 (ref 12–20)
CALCIUM SERPL-MCNC: 9.1 MG/DL (ref 8.5–10.1)
CHLORIDE SERPL-SCNC: 106 MMOL/L (ref 97–108)
CO2 SERPL-SCNC: 30 MMOL/L (ref 21–32)
COLOR UR: ABNORMAL
CREAT SERPL-MCNC: 1.41 MG/DL (ref 0.55–1.02)
DIFFERENTIAL METHOD BLD: ABNORMAL
EOSINOPHIL # BLD: 0.3 K/UL (ref 0–0.4)
EOSINOPHIL NFR BLD: 5 % (ref 0–7)
EPITH CASTS URNS QL MICRO: ABNORMAL /LPF
ERYTHROCYTE [DISTWIDTH] IN BLOOD BY AUTOMATED COUNT: 14.3 % (ref 11.5–14.5)
GLOBULIN SER CALC-MCNC: 3.7 G/DL (ref 2–4)
GLUCOSE SERPL-MCNC: 106 MG/DL (ref 65–100)
GLUCOSE UR STRIP.AUTO-MCNC: NEGATIVE MG/DL
HCT VFR BLD AUTO: 34 % (ref 35–47)
HGB BLD-MCNC: 10.7 G/DL (ref 11.5–16)
HGB UR QL STRIP: NEGATIVE
HYALINE CASTS URNS QL MICRO: ABNORMAL /LPF (ref 0–5)
IMM GRANULOCYTES # BLD: 0 K/UL (ref 0–0.04)
IMM GRANULOCYTES NFR BLD AUTO: 0 % (ref 0–0.5)
KETONES UR QL STRIP.AUTO: NEGATIVE MG/DL
LEUKOCYTE ESTERASE UR QL STRIP.AUTO: ABNORMAL
LYMPHOCYTES # BLD: 3.2 K/UL (ref 0.8–3.5)
LYMPHOCYTES NFR BLD: 45 % (ref 12–49)
MCH RBC QN AUTO: 31.2 PG (ref 26–34)
MCHC RBC AUTO-ENTMCNC: 31.5 G/DL (ref 30–36.5)
MCV RBC AUTO: 99.1 FL (ref 80–99)
MONOCYTES # BLD: 0.6 K/UL (ref 0–1)
MONOCYTES NFR BLD: 8 % (ref 5–13)
NEUTS SEG # BLD: 2.9 K/UL (ref 1.8–8)
NEUTS SEG NFR BLD: 41 % (ref 32–75)
NITRITE UR QL STRIP.AUTO: NEGATIVE
NRBC # BLD: 0 K/UL (ref 0–0.01)
NRBC BLD-RTO: 0 PER 100 WBC
PH UR STRIP: 6.5 [PH] (ref 5–8)
PLATELET # BLD AUTO: 163 K/UL (ref 150–400)
PMV BLD AUTO: 12.2 FL (ref 8.9–12.9)
POTASSIUM SERPL-SCNC: 4.2 MMOL/L (ref 3.5–5.1)
PROT SERPL-MCNC: 7.1 G/DL (ref 6.4–8.2)
PROT UR STRIP-MCNC: 30 MG/DL
RBC # BLD AUTO: 3.43 M/UL (ref 3.8–5.2)
RBC #/AREA URNS HPF: ABNORMAL /HPF (ref 0–5)
SODIUM SERPL-SCNC: 142 MMOL/L (ref 136–145)
SP GR UR REFRACTOMETRY: 1.01 (ref 1–1.03)
TROPONIN I SERPL-MCNC: <0.04 NG/ML
UR CULT HOLD, URHOLD: NORMAL
UROBILINOGEN UR QL STRIP.AUTO: 1 EU/DL (ref 0.2–1)
WBC # BLD AUTO: 7.1 K/UL (ref 3.6–11)
WBC URNS QL MICRO: ABNORMAL /HPF (ref 0–4)

## 2018-03-21 PROCEDURE — 36415 COLL VENOUS BLD VENIPUNCTURE: CPT | Performed by: EMERGENCY MEDICINE

## 2018-03-21 PROCEDURE — 93797 PHYS/QHP OP CAR RHAB WO ECG: CPT

## 2018-03-21 PROCEDURE — 81001 URINALYSIS AUTO W/SCOPE: CPT | Performed by: EMERGENCY MEDICINE

## 2018-03-21 PROCEDURE — 80053 COMPREHEN METABOLIC PANEL: CPT | Performed by: EMERGENCY MEDICINE

## 2018-03-21 PROCEDURE — 99283 EMERGENCY DEPT VISIT LOW MDM: CPT

## 2018-03-21 PROCEDURE — 85025 COMPLETE CBC W/AUTO DIFF WBC: CPT | Performed by: EMERGENCY MEDICINE

## 2018-03-21 PROCEDURE — 84484 ASSAY OF TROPONIN QUANT: CPT | Performed by: EMERGENCY MEDICINE

## 2018-03-21 PROCEDURE — 70450 CT HEAD/BRAIN W/O DYE: CPT

## 2018-03-21 PROCEDURE — 93005 ELECTROCARDIOGRAM TRACING: CPT

## 2018-03-21 PROCEDURE — 93798 PHYS/QHP OP CAR RHAB W/ECG: CPT

## 2018-03-21 NOTE — Clinical Note
Continue your medicines as prescribed. Return to ER for any fever, chills, headache, chest pain, shortness of breath, difficulty breathing. Keep a record of your blood pressure to take to cardiologist appointment on the 30th.

## 2018-03-22 ENCOUNTER — HOSPITAL ENCOUNTER (OUTPATIENT)
Dept: CARDIAC REHAB | Age: 78
Discharge: HOME | End: 2018-03-22
Payer: MEDICARE

## 2018-03-22 LAB
ATRIAL RATE: 56 BPM
CALCULATED P AXIS, ECG09: 26 DEGREES
CALCULATED R AXIS, ECG10: -13 DEGREES
CALCULATED T AXIS, ECG11: 110 DEGREES
DIAGNOSIS, 93000: NORMAL
P-R INTERVAL, ECG05: 134 MS
Q-T INTERVAL, ECG07: 432 MS
QRS DURATION, ECG06: 96 MS
QTC CALCULATION (BEZET), ECG08: 416 MS
VENTRICULAR RATE, ECG03: 56 BPM

## 2018-03-22 NOTE — DISCHARGE INSTRUCTIONS
High Blood Pressure: Care Instructions  Your Care Instructions    If your blood pressure is usually above 140/90, you have high blood pressure, or hypertension. That means the top number is 140 or higher or the bottom number is 90 or higher, or both. Despite what a lot of people think, high blood pressure usually doesn't cause headaches or make you feel dizzy or lightheaded. It usually has no symptoms. But it does increase your risk for heart attack, stroke, and kidney or eye damage. The higher your blood pressure, the more your risk increases. Your doctor will give you a goal for your blood pressure. Your goal will be based on your health and your age. An example of a goal is to keep your blood pressure below 140/90. Lifestyle changes, such as eating healthy and being active, are always important to help lower blood pressure. You might also take medicine to reach your blood pressure goal.  Follow-up care is a key part of your treatment and safety. Be sure to make and go to all appointments, and call your doctor if you are having problems. It's also a good idea to know your test results and keep a list of the medicines you take. How can you care for yourself at home? Medical treatment  · If you stop taking your medicine, your blood pressure will go back up. You may take one or more types of medicine to lower your blood pressure. Be safe with medicines. Take your medicine exactly as prescribed. Call your doctor if you think you are having a problem with your medicine. · Talk to your doctor before you start taking aspirin every day. Aspirin can help certain people lower their risk of a heart attack or stroke. But taking aspirin isn't right for everyone, because it can cause serious bleeding. · See your doctor regularly. You may need to see the doctor more often at first or until your blood pressure comes down.   · If you are taking blood pressure medicine, talk to your doctor before you take decongestants or anti-inflammatory medicine, such as ibuprofen. Some of these medicines can raise blood pressure. · Learn how to check your blood pressure at home. Lifestyle changes  · Stay at a healthy weight. This is especially important if you put on weight around the waist. Losing even 10 pounds can help you lower your blood pressure. · If your doctor recommends it, get more exercise. Walking is a good choice. Bit by bit, increase the amount you walk every day. Try for at least 30 minutes on most days of the week. You also may want to swim, bike, or do other activities. · Avoid or limit alcohol. Talk to your doctor about whether you can drink any alcohol. · Try to limit how much sodium you eat to less than 2,300 milligrams (mg) a day. Your doctor may ask you to try to eat less than 1,500 mg a day. · Eat plenty of fruits (such as bananas and oranges), vegetables, legumes, whole grains, and low-fat dairy products. · Lower the amount of saturated fat in your diet. Saturated fat is found in animal products such as milk, cheese, and meat. Limiting these foods may help you lose weight and also lower your risk for heart disease. · Do not smoke. Smoking increases your risk for heart attack and stroke. If you need help quitting, talk to your doctor about stop-smoking programs and medicines. These can increase your chances of quitting for good. When should you call for help? Call 911 anytime you think you may need emergency care. This may mean having symptoms that suggest that your blood pressure is causing a serious heart or blood vessel problem. Your blood pressure may be over 180/110. ? For example, call 911 if:  ? · You have symptoms of a heart attack. These may include:  ¨ Chest pain or pressure, or a strange feeling in the chest.  ¨ Sweating. ¨ Shortness of breath. ¨ Nausea or vomiting.   ¨ Pain, pressure, or a strange feeling in the back, neck, jaw, or upper belly or in one or both shoulders or arms.  ¨ Lightheadedness or sudden weakness. ¨ A fast or irregular heartbeat. ? · You have symptoms of a stroke. These may include:  ¨ Sudden numbness, tingling, weakness, or loss of movement in your face, arm, or leg, especially on only one side of your body. ¨ Sudden vision changes. ¨ Sudden trouble speaking. ¨ Sudden confusion or trouble understanding simple statements. ¨ Sudden problems with walking or balance. ¨ A sudden, severe headache that is different from past headaches. ? · You have severe back or belly pain. ?Do not wait until your blood pressure comes down on its own. Get help right away. ?Call your doctor now or seek immediate care if:  ? · Your blood pressure is much higher than normal (such as 180/110 or higher), but you don't have symptoms. ? · You think high blood pressure is causing symptoms, such as:  ¨ Severe headache. ¨ Blurry vision. ? Watch closely for changes in your health, and be sure to contact your doctor if:  ? · Your blood pressure measures 140/90 or higher at least 2 times. That means the top number is 140 or higher or the bottom number is 90 or higher, or both. ? · You think you may be having side effects from your blood pressure medicine. ? · Your blood pressure is usually normal, but it goes above normal at least 2 times. Where can you learn more? Go to http://desi-mono.info/. Enter L969 in the search box to learn more about \"High Blood Pressure: Care Instructions. \"  Current as of: September 21, 2016  Content Version: 11.4  © 8182-0326 [employer]. Care instructions adapted under license by [employer] (which disclaims liability or warranty for this information). If you have questions about a medical condition or this instruction, always ask your healthcare professional. [place] [address] any warranty or liability for your use of this information.

## 2018-03-22 NOTE — CALL BACK NOTE
[facility] Emergency Department Follow Up Call Record    Discharged to : Home/Family Home/Home Health/Skilled Facility/Rehab/Assisted Living/Other__Home_____  1) Did you receive your discharge instructions? Yes        2) Do you understand them? Yes  Karis Rsos's daughter  reports she has received Discharge instructions. 3) Are you able to follow them? Yes          If NO, what can I clarify for you? Need to follow up with Cardiologist. Daughter reports \"mother will see her Cardiologsit tomorrow at noon\". 4) Do you understand your diagnosis? Yes         5) Do you know which symptoms should prompt you to call the doctor? Yes. Daughter also will be discussing concerns for mother's anxiety since surgery. 6) Were you able to fill and  any medications that were prescribed? Not applicable     7) You were prescribed __n/a_________for ____________________. Common side effects of this medication are____________________. This is not a complete list so please review the forms given from the pharmacy for a complete list.      8) Are there any questions about your medications? No            Have you scheduled any recommended doctors appointments (specialty, PCP) YES  If NO, what barriers are you encountering (transportation/lost contact info/cost/  didnt think necessary/no PCP  9) If discharged with Home Health, has the agency contacted you to schedule visit? N/A    10) Is there anyone available to help you at home (meals, errands, transportation    monitoring) (adult children, neighbors, private duty companions) Yes    11) Are you on a special diet? Yes AHA        If YES, do you understand the requirements for this diet? Yes      Education provided? YES  12) If presented with cough, bronchitis, COPD, asthma, is it ok to ask that the   respiratory disease management educator call you?  Not applicable      13)  A) If presented with fall, were you issued an assistive device in the ED    Are you using?Not applicable  B) If given RX for device, have you obtained? Not applicable       If NO, barriers? C) Therapist recommended:   Are you able to implement the suggestions? Not applicable        If NO, barriers to implementation? D) Are you having any difficulties with mobility inside your home?     (steps, bed, tub)No   If YES, ask if the [facility] PT can contact patient and good time and number?  14)  At the end of your discharge instructions, there is information about accessing [facility] & [facility] SERVICES, have you had a chance to review those? Yes         Do you have any questions about signing up for this service? We encourage our patients to be active participants in their healthcare and this site is one of the ways to do that. It will allow you to access parts of your medical record, email your doctors office, schedule appointments, and request medications refills . 15) Are there any other questions that I can answer for you regarding    your Emergency department visit?  NO             Estimated Call Time:_____11:59 AM  ______________ Date/Time:_______________

## 2018-03-22 NOTE — ED PROVIDER NOTES
HPI Comments: 50-year-old female presents to the emergency room for evaluation of high blood pressure. Patient had coronary artery bypass 8 weeks ago. Patient checked her blood pressure daily. Patient checked blood pressure this evening was elevated. Patient checked her blood pressure again at [facility] and found it to be 200/80. Patient came to the emergency room. She denies having any chest pain, shortness of breath or difficulty breathing. No abdominal pain, nausea vomiting. Patient reports she had a mild headache. This has resolved. No blurry vision or double vision. No numbness or tingling. No neck pain. Patient has been participating in cardiac rehabilitation. She participated today and had no chest pain or any difficulties. Social hx  Nonsmoker  No alcohol    Patient is a 77 y.o. female presenting with hypertension. The history is provided by the patient and a relative. Hypertension    Associated symptoms include headaches. Pertinent negatives include no chest pain, no palpitations, no neck pain, no dizziness, no shortness of breath, no nausea and no vomiting.         Past Medical History:   Diagnosis Date    CAD (coronary artery disease)     calcium score 229 - 2011, cath 4/2009 - [facility]    Cataract     CKD (chronic kidney disease) stage 3, GFR 30-59 ml/min     kidney are ok - no current issues as of 3/8/18    Cystoid macular degeneration of right eye     Diabetes ([address]     Diabetic neuropathy (HCC)     Diastolic dysfunction     Esophageal reflux 4/9/2015    Foot ulcer ([address]     Glaucoma     right    Hypercholesterolemia     Hyperlipidemia 1/23/2015    Hypertension     Macular degeneration     rt eye    Proliferative diabetic retinopathy ([address]     PVD (peripheral vascular disease) ([address]     no current issues as of 3/8/18    Rupture of ulnar collateral ligament of thumb     Sebaceous cyst 4/4/2017    Urge incontinence     Vitreous hemorrhage of both eyes ([address]        Past Surgical History: Procedure Laterality Date    CARDIAC SURG PROCEDURE UNLIST  02/06/2018    CABG x 4    HX CATARACT REMOVAL Left     HX CHOLECYSTECTOMY           Family History:   Problem Relation Age of Onset    Heart Failure Mother     Heart Disease Mother     Diabetes Father    Scott Bun Elevated Lipids Father        Social History     Social History    Marital status:      Spouse name: N/A    Number of children: N/A    Years of education: N/A     Occupational History    Not on file. Social History Main Topics    Smoking status: Never Smoker    Smokeless tobacco: Never Used    Alcohol use No    Drug use: No    Sexual activity: No     Other Topics Concern    Not on file     Social History Narrative         ALLERGIES: Ibuprofen and Norvasc [amlodipine]    Review of Systems   Constitutional: Negative for chills and fever. HENT: Negative for congestion and sore throat. Eyes: Negative for visual disturbance. Respiratory: Negative for cough, chest tightness and shortness of breath. Cardiovascular: Negative for chest pain and palpitations. Gastrointestinal: Negative for abdominal pain, diarrhea, nausea and vomiting. Genitourinary: Negative for dysuria, flank pain, frequency and urgency. Musculoskeletal: Negative for back pain, myalgias, neck pain and neck stiffness. Skin: Negative for rash and wound. Neurological: Positive for headaches. Negative for dizziness and numbness. All other systems reviewed and are negative. Vitals:    03/21/18 2047 03/21/18 2049 03/21/18 2144 03/21/18 2147   BP: 153/69 111/70 157/67 127/69   Pulse: 60      Resp: 16      Temp: 98.2 °F (36.8 °C)      SpO2: 96%      Weight: 96.4 kg (212 lb 9.6 oz)      Height: 5' 6\" (1.676 m)               Physical Exam   Constitutional: She is oriented to person, place, and time. She appears well-developed and well-nourished. No distress. HENT:   Head: Normocephalic and atraumatic.    Right Ear: External ear normal.   Left Ear: External ear normal.   Eyes: Conjunctivae and EOM are normal. Pupils are equal, round, and reactive to light. Neck: Normal range of motion. Neck supple. Cardiovascular: Normal rate and regular rhythm. Pulses:       Radial pulses are 2+ on the right side, and 2+ on the left side. Dorsalis pedis pulses are 2+ on the right side, and 2+ on the left side. Pulmonary/Chest: Effort normal and breath sounds normal. No respiratory distress. She has no wheezes. Abdominal: Soft. Normal appearance and bowel sounds are normal. She exhibits no shifting dullness, no distension, no pulsatile liver, no abdominal bruit, no pulsatile midline mass and no mass. There is no hepatosplenomegaly, splenomegaly or hepatomegaly. There is no tenderness. There is no rigidity, no rebound, no guarding, no CVA tenderness, no tenderness at McBurney's point and negative Mcnair's sign. Musculoskeletal: Normal range of motion. Neurological: She is alert and oriented to person, place, and time. She has normal reflexes. No cranial nerve deficit. Coordination normal.   Skin: Skin is warm and dry. No rash noted. No erythema. Psychiatric: She has a normal mood and affect. Her behavior is normal. Judgment and thought content normal.   Nursing note and vitals reviewed. MDM  Number of Diagnoses or Management Options  Hypertension, unspecified type:   Diagnosis management comments: 74-year-old female presenting for elevated blood pressure. Patient ate out of cardiac surgery. She is in no acute distress. Lungs are clear bilaterally. Plan: CT, labs EKG    1:26 AM  Labs and CT reassuring. Pt with no complaints. Blood pressure has trended down. Patient is well hydrated, well appearing, and in no respiratory distress. Physical exam is reassuring, and without signs of serious illness. Will discharge patient home with supportive care, and follow-up with Cardiology within the next few days.     Patient's results have been reviewed with them.  Patient and/or family have verbally conveyed their understanding and agreement of the patient's signs, symptoms, diagnosis, treatment and prognosis and additionally agree to follow up as recommended or return to the Emergency Room should their condition change prior to follow-up. Discharge instructions have also been provided to the patient with some educational information regarding their diagnosis as well a list of reasons why they would want to return to the ER prior to their follow-up appointment should their condition change. Amount and/or Complexity of Data Reviewed  Discuss the patient with other providers: yes (ER attending-Pavel)    Patient Progress  Patient progress: stable        ED Course       Procedures        ED EKG interpretation:  Rhythm: sinus bradycardia; and regular . Rate (approx.): 55; Axis:normal; nonspecific ST changes. This EKG was interpreted by Keri Elizabeth PA-C, ED Provider. Pt has been seen and evaluated by attending physician who has reviewed lab work and imaging tests. He agrees with discharge and prescription plan.

## 2018-03-22 NOTE — ED TRIAGE NOTES
Patient arrives to ED with c/o hypertension per daughter, patient's BP 2 30 mins ago was 200/83, both home and at [facility]. No other complaint noted. Patient takes Metoprolol 100 mg BID.

## 2018-03-22 NOTE — ED NOTES
Discharge instructions given to pt and family. All questions answered and pt verbalized understanding. V/S stable @ time of discharge. No lines or drains in place. Pt ambulatory out of unit  And taken home by family.

## 2018-03-23 ENCOUNTER — HOSPITAL ENCOUNTER (OUTPATIENT)
Dept: CARDIAC REHAB | Age: 78
Discharge: HOME | End: 2018-03-23
Payer: MEDICARE

## 2018-03-23 VITALS — WEIGHT: 212 LBS | BODY MASS INDEX: 34.22 KG/M2

## 2018-03-23 DIAGNOSIS — E11.8 TYPE 2 DIABETES MELLITUS WITH COMPLICATION, WITH LONG-TERM CURRENT USE OF INSULIN (HCC): ICD-10-CM

## 2018-03-23 DIAGNOSIS — Z79.4 TYPE 2 DIABETES MELLITUS WITH COMPLICATION, WITH LONG-TERM CURRENT USE OF INSULIN (HCC): ICD-10-CM

## 2018-03-23 PROCEDURE — 93798 PHYS/QHP OP CAR RHAB W/ECG: CPT

## 2018-03-23 RX ORDER — SITAGLIPTIN 100 MG/1
TABLET, FILM COATED ORAL
Qty: 90 TAB | Refills: 3 | Status: SHIPPED | OUTPATIENT
Start: 2018-03-23 | End: 2018-05-11 | Stop reason: ALTCHOICE

## 2018-03-24 DIAGNOSIS — E78.5 HYPERLIPIDEMIA, UNSPECIFIED HYPERLIPIDEMIA TYPE: ICD-10-CM

## 2018-03-24 RX ORDER — ROSUVASTATIN CALCIUM 40 MG/1
40 TABLET, COATED ORAL DAILY
Qty: 90 TAB | Refills: 1 | Status: SHIPPED | OUTPATIENT
Start: 2018-03-24 | End: 2018-03-29 | Stop reason: SDUPTHER

## 2018-03-26 ENCOUNTER — HOSPITAL ENCOUNTER (OUTPATIENT)
Dept: CARDIAC REHAB | Age: 78
Discharge: HOME | End: 2018-03-26
Payer: MEDICARE

## 2018-03-26 VITALS — WEIGHT: 211.5 LBS | BODY MASS INDEX: 34.14 KG/M2

## 2018-03-26 PROCEDURE — 93798 PHYS/QHP OP CAR RHAB W/ECG: CPT

## 2018-03-28 ENCOUNTER — HOSPITAL ENCOUNTER (OUTPATIENT)
Dept: CARDIAC REHAB | Age: 78
Discharge: HOME | End: 2018-03-28
Payer: MEDICARE

## 2018-03-28 VITALS — BODY MASS INDEX: 34.06 KG/M2 | WEIGHT: 211 LBS

## 2018-03-28 PROCEDURE — 93798 PHYS/QHP OP CAR RHAB W/ECG: CPT | Performed by: DIETITIAN, REGISTERED

## 2018-03-30 ENCOUNTER — HOSPITAL ENCOUNTER (OUTPATIENT)
Dept: CARDIAC REHAB | Age: 78
Discharge: HOME | End: 2018-03-30
Payer: MEDICARE

## 2018-03-30 VITALS — WEIGHT: 210.5 LBS | BODY MASS INDEX: 33.98 KG/M2

## 2018-03-30 DIAGNOSIS — K21.9 GASTROESOPHAGEAL REFLUX DISEASE WITHOUT ESOPHAGITIS: ICD-10-CM

## 2018-03-30 PROCEDURE — 93798 PHYS/QHP OP CAR RHAB W/ECG: CPT

## 2018-03-30 RX ORDER — HYDROGEN PEROXIDE 3 %
SOLUTION, NON-ORAL MISCELLANEOUS
Qty: 90 CAP | Refills: 3 | Status: SHIPPED | OUTPATIENT
Start: 2018-03-30 | End: 2019-03-08 | Stop reason: SDUPTHER

## 2018-04-02 ENCOUNTER — HOSPITAL ENCOUNTER (OUTPATIENT)
Dept: CARDIAC REHAB | Age: 78
Discharge: HOME | End: 2018-04-02
Payer: MEDICARE

## 2018-04-02 VITALS — BODY MASS INDEX: 33.89 KG/M2 | WEIGHT: 210 LBS

## 2018-04-02 PROCEDURE — 93798 PHYS/QHP OP CAR RHAB W/ECG: CPT

## 2018-04-04 ENCOUNTER — HOSPITAL ENCOUNTER (OUTPATIENT)
Dept: CARDIAC REHAB | Age: 78
Discharge: HOME | End: 2018-04-04
Payer: MEDICARE

## 2018-04-04 VITALS — BODY MASS INDEX: 33.81 KG/M2 | WEIGHT: 209.5 LBS

## 2018-04-04 PROCEDURE — 93798 PHYS/QHP OP CAR RHAB W/ECG: CPT

## 2018-04-06 ENCOUNTER — HOSPITAL ENCOUNTER (OUTPATIENT)
Dept: CARDIAC REHAB | Age: 78
Discharge: HOME | End: 2018-04-06
Payer: MEDICARE

## 2018-04-06 VITALS — BODY MASS INDEX: 33.89 KG/M2 | WEIGHT: 210 LBS

## 2018-04-06 PROCEDURE — 93798 PHYS/QHP OP CAR RHAB W/ECG: CPT

## 2018-04-09 ENCOUNTER — HOSPITAL ENCOUNTER (OUTPATIENT)
Dept: CARDIAC REHAB | Age: 78
Discharge: HOME | End: 2018-04-09
Payer: MEDICARE

## 2018-04-09 VITALS — BODY MASS INDEX: 33.81 KG/M2 | WEIGHT: 209.5 LBS

## 2018-04-09 PROCEDURE — 93798 PHYS/QHP OP CAR RHAB W/ECG: CPT

## 2018-04-10 ENCOUNTER — OFFICE VISIT (OUTPATIENT)
Dept: INTERNAL MEDICINE CLINIC | Age: 78
End: 2018-04-10

## 2018-04-10 VITALS
OXYGEN SATURATION: 97 % | HEIGHT: 66 IN | RESPIRATION RATE: 16 BRPM | SYSTOLIC BLOOD PRESSURE: 137 MMHG | HEART RATE: 57 BPM | BODY MASS INDEX: 33.85 KG/M2 | DIASTOLIC BLOOD PRESSURE: 52 MMHG | TEMPERATURE: 97.7 F | WEIGHT: 210.6 LBS

## 2018-04-10 DIAGNOSIS — E55.9 VITAMIN D DEFICIENCY: ICD-10-CM

## 2018-04-10 DIAGNOSIS — Z79.4 TYPE 2 DIABETES MELLITUS WITH COMPLICATION, WITH LONG-TERM CURRENT USE OF INSULIN (HCC): Primary | ICD-10-CM

## 2018-04-10 DIAGNOSIS — I10 ESSENTIAL HYPERTENSION: ICD-10-CM

## 2018-04-10 DIAGNOSIS — R76.8 ELEVATED RHEUMATOID FACTOR: ICD-10-CM

## 2018-04-10 DIAGNOSIS — E11.21 TYPE 2 DIABETES MELLITUS WITH NEPHROPATHY (HCC): ICD-10-CM

## 2018-04-10 DIAGNOSIS — I25.10 CORONARY ARTERY DISEASE INVOLVING NATIVE CORONARY ARTERY OF NATIVE HEART WITHOUT ANGINA PECTORIS: ICD-10-CM

## 2018-04-10 DIAGNOSIS — E11.8 TYPE 2 DIABETES MELLITUS WITH COMPLICATION, WITH LONG-TERM CURRENT USE OF INSULIN (HCC): Primary | ICD-10-CM

## 2018-04-10 DIAGNOSIS — Z95.1 S/P CABG X 4: ICD-10-CM

## 2018-04-10 DIAGNOSIS — N18.30 CKD (CHRONIC KIDNEY DISEASE) STAGE 3, GFR 30-59 ML/MIN (HCC): ICD-10-CM

## 2018-04-10 DIAGNOSIS — E78.5 HYPERLIPIDEMIA, UNSPECIFIED HYPERLIPIDEMIA TYPE: ICD-10-CM

## 2018-04-10 PROBLEM — R80.9 MICROALBUMINURIA: Status: ACTIVE | Noted: 2018-04-10

## 2018-04-10 LAB
ALBUMIN UR QL STRIP: 80 MG/L
CREATININE, URINE POC: 50 MG/DL
MICROALBUMIN/CREAT RATIO POC: >300 MG/G

## 2018-04-10 RX ORDER — INSULIN GLARGINE 100 [IU]/ML
48 INJECTION, SOLUTION SUBCUTANEOUS
Qty: 1 VIAL | Refills: 5 | Status: SHIPPED | OUTPATIENT
Start: 2018-04-10 | End: 2018-09-22 | Stop reason: SDUPTHER

## 2018-04-10 RX ORDER — LISINOPRIL 5 MG/1
5 TABLET ORAL DAILY
Qty: 90 TAB | Refills: 1 | Status: SHIPPED | OUTPATIENT
Start: 2018-04-10 | End: 2018-10-03 | Stop reason: SDUPTHER

## 2018-04-10 RX ORDER — METOPROLOL TARTRATE 25 MG/1
75 TABLET, FILM COATED ORAL 2 TIMES DAILY
COMMUNITY

## 2018-04-10 RX ORDER — AMLODIPINE BESYLATE 5 MG/1
5 TABLET ORAL DAILY
COMMUNITY

## 2018-04-10 NOTE — PROGRESS NOTES
HPI:  Presents for f/u CAD, s/p CABG x 4, DM, lipids    Daughter translates and provides hx by pt preference. In the interim, pt developed CARO, SOB, CP  Dx multi-vessel CAD and now s/p CABG x 4    Recent increase in HTN  Saw Cards in f/u - Dr Ad Vasquez  Med dosing adjusted    Metformin and januvia dc'd as inpt in lieu of lantus + mealtime lispro  Lisinopril also dc'd  Tolerating regimen for now  But, qid insulin dosing and BG monitoring is difficult    Home BG monitoring - fair control reported  No hypoglycemia on current regimen. +med compliance reported    Past medical, Social, and Family history reviewed    Prior to Admission medications    Medication Sig Start Date End Date Taking? Authorizing Provider   esomeprazole (NEXIUM) 20 mg capsule TAKE ONE CAPSULE BY MOUTH DAILY 3/30/18  Yes Lisa Olivas MD   dicyclomine (BENTYL) 10 mg capsule Take 10 mg by mouth as needed. Yes Historical Provider   esomeprazole (NEXIUM) 20 mg capsule Take 20 mg by mouth daily. Yes Historical Provider   acetaminophen (TYLENOL EXTRA STRENGTH) 500 mg tablet Take 500 mg by mouth every six (6) hours as needed for Pain. Yes Historical Provider   amiodarone (CORDARONE) 200 mg tablet Take 400 mg by mouth twice daily for 2 weeks, then take 400 mg by mouth daily for 2 weeks. Then you are done with this medication. 2/14/18  Yes Ron Rdoriguez NP   amLODIPine (NORVASC) 2.5 mg tablet Take 1 Tab by mouth daily. 2/15/18  Yes Ron Rodriguez NP   metoprolol tartrate (LOPRESSOR) 100 mg IR tablet Take 1 Tab by mouth two (2) times a day. 2/14/18  Yes Ron Rodriguez NP   senna-docusate (PERICOLACE) 8.6-50 mg per tablet Take 1 Tab by mouth two (2) times a day. Take until having regular bowel movements. 2/14/18  Yes Ron Rodriguez NP   insulin glargine (LANTUS) 100 unit/mL injection 48 Units by SubCUTAneous route nightly.  2/14/18  Yes Ron Rodriguez NP   insulin lispro (HUMALOG) 100 unit/mL injection 5 Units by SubCUTAneous route Before breakfast, lunch, and dinner. 2/14/18  Yes Joanna Bella NP   ergocalciferol (ERGOCALCIFEROL) 50,000 unit capsule TAKE ONE CAPSULE BY MOUTH EVERY 7 DAYS 1/31/18  Yes Reyes Curling, MD   rosuvastatin (CRESTOR) 40 mg tablet Take 1 Tab by mouth daily. 1/31/18  Yes Reyes Curling, MD   gabapentin (NEURONTIN) 300 mg capsule Take 1 Cap by mouth three (3) times daily. Patient taking differently: Take 300 mg by mouth nightly. Takes only QHS 1/8/18  Yes Reyes Curling, MD   polyethylene glycol (MIRALAX) 17 gram/dose powder USE 1 TABLESPOONFUL AS DIRECTED BY MOUTH EVERY DAY  Patient taking differently: USE 1 TABLESPOONFUL AS DIRECTED BY MOUTH EVERY DAY AS NEEDED 7/14/17  Yes Reyes Curling, MD   COMBIGAN 0.2-0.5 % drop ophthalmic solution INSTILL 1 DROP IN RIGHT EYE EVERY MORNING AND NIGHT. 3/16/17  Yes Historical Provider   aspirin delayed-release 81 mg tablet Take 81 mg by mouth daily. Yes Gurpreet Devine MD   JANUVIA 100 mg tablet TAKE 1 TABLET BY MOUTH DAILY 3/23/18   Reyes Curling, MD          ROS  Complete ROS reviewed and negative or stable except as noted in HPI. Physical Exam   Constitutional: She is oriented to person, place, and time. She appears well-nourished. No distress. HENT:   Head: Normocephalic and atraumatic. Mouth/Throat: Oropharynx is clear and moist.   Eyes: EOM are normal. Pupils are equal, round, and reactive to light. No scleral icterus. Neck: Normal range of motion. Neck supple. No JVD present. Cardiovascular: Normal rate, regular rhythm and normal heart sounds. Exam reveals no gallop and no friction rub. No murmur heard. Pulmonary/Chest: Effort normal and breath sounds normal. No respiratory distress. She has no wheezes. She has no rales. Abdominal: Soft. She exhibits no distension. There is no tenderness. Musculoskeletal: Normal range of motion. She exhibits edema (trace ). Lymphadenopathy:     She has no cervical adenopathy.    Neurological: She is alert and oriented to person, place, and time. She exhibits normal muscle tone. Skin: Skin is warm. No rash noted. Psychiatric: She has a normal mood and affect. Nursing note and vitals reviewed. Prior labs reviewed. Reviewed dc summary  Reviewed echo reports  Reviewed prior imaging reports      Assessment/Plan:    ICD-10-CM ICD-9-CM    1. Type 2 diabetes mellitus with complication, with long-term current use of insulin (HCC) E11.8 250.90 AMB POC URINE, MICROALBUMIN, SEMIQUANT (3 RESULTS)    Z79.4 V58.67 HEMOGLOBIN A1C WITH EAG   2. Essential hypertension I10 401.9 lisinopril (PRINIVIL, ZESTRIL) 5 mg tablet      CBC WITH AUTOMATED DIFF   3. S/P CABG x 4 Z95.1 V45.81    4. Hyperlipidemia, unspecified hyperlipidemia type E78.5 272.4 CK      LIPID PANEL      METABOLIC PANEL, COMPREHENSIVE   5. CKD (chronic kidney disease) stage 3, GFR 30-59 ml/min N18.3 585.3    6. Coronary artery disease involving native coronary artery of native heart without angina pectoris I25.10 414.01    7. Type 2 diabetes mellitus with nephropathy (HCC) E11.21 250.40 lisinopril (PRINIVIL, ZESTRIL) 5 mg tablet     583.81    8. Vitamin D deficiency E55.9 268.9 VITAMIN D, 25 HYDROXY   9. Elevated rheumatoid factor R76.8 795.79 HCV AB W/RFLX TO DARRICK      RA + CCP ABS     Follow-up Disposition:  Return in about 3 weeks (around 5/1/2018), or if symptoms worsen or fail to improve, for blood pressure, diabetes, Medicare Wellness Visit.    results and schedule of future studies reviewed with patient  reviewed diet, exercise and weight    cardiovascular risk and specific lipid/LDL goals reviewed  reviewed medications and side effects in detail   Resume low dose ACEI  Return for fasting labs  Repeat RF and check Hep C  Consider adjust DM regimen to resume [place] [facility] and [place] and/or metformin and dc TID lispro to simplify       >40 minutes time spent with >50% in counseling and coordination of care

## 2018-04-10 NOTE — MR AVS SNAPSHOT
[place] [address] [address] [place] [address] 
[phone] Patient: Maurilio Talley MRN:  [id number] Visit Information Date & Time Provider Department Dept. Phone Encounter #  
 4/10/2018 10:45 AM Onel Rizvi MD [facility] [phone] [id number] Follow-up Instructions Return in about 3 weeks (around 5/1/2018), or if symptoms worsen or fail to improve, for blood pressure, diabetes, Medicare Wellness Visit. Upcoming Health Maintenance Date Due  
 FOOT EXAM Q1 12/20/2017 EYE EXAM RETINAL OR DILATED Q1 3/3/2018 MEDICARE YEARLY EXAM 3/22/2018 HEMOGLOBIN A1C Q6M 8/5/2018 LIPID PANEL Q1 2/3/2019 GLAUCOMA SCREENING Q2Y 3/3/2019 MICROALBUMIN Q1 4/10/2019 DTaP/Tdap/Td series (2 - Td) 1/22/2028 Allergies as of 4/10/2018  Review Complete On: 4/10/2018 By: Onel Rizvi MD  
  
 Severity Noted Reaction Type Reactions Ibuprofen  03/05/2010    Unknown (comments) Norvasc [Amlodipine]  04/14/2015    Other (comments) LE swelling - currently taking with no issues (3/8/18) Current Immunizations  Reviewed on 3/20/2017 Name Date Influenza Vaccine (Quad) PF 12/16/2014 Pneumococcal Conjugate (PCV-13) 3/11/2016 Pneumococcal Polysaccharide (PPSV-23) 12/16/2014 Not reviewed this visit You Were Diagnosed With   
  
 Codes Comments Type 2 diabetes mellitus with complication, with long-term current use of insulin (HCC)    -  Primary ICD-10-CM: E11.8, Z79.4 ICD-9-CM: 250.90, V58.67 Essential hypertension     ICD-10-CM: I10 
ICD-9-CM: 401.9 S/P CABG x 4     ICD-10-CM: Z95.1 ICD-9-CM: V45.81 Hyperlipidemia, unspecified hyperlipidemia type     ICD-10-CM: E78.5 ICD-9-CM: 272.4 CKD (chronic kidney disease) stage 3, GFR 30-59 ml/min     ICD-10-CM: N18.3 ICD-9-CM: 585.3 Coronary artery disease involving native coronary artery of native heart without angina pectoris     ICD-10-CM: I25.10 ICD-9-CM: 414.01 Type 2 diabetes mellitus with nephropathy (HCC)     ICD-10-CM: E11.21 
ICD-9-CM: 250.40, 583.81 Vitamin D deficiency     ICD-10-CM: E55.9 ICD-9-CM: 268.9 Elevated rheumatoid factor     ICD-10-CM: R76.8 ICD-9-CM: 795.79 Vitals BP Pulse Temp Resp Height(growth percentile) Weight(growth percentile) 137/52 (BP 1 Location: Left arm, BP Patient Position: Sitting) (!) 57 97.7 °F (36.5 °C) (Oral) 16 5' 6\" (1.676 m) 210 lb 9.6 oz (95.5 kg) SpO2 BMI OB Status Smoking Status 97% 33.99 kg/m2 Postmenopausal Never Smoker BMI and BSA Data Body Mass Index Body Surface Area  
 33.99 kg/m 2 2.11 m 2 Preferred Pharmacy Pharmacy Name Phone [facility] #[id number]- [place]io, [address] [place], [place] [phone] Your Updated Medication List  
  
   
This list is accurate as of 4/10/18 11:57 AM.  Always use your most recent med list.  
  
  
  
  
 aspirin delayed-release 81 mg tablet Take 81 mg by mouth daily. COMBIGAN 0.2-0.5 % Drop ophthalmic solution Generic drug:  brimonidine-timolol INSTILL 1 DROP IN RIGHT EYE EVERY MORNING AND NIGHT. dicyclomine 10 mg capsule Commonly known as:  BENTYL Take 10 mg by mouth as needed. ergocalciferol 50,000 unit capsule Commonly known as:  ERGOCALCIFEROL  
TAKE ONE CAPSULE BY MOUTH EVERY 7 DAYS  
  
 * esomeprazole 20 mg capsule Commonly known as:  Brenda Breed Take 20 mg by mouth daily. * esomeprazole 20 mg capsule Commonly known as:  NEXIUM  
TAKE ONE CAPSULE BY MOUTH DAILY  
  
 gabapentin 300 mg capsule Commonly known as:  NEURONTIN Take 1 Cap by mouth three (3) times daily. insulin glargine 100 unit/mL injection Commonly known as:  LANTUS  
48 Units by SubCUTAneous route nightly. insulin lispro 100 unit/mL injection Commonly known as:  HUMALOG  
5 Units by SubCUTAneous route Before breakfast, lunch, and dinner. JANUVIA 100 mg tablet Generic drug:  SITagliptin TAKE 1 TABLET BY MOUTH DAILY  
  
 lisinopril 5 mg tablet Commonly known as:  Pecola Cypher Take 1 Tab by mouth daily. metoprolol tartrate 25 mg tablet Commonly known as:  LOPRESSOR Take 75 mg by mouth two (2) times a day. NORVASC 5 mg tablet Generic drug:  amLODIPine Take 5 mg by mouth daily. polyethylene glycol 17 gram/dose powder Commonly known as:  MIRALAX  
USE 1 TABLESPOONFUL AS DIRECTED BY MOUTH EVERY DAY  
  
 rosuvastatin 40 mg tablet Commonly known as:  CRESTOR Take 1 Tab by mouth daily. senna-docusate 8.6-50 mg per tablet Commonly known as:  Jana Brittaney Take 1 Tab by mouth two (2) times a day. Take until having regular bowel movements. TYLENOL EXTRA STRENGTH 500 mg tablet Generic drug:  acetaminophen Take 500 mg by mouth every six (6) hours as needed for Pain. * Notice: This list has 2 medication(s) that are the same as other medications prescribed for you. Read the directions carefully, and ask your doctor or other care provider to review them with you. Prescriptions Sent to Pharmacy Refills  
 lisinopril (PRINIVIL, ZESTRIL) 5 mg tablet 1 Sig: Take 1 Tab by mouth daily. Class: Normal  
 Pharmacy: [facility]/pharmacy #[id number] Ph #: [phone] Route: Oral  
 insulin glargine (LANTUS) 100 unit/mL injection 5 Si Units by SubCUTAneous route nightly. Class: Normal  
 Pharmacy: [facility]/pharmacy #[id number] Ph #: [phone] Route: SubCUTAneous We Performed the Following AMB POC URINE, MICROALBUMIN, SEMIQUANT (3 RESULTS) [44952 CPT(R)] Follow-up Instructions Return in about 3 weeks (around 5/1/2018), or if symptoms worsen or fail to improve, for blood pressure, diabetes, Medicare Wellness Visit. To-Do List   
 04/11/2018 1:00 PM  
  Appointment with [address] at [address] [place] [address] ([phone])  
  
 04/11/2018 2:00 PM  
  Appointment with [address] at [address] [place] [address] ([phone]) Around 04/12/2018 Lab:  CBC WITH AUTOMATED DIFF Around 04/12/2018 Lab:  CK Around 04/12/2018 Lab:  HCV AB W/RFLX TO DARRICK Around 04/12/2018 Lab:  HEMOGLOBIN A1C WITH EAG Around 04/12/2018 Lab:  LIPID PANEL Around 04/12/2018 Lab:  METABOLIC PANEL, COMPREHENSIVE Around 04/12/2018 Lab:  RA + CCP ABS Around 04/12/2018   Lab:  VITAMIN D, 25 HYDROXY   
  
 04/13/2018 1:00 PM  
  Appointment with [address] at [address] [place] [address] ([phone])  
  
 04/16/2018 1:30 PM  
  Appointment with [address] at [address] [place] [address] ([phone])  
  
 04/18/2018 1:00 PM  
  Appointment with [address] at [address] [place] [address] ([phone])  
  
 04/18/2018 2:00 PM  
  Appointment with [address] at [address] [place] [address] ([phone])  
  
 04/20/2018 1:00 PM  
  Appointment with [address] at [address] [place] [address] ([phone])  
  
 04/23/2018 1:30 PM  
  Appointment with [address] at [address] [place] [address] ([phone])  
  
 04/25/2018 1:00 PM  
  Appointment with [address] at [address] [place] [address] ([phone])  
  
 04/25/2018 2:00 PM  
  Appointment with [address] at [address] [place] [address] ([phone])  
  
 04/27/2018 1:00 PM  
  Appointment with [address] at [address] [place] [address] ([phone])  
  
 04/30/2018 1:30 PM  
  Appointment with [address] at [address] [place] [address] ([phone])  
  
 05/02/2018 1:00 PM  
  Appointment with [address] at [address] [place] [address] ([phone])  
  
 05/02/2018 2:00 PM  
  Appointment with [address] at [address] [place] [address] ([phone])  
  
 05/04/2018 1:00 PM  
  Appointment with [address] at [address] [place] [address] ([phone])  
  
 05/07/2018 1:30 PM  
  Appointment with [address] at [address] [place] [address] ([phone])  
  
 05/09/2018 1:00 PM  
  Appointment with [address] at [address] [place] [address] ([phone]) Introducing [facility]! Marian Gage introduces [employer] patient portal. Now you can access parts of your medical record, email your doctor's office, and request medication refills online. 1. In your internet browser, go to [email or web address] 2. Click on the First Time User? Click Here link in the Sign In box. You will see the New Member Sign Up page. 3. Enter your [employer] Access Code exactly as it appears below. You will not need to use this code after youve completed the sign-up process. If you do not sign up before the expiration date, you must request a new code. · [employer] Access Code: [email or web address] Expires: 4/22/2018 11:56 AM 
 
4. Enter the last four digits of your Social Security Number (xxxx) and Date of Birth (mm/dd/yyyy) as indicated and click Submit. You will be taken to the next sign-up page. 5. Create a [employer]ipl[employer] ID. This will be your [employer] login ID and cannot be changed, so think of one that is secure and easy to remember. 6. Create a Ripl[employer] password. You can change your password at any time. 7. Enter your Password Reset Question and Answer. This can be used at a later time if you forget your password. 8. Enter your e-mail address. You will receive e-mail notification when new information is available in [address] 9. Click Sign Up. You can now view and download portions of your medical record. 10. Click the Download Summary menu link to download a portable copy of your medical information. If you have questions, please visit the Frequently Asked Questions section of the [employer] website. Remember, [employer] is NOT to be used for urgent needs. For medical emergencies, dial 911. Now available from your iPhone and Android! Please provide this summary of care documentation to your next provider. Your primary care clinician is listed as [address] If you have any questions after today's visit, please call [phone].

## 2018-04-10 NOTE — PROGRESS NOTES
Rm 15  Chief Complaint   Patient presents with    Diabetes    Annual Wellness Visit     cholesterol    Blood Pressure Check   pt had open heart surgery 2/2/18    1. Have you been to the ER, urgent care clinic since your last visit? Hospitalized since your last visit? ED 3/21/18, HTN    2. Have you seen or consulted any other health care providers outside of the [address] [place] [address] since your last visit? Include any pap smears or colon screening. No    Health Maintenance Due   Topic Date Due    FOOT EXAM Q1  12/20/2017    EYE EXAM RETINAL OR DILATED Q1  03/03/2018    MICROALBUMIN Q1  03/20/2018    MEDICARE YEARLY EXAM  03/22/2018     PHQ over the last two weeks 4/10/2018   Little interest or pleasure in doing things Nearly every day   Feeling down, depressed or hopeless Nearly every day   Total Score PHQ 2 6   Trouble falling or staying asleep, or sleeping too much Nearly every day   Feeling tired or having little energy Nearly every day   Poor appetite or overeating Nearly every day   Feeling bad about yourself - or that you are a failure or have let yourself or your family down Not at all   Trouble concentrating on things such as school, work, reading or watching TV Not at all   Moving or speaking so slowly that other people could have noticed; or the opposite being so fidgety that others notice Nearly every day   Thoughts of being better off dead, or hurting yourself in some way Not at all   PHQ 9 Score 18   How difficult have these problems made it for you to do your work, take care of your home and get along with others Very difficult     Abuse Screening Questionnaire 4/10/2018   Do you ever feel afraid of your partner? N   Are you in a relationship with someone who physically or mentally threatens you? N   Is it safe for you to go home?  Y     ADL Assessment 4/10/2018   Feeding yourself Help Needed   Getting from bed to chair Help Needed   Getting dressed Help Needed   Bathing or showering Help Needed   Walk across the room (includes cane/walker) Help Needed   Using the telphone Help Needed   Taking your medications Help Needed   Preparing meals Help Needed   Managing money (expenses/bills) Help Needed   Moderately strenuous housework (laundry) Help Needed   Shopping for personal items (toiletries/medicines) Help Needed   Shopping for groceries Help Needed   Driving Help Needed   Climbing a flight of stairs Help Needed   Getting to places beyond walking distances Help Needed     Fall Risk Assessment, last 12 mths 4/10/2018   Able to walk? Yes   Fall in past 12 months?  No   Fall with injury? -   Number of falls in past 12 months -   Fall Risk Score -         Learning Assessment 4/4/2017   PRIMARY LEARNER Patient   HIGHEST LEVEL OF EDUCATION - PRIMARY LEARNER  2 YEARS OF COLLEGE   BARRIERS PRIMARY LEARNER NONE   CO-LEARNER CAREGIVER -   PRIMARY LANGUAGE OTHER (COMMENT)    NEED Yes   LEARNER PREFERENCE PRIMARY OTHER (COMMENT)   ANSWERED BY patient   RELATIONSHIP SELF

## 2018-04-11 ENCOUNTER — HOSPITAL ENCOUNTER (OUTPATIENT)
Dept: CARDIAC REHAB | Age: 78
Discharge: HOME | End: 2018-04-11
Payer: MEDICARE

## 2018-04-11 VITALS — WEIGHT: 210 LBS | BODY MASS INDEX: 33.89 KG/M2

## 2018-04-11 PROCEDURE — 93798 PHYS/QHP OP CAR RHAB W/ECG: CPT | Performed by: DIETITIAN, REGISTERED

## 2018-04-13 ENCOUNTER — HOSPITAL ENCOUNTER (OUTPATIENT)
Dept: CARDIAC REHAB | Age: 78
Discharge: HOME | End: 2018-04-13
Payer: MEDICARE

## 2018-04-13 VITALS — BODY MASS INDEX: 33.65 KG/M2 | WEIGHT: 208.5 LBS

## 2018-04-13 PROCEDURE — 93798 PHYS/QHP OP CAR RHAB W/ECG: CPT

## 2018-04-16 ENCOUNTER — HOSPITAL ENCOUNTER (OUTPATIENT)
Dept: CARDIAC REHAB | Age: 78
Discharge: HOME | End: 2018-04-16
Payer: MEDICARE

## 2018-04-16 VITALS — BODY MASS INDEX: 33.81 KG/M2 | WEIGHT: 209.5 LBS

## 2018-04-16 PROCEDURE — 93798 PHYS/QHP OP CAR RHAB W/ECG: CPT

## 2018-04-17 ENCOUNTER — HOSPITAL ENCOUNTER (OUTPATIENT)
Dept: LAB | Age: 78
Discharge: HOME | End: 2018-04-17
Payer: MEDICARE

## 2018-04-17 PROCEDURE — 86431 RHEUMATOID FACTOR QUANT: CPT

## 2018-04-17 PROCEDURE — 80061 LIPID PANEL: CPT

## 2018-04-17 PROCEDURE — 80053 COMPREHEN METABOLIC PANEL: CPT

## 2018-04-17 PROCEDURE — 82550 ASSAY OF CK (CPK): CPT

## 2018-04-17 PROCEDURE — 86803 HEPATITIS C AB TEST: CPT

## 2018-04-17 PROCEDURE — 83036 HEMOGLOBIN GLYCOSYLATED A1C: CPT

## 2018-04-17 PROCEDURE — 82306 VITAMIN D 25 HYDROXY: CPT

## 2018-04-17 PROCEDURE — 85025 COMPLETE CBC W/AUTO DIFF WBC: CPT

## 2018-04-18 ENCOUNTER — APPOINTMENT (OUTPATIENT)
Dept: CARDIAC REHAB | Age: 78
End: 2018-04-18
Payer: MEDICARE

## 2018-04-18 ENCOUNTER — TELEPHONE (OUTPATIENT)
Dept: CARDIAC REHAB | Age: 78
End: 2018-04-18

## 2018-04-18 NOTE — TELEPHONE ENCOUNTER
4/18/2018 Cardiac Wellness: Ms. Olya Reed called to cancel today's appointment d/t sickness. Will return for next appointment.  Casimiro Vigil

## 2018-04-20 ENCOUNTER — HOSPITAL ENCOUNTER (OUTPATIENT)
Dept: CARDIAC REHAB | Age: 78
Discharge: HOME | End: 2018-04-20
Payer: MEDICARE

## 2018-04-20 VITALS — WEIGHT: 208.5 LBS | BODY MASS INDEX: 33.65 KG/M2

## 2018-04-20 PROCEDURE — 93798 PHYS/QHP OP CAR RHAB W/ECG: CPT

## 2018-04-23 ENCOUNTER — HOSPITAL ENCOUNTER (OUTPATIENT)
Dept: CARDIAC REHAB | Age: 78
Discharge: HOME | End: 2018-04-23
Payer: MEDICARE

## 2018-04-23 VITALS — BODY MASS INDEX: 33.57 KG/M2 | WEIGHT: 208 LBS

## 2018-04-23 PROCEDURE — 93798 PHYS/QHP OP CAR RHAB W/ECG: CPT

## 2018-04-25 ENCOUNTER — HOSPITAL ENCOUNTER (OUTPATIENT)
Dept: CARDIAC REHAB | Age: 78
Discharge: HOME | End: 2018-04-25
Payer: MEDICARE

## 2018-04-25 VITALS — WEIGHT: 208 LBS | BODY MASS INDEX: 33.57 KG/M2

## 2018-04-25 PROCEDURE — 93798 PHYS/QHP OP CAR RHAB W/ECG: CPT | Performed by: DIETITIAN, REGISTERED

## 2018-04-27 ENCOUNTER — HOSPITAL ENCOUNTER (OUTPATIENT)
Dept: CARDIAC REHAB | Age: 78
Discharge: HOME | End: 2018-04-27
Payer: MEDICARE

## 2018-04-27 VITALS — WEIGHT: 207.5 LBS | BODY MASS INDEX: 33.49 KG/M2

## 2018-04-27 PROCEDURE — 93798 PHYS/QHP OP CAR RHAB W/ECG: CPT

## 2018-05-02 ENCOUNTER — HOSPITAL ENCOUNTER (OUTPATIENT)
Dept: CARDIAC REHAB | Age: 78
Discharge: HOME | End: 2018-05-02
Payer: MEDICARE

## 2018-05-02 VITALS — WEIGHT: 206.5 LBS | BODY MASS INDEX: 33.33 KG/M2

## 2018-05-02 PROCEDURE — 93798 PHYS/QHP OP CAR RHAB W/ECG: CPT

## 2018-05-04 ENCOUNTER — HOSPITAL ENCOUNTER (OUTPATIENT)
Dept: CARDIAC REHAB | Age: 78
Discharge: HOME | End: 2018-05-04
Payer: MEDICARE

## 2018-05-04 VITALS — BODY MASS INDEX: 33.25 KG/M2 | WEIGHT: 206 LBS

## 2018-05-04 PROCEDURE — 93798 PHYS/QHP OP CAR RHAB W/ECG: CPT

## 2018-05-07 ENCOUNTER — HOSPITAL ENCOUNTER (OUTPATIENT)
Dept: CARDIAC REHAB | Age: 78
Discharge: HOME | End: 2018-05-07
Payer: MEDICARE

## 2018-05-07 VITALS — WEIGHT: 209 LBS | BODY MASS INDEX: 33.73 KG/M2

## 2018-05-07 PROCEDURE — 93798 PHYS/QHP OP CAR RHAB W/ECG: CPT

## 2018-05-09 ENCOUNTER — HOSPITAL ENCOUNTER (OUTPATIENT)
Dept: CARDIAC REHAB | Age: 78
Discharge: HOME | End: 2018-05-09
Payer: MEDICARE

## 2018-05-09 ENCOUNTER — APPOINTMENT (OUTPATIENT)
Dept: CARDIAC REHAB | Age: 78
End: 2018-05-09
Payer: MEDICARE

## 2018-05-09 VITALS — WEIGHT: 209 LBS | BODY MASS INDEX: 33.73 KG/M2

## 2018-05-09 PROCEDURE — 93798 PHYS/QHP OP CAR RHAB W/ECG: CPT | Performed by: DIETITIAN, REGISTERED

## 2018-05-11 ENCOUNTER — HOSPITAL ENCOUNTER (OUTPATIENT)
Dept: CARDIAC REHAB | Age: 78
Discharge: HOME | End: 2018-05-11
Payer: MEDICARE

## 2018-05-11 ENCOUNTER — OFFICE VISIT (OUTPATIENT)
Dept: INTERNAL MEDICINE CLINIC | Age: 78
End: 2018-05-11

## 2018-05-11 ENCOUNTER — APPOINTMENT (OUTPATIENT)
Dept: CARDIAC REHAB | Age: 78
End: 2018-05-11
Payer: MEDICARE

## 2018-05-11 VITALS — WEIGHT: 208.5 LBS | BODY MASS INDEX: 33.65 KG/M2

## 2018-05-11 VITALS
HEIGHT: 66 IN | TEMPERATURE: 98 F | RESPIRATION RATE: 16 BRPM | HEART RATE: 59 BPM | DIASTOLIC BLOOD PRESSURE: 47 MMHG | SYSTOLIC BLOOD PRESSURE: 115 MMHG | OXYGEN SATURATION: 96 % | WEIGHT: 209.8 LBS | BODY MASS INDEX: 33.72 KG/M2

## 2018-05-11 DIAGNOSIS — I73.9 PVD (PERIPHERAL VASCULAR DISEASE) (HCC): ICD-10-CM

## 2018-05-11 DIAGNOSIS — R76.8 RHEUMATOID FACTOR POSITIVE: ICD-10-CM

## 2018-05-11 DIAGNOSIS — E11.8 TYPE 2 DIABETES MELLITUS WITH COMPLICATION, WITH LONG-TERM CURRENT USE OF INSULIN (HCC): ICD-10-CM

## 2018-05-11 DIAGNOSIS — Z79.4 TYPE 2 DIABETES MELLITUS WITH COMPLICATION, WITH LONG-TERM CURRENT USE OF INSULIN (HCC): ICD-10-CM

## 2018-05-11 DIAGNOSIS — I25.10 CORONARY ARTERY DISEASE INVOLVING NATIVE CORONARY ARTERY OF NATIVE HEART WITHOUT ANGINA PECTORIS: ICD-10-CM

## 2018-05-11 DIAGNOSIS — E78.2 MIXED HYPERLIPIDEMIA: ICD-10-CM

## 2018-05-11 DIAGNOSIS — Z95.1 S/P CABG X 4: ICD-10-CM

## 2018-05-11 DIAGNOSIS — I10 ESSENTIAL HYPERTENSION: ICD-10-CM

## 2018-05-11 DIAGNOSIS — Z00.00 MEDICARE ANNUAL WELLNESS VISIT, SUBSEQUENT: Primary | ICD-10-CM

## 2018-05-11 DIAGNOSIS — E55.9 VITAMIN D DEFICIENCY: ICD-10-CM

## 2018-05-11 DIAGNOSIS — N18.30 CKD (CHRONIC KIDNEY DISEASE) STAGE 3, GFR 30-59 ML/MIN (HCC): ICD-10-CM

## 2018-05-11 DIAGNOSIS — E78.5 HYPERLIPIDEMIA, UNSPECIFIED HYPERLIPIDEMIA TYPE: ICD-10-CM

## 2018-05-11 DIAGNOSIS — E11.21 TYPE 2 DIABETES MELLITUS WITH NEPHROPATHY (HCC): ICD-10-CM

## 2018-05-11 DIAGNOSIS — E08.3599 PROLIFERATIVE DIABETIC RETINOPATHY WITHOUT MACULAR EDEMA ASSOCIATED WITH DIABETES MELLITUS DUE TO UNDERLYING CONDITION, UNSPECIFIED LATERALITY (HCC): ICD-10-CM

## 2018-05-11 DIAGNOSIS — E08.43 DIABETIC AUTONOMIC NEUROPATHY ASSOCIATED WITH DIABETES MELLITUS DUE TO UNDERLYING CONDITION (HCC): ICD-10-CM

## 2018-05-11 PROCEDURE — 93798 PHYS/QHP OP CAR RHAB W/ECG: CPT

## 2018-05-11 RX ORDER — INSULIN LISPRO 100 [IU]/ML
5 INJECTION, SOLUTION INTRAVENOUS; SUBCUTANEOUS
Qty: 2 ADJUSTABLE DOSE PRE-FILLED PEN SYRINGE | Refills: 1 | Status: SHIPPED | OUTPATIENT
Start: 2018-05-11 | End: 2020-07-01 | Stop reason: SDUPTHER

## 2018-05-11 RX ORDER — PEN NEEDLE, DIABETIC 30 GX3/16"
NEEDLE, DISPOSABLE MISCELLANEOUS
Qty: 100 PEN NEEDLE | Refills: 3 | Status: SHIPPED | OUTPATIENT
Start: 2018-05-11 | End: 2018-11-11 | Stop reason: SDUPTHER

## 2018-05-11 NOTE — MR AVS SNAPSHOT
[address] [address] [facility] [address] 
[phone] Patient: Faizan Vance MRN:  [id number] Visit Information Date & Time Provider Department Dept. Phone Encounter #  
 5/11/2018  3:30 PM Jabier Rouse, [address] [place] [address] and Internal Medicine [phone] Follow-up Instructions Return in about 3 months (around 8/11/2018), or if symptoms worsen or fail to improve, for diabetes, blood pressure. Upcoming Health Maintenance Date Due  
 EYE EXAM RETINAL OR DILATED Q1 3/3/2018 MEDICARE YEARLY EXAM 3/22/2018 Influenza Age 5 to Adult 8/1/2018 HEMOGLOBIN A1C Q6M 10/17/2018 GLAUCOMA SCREENING Q2Y 3/3/2019 MICROALBUMIN Q1 4/10/2019 LIPID PANEL Q1 4/17/2019 FOOT EXAM Q1 5/9/2019 DTaP/Tdap/Td series (2 - Td) 1/22/2028 Allergies as of 5/11/2018  Review Complete On: 5/11/2018 By: Jabier Rouse MD  
  
 Severity Noted Reaction Type Reactions Ibuprofen  03/05/2010    Unknown (comments) Norvasc [Amlodipine]  04/14/2015    Other (comments) LE swelling - currently taking with no issues (3/8/18) Current Immunizations  Reviewed on 5/11/2018 Name Date Influenza Vaccine (Quad) PF 12/16/2014 Pneumococcal Conjugate (PCV-13) 3/11/2016 Pneumococcal Polysaccharide (PPSV-23) 12/16/2014 Reviewed by Jabier Rouse MD on 5/11/2018 at  4:48 PM  
You Were Diagnosed With   
  
 Codes Comments Rheumatoid factor positive    -  Primary ICD-10-CM: R76.8 ICD-9-CM: 795.79 Type 2 diabetes mellitus with complication, with long-term current use of insulin (HCC)     ICD-10-CM: E11.8, Z79.4 ICD-9-CM: 250.90, V58.67 Proliferative diabetic retinopathy without macular edema associated with diabetes mellitus due to underlying condition, unspecified laterality ([facility]     ICD-10-CM: K77.6046 ICD-9-CM: 249.50, 362.02   
 Diabetic autonomic neuropathy associated with diabetes mellitus due to underlying condition ([facility]     ICD-10-CM: Q72.75 
ICD-9-CM: 249.60, 337.1 Type 2 diabetes mellitus with nephropathy (HCC)     ICD-10-CM: E11.21 
ICD-9-CM: 250.40, 583.81 PVD (peripheral vascular disease) ([facility]     ICD-10-CM: I73.9 ICD-9-CM: 443.9 Coronary artery disease involving native coronary artery of native heart without angina pectoris     ICD-10-CM: I25.10 ICD-9-CM: 414.01 Essential hypertension     ICD-10-CM: I10 
ICD-9-CM: 401.9 CKD (chronic kidney disease) stage 3, GFR 30-59 ml/min     ICD-10-CM: N18.3 ICD-9-CM: 739. 3 Hyperlipidemia, unspecified hyperlipidemia type     ICD-10-CM: E78.5 ICD-9-CM: 272.4 Medicare annual wellness visit, subsequent     ICD-10-CM: Z00.00 ICD-9-CM: V70.0 Vitamin D deficiency     ICD-10-CM: E55.9 ICD-9-CM: 268.9 Mixed hyperlipidemia     ICD-10-CM: E78.2 ICD-9-CM: 272.2 S/P CABG x 4     ICD-10-CM: Z95.1 ICD-9-CM: V45.81 Vitals BP Pulse Temp Resp Height(growth percentile) Weight(growth percentile) 115/47 (BP 1 Location: Left arm, BP Patient Position: Sitting) (!) 59 98 °F (36.7 °C) (Oral) 16 5' 6\" (1.676 m) 209 lb 12.8 oz (95.2 kg) SpO2 BMI OB Status Smoking Status 96% 33.86 kg/m2 Postmenopausal Never Smoker BMI and BSA Data Body Mass Index Body Surface Area  
 33.86 kg/m 2 2.11 m 2 Preferred Pharmacy Pharmacy Name Phone [id number] [place]ie Nunez, [address] [facility] [phone] Your Updated Medication List  
  
   
This list is accurate as of 5/11/18  5:00 PM.  Always use your most recent med list.  
  
  
  
  
 aspirin delayed-release 81 mg tablet Take 81 mg by mouth daily. COMBIGAN 0.2-0.5 % Drop ophthalmic solution Generic drug:  brimonidine-timolol INSTILL 1 DROP IN RIGHT EYE EVERY MORNING AND NIGHT. dicyclomine 10 mg capsule Commonly known as:  BENTYL Take 10 mg by mouth as needed. ergocalciferol 50,000 unit capsule Commonly known as:  ERGOCALCIFEROL  
TAKE ONE CAPSULE BY MOUTH EVERY 7 DAYS  
  
 * esomeprazole 20 mg capsule Commonly known as:  Juan Don Take 20 mg by mouth daily. * esomeprazole 20 mg capsule Commonly known as:  NEXIUM  
TAKE ONE CAPSULE BY MOUTH DAILY  
  
 gabapentin 300 mg capsule Commonly known as:  NEURONTIN Take 1 Cap by mouth three (3) times daily. insulin glargine 100 unit/mL injection Commonly known as:  LANTUS  
48 Units by SubCUTAneous route nightly. insulin lispro 100 unit/mL kwikpen Commonly known as:  HUMALOG  
5 Units by SubCUTAneous route Before breakfast, lunch, and dinner. Insulin Needles (Disposable) 31 gauge x 5/16\" Ndle Commonly known as:  BD ULTRA-FINE SHORT PEN NEEDLE  
USE TO TEST BLOOD SUGAR 3 TIMES A DAY AS DIRECTED  
  
 lisinopril 5 mg tablet Commonly known as:  Burma Fairy Take 1 Tab by mouth daily. metoprolol tartrate 25 mg tablet Commonly known as:  LOPRESSOR Take 75 mg by mouth two (2) times a day. NORVASC 5 mg tablet Generic drug:  amLODIPine Take 5 mg by mouth daily. polyethylene glycol 17 gram/dose powder Commonly known as:  MIRALAX  
USE 1 TABLESPOONFUL AS DIRECTED BY MOUTH EVERY DAY  
  
 rosuvastatin 40 mg tablet Commonly known as:  CRESTOR Take 1 Tab by mouth daily. senna-docusate 8.6-50 mg per tablet Commonly known as:  Shabazz Mica Take 1 Tab by mouth two (2) times a day. Take until having regular bowel movements. SITagliptin-metFORMIN 50-1,000 mg Tm24 Commonly known as:  JANUMET XR Take 1 Tab by mouth daily. TYLENOL EXTRA STRENGTH 500 mg tablet Generic drug:  acetaminophen Take 500 mg by mouth every six (6) hours as needed for Pain. * Notice:   This list has 2 medication(s) that are the same as other medications prescribed for you. Read the directions carefully, and ask your doctor or other care provider to review them with you. Prescriptions Sent to Pharmacy Refills SITagliptin-metFORMIN (JANUMET XR) 50-1,000 mg TM24 1 Sig: Take 1 Tab by mouth daily. Class: Normal  
 Pharmacy: [id number]- [address] Ph #: [phone] Route: Oral  
 Insulin Needles, Disposable, (BD ULTRA-FINE SHORT PEN NEEDLE) 31 gauge x 5/16\" ndle 3 Sig: USE TO TEST BLOOD SUGAR 3 TIMES A DAY AS DIRECTED Class: Normal  
 Pharmacy: [facility] #[id number]- [address] Ph #: [phone]  
 insulin lispro (HUMALOG) 100 unit/mL kwikpen 1 Si Units by SubCUTAneous route Before breakfast, lunch, and dinner. Class: Normal  
 [id number] [address] Ph #: [phone] Route: SubCUTAneous We Performed the Following REFERRAL TO RHEUMATOLOGY [FQP08 Custom] Follow-up Instructions Return in about 3 months (around 2018), or if symptoms worsen or fail to improve, for diabetes, blood pressure. Referral Information Referral ID Referred By Referred To  
  
 [id number] Jamal ROBBINS MD   
   [address] [place] [address], [address] [place] [address] Phone: [phone] Fax: [phone] Visits Status Start Date End Date 1 New Request 18 If your referral has a status of pending review or denied, additional information will be sent to support the outcome of this decision. Patient Instructions Medicare Wellness Visit, Female The best way to live healthy is to have a healthy lifestyle by eating a well-balanced diet, exercising regularly, limiting alcohol and stopping smoking.  
 
Regular physical exams and screening tests are another way to keep healthy. Preventive exams provided by your health care provider can find health problems before they become diseases or illnesses. Preventive services including immunizations, screening tests, monitoring and exams can help you take care of your own health. All people over age 72 should have a pneumovax  and and a prevnar shot to prevent pneumonia. These are once in a lifetime unless you and your provider decide differently. All people over 65 should have a yearly flu shot and a tetanus vaccine every 10 years. A bone mass density to screen for osteoporosis or thinning of the bones should be done every 2 years after 65. Screening for diabetes mellitus with a blood sugar test should be done every year. Glaucoma is a disease of the eye due to increased ocular pressure that can lead to blindness and it should be done every year by an eye professional. 
 
Cardiovascular screening tests that check for elevated lipids (fatty part of blood) which can lead to heart disease and strokes should be done every 5 years. Colorectal screening that evaluates for blood or polyps in your colon should be done yearly as a stool test or every five years as a flexible sigmoidoscope or every 10 years as a colonoscopy up to age 76. Breast cancer screening with a mammogram is recommended biennially  for women age 54-69. Screening for cervical cancer with a pap smear and pelvic exam is recommended for women after age 72 years every 2 years up to age 79 or when the provider and patient decide to stop. If there is a history of cervical abnormalities or other increased risk for cancer then the test is recommended yearly. Hepatitis C screening is also recommended for anyone born between 80 through [address]. A shingles vaccine is also recommended once in a lifetime after age 61. Your Medicare Wellness Exam is recommended annually. Here is a list of your current Health Maintenance items with a due date: Health Maintenance Due Topic Date Due 24 \[facility]\"" Eye Exam  03/03/2018 24 \[facility]\"" Annual Well Visit  03/22/2018 Ask pharmacy about the new shingles and Tdap vaccines. Introducing [facility] SERVICES! [facility] introduces [employer] patient portal. Now you can access parts of your medical record, email your doctor's office, and request medication refills online. 1. In your internet browser, go to [email or web address] 2. Click on the First Time User? Click Here link in the Sign In box. You will see the New Member Sign Up page. 3. Enter your [employer] Access Code exactly as it appears below. You will not need to use this code after youve completed the sign-up process. If you do not sign up before the expiration date, you must request a new code. · [employer] Access Code: [email or web address] Expires: 7/22/2018  1:01 PM 
 
4. Enter the last four digits of your Social Security Number (xxxx) and Date of Birth (mm/dd/yyyy) as indicated and click Submit. You will be taken to the next sign-up page. 5. Create a [employer] ID. This will be your [employer] login ID and cannot be changed, so think of one that is secure and easy to remember. 6. Create a [employer] password. You can change your password at any time. 7. Enter your Password Reset Question and Answer. This can be used at a later time if you forget your password. 8. Enter your e-mail address. You will receive e-mail notification when new information is available in [address] 9. Click Sign Up. You can now view and download portions of your medical record. 10. Click the Download Summary menu link to download a portable copy of your medical information. If you have questions, please visit the Frequently Asked Questions section of the [employer] website. Remember, [employer] is NOT to be used for urgent needs. For medical emergencies, dial 911. Now available from your iPhone and Android! Please provide this summary of care documentation to your next provider. Your primary care clinician is listed as [address] If you have any questions after today's visit, please call [phone].

## 2018-05-11 NOTE — PROGRESS NOTES
This is the Subsequent Medicare Annual Wellness Exam, performed 12 months or more after the Initial AWV or the last Subsequent AWV    I have reviewed the patient's medical history in detail and updated the computerized patient record. History     Past Medical History:   Diagnosis Date    CAD (coronary artery disease)     calcium score 229 - 2011, cath 4/2009 - [facility]    Cataract     CKD (chronic kidney disease) stage 3, GFR 30-59 ml/min     kidney are ok - no current issues as of 3/8/18    Cystoid macular degeneration of right eye     Diabetes ([address]     Diabetic neuropathy ([facility])     Diastolic dysfunction     Esophageal reflux 4/9/2015    Foot ulcer ([address]     Glaucoma     right    Hypercholesterolemia     Hyperlipidemia 1/23/2015    Hypertension     Macular degeneration     rt eye    Microalbuminuria 4/10/2018    Proliferative diabetic retinopathy ([facility]     PVD (peripheral vascular disease) ([facility])     no current issues as of 3/8/18    Rupture of ulnar collateral ligament of thumb     Sebaceous cyst 4/4/2017    Urge incontinence     Vitreous hemorrhage of both eyes ([facility])       Past Surgical History:   Procedure Laterality Date    CARDIAC SURG PROCEDURE UNLIST  02/06/2018    CABG x 4    HX CATARACT REMOVAL Left     HX CHOLECYSTECTOMY       Current Outpatient Prescriptions   Medication Sig Dispense Refill    amLODIPine (NORVASC) 5 mg tablet Take 5 mg by mouth daily.  lisinopril (PRINIVIL, ZESTRIL) 5 mg tablet Take 1 Tab by mouth daily. 90 Tab 1    insulin glargine (LANTUS) 100 unit/mL injection 48 Units by SubCUTAneous route nightly. 1 Vial 5    metoprolol tartrate (LOPRESSOR) 25 mg tablet Take 75 mg by mouth two (2) times a day.  esomeprazole (NEXIUM) 20 mg capsule TAKE ONE CAPSULE BY MOUTH DAILY 90 Cap 3    JANUVIA 100 mg tablet TAKE 1 TABLET BY MOUTH DAILY 90 Tab 3    dicyclomine (BENTYL) 10 mg capsule Take 10 mg by mouth as needed.       esomeprazole (NEXIUM) 20 mg capsule Take 20 mg by mouth daily.  acetaminophen (TYLENOL EXTRA STRENGTH) 500 mg tablet Take 500 mg by mouth every six (6) hours as needed for Pain.  senna-docusate (PERICOLACE) 8.6-50 mg per tablet Take 1 Tab by mouth two (2) times a day. Take until having regular bowel movements. 60 Tab 1    insulin lispro (HUMALOG) 100 unit/mL injection 5 Units by SubCUTAneous route Before breakfast, lunch, and dinner. 1 Vial 1    ergocalciferol (ERGOCALCIFEROL) 50,000 unit capsule TAKE ONE CAPSULE BY MOUTH EVERY 7 DAYS 13 Cap 3    rosuvastatin (CRESTOR) 40 mg tablet Take 1 Tab by mouth daily. 90 Tab 1    gabapentin (NEURONTIN) 300 mg capsule Take 1 Cap by mouth three (3) times daily. (Patient taking differently: Take 300 mg by mouth nightly. Takes only QHS) 270 Cap 1    polyethylene glycol (MIRALAX) 17 gram/dose powder USE 1 TABLESPOONFUL AS DIRECTED BY MOUTH EVERY DAY (Patient taking differently: USE 1 TABLESPOONFUL AS DIRECTED BY MOUTH EVERY DAY AS NEEDED) 527 g 5    COMBIGAN 0.2-0.5 % drop ophthalmic solution INSTILL 1 DROP IN RIGHT EYE EVERY MORNING AND NIGHT. 6    aspirin delayed-release 81 mg tablet Take 81 mg by mouth daily.          Allergies   Allergen Reactions    Ibuprofen Unknown (comments)    Norvasc [Amlodipine] Other (comments)     LE swelling - currently taking with no issues (3/8/18)     Family History   Problem Relation Age of Onset    Heart Failure Mother     Heart Disease Mother     Diabetes Father    Abdon Curry Elevated Lipids Father      Social History   Substance Use Topics    Smoking status: Never Smoker    Smokeless tobacco: Never Used    Alcohol use No     Patient Active Problem List   Diagnosis Code    Foot pain M79.673    T2DM (type 2 diabetes mellitus) ([facility] E11.9    CAD (coronary artery disease) I25.10    HTN (hypertension) I10    Proliferative diabetic retinopathy ([facility] P06.6445    CKD (chronic kidney disease) stage 3, GFR 30-59 ml/min N18.3    Macular degeneration H35.30    Cataract H26.9    Diabetic neuropathy ([facility]) E11.40    Urge incontinence N39.41    Hyperlipidemia E78.5    Encounter for long-term (current) use of other medications Z79.899    Vitreous hemorrhage of both eyes ([facility]) H43.13    Esophageal reflux K21.9    Essential hypertension I10    Advance directive discussed with patient Z70.80    Vitamin D deficiency E55.9    Mixed hyperlipidemia E78.2    Trochanteric bursitis, right hip M70.61    Glaucoma H40.9    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    Sebaceous cyst L72.3    Type 2 diabetes mellitus with nephropathy ([facility]) E11.21    Rheumatoid factor positive R76.8    Chest pain R07.9    Unstable angina ([facility]) B35.6    Diastolic dysfunction M15.8    S/P CABG x 4 Z95.1    PVD (peripheral vascular disease) ([facility]) I73.9    Microalbuminuria R80.9       Depression Risk Factor Screening:     PHQ over the last two weeks 5/11/2018   Little interest or pleasure in doing things Not at all   Feeling down, depressed or hopeless Several days   Total Score PHQ 2 1   Trouble falling or staying asleep, or sleeping too much -   Feeling tired or having little energy -   Poor appetite or overeating -   Feeling bad about yourself - or that you are a failure or have let yourself or your family down -   Trouble concentrating on things such as school, work, reading or watching TV -   Moving or speaking so slowly that other people could have noticed; or the opposite being so fidgety that others notice -   Thoughts of being better off dead, or hurting yourself in some way -   PHQ 9 Score -   How difficult have these problems made it for you to do your work, take care of your home and get along with others -     Alcohol Risk Factor Screening: You do not drink alcohol or very rarely. Functional Ability and Level of Safety:   Hearing Loss  Hearing is good. Activities of Daily Living  The home contains: no safety equipment.   Patient does total self care    Fall Risk  Fall Risk Assessment, last 12 mths 5/11/2018   Able to walk? Yes   Fall in past 12 months? No   Fall with injury? -   Number of falls in past 12 months -   Fall Risk Score -       Abuse Screen  Patient is not abused    Cognitive Screening   Evaluation of Cognitive Function:  Has your family/caregiver stated any concerns about your memory: no      Patient Care Team   Patient Care Team:  Lisa Olivas MD as PCP - General (Internal Medicine)  Almeta Bumpers, MD (Orthopedic Surgery)  Tereso Hernandez MD as Surgeon (General Surgery)  Jennifer Stone RN as Ambulatory Care Navigator  Augie Canela MD (Cardiology)    Assessment/Plan   Education and counseling provided:  Are appropriate based on today's review and evaluation      ICD-10-CM ICD-9-CM    1. Medicare annual wellness visit, subsequent Z00.00 V70.0    2. Rheumatoid factor positive R76.8 795.79 REFERRAL TO RHEUMATOLOGY   3. Type 2 diabetes mellitus with complication, with long-term current use of insulin ([facility]) E11.8 250.90 Insulin Needles, Disposable, (BD ULTRA-FINE SHORT PEN NEEDLE) 31 gauge x 5/16\" ndle    Z79.4 V58.67    4. Proliferative diabetic retinopathy without macular edema associated with diabetes mellitus due to underlying condition, unspecified laterality ([facility] G91.8520 249.50      362.02    5. Diabetic autonomic neuropathy associated with diabetes mellitus due to underlying condition ([facility]) E08.43 249.60      337.1    6. Type 2 diabetes mellitus with nephropathy ([facility]) E11.21 250.40 insulin lispro (HUMALOG) 100 unit/mL kwikpen     583.81    7. PVD (peripheral vascular disease) ([facility]) I73.9 443.9    8. Coronary artery disease involving native coronary artery of native heart without angina pectoris I25.10 414.01    9. Essential hypertension I10 401.9    10. CKD (chronic kidney disease) stage 3, GFR 30-59 ml/min N18.3 585.3    11. Hyperlipidemia, unspecified hyperlipidemia type E78.5 272.4    12. Vitamin D deficiency E55.9 268.9    13.  Mixed hyperlipidemia E78.2 272.2    14. S/P CABG x 4 Z95.1 V45.81 insulin lispro (HUMALOG) 100 unit/mL kwikpen     Follow-up Disposition:  Return in about 3 months (around 8/11/2018), or if symptoms worsen or fail to improve, for diabetes, blood pressure.    results and schedule of future studies reviewed with patient  reviewed diet, exercise and weight   cardiovascular risk and specific lipid/LDL goals reviewed  reviewed medications and side effects in detail

## 2018-05-11 NOTE — PATIENT INSTRUCTIONS

## 2018-05-11 NOTE — PROGRESS NOTES
Rm14    Chief Complaint   Patient presents with    Annual Wellness Visit    Blood Pressure Check    Diabetes     1. Have you been to the ER, urgent care clinic since your last visit? Hospitalized since your last visit? No    2. Have you seen or consulted any other health care providers outside of the [address] [place] [address] since your last visit? Include any pap smears or colon screening. No    Health Maintenance Due   Topic Date Due    FOOT EXAM Q1  12/20/2017    EYE EXAM RETINAL OR DILATED Q1  03/03/2018    MEDICARE YEARLY EXAM  03/22/2018   eye exam 6/15/18  Foot exam done 5/10/18,     Fall Risk Assessment, last 12 mths 5/11/2018   Able to walk? Yes   Fall in past 12 months?  No   Fall with injury? -   Number of falls in past 12 months -   Fall Risk Score -         PHQ over the last two weeks 5/11/2018   Little interest or pleasure in doing things Not at all   Feeling down, depressed or hopeless Several days   Total Score PHQ 2 1   Trouble falling or staying asleep, or sleeping too much -   Feeling tired or having little energy -   Poor appetite or overeating -   Feeling bad about yourself - or that you are a failure or have let yourself or your family down -   Trouble concentrating on things such as school, work, reading or watching TV -   Moving or speaking so slowly that other people could have noticed; or the opposite being so fidgety that others notice -   Thoughts of being better off dead, or hurting yourself in some way -   PHQ 9 Score -   How difficult have these problems made it for you to do your work, take care of your home and get along with others -     Learning Assessment 4/4/2017   PRIMARY LEARNER Patient   HIGHEST LEVEL OF EDUCATION - PRIMARY LEARNER  2 YEARS OF COLLEGE   BARRIERS PRIMARY LEARNER NONE   CO-LEARNER CAREGIVER -   PRIMARY LANGUAGE OTHER (COMMENT)    NEED Yes   LEARNER PREFERENCE PRIMARY OTHER (COMMENT)   ANSWERED BY patient   RELATIONSHIP SELF

## 2018-05-11 NOTE — ACP (ADVANCE CARE PLANNING)
Pt does not have an advanced directive. Pt and daughter are encouraged to discuss and consider developing an advanced directive.

## 2018-05-11 NOTE — PROGRESS NOTES
HPI:  Presents for f/u DM, HTN, etc    Presents with daughter - who translates by pt preference    Saw podiatry this week  Needs documentation for diabetic shoes - Rx given by podiatrist    HTN controlled in the interim back on lisinopril. BG control is good per report  Using lantus + tid lispro  Tired following lantus dosing at night - ?hypoglycemia      Past medical, Social, and Family history reviewed    Prior to Admission medications    Medication Sig Start Date End Date Taking? Authorizing Provider   amLODIPine (NORVASC) 5 mg tablet Take 5 mg by mouth daily. Yes Historical Provider   lisinopril (PRINIVIL, ZESTRIL) 5 mg tablet Take 1 Tab by mouth daily. 4/10/18  Yes Ese Charles MD   insulin glargine (LANTUS) 100 unit/mL injection 48 Units by SubCUTAneous route nightly. 4/10/18  Yes Ese Charles MD   metoprolol tartrate (LOPRESSOR) 25 mg tablet Take 75 mg by mouth two (2) times a day. Yes Historical Provider   esomeprazole (NEXIUM) 20 mg capsule TAKE ONE CAPSULE BY MOUTH DAILY 3/30/18  Yes Ese Charles MD   JANUVIA 100 mg tablet TAKE 1 TABLET BY MOUTH DAILY 3/23/18  Yes Ese Charles MD   dicyclomine (BENTYL) 10 mg capsule Take 10 mg by mouth as needed. Yes Historical Provider   esomeprazole (NEXIUM) 20 mg capsule Take 20 mg by mouth daily. Yes Historical Provider   acetaminophen (TYLENOL EXTRA STRENGTH) 500 mg tablet Take 500 mg by mouth every six (6) hours as needed for Pain. Yes Historical Provider   senna-docusate (PERICOLACE) 8.6-50 mg per tablet Take 1 Tab by mouth two (2) times a day. Take until having regular bowel movements. 2/14/18  Yes Michelle Blakely NP   insulin lispro (HUMALOG) 100 unit/mL injection 5 Units by SubCUTAneous route Before breakfast, lunch, and dinner.  2/14/18  Yes Michelle Blakely NP   ergocalciferol (ERGOCALCIFEROL) 50,000 unit capsule TAKE ONE CAPSULE BY MOUTH EVERY 7 DAYS 1/31/18  Yes Ese Charles MD   rosuvastatin (CRESTOR) 40 mg tablet Take 1 Tab by mouth daily. 1/31/18  Yes Aftab Rivera MD   gabapentin (NEURONTIN) 300 mg capsule Take 1 Cap by mouth three (3) times daily. Patient taking differently: Take 300 mg by mouth nightly. Takes only QHS 1/8/18  Yes Aftab Rivera MD   polyethylene glycol (MIRALAX) 17 gram/dose powder USE 1 TABLESPOONFUL AS DIRECTED BY MOUTH EVERY DAY  Patient taking differently: USE 1 TABLESPOONFUL AS DIRECTED BY MOUTH EVERY DAY AS NEEDED 7/14/17  Yes Aftab Rivera MD   COMBIGAN 0.2-0.5 % drop ophthalmic solution INSTILL 1 DROP IN RIGHT EYE EVERY MORNING AND NIGHT. 3/16/17  Yes Historical Provider   aspirin delayed-release 81 mg tablet Take 81 mg by mouth daily. Yes Gurpreet Devine MD          ROS  Complete ROS reviewed and negative or stable except as noted in HPI. Physical Exam   Constitutional: She is oriented to person, place, and time. She appears well-nourished. No distress. HENT:   Head: Normocephalic and atraumatic. Mouth/Throat: Oropharynx is clear and moist.   Eyes: EOM are normal. Pupils are equal, round, and reactive to light. No scleral icterus. Neck: Normal range of motion. Neck supple. No JVD present. Cardiovascular: Normal rate, regular rhythm and normal heart sounds. Exam reveals no gallop and no friction rub. No murmur heard. Pulmonary/Chest: Effort normal and breath sounds normal. No respiratory distress. She has no wheezes. She has no rales. Abdominal: Soft. She exhibits no distension. There is no tenderness. Musculoskeletal: Normal range of motion. She exhibits edema (trace ). Lymphadenopathy:     She has no cervical adenopathy. Neurological: She is alert and oriented to person, place, and time. She exhibits normal muscle tone. Skin: Skin is warm. No rash noted. Psychiatric: She has a normal mood and affect. Nursing note and vitals reviewed. Prior labs reviewed. HgbA1C last month 6.0    Assessment/Plan:    ICD-10-CM ICD-9-CM    1.  Type 2 diabetes mellitus with complication, with long-term current use of insulin ([facility]) E11.8 250.90 Insulin Needles, Disposable, (BD ULTRA-FINE SHORT PEN NEEDLE) 31 gauge x 5/16\" ndle    Z79.4 V58.67    2. Rheumatoid factor positive R76.8 795.79 REFERRAL TO RHEUMATOLOGY   3. Proliferative diabetic retinopathy without macular edema associated with diabetes mellitus due to underlying condition, unspecified laterality [facility] Z08.4958 249.50      362.02    4. Diabetic autonomic neuropathy associated with diabetes mellitus due to underlying condition ([facility]) E08.43 249.60      337.1    5. Type 2 diabetes mellitus with nephropathy ([facility]) E11.21 250.40 insulin lispro (HUMALOG) 100 unit/mL kwikpen     583.81    6. PVD (peripheral vascular disease) ([facility]) I73.9 443.9    7. Coronary artery disease involving native coronary artery of native heart without angina pectoris I25.10 414.01    8. Essential hypertension I10 401.9    9. CKD (chronic kidney disease) stage 3, GFR 30-59 ml/min N18.3 585.3    10. Hyperlipidemia, unspecified hyperlipidemia type E78.5 272.4    11. Medicare annual wellness visit, subsequent Z00.00 V70.0    12. Vitamin D deficiency E55.9 268.9    13. Mixed hyperlipidemia E78.2 272.2    14. S/P CABG x 4 Z95.1 V45.81 insulin lispro (HUMALOG) 100 unit/mL kwikpen     Follow-up Disposition:  Return in about 3 months (around 8/11/2018), or if symptoms worsen or fail to improve, for diabetes, blood pressure. results and schedule of future studies reviewed with patient  reviewed diet, exercise and weight    cardiovascular risk and specific lipid/LDL goals reviewed  reviewed medications and side effects in detail   Simplify regimen  Janumet + lantus  Dc tid lispro  Reduce lantus if finding hypoglycemia once janumet begins to kick in. Pt is under a comprehensive plan of care for diabetes. I am prescribing diabetic shoes and insoles for her.   Pt has diabetic peripheral neuropathy with callous formation with hammer toe deformity bilaterally warranting these.       >40 minutes time spent with >50% in counseling and coordination of care

## 2018-05-14 ENCOUNTER — HOSPITAL ENCOUNTER (OUTPATIENT)
Dept: CARDIAC REHAB | Age: 78
Discharge: HOME | End: 2018-05-14
Payer: MEDICARE

## 2018-05-14 ENCOUNTER — APPOINTMENT (OUTPATIENT)
Dept: CARDIAC REHAB | Age: 78
End: 2018-05-14
Payer: MEDICARE

## 2018-05-14 VITALS — WEIGHT: 208 LBS | BODY MASS INDEX: 33.57 KG/M2

## 2018-05-14 PROCEDURE — 93798 PHYS/QHP OP CAR RHAB W/ECG: CPT

## 2018-05-16 ENCOUNTER — APPOINTMENT (OUTPATIENT)
Dept: CARDIAC REHAB | Age: 78
End: 2018-05-16
Payer: MEDICARE

## 2018-05-18 ENCOUNTER — HOSPITAL ENCOUNTER (OUTPATIENT)
Dept: CARDIAC REHAB | Age: 78
Discharge: HOME | End: 2018-05-18
Payer: MEDICARE

## 2018-05-18 ENCOUNTER — APPOINTMENT (OUTPATIENT)
Dept: CARDIAC REHAB | Age: 78
End: 2018-05-18
Payer: MEDICARE

## 2018-05-18 VITALS — WEIGHT: 208 LBS | BODY MASS INDEX: 33.57 KG/M2

## 2018-05-18 PROCEDURE — 93798 PHYS/QHP OP CAR RHAB W/ECG: CPT

## 2018-05-21 ENCOUNTER — HOSPITAL ENCOUNTER (OUTPATIENT)
Dept: CARDIAC REHAB | Age: 78
Discharge: HOME | End: 2018-05-21
Payer: MEDICARE

## 2018-05-21 ENCOUNTER — APPOINTMENT (OUTPATIENT)
Dept: CARDIAC REHAB | Age: 78
End: 2018-05-21
Payer: MEDICARE

## 2018-05-21 VITALS — BODY MASS INDEX: 33.57 KG/M2 | WEIGHT: 208 LBS

## 2018-05-21 PROCEDURE — 93798 PHYS/QHP OP CAR RHAB W/ECG: CPT

## 2018-05-23 ENCOUNTER — APPOINTMENT (OUTPATIENT)
Dept: CARDIAC REHAB | Age: 78
End: 2018-05-23
Payer: MEDICARE

## 2018-05-25 ENCOUNTER — APPOINTMENT (OUTPATIENT)
Dept: CARDIAC REHAB | Age: 78
End: 2018-05-25
Payer: MEDICARE

## 2018-05-30 ENCOUNTER — APPOINTMENT (OUTPATIENT)
Dept: CARDIAC REHAB | Age: 78
End: 2018-05-30
Payer: MEDICARE

## 2018-06-08 ENCOUNTER — HOSPITAL ENCOUNTER (OUTPATIENT)
Dept: CARDIAC REHAB | Age: 78
Discharge: HOME | End: 2018-06-08
Payer: MEDICARE

## 2018-06-08 VITALS — BODY MASS INDEX: 33.49 KG/M2 | WEIGHT: 207.5 LBS

## 2018-06-08 PROCEDURE — 93798 PHYS/QHP OP CAR RHAB W/ECG: CPT

## 2018-06-20 ENCOUNTER — HOSPITAL ENCOUNTER (OUTPATIENT)
Dept: CARDIAC REHAB | Age: 78
Discharge: HOME | End: 2018-06-20
Payer: MEDICARE

## 2018-06-20 ENCOUNTER — TELEPHONE (OUTPATIENT)
Dept: INTERNAL MEDICINE CLINIC | Age: 78
End: 2018-06-20

## 2018-06-20 VITALS — WEIGHT: 208.5 LBS | BODY MASS INDEX: 33.65 KG/M2

## 2018-06-20 PROCEDURE — 93798 PHYS/QHP OP CAR RHAB W/ECG: CPT

## 2018-06-22 ENCOUNTER — HOSPITAL ENCOUNTER (OUTPATIENT)
Dept: CARDIAC REHAB | Age: 78
Discharge: HOME | End: 2018-06-22
Payer: MEDICARE

## 2018-06-22 VITALS — BODY MASS INDEX: 33.57 KG/M2 | WEIGHT: 208 LBS

## 2018-06-22 PROCEDURE — 93798 PHYS/QHP OP CAR RHAB W/ECG: CPT

## 2018-06-25 ENCOUNTER — HOSPITAL ENCOUNTER (OUTPATIENT)
Dept: CARDIAC REHAB | Age: 78
Discharge: HOME | End: 2018-06-25
Payer: MEDICARE

## 2018-06-25 VITALS — WEIGHT: 208 LBS | BODY MASS INDEX: 33.57 KG/M2

## 2018-06-25 DIAGNOSIS — E08.43 DIABETIC AUTONOMIC NEUROPATHY ASSOCIATED WITH DIABETES MELLITUS DUE TO UNDERLYING CONDITION (HCC): ICD-10-CM

## 2018-06-25 PROCEDURE — 93798 PHYS/QHP OP CAR RHAB W/ECG: CPT

## 2018-06-25 RX ORDER — GABAPENTIN 300 MG/1
300 CAPSULE ORAL 3 TIMES DAILY
Qty: 270 CAP | Refills: 1 | Status: SHIPPED | OUTPATIENT
Start: 2018-06-25 | End: 2018-08-27 | Stop reason: SDUPTHER

## 2018-06-25 NOTE — TELEPHONE ENCOUNTER
Medication refill request:    Last Office Visit:  5/11/18  Next Office Visit:  Future Appointments  Date Time Provider Consuelo Rosas   6/27/2018 9:30  [facility]   6/29/2018 9:30  [facility]   7/2/2018 9:30 AM [address] [place] [address] verified. yes    Patient requesting 90 day supply.

## 2018-06-27 ENCOUNTER — HOSPITAL ENCOUNTER (OUTPATIENT)
Dept: CARDIAC REHAB | Age: 78
Discharge: HOME | End: 2018-06-27
Payer: MEDICARE

## 2018-06-27 VITALS — BODY MASS INDEX: 33.65 KG/M2 | WEIGHT: 208.5 LBS

## 2018-06-27 PROCEDURE — 93798 PHYS/QHP OP CAR RHAB W/ECG: CPT

## 2018-06-29 ENCOUNTER — HOSPITAL ENCOUNTER (OUTPATIENT)
Dept: CARDIAC REHAB | Age: 78
Discharge: HOME | End: 2018-06-29
Payer: MEDICARE

## 2018-06-29 ENCOUNTER — HOSPITAL ENCOUNTER (OUTPATIENT)
Dept: CARDIAC REHAB | Age: 78
End: 2018-06-29
Payer: MEDICARE

## 2018-06-29 VITALS — BODY MASS INDEX: 33.57 KG/M2 | WEIGHT: 208 LBS

## 2018-06-29 PROCEDURE — 93798 PHYS/QHP OP CAR RHAB W/ECG: CPT

## 2018-07-02 ENCOUNTER — APPOINTMENT (OUTPATIENT)
Dept: CARDIAC REHAB | Age: 78
End: 2018-07-02

## 2018-07-25 RX ORDER — POLYETHYLENE GLYCOL 3350 17 G/17G
POWDER, FOR SOLUTION ORAL
Qty: 527 G | Refills: 5 | Status: SHIPPED | OUTPATIENT
Start: 2018-07-25 | End: 2020-07-01 | Stop reason: SDUPTHER

## 2018-07-25 NOTE — CARDIO/PULMONARY
Bradenkarl Sagrario  Completed phase II cardiac rehab and attended 36 sessions from 02/13/18 - 06/29/18. Fermín Porras is interested in maintaining optimal health and will work with Dr. Pineda Javier. Fermín Porras has improved her endurance and stamina through regular exercise, lost 1 lb and maintained her waist at 46 inches. Blood pressure is 130/67 and is WNL. She has also improved her nutrition, [employer] and depression scores and these were reviewed with patient. Karis Gomesjoannechong plans to continue exercising at home and has set revised goals that include walking with her walker, and using cardio equipment 5 times a week for 30 minutes. Zelda Gutiérrez RN  7/25/2018

## 2018-08-27 DIAGNOSIS — E08.43 DIABETIC AUTONOMIC NEUROPATHY ASSOCIATED WITH DIABETES MELLITUS DUE TO UNDERLYING CONDITION (HCC): ICD-10-CM

## 2018-08-28 RX ORDER — GABAPENTIN 300 MG/1
300 CAPSULE ORAL 3 TIMES DAILY
Qty: 270 CAP | Refills: 0 | Status: SHIPPED | OUTPATIENT
Start: 2018-08-28 | End: 2019-03-25 | Stop reason: SINTOL

## 2018-09-23 RX ORDER — INSULIN GLARGINE 100 [IU]/ML
INJECTION, SOLUTION SUBCUTANEOUS
Qty: 15 ADJUSTABLE DOSE PRE-FILLED PEN SYRINGE | Refills: 5 | Status: SHIPPED | OUTPATIENT
Start: 2018-09-23 | End: 2019-11-20 | Stop reason: SDUPTHER

## 2018-10-03 DIAGNOSIS — I10 ESSENTIAL HYPERTENSION: ICD-10-CM

## 2018-10-03 DIAGNOSIS — E11.21 TYPE 2 DIABETES MELLITUS WITH NEPHROPATHY (HCC): ICD-10-CM

## 2018-10-03 RX ORDER — LISINOPRIL 5 MG/1
TABLET ORAL
Qty: 90 TAB | Refills: 1 | Status: SHIPPED | OUTPATIENT
Start: 2018-10-03 | End: 2019-03-19 | Stop reason: SDUPTHER

## 2018-10-14 RX ORDER — SITAGLIPTIN AND METFORMIN HYDROCHLORIDE 50; 1000 MG/1; MG/1
TABLET, FILM COATED ORAL
Qty: 90 TAB | Refills: 1 | Status: SHIPPED | OUTPATIENT
Start: 2018-10-14 | End: 2019-03-19 | Stop reason: SDUPTHER

## 2018-11-11 DIAGNOSIS — E11.8 TYPE 2 DIABETES MELLITUS WITH COMPLICATION, WITH LONG-TERM CURRENT USE OF INSULIN (HCC): ICD-10-CM

## 2018-11-11 DIAGNOSIS — Z79.4 TYPE 2 DIABETES MELLITUS WITH COMPLICATION, WITH LONG-TERM CURRENT USE OF INSULIN (HCC): ICD-10-CM

## 2018-11-11 RX ORDER — PEN NEEDLE, DIABETIC 30 GX3/16"
NEEDLE, DISPOSABLE MISCELLANEOUS
Qty: 100 PEN NEEDLE | Refills: 3 | Status: SHIPPED | OUTPATIENT
Start: 2018-11-11 | End: 2019-03-18 | Stop reason: SDUPTHER

## 2019-01-25 DIAGNOSIS — E78.5 HYPERLIPIDEMIA, UNSPECIFIED HYPERLIPIDEMIA TYPE: ICD-10-CM

## 2019-01-25 DIAGNOSIS — E55.9 VITAMIN D DEFICIENCY: ICD-10-CM

## 2019-01-25 RX ORDER — ERGOCALCIFEROL 1.25 MG/1
CAPSULE ORAL
Qty: 13 CAP | Refills: 0 | Status: SHIPPED | OUTPATIENT
Start: 2019-01-25 | End: 2019-04-27 | Stop reason: SDUPTHER

## 2019-01-25 RX ORDER — ROSUVASTATIN CALCIUM 40 MG/1
TABLET, COATED ORAL
Qty: 90 TAB | Refills: 0 | Status: SHIPPED | OUTPATIENT
Start: 2019-01-25 | End: 2019-03-25 | Stop reason: SDUPTHER

## 2019-01-25 NOTE — TELEPHONE ENCOUNTER
Left a generic message to callback the office pt's medication has been approved,however pt need's to make a follow-up appt.

## 2019-03-08 DIAGNOSIS — K21.9 GASTROESOPHAGEAL REFLUX DISEASE WITHOUT ESOPHAGITIS: ICD-10-CM

## 2019-03-08 RX ORDER — HYDROGEN PEROXIDE 3 %
SOLUTION, NON-ORAL MISCELLANEOUS
Qty: 90 CAP | Refills: 3 | Status: SHIPPED | OUTPATIENT
Start: 2019-03-08 | End: 2019-03-25 | Stop reason: ALTCHOICE

## 2019-03-18 DIAGNOSIS — E11.8 TYPE 2 DIABETES MELLITUS WITH COMPLICATION, WITH LONG-TERM CURRENT USE OF INSULIN (HCC): ICD-10-CM

## 2019-03-18 DIAGNOSIS — Z79.4 TYPE 2 DIABETES MELLITUS WITH COMPLICATION, WITH LONG-TERM CURRENT USE OF INSULIN (HCC): ICD-10-CM

## 2019-03-18 RX ORDER — PEN NEEDLE, DIABETIC 30 GX3/16"
NEEDLE, DISPOSABLE MISCELLANEOUS
Qty: 100 PEN NEEDLE | Refills: 3 | Status: SHIPPED | OUTPATIENT
Start: 2019-03-18 | End: 2019-07-07 | Stop reason: SDUPTHER

## 2019-03-19 DIAGNOSIS — I10 ESSENTIAL HYPERTENSION: ICD-10-CM

## 2019-03-19 DIAGNOSIS — E11.21 TYPE 2 DIABETES MELLITUS WITH NEPHROPATHY (HCC): ICD-10-CM

## 2019-03-20 RX ORDER — LISINOPRIL 5 MG/1
TABLET ORAL
Qty: 90 TAB | Refills: 1 | Status: SHIPPED | OUTPATIENT
Start: 2019-03-20 | End: 2019-03-25 | Stop reason: ALTCHOICE

## 2019-03-20 RX ORDER — SITAGLIPTIN AND METFORMIN HYDROCHLORIDE 50; 1000 MG/1; MG/1
TABLET, FILM COATED ORAL
Qty: 90 TAB | Refills: 1 | Status: SHIPPED | OUTPATIENT
Start: 2019-03-20 | End: 2019-10-03 | Stop reason: SDUPTHER

## 2019-03-25 ENCOUNTER — OFFICE VISIT (OUTPATIENT)
Dept: INTERNAL MEDICINE CLINIC | Age: 79
End: 2019-03-25

## 2019-03-25 ENCOUNTER — HOSPITAL ENCOUNTER (OUTPATIENT)
Dept: LAB | Age: 79
Discharge: HOME | End: 2019-03-25
Payer: MEDICARE

## 2019-03-25 VITALS
SYSTOLIC BLOOD PRESSURE: 120 MMHG | RESPIRATION RATE: 17 BRPM | WEIGHT: 213.4 LBS | BODY MASS INDEX: 34.3 KG/M2 | HEIGHT: 66 IN | TEMPERATURE: 97.6 F | DIASTOLIC BLOOD PRESSURE: 72 MMHG | OXYGEN SATURATION: 96 % | HEART RATE: 63 BPM

## 2019-03-25 DIAGNOSIS — B37.2 YEAST INFECTION OF THE SKIN: ICD-10-CM

## 2019-03-25 DIAGNOSIS — E55.9 VITAMIN D DEFICIENCY: ICD-10-CM

## 2019-03-25 DIAGNOSIS — E78.2 MIXED HYPERLIPIDEMIA: ICD-10-CM

## 2019-03-25 DIAGNOSIS — E11.8 TYPE 2 DIABETES MELLITUS WITH COMPLICATION, WITH LONG-TERM CURRENT USE OF INSULIN (HCC): Primary | ICD-10-CM

## 2019-03-25 DIAGNOSIS — Z79.4 TYPE 2 DIABETES MELLITUS WITH COMPLICATION, WITH LONG-TERM CURRENT USE OF INSULIN (HCC): Primary | ICD-10-CM

## 2019-03-25 DIAGNOSIS — E11.21 TYPE 2 DIABETES MELLITUS WITH NEPHROPATHY (HCC): ICD-10-CM

## 2019-03-25 DIAGNOSIS — I73.9 PVD (PERIPHERAL VASCULAR DISEASE) (HCC): ICD-10-CM

## 2019-03-25 DIAGNOSIS — I10 ESSENTIAL HYPERTENSION: ICD-10-CM

## 2019-03-25 DIAGNOSIS — N18.30 CKD (CHRONIC KIDNEY DISEASE) STAGE 3, GFR 30-59 ML/MIN (HCC): ICD-10-CM

## 2019-03-25 DIAGNOSIS — R94.6 ABNORMAL THYROID FUNCTION TEST: ICD-10-CM

## 2019-03-25 DIAGNOSIS — I51.89 DIASTOLIC DYSFUNCTION: ICD-10-CM

## 2019-03-25 DIAGNOSIS — I25.10 CORONARY ARTERY DISEASE INVOLVING NATIVE CORONARY ARTERY OF NATIVE HEART WITHOUT ANGINA PECTORIS: ICD-10-CM

## 2019-03-25 DIAGNOSIS — E08.3599 PROLIFERATIVE DIABETIC RETINOPATHY WITHOUT MACULAR EDEMA ASSOCIATED WITH DIABETES MELLITUS DUE TO UNDERLYING CONDITION, UNSPECIFIED LATERALITY (HCC): ICD-10-CM

## 2019-03-25 LAB
ALBUMIN UR QL STRIP: 30 MG/L
CREATININE, URINE POC: 10 MG/DL
HBA1C MFR BLD HPLC: 6.3 % (ref 4.8–5.6)
MICROALBUMIN/CREAT RATIO POC: NORMAL MG/G

## 2019-03-25 PROCEDURE — 80053 COMPREHEN METABOLIC PANEL: CPT

## 2019-03-25 PROCEDURE — 84443 ASSAY THYROID STIM HORMONE: CPT

## 2019-03-25 PROCEDURE — 82306 VITAMIN D 25 HYDROXY: CPT

## 2019-03-25 PROCEDURE — 80061 LIPID PANEL: CPT

## 2019-03-25 PROCEDURE — 84439 ASSAY OF FREE THYROXINE: CPT

## 2019-03-25 PROCEDURE — 36415 COLL VENOUS BLD VENIPUNCTURE: CPT

## 2019-03-25 PROCEDURE — 82550 ASSAY OF CK (CPK): CPT

## 2019-03-25 PROCEDURE — 85025 COMPLETE CBC W/AUTO DIFF WBC: CPT

## 2019-03-25 RX ORDER — CHLORPHENIRAMINE MALEATE 4 MG
TABLET ORAL 2 TIMES DAILY
Qty: 60 G | Refills: 2 | Status: SHIPPED | OUTPATIENT
Start: 2019-03-25 | End: 2020-06-09

## 2019-03-25 RX ORDER — NYSTATIN 100000 [USP'U]/G
POWDER TOPICAL 3 TIMES DAILY
Qty: 60 G | Refills: 5 | Status: SHIPPED | OUTPATIENT
Start: 2019-03-25 | End: 2020-06-09

## 2019-03-25 RX ORDER — LOSARTAN POTASSIUM 25 MG/1
TABLET ORAL DAILY
COMMUNITY

## 2019-03-25 NOTE — PROGRESS NOTES
[facility]de-identified] [facility]. female    Chief Complaint   Patient presents with    Diabetes     follow up    Hypertension     follow up     1. Have you been to the ER, urgent care clinic since your last visit? Hospitalized since your last visit? No    2. Have you seen or consulted any other health care providers outside of the [address] [place] [address] since your last visit? Include any pap smears or colon screening.  No     Health Maintenance Due   Topic Date Due    Shingrix Vaccine Age 49> (1 of 2) 03/02/1989    Influenza Age 5 to Adult  08/01/2018    HEMOGLOBIN A1C Q6M  10/17/2018    GLAUCOMA SCREENING Q2Y  03/03/2019    EYE EXAM RETINAL OR DILATED  03/03/2019    MICROALBUMIN Q1  04/10/2019    LIPID PANEL Q1  04/17/2019

## 2019-03-25 NOTE — PROGRESS NOTES
HPI:  Presents for f/u DM, HTN, lipids    Pt taking Janumet + lantus  Some occasional prn lispro is being given. 48 units lantus most days  Some adjustments being made independently. Still some relative hypoglycemia. Bedtime snack does not always happen. Daughter reports juice and cookie as potential bedtime snack. Saw eye doctor last week - reported stable    Saw Cardiology recently - Dr. Dominique Corona  Echo reviewed. Pt changed med to losartan from lisinopril due to cough    Off gabapentin due to drowsiness. Pt c/o rash - discomfort and itch  Under breasts and pannus     Past medical, Social, and Family history reviewed    Prior to Admission medications    Medication Sig Start Date End Date Taking? Authorizing Provider   OMEPRAZOLE PO Take  by mouth. Yes Provider, Historical   JANUMET 50-1,000 mg per tablet TAKE 1 TABLET BY MOUTH ONCE DAILY 3/20/19  Yes Srikanth Vasquez MD   lisinopril (PRINIVIL, ZESTRIL) 5 mg tablet TAKE 1 TABLET BY MOUTH EVERY DAY 3/20/19  Yes Srikanth Vasquez MD   Insulin Needles, Disposable, (BD ULTRA-FINE SHORT PEN NEEDLE) 31 gauge x 5/16\" ndle USE TO TEST BLOOD SUGAR 3 TIMES A DAY AS DIRECTED 3/18/19  Yes Srikanth Vasquez MD   ergocalciferol (ERGOCALCIFEROL) 50,000 unit capsule TAKE ONE CAPSULE BY MOUTH EVERY 7 DAYS 1/25/19  Yes MD RILEY Doll SOLOSTAR U-100 INSULIN 100 unit/mL (3 mL) inpn INJECT 48 UNITS SUBCUTANEOUSLY AT BEDTIME 9/23/18  Yes Srikanth Vasquez MD   polyethylene glycol (MIRALAX) 17 gram/dose powder USE 1 TABLESPOONFUL AS DIRECTED BY MOUTH EVERY DAY 7/25/18  Yes Srikanth Vasquez MD   insulin lispro (HUMALOG) 100 unit/mL kwikpen 5 Units by SubCUTAneous route Before breakfast, lunch, and dinner. 5/11/18  Yes Srikanth Vasquez MD   amLODIPine (NORVASC) 5 mg tablet Take 5 mg by mouth daily. Yes Provider, Historical   metoprolol tartrate (LOPRESSOR) 25 mg tablet Take 75 mg by mouth two (2) times a day.    Yes Provider, Historical   dicyclomine (BENTYL) 10 mg capsule Take 10 mg by mouth as needed. Yes Provider, Historical   acetaminophen (TYLENOL EXTRA STRENGTH) 500 mg tablet Take 500 mg by mouth every six (6) hours as needed for Pain. Yes Provider, Historical   senna-docusate (PERICOLACE) 8.6-50 mg per tablet Take 1 Tab by mouth two (2) times a day. Take until having regular bowel movements. 2/14/18  Yes Jaren Mireles NP   rosuvastatin (CRESTOR) 40 mg tablet Take 1 Tab by mouth daily. 1/31/18  Yes Dav Balderas MD   COMBIGAN 0.2-0.5 % drop ophthalmic solution INSTILL 1 DROP IN RIGHT EYE EVERY MORNING AND NIGHT. 3/16/17  Yes Provider, Historical   aspirin delayed-release 81 mg tablet Take 81 mg by mouth daily. Yes Other, MD Gurpreet   esomeprazole (NEXIUM) 20 mg capsule TAKE ONE CAPSULE BY MOUTH DAILY  Patient not taking: Reported on 3/25/2019 3/8/19   Dav Balderas MD   rosuvastatin (CRESTOR) 40 mg tablet TAKE 1 TAB BY MOUTH DAILY  RFTS 4-9-18   Patient not taking: Reported on 3/25/2019 1/25/19   Dav Balderas MD   gabapentin (NEURONTIN) 300 mg capsule Take 1 Cap by mouth three (3) times daily. Patient not taking: Reported on 3/25/2019 8/28/18   Dav Balderas MD   esomeprazole (NEXIUM) 20 mg capsule Take 20 mg by mouth daily. Provider, Historical          ROS  Complete ROS reviewed and negative or stable except as noted in HPI. Physical Exam   Constitutional: She is oriented to person, place, and time. She appears well-nourished. No distress. HENT:   Head: Normocephalic and atraumatic. Mouth/Throat: Oropharynx is clear and moist.   Eyes: Pupils are equal, round, and reactive to light. EOM are normal. No scleral icterus. Neck: Normal range of motion. Neck supple. No JVD present. Cardiovascular: Normal rate, regular rhythm and normal heart sounds. Exam reveals no gallop and no friction rub. No murmur heard. Pulmonary/Chest: Effort normal and breath sounds normal. No respiratory distress. She has no wheezes. She has no rales. Abdominal: Soft. She exhibits no distension. There is no tenderness. Musculoskeletal: Normal range of motion. She exhibits edema (trace ). Lymphadenopathy:     She has no cervical adenopathy. Neurological: She is alert and oriented to person, place, and time. She exhibits normal muscle tone. Skin: Skin is warm. Rash (erythematous coelescent rash with satellite lesions and mild maceration under breast and at pannus overlapping skin) noted. Psychiatric: She has a normal mood and affect. Nursing note and vitals reviewed. Prior labs reviewed. Assessment/Plan:    ICD-10-CM ICD-9-CM    1. Type 2 diabetes mellitus with complication, with long-term current use of insulin ([facility]) E11.8 250.90 AMB POC HEMOGLOBIN A1C    Z79.4 V58.67    2. PVD (peripheral vascular disease) ([facility]) I73.9 443.9    3. Proliferative diabetic retinopathy without macular edema associated with diabetes mellitus due to underlying condition, unspecified laterality [facility] U99.7766 249.50      362.02    4. Vitamin D deficiency E55.9 268.9 VITAMIN D, 25 HYDROXY   5. Type 2 diabetes mellitus with nephropathy ([facility]) E11.21 250.40 AMB POC URINE, MICROALBUMIN, SEMIQUANT (3 RESULTS)     583.81    6. Mixed hyperlipidemia E78.2 272.2 CK      LIPID PANEL      METABOLIC PANEL, COMPREHENSIVE   7. Essential hypertension I10 401.9 CBC WITH AUTOMATED DIFF   8. Diastolic dysfunction U45.5 429.9    9. Coronary artery disease involving native coronary artery of native heart without angina pectoris I25.10 414.01    10. CKD (chronic kidney disease) stage 3, GFR 30-59 ml/min ([facility]) N18.3 585.3    11. Abnormal thyroid function test R94.6 794.5 T4, FREE      TSH 3RD GENERATION   12.  Yeast infection of the skin B37.2 112.3 clotrimazole (LOTRIMIN) 1 % topical cream      nystatin (MYCOSTATIN) powder     Follow-up and Dispositions    · Return in about 3 months (around 6/25/2019), or if symptoms worsen or fail to improve, for diabetes, blood pressure. results and schedule of future studies reviewed with patient  reviewed diet, exercise and weight   cardiovascular risk and specific lipid/LDL goals reviewed  reviewed medications and side effects in detail  Reviewed that a bedtime snack of complex carb and/or protein source will allow for limited hypoglycemia without raising BG too high. Continue current medications   May be able to reduce or eliminate the lispro. If recurrent hypoglycemia, then daughter to contact us and we can reduce the lantus dose.   Consider Shingrix   Lotrimin   Then, nystatin powder for maintenance

## 2019-03-26 LAB
25(OH)D3+25(OH)D2 SERPL-MCNC: 36.4 NG/ML (ref 30–100)
ALBUMIN SERPL-MCNC: 4.3 G/DL (ref 3.5–4.7)
ALBUMIN/GLOB SERPL: 2 {RATIO} (ref 1.2–2.2)
ALP SERPL-CCNC: 47 IU/L (ref 39–117)
ALT SERPL-CCNC: 20 IU/L (ref 0–32)
AST SERPL-CCNC: 18 IU/L (ref 0–40)
BASOPHILS # BLD AUTO: 0 X10E3/UL (ref 0–0.2)
BASOPHILS NFR BLD AUTO: 0 %
BILIRUB SERPL-MCNC: 0.2 MG/DL (ref 0–1.2)
BUN SERPL-MCNC: 32 MG/DL (ref 8–27)
BUN/CREAT SERPL: 23 (ref 12–28)
CALCIUM SERPL-MCNC: 9.5 MG/DL (ref 8.7–10.3)
CHLORIDE SERPL-SCNC: 103 MMOL/L (ref 96–106)
CHOLEST SERPL-MCNC: 155 MG/DL (ref 100–199)
CK SERPL-CCNC: 34 U/L (ref 24–173)
CO2 SERPL-SCNC: 25 MMOL/L (ref 20–29)
CREAT SERPL-MCNC: 1.4 MG/DL (ref 0.57–1)
EOSINOPHIL # BLD AUTO: 0.3 X10E3/UL (ref 0–0.4)
EOSINOPHIL NFR BLD AUTO: 3 %
ERYTHROCYTE [DISTWIDTH] IN BLOOD BY AUTOMATED COUNT: 13.4 % (ref 12.3–15.4)
GLOBULIN SER CALC-MCNC: 2.1 G/DL (ref 1.5–4.5)
GLUCOSE SERPL-MCNC: 134 MG/DL (ref 65–99)
HCT VFR BLD AUTO: 36.2 % (ref 34–46.6)
HDLC SERPL-MCNC: 47 MG/DL
HGB BLD-MCNC: 11.5 G/DL (ref 11.1–15.9)
IMM GRANULOCYTES # BLD AUTO: 0 X10E3/UL (ref 0–0.1)
IMM GRANULOCYTES NFR BLD AUTO: 0 %
LDLC SERPL CALC-MCNC: 79 MG/DL (ref 0–99)
LYMPHOCYTES # BLD AUTO: 3.1 X10E3/UL (ref 0.7–3.1)
LYMPHOCYTES NFR BLD AUTO: 40 %
MCH RBC QN AUTO: 31.3 PG (ref 26.6–33)
MCHC RBC AUTO-ENTMCNC: 31.8 G/DL (ref 31.5–35.7)
MCV RBC AUTO: 98 FL (ref 79–97)
MONOCYTES # BLD AUTO: 0.6 X10E3/UL (ref 0.1–0.9)
MONOCYTES NFR BLD AUTO: 7 %
NEUTROPHILS # BLD AUTO: 3.8 X10E3/UL (ref 1.4–7)
NEUTROPHILS NFR BLD AUTO: 50 %
PLATELET # BLD AUTO: 163 X10E3/UL (ref 150–379)
POTASSIUM SERPL-SCNC: 4.7 MMOL/L (ref 3.5–5.2)
PROT SERPL-MCNC: 6.4 G/DL (ref 6–8.5)
RBC # BLD AUTO: 3.68 X10E6/UL (ref 3.77–5.28)
SODIUM SERPL-SCNC: 143 MMOL/L (ref 134–144)
T4 FREE SERPL-MCNC: 1.02 NG/DL (ref 0.82–1.77)
TRIGL SERPL-MCNC: 144 MG/DL (ref 0–149)
TSH SERPL DL<=0.005 MIU/L-ACNC: 15.38 UIU/ML (ref 0.45–4.5)
VLDLC SERPL CALC-MCNC: 29 MG/DL (ref 5–40)
WBC # BLD AUTO: 7.7 X10E3/UL (ref 3.4–10.8)

## 2019-04-02 PROBLEM — E03.9 ACQUIRED HYPOTHYROIDISM: Status: ACTIVE | Noted: 2019-04-02

## 2019-07-07 DIAGNOSIS — Z79.4 TYPE 2 DIABETES MELLITUS WITH COMPLICATION, WITH LONG-TERM CURRENT USE OF INSULIN (HCC): ICD-10-CM

## 2019-07-07 DIAGNOSIS — E11.8 TYPE 2 DIABETES MELLITUS WITH COMPLICATION, WITH LONG-TERM CURRENT USE OF INSULIN (HCC): ICD-10-CM

## 2019-07-08 RX ORDER — PEN NEEDLE, DIABETIC 30 GX3/16"
NEEDLE, DISPOSABLE MISCELLANEOUS
Qty: 100 PEN NEEDLE | Refills: 3 | Status: SHIPPED | OUTPATIENT
Start: 2019-07-08 | End: 2020-03-26

## 2019-09-26 DIAGNOSIS — E03.9 ACQUIRED HYPOTHYROIDISM: ICD-10-CM

## 2019-09-26 RX ORDER — LEVOTHYROXINE SODIUM 50 UG/1
TABLET ORAL
Qty: 90 TAB | Refills: 1 | Status: SHIPPED | OUTPATIENT
Start: 2019-09-26 | End: 2020-02-04

## 2019-10-03 RX ORDER — SITAGLIPTIN AND METFORMIN HYDROCHLORIDE 50; 1000 MG/1; MG/1
TABLET, FILM COATED ORAL
Qty: 90 TAB | Refills: 1 | Status: SHIPPED | OUTPATIENT
Start: 2019-10-03 | End: 2020-05-31

## 2019-10-07 NOTE — TELEPHONE ENCOUNTER
Writer informed pt's daughter Jacqueline Strickland on [employer]IP[employer] that her mother's medication was approved,pt's scheduled to see Bela Rojas on 1/8/20.

## 2019-11-20 RX ORDER — INSULIN GLARGINE 100 [IU]/ML
INJECTION, SOLUTION SUBCUTANEOUS
Qty: 45 ADJUSTABLE DOSE PRE-FILLED PEN SYRINGE | Refills: 5 | Status: SHIPPED | OUTPATIENT
Start: 2019-11-20 | End: 2021-01-17

## 2019-11-21 DIAGNOSIS — E78.5 HYPERLIPIDEMIA, UNSPECIFIED HYPERLIPIDEMIA TYPE: ICD-10-CM

## 2019-11-21 RX ORDER — ROSUVASTATIN CALCIUM 40 MG/1
TABLET, COATED ORAL
Qty: 90 TAB | Refills: 1 | Status: SHIPPED | OUTPATIENT
Start: 2019-11-21 | End: 2020-02-14

## 2020-02-04 DIAGNOSIS — E03.9 ACQUIRED HYPOTHYROIDISM: ICD-10-CM

## 2020-02-04 RX ORDER — LEVOTHYROXINE SODIUM 50 UG/1
TABLET ORAL
Qty: 90 TAB | Refills: 1 | Status: SHIPPED | OUTPATIENT
Start: 2020-02-04 | End: 2020-06-12

## 2020-02-14 DIAGNOSIS — E78.5 HYPERLIPIDEMIA, UNSPECIFIED HYPERLIPIDEMIA TYPE: ICD-10-CM

## 2020-02-14 RX ORDER — ROSUVASTATIN CALCIUM 40 MG/1
TABLET, COATED ORAL
Qty: 90 TAB | Refills: 1 | Status: SHIPPED | OUTPATIENT
Start: 2020-02-14 | End: 2020-11-22

## 2020-03-26 DIAGNOSIS — Z79.4 TYPE 2 DIABETES MELLITUS WITH COMPLICATION, WITH LONG-TERM CURRENT USE OF INSULIN (HCC): ICD-10-CM

## 2020-03-26 DIAGNOSIS — E11.8 TYPE 2 DIABETES MELLITUS WITH COMPLICATION, WITH LONG-TERM CURRENT USE OF INSULIN (HCC): ICD-10-CM

## 2020-03-26 RX ORDER — PEN NEEDLE, DIABETIC 30 GX3/16"
NEEDLE, DISPOSABLE MISCELLANEOUS
Qty: 1 PACKAGE | Refills: 3 | Status: SHIPPED | OUTPATIENT
Start: 2020-03-26 | End: 2020-08-27

## 2020-03-31 ENCOUNTER — OFFICE VISIT (OUTPATIENT)
Dept: INTERNAL MEDICINE CLINIC | Age: 80
End: 2020-03-31

## 2020-03-31 ENCOUNTER — HOSPITAL ENCOUNTER (OUTPATIENT)
Dept: LAB | Age: 80
Discharge: HOME | End: 2020-03-31
Payer: MEDICARE

## 2020-03-31 VITALS
TEMPERATURE: 98 F | HEART RATE: 64 BPM | OXYGEN SATURATION: 97 % | SYSTOLIC BLOOD PRESSURE: 128 MMHG | WEIGHT: 216.6 LBS | DIASTOLIC BLOOD PRESSURE: 70 MMHG | RESPIRATION RATE: 16 BRPM | BODY MASS INDEX: 34.81 KG/M2 | HEIGHT: 66 IN

## 2020-03-31 DIAGNOSIS — Z79.4 TYPE 2 DIABETES MELLITUS WITH COMPLICATION, WITH LONG-TERM CURRENT USE OF INSULIN (HCC): Primary | ICD-10-CM

## 2020-03-31 DIAGNOSIS — R82.90 ABNORMAL FINDING IN URINE: ICD-10-CM

## 2020-03-31 DIAGNOSIS — N18.30 CKD (CHRONIC KIDNEY DISEASE) STAGE 3, GFR 30-59 ML/MIN (HCC): ICD-10-CM

## 2020-03-31 DIAGNOSIS — E03.9 ACQUIRED HYPOTHYROIDISM: ICD-10-CM

## 2020-03-31 DIAGNOSIS — E78.5 HYPERLIPIDEMIA, UNSPECIFIED HYPERLIPIDEMIA TYPE: ICD-10-CM

## 2020-03-31 DIAGNOSIS — E11.8 TYPE 2 DIABETES MELLITUS WITH COMPLICATION, WITH LONG-TERM CURRENT USE OF INSULIN (HCC): Primary | ICD-10-CM

## 2020-03-31 DIAGNOSIS — E55.9 VITAMIN D DEFICIENCY: ICD-10-CM

## 2020-03-31 LAB
ALBUMIN UR QL STRIP: 150 MG/L
BILIRUB UR QL STRIP: NEGATIVE
CREATININE, URINE POC: 50 MG/DL
GLUCOSE UR-MCNC: NEGATIVE MG/DL
HBA1C MFR BLD HPLC: 6.2 %
KETONES P FAST UR STRIP-MCNC: NEGATIVE MG/DL
MICROALBUMIN/CREAT RATIO POC: >300 MG/G
PH UR STRIP: 6.5 [PH] (ref 4.6–8)
PROT UR QL STRIP: NORMAL
SP GR UR STRIP: 1.01 (ref 1–1.03)
UA UROBILINOGEN AMB POC: NORMAL (ref 0.2–1)
URINALYSIS CLARITY POC: CLEAR
URINALYSIS COLOR POC: YELLOW
URINE BLOOD POC: NEGATIVE
URINE LEUKOCYTES POC: NORMAL
URINE NITRITES POC: NEGATIVE

## 2020-03-31 PROCEDURE — 84443 ASSAY THYROID STIM HORMONE: CPT

## 2020-03-31 PROCEDURE — 85025 COMPLETE CBC W/AUTO DIFF WBC: CPT

## 2020-03-31 PROCEDURE — 82306 VITAMIN D 25 HYDROXY: CPT

## 2020-03-31 PROCEDURE — 80053 COMPREHEN METABOLIC PANEL: CPT

## 2020-03-31 PROCEDURE — 87086 URINE CULTURE/COLONY COUNT: CPT

## 2020-03-31 PROCEDURE — 36415 COLL VENOUS BLD VENIPUNCTURE: CPT

## 2020-03-31 RX ORDER — HYDROGEN PEROXIDE 3 %
SOLUTION, NON-ORAL MISCELLANEOUS
COMMUNITY
Start: 2019-12-26 | End: 2020-04-14

## 2020-03-31 RX ORDER — TIMOLOL MALEATE 5 MG/ML
SOLUTION/ DROPS OPHTHALMIC
COMMUNITY
Start: 2020-02-04

## 2020-03-31 RX ORDER — AMIODARONE HYDROCHLORIDE 200 MG/1
TABLET ORAL
COMMUNITY

## 2020-03-31 NOTE — PATIENT INSTRUCTIONS
Learning About Diabetes Food Guidelines Your Care Instructions Meal planning is important to manage diabetes. It helps keep your blood sugar at a target level (which you set with your doctor). You don't have to eat special foods. You can eat what your family eats, including sweets once in a while. But you do have to pay attention to how often you eat and how much you eat of certain foods. You may want to work with a dietitian or a certified diabetes educator (CDE) to help you plan meals and snacks. A dietitian or CDE can also help you lose weight if that is one of your goals. What should you know about eating carbs? Managing the amount of carbohydrate (carbs) you eat is an important part of healthy meals when you have diabetes. Carbohydrate is found in many foods. · Learn which foods have carbs. And learn the amounts of carbs in different foods. ? Bread, cereal, pasta, and rice have about 15 grams of carbs in a serving. A serving is 1 slice of bread (1 ounce), ½ cup of cooked cereal, or 1/3 cup of cooked pasta or rice. ? Fruits have 15 grams of carbs in a serving. A serving is 1 small fresh fruit, such as an apple or orange; ½ of a banana; ½ cup of cooked or canned fruit; ½ cup of fruit juice; 1 cup of melon or raspberries; or 2 tablespoons of dried fruit. ? Milk and no-sugar-added yogurt have 15 grams of carbs in a serving. A serving is 1 cup of milk or 2/3 cup of no-sugar-added yogurt. ? Starchy vegetables have 15 grams of carbs in a serving. A serving is ½ cup of mashed potatoes or sweet potato; 1 cup winter squash; ½ of a small baked potato; ½ cup of cooked beans; or ½ cup cooked corn or green peas. · Learn how much carbs to eat each day and at each meal. A dietitian or CDE can teach you how to keep track of the amount of carbs you eat. This is called carbohydrate counting.  
· If you are not sure how to count carbohydrate grams, use the Plate Method to plan meals. It is a good, quick way to make sure that you have a balanced meal. It also helps you spread carbs throughout the day. ? Divide your plate by types of foods. Put non-starchy vegetables on half the plate, meat or other protein food on one-quarter of the plate, and a grain or starchy vegetable in the final quarter of the plate. To this you can add a small piece of fruit and 1 cup of milk or yogurt, depending on how many carbs you are supposed to eat at a meal. 
· Try to eat about the same amount of carbs at each meal. Do not \"save up\" your daily allowance of carbs to eat at one meal. 
· Proteins have very little or no carbs per serving. Examples of proteins are beef, chicken, turkey, fish, eggs, tofu, cheese, cottage cheese, and peanut butter. A serving size of meat is 3 ounces, which is about the size of a deck of cards. Examples of meat substitute serving sizes (equal to 1 ounce of meat) are 1/4 cup of cottage cheese, 1 egg, 1 tablespoon of peanut butter, and ½ cup of tofu. How can you eat out and still eat healthy? · Learn to estimate the serving sizes of foods that have carbohydrate. If you measure food at home, it will be easier to estimate the amount in a serving of restaurant food. · If the meal you order has too much carbohydrate (such as potatoes, corn, or baked beans), ask to have a low-carbohydrate food instead. Ask for a salad or green vegetables. · If you use insulin, check your blood sugar before and after eating out to help you plan how much to eat in the future. · If you eat more carbohydrate at a meal than you had planned, take a walk or do other exercise. This will help lower your blood sugar. What else should you know? · Limit saturated fat, such as the fat from meat and dairy products. This is a healthy choice because people who have diabetes are at higher risk of heart disease.  So choose lean cuts of meat and nonfat or low-fat dairy products. Use olive or canola oil instead of butter or shortening when cooking. · Don't skip meals. Your blood sugar may drop too low if you skip meals and take insulin or certain medicines for diabetes. · Check with your doctor before you drink alcohol. Alcohol can cause your blood sugar to drop too low. Alcohol can also cause a bad reaction if you take certain diabetes medicines. Follow-up care is a key part of your treatment and safety. Be sure to make and go to all appointments, and call your doctor if you are having problems. It's also a good idea to know your test results and keep a list of the medicines you take. Where can you learn more? Go to http://desi-mono.info/ Enter M085 in the search box to learn more about \"Learning About Diabetes Food Guidelines. \" Current as of: December 19, 2019Content Version: 12.4 © 7961-1438 [employer]. Care instructions adapted under license by [employer] (which disclaims liability or warranty for this information). If you have questions about a medical condition or this instruction, always ask your healthcare professional. [address] any warranty or liability for your use of this information. Diabetes and Preventing Falls: Care Instructions Your Care Instructions If you are an older adult who has diabetes, you may have a higher risk of falling. Complications of diabetessuch as nerve damage, foot problems, and reduced visionmay increase your risk of a fall. Some of your medicines also may add to your risk. By making your home safer, you can lower your risk of falling. Doing things to prevent diabetes complications may also help to lower your risk. You can make your home safer with a few simple measures. Follow-up care is a key part of your treatment and safety.  Be sure to make and go to all appointments, and call your doctor if you are having problems. It's also a good idea to know your test results and keep a list of the medicines you take. How can you care for yourself at home? Taking care of yourself · Keep your blood sugar at a target level (which you set with your doctor). · Exercise regularly to improve your strength, muscle tone, and balance. Walk if you can. Swimming may be a good choice if you cannot walk easily. · Have your vision checked as often as your doctor recommends. It is usually once a year or more often if you have eye problems. · Know the side effects of the medicines you take. Ask your doctor or pharmacist whether the medicines you take can affect your balance. Sleeping pills or sedatives can affect your balance. · Limit the amount of alcohol you drink. Alcohol can impair your balance and other senses. · Have your doctor check your feet during each visit. If you have a foot problem, see your doctor. Preventing falls at home · Remove raised doorway thresholds, throw rugs, and clutter. Repair loose carpet or raised areas in the floor. · Move furniture and electrical cords to keep them out of walking paths. · Use nonskid floor wax, and wipe up spills right away, especially on ceramic tile floors. · If you use a walker or cane, put rubber tips on it. If you use crutches, clean the bottoms of them regularly with an abrasive pad, such as steel wool. · Keep your house well lit, especially [employer], and outside walkways. Use night-lights in areas such as hallways and bathrooms. Add extra light switches or use remote switches (such as switches that go on or off when you clap your hands) to make it easier to turn lights on if you have to get up during the night. · Install sturdy handrails on stairways. Put grab bars near your shower, bathtub, and toilet. · Store household items on low shelves so that you do not have to climb or reach high.  Or use a reaching device that you can get at a medical supply store. If you have to climb for something, use a step stool with handrails, or ask someone to get it for you. · Keep a cordless phone and a flashlight with new batteries by your bed. If possible, put a phone in each of the main rooms of your house, or carry a cell phone in case you fall and cannot reach a phone. Or you can wear a device around your neck or wrist. You push a button that sends a signal for help. · Wear low-heeled shoes that fit well and give your feet good support. Use footwear with nonskid soles. Check the heels and soles of your shoes for wear. Repair or replace worn heels or soles. · Do not wear socks without shoes on wood floors. · Walk on the grass when the sidewalks are slippery. If you live in an area that gets snow and ice in the winter, sprinkle salt on slippery steps and sidewalks. Where can you learn more? Go to http://desi-mono.info/ Enter M085 in the search box to learn more about \"Diabetes and Preventing Falls: Care Instructions. \" Current as of: August 6, 2019Content Version: 12.4 © 1475-3177 [employer]. Care instructions adapted under license by [employer] (which disclaims liability or warranty for this information). If you have questions about a medical condition or this instruction, always ask your healthcare professional. [place] [address] any warranty or liability for your use of this information. Hypothyroidism: Care Instructions Your Care Instructions You have hypothyroidism, which means that your body is not making enough thyroid hormone. This hormone helps your body use energy. If your thyroid level is low, you may feel tired, be constipated, have an increase in your blood pressure, or have dry skin or memory problems. You may also get cold easily, even when it is warm. Women with low thyroid levels may have heavy menstrual periods. A blood test to find your thyroid-stimulating hormone (TSH) level is used to check for hypothyroidism. A high TSH level may mean that you have low thyroid. When your body is not making enough thyroid hormone, TSH levels rise in an effort to make the body produce more. The treatment for hypothyroidism is to take thyroid hormone pills. You should start to feel better in 1 to 2 weeks. But it can take several months to see changes in the TSH level. You will need regular visits with your doctor to make sure you have the right dose of medicine. Most people need treatment for the rest of their lives. You will need to see your doctor regularly to have blood tests and to make sure you are doing well. Follow-up care is a key part of your treatment and safety. Be sure to make and go to all appointments, and call your doctor if you are having problems. It's also a good idea to know your test results and keep a list of the medicines you take. How can you care for yourself at home? · Take your thyroid hormone medicine exactly as prescribed. Call your doctor if you think you are having a problem with your medicine. Most people do not have side effects if they take the right amount of medicine regularly. ? Take the medicine 30 minutes before breakfast, and do not take it with calcium, vitamins, or iron. ? Do not take extra doses of your thyroid medicine. It will not help you get better any faster, and it may cause side effects. ? If you forget to take a dose, do NOT take a double dose of medicine. Take your usual dose the next day. · Tell your doctor about all prescription, herbal, or over-the-counter products you take. · Take care of yourself. Eat a healthy diet, get enough sleep, and get regular exercise. When should you call for help? Call 911 anytime you think you may need emergency care. For example, call if: 
  · You passed out (lost consciousness).  
  · You have severe trouble breathing.   · You have a very slow heartbeat (less than 60 beats a minute).  
  · You have a low body temperature (95°F or below).  
 Call your doctor now or seek immediate medical care if: 
  · You feel tired, sluggish, or weak.  
  · You have trouble remembering things or concentrating.  
  · You do not begin to feel better 2 weeks after starting your medicine.  
 Watch closely for changes in your health, and be sure to contact your doctor if you have any problems. Where can you learn more? Go to http://desi-mono.info/ Enter G056 in the search box to learn more about \"Hypothyroidism: Care Instructions. \" Current as of: July 28, 2019Content Version: 12.4 © 1090-3867 [employer]. Care instructions adapted under license by [employer] (which disclaims liability or warranty for this information). If you have questions about a medical condition or this instruction, always ask your healthcare professional. [address] any warranty or liability for your use of this information.

## 2020-03-31 NOTE — PROGRESS NOTES
Room 7    Patient presents with her daughter. Chief Complaint   Patient presents with    Diabetes    Medication Evaluation    Hypertension     Patient had a fall about 5 weeks ago in a store parking lot. Per daughter she scraped her hands and her knees bruised very badly. 1. Have you been to the ER, urgent care clinic since your last visit? Hospitalized since your last visit? No    2. Have you seen or consulted any other health care providers outside of the [address] [place] [address] since your last visit? Include any pap smears or colon screening. No    Health Maintenance Due   Topic Date Due    Shingrix Vaccine Age 49> (1 of 2) 03/02/1989    Eye Exam Retinal or Dilated  03/03/2019    Foot Exam Q1  05/09/2019    Medicare Yearly Exam  05/12/2019    Influenza Age 5 to Adult  08/01/2019    A1C test (Diabetic or Prediabetic)  09/25/2019    MICROALBUMIN Q1  03/25/2020    Lipid Screen  03/25/2020     Patient had flu vaccine for this season per daughter.     Appointment for eye exam was rescheduled from last week to June due to [employer].    3 most recent [address] 3/31/2020   Little interest or pleasure in doing things Not at all   Feeling down, depressed, irritable, or hopeless More than half the days   Total Score PHQ 2 2   Trouble falling or staying asleep, or sleeping too much -   Feeling tired or having little energy -   Poor appetite, weight loss, or overeating -   Feeling bad about yourself - or that you are a failure or have let yourself or your family down -   Trouble concentrating on things such as school, work, reading, or watching TV -   Moving or speaking so slowly that other people could have noticed; or the opposite being so fidgety that others notice -   Thoughts of being better off dead, or hurting yourself in some way -   PHQ 9 Score -   How difficult have these problems made it for you to do your work, take care of your home and get along with others -       Fall Risk Assessment, last 12 mths 3/31/2020   Able to walk? Yes   Fall in past 12 months? Yes   Fall with injury? Yes   Number of falls in past 12 months 1   Fall Risk Score 2           Abuse Screening Questionnaire 3/31/2020   Do you ever feel afraid of your partner? N   Are you in a relationship with someone who physically or mentally threatens you? N   Is it safe for you to go home? Y           AVS given to patient and understanding was verbalized.

## 2020-03-31 NOTE — PROGRESS NOTES
Cody Porras is a 79 y.o. female presents for routine visit  HPI   Compliant with medical management. Denies ADRs or interim change in medical management     Several weeks ago, she tripped and fell forward on knees and hands; no serious injuries, scars healed. No frequent fall     Eye exam reschedule for June due to COVID-19    Received flu vaccine elsewhere     Saw cardiology last week, will have stress test tomorrow      Past Medical History:   Diagnosis Date    Acquired hypothyroidism 4/2/2019    CAD (coronary artery disease)     calcium score 229 - 2011, cath 4/2009 - [facility]    Cataract     CKD (chronic kidney disease) stage 3, GFR 30-59 ml/min ([facility])     kidney are ok - no current issues as of 3/8/18    Cystoid macular degeneration of right eye     Diabetes ([address]     Diabetic neuropathy ([facility])     Diastolic dysfunction     Esophageal reflux 4/9/2015    Foot ulcer ([address]     Glaucoma     right    Hypercholesterolemia     Hyperlipidemia 1/23/2015    Hypertension     Macular degeneration     rt eye    Microalbuminuria 4/10/2018    Proliferative diabetic retinopathy ([address]     PVD (peripheral vascular disease) ([facility])     no current issues as of 3/8/18    Rupture of ulnar collateral ligament of thumb     Sebaceous cyst 4/4/2017    Urge incontinence     Vitreous hemorrhage of both eyes ([facility])        Current Outpatient Medications   Medication Sig    esomeprazole (NEXIUM) 20 mg capsule TAKE ONE CAPSULE BY MOUTH DAILY    timolol (TIMOPTIC) 0.5 % ophthalmic solution PLACE 1 DROP INTO LEFT EYE TWICE A DAY AS DIRECTED    amiodarone (CORDARONE) 200 mg tablet Take  by mouth.     Insulin Needles, Disposable, (BD Ultra-Fine Short Pen Needle) 31 gauge x 5/16\" ndle USE TO TEST BLOOD SUGAR 3 TIMES A DAY AS DIRECTED    rosuvastatin (CRESTOR) 40 mg tablet TAKE 1 TAB BY MOUTH DAILY    levothyroxine (SYNTHROID) 50 mcg tablet TAKE 1 TABLET BY MOUTH EVERY DAY BEFORE BREAKFAST    LANTUS SOLOSTAR U-100 INSULIN 100 unit/mL (3 mL) inpn INJECT 48 UNITS SUBCUTANEOUSLY AT BEDTIME    JANUMET 50-1,000 mg per tablet TAKE 1 TABLET BY MOUTH ONCE DAILY    ergocalciferol (ERGOCALCIFEROL) 50,000 unit capsule Take 1 Cap by mouth every thirty (30) days.  losartan (COZAAR) 25 mg tablet Take  by mouth daily.  nystatin (MYCOSTATIN) powder Apply  to affected area three (3) times daily. As directed    polyethylene glycol (MIRALAX) 17 gram/dose powder USE 1 TABLESPOONFUL AS DIRECTED BY MOUTH EVERY DAY    amLODIPine (NORVASC) 5 mg tablet Take 5 mg by mouth daily.  metoprolol tartrate (LOPRESSOR) 25 mg tablet Take 75 mg by mouth two (2) times a day.  COMBIGAN 0.2-0.5 % drop ophthalmic solution INSTILL 1 DROP IN RIGHT EYE EVERY MORNING AND NIGHT.  aspirin delayed-release 81 mg tablet Take 81 mg by mouth daily.  OMEPRAZOLE PO Take  by mouth.  clotrimazole (LOTRIMIN) 1 % topical cream Apply  to affected area two (2) times a day.  insulin lispro (HUMALOG) 100 unit/mL kwikpen 5 Units by SubCUTAneous route Before breakfast, lunch, and dinner.  dicyclomine (BENTYL) 10 mg capsule Take 10 mg by mouth as needed.  acetaminophen (TYLENOL EXTRA STRENGTH) 500 mg tablet Take 500 mg by mouth every six (6) hours as needed for Pain.  senna-docusate (PERICOLACE) 8.6-50 mg per tablet Take 1 Tab by mouth two (2) times a day. Take until having regular bowel movements. No current facility-administered medications for this visit. Allergies   Allergen Reactions    Ibuprofen Unknown (comments)    Lisinopril Cough    Norvasc [Amlodipine] Other (comments)     LE swelling - currently taking with no issues (3/8/18)     Past Surgical History:   Procedure Laterality Date    CARDIAC SURG PROCEDURE UNLIST  02/06/2018    CABG x 4    HX CATARACT REMOVAL Left     HX CHOLECYSTECTOMY           Review of Systems   Constitutional: Negative. Cardiovascular: Negative for chest pain and leg swelling. Gastrointestinal: Negative. Genitourinary: Negative. Musculoskeletal: Positive for joint pain. Neurological: Negative for dizziness and headaches. Objective  Visit Vitals  /78   Pulse 64   Temp 98 °F (36.7 °C) (Oral)   Resp 16   Ht 5' 6\" (1.676 m)   Wt 216 lb 9.6 oz (98.2 kg)   SpO2 97%   BMI 34.96 kg/m²     Physical Exam  Constitutional:       General: She is not in acute distress. Appearance: She is normal weight. She is not ill-appearing. HENT:      Head: Normocephalic and atraumatic. Neck:      Musculoskeletal: Normal range of motion and neck supple. Cardiovascular:      Rate and Rhythm: Normal rate and regular rhythm. Heart sounds: Murmur present. Pulmonary:      Effort: Pulmonary effort is normal.      Breath sounds: Normal breath sounds. Abdominal:      General: Abdomen is flat. There is no distension. Palpations: Abdomen is soft. There is no mass. Tenderness: There is no abdominal tenderness. There is no right CVA tenderness, left CVA tenderness or guarding. Hernia: No hernia is present. Skin:     General: Skin is warm and dry. Findings: No rash. Neurological:      General: No focal deficit present. Mental Status: She is alert. Cranial Nerves: No cranial nerve deficit. Assessment & Plan    ICD-10-CM ICD-9-CM    1. Type 2 diabetes mellitus with complication, with long-term current use of insulin ([facility]) E11.8 250.90 AMB POC HEMOGLOBIN A1C    Z79.4 V58.67 AMB POC URINE, MICROALBUMIN, SEMIQUANT (3 RESULTS)       DIABETES FOOT EXAM      METABOLIC PANEL, COMPREHENSIVE      AMB POC URINALYSIS DIP STICK AUTO W/O MICRO   2. Hyperlipidemia, unspecified hyperlipidemia type T62.6 246.8 METABOLIC PANEL, COMPREHENSIVE   3. Acquired hypothyroidism E03.9 244.9 TSH AND FREE T4   4.  Vitamin D deficiency E55.9 268.9 VITAMIN D, 25 HYDROXY   5. CKD (chronic kidney disease) stage 3, GFR 30-59 ml/min ([facility]) J79.6 860.6 METABOLIC PANEL, COMPREHENSIVE      CBC WITH AUTOMATED DIFF   6. Abnormal finding in urine R82.90 791.9 CULTURE, URINE     Follow-up and Dispositions    · Return in about 4 months (around 7/31/2020) for diabetes, htn, hyperlipidemia, thyroid disorder. Urine dip 1+ leukocytes. Hemoglobin A1c 6.2%  Normotensive   Discussed fall prevention     Current treatment plan is effective, no change in therapy  lab results and schedule of future lab studies reviewed with patient  reviewed medications and side effects in detail  specific diabetic recommendations: low cholesterol diet, weight control and daily exercise discussed, annual eye examinations at Ophthalmology discussed, glycohemoglobin and other lab monitoring discussed and long term diabetic complications discussed     Patient voices understanding and acceptance of this advice and will call back if any further questions or concerns. An After Visit Summary was printed and given to the patient.   Ines Early NP

## 2020-04-01 LAB
25(OH)D3+25(OH)D2 SERPL-MCNC: 31.8 NG/ML (ref 30–100)
ALBUMIN SERPL-MCNC: 4.6 G/DL (ref 3.6–4.6)
ALBUMIN/GLOB SERPL: 2.7 {RATIO} (ref 1.2–2.2)
ALP SERPL-CCNC: 57 IU/L (ref 39–117)
ALT SERPL-CCNC: 19 IU/L (ref 0–32)
AST SERPL-CCNC: 15 IU/L (ref 0–40)
BASOPHILS # BLD AUTO: 0 X10E3/UL (ref 0–0.2)
BASOPHILS NFR BLD AUTO: 1 %
BILIRUB SERPL-MCNC: 0.3 MG/DL (ref 0–1.2)
BUN SERPL-MCNC: 32 MG/DL (ref 8–27)
BUN/CREAT SERPL: 22 (ref 12–28)
CALCIUM SERPL-MCNC: 9.6 MG/DL (ref 8.7–10.3)
CHLORIDE SERPL-SCNC: 101 MMOL/L (ref 96–106)
CO2 SERPL-SCNC: 24 MMOL/L (ref 20–29)
CREAT SERPL-MCNC: 1.47 MG/DL (ref 0.57–1)
EOSINOPHIL # BLD AUTO: 0.3 X10E3/UL (ref 0–0.4)
EOSINOPHIL NFR BLD AUTO: 4 %
ERYTHROCYTE [DISTWIDTH] IN BLOOD BY AUTOMATED COUNT: 13 % (ref 11.7–15.4)
GLOBULIN SER CALC-MCNC: 1.7 G/DL (ref 1.5–4.5)
GLUCOSE SERPL-MCNC: 185 MG/DL (ref 65–99)
HCT VFR BLD AUTO: 35.4 % (ref 34–46.6)
HGB BLD-MCNC: 11.4 G/DL (ref 11.1–15.9)
IMM GRANULOCYTES # BLD AUTO: 0 X10E3/UL (ref 0–0.1)
IMM GRANULOCYTES NFR BLD AUTO: 0 %
LYMPHOCYTES # BLD AUTO: 2.6 X10E3/UL (ref 0.7–3.1)
LYMPHOCYTES NFR BLD AUTO: 43 %
MCH RBC QN AUTO: 31.1 PG (ref 26.6–33)
MCHC RBC AUTO-ENTMCNC: 32.2 G/DL (ref 31.5–35.7)
MCV RBC AUTO: 97 FL (ref 79–97)
MONOCYTES # BLD AUTO: 0.5 X10E3/UL (ref 0.1–0.9)
MONOCYTES NFR BLD AUTO: 9 %
NEUTROPHILS # BLD AUTO: 2.6 X10E3/UL (ref 1.4–7)
NEUTROPHILS NFR BLD AUTO: 43 %
PLATELET # BLD AUTO: 146 X10E3/UL (ref 150–450)
POTASSIUM SERPL-SCNC: 4.7 MMOL/L (ref 3.5–5.2)
PROT SERPL-MCNC: 6.3 G/DL (ref 6–8.5)
RBC # BLD AUTO: 3.67 X10E6/UL (ref 3.77–5.28)
SODIUM SERPL-SCNC: 143 MMOL/L (ref 134–144)
T4 FREE SERPL-MCNC: 1.35 NG/DL (ref 0.82–1.77)
TSH SERPL DL<=0.005 MIU/L-ACNC: 7.43 UIU/ML (ref 0.45–4.5)
WBC # BLD AUTO: 6 X10E3/UL (ref 3.4–10.8)

## 2020-04-02 LAB — BACTERIA UR CULT: NORMAL

## 2020-04-06 ENCOUNTER — TELEPHONE (OUTPATIENT)
Dept: INTERNAL MEDICINE CLINIC | Age: 80
End: 2020-04-06

## 2020-04-06 NOTE — TELEPHONE ENCOUNTER
----- Message from Rylan Khoury sent at 4/6/2020 10:04 AM EDT -----  Regarding: Dr Pradeep Shah  Pt's daughter Jaqueline Crespo is calling to get her mom Test Results, please call [phone]

## 2020-04-06 NOTE — TELEPHONE ENCOUNTER
Called daughter back. Verified name and . On [employer]. given lab results from lab letter. Daughter verbalized understanding of providers recommendations. RECOMMENDATIONS:   Work on diet and exercise. Recheck this test: thyroid function, kidney function   in  3 months. Continue with current  medications. Thyroid function has improved, no need for medication adjustment at this time  Diabetes is well controlled   Kidney function is decreased. You may not be drinking enough water, drink at least 48 ounces daily.  Avoid medications such as Advil, Aleve and Motrin  You do not have a bladder infection

## 2020-04-14 RX ORDER — HYDROGEN PEROXIDE 3 %
SOLUTION, NON-ORAL MISCELLANEOUS
Qty: 90 CAP | Refills: 3 | Status: SHIPPED | OUTPATIENT
Start: 2020-04-14 | End: 2020-06-09

## 2020-05-31 RX ORDER — SITAGLIPTIN AND METFORMIN HYDROCHLORIDE 50; 1000 MG/1; MG/1
TABLET, FILM COATED ORAL
Qty: 90 TAB | Refills: 1 | Status: SHIPPED | OUTPATIENT
Start: 2020-05-31 | End: 2020-12-27

## 2020-06-09 ENCOUNTER — HOSPITAL ENCOUNTER (EMERGENCY)
Age: 80
Discharge: HOME | End: 2020-06-09
Attending: STUDENT IN AN ORGANIZED HEALTH CARE EDUCATION/TRAINING PROGRAM
Payer: MEDICARE

## 2020-06-09 VITALS
OXYGEN SATURATION: 98 % | BODY MASS INDEX: 35.87 KG/M2 | SYSTOLIC BLOOD PRESSURE: 170 MMHG | WEIGHT: 222.22 LBS | HEART RATE: 61 BPM | DIASTOLIC BLOOD PRESSURE: 65 MMHG | TEMPERATURE: 97.7 F | RESPIRATION RATE: 15 BRPM

## 2020-06-09 DIAGNOSIS — R53.1 GENERAL WEAKNESS: Primary | ICD-10-CM

## 2020-06-09 DIAGNOSIS — R60.0 LOWER EXTREMITY EDEMA: ICD-10-CM

## 2020-06-09 LAB
ALBUMIN SERPL-MCNC: 3.8 G/DL (ref 3.5–5)
ALBUMIN/GLOB SERPL: 1.2 {RATIO} (ref 1.1–2.2)
ALP SERPL-CCNC: 63 U/L (ref 45–117)
ALT SERPL-CCNC: 33 U/L (ref 12–78)
ANION GAP SERPL CALC-SCNC: 10 MMOL/L (ref 5–15)
APPEARANCE UR: ABNORMAL
APPEARANCE UR: ABNORMAL
AST SERPL-CCNC: 19 U/L (ref 15–37)
ATRIAL RATE: 71 BPM
BACTERIA URNS QL MICRO: ABNORMAL /HPF
BACTERIA URNS QL MICRO: ABNORMAL /HPF
BASOPHILS # BLD: 0.1 K/UL (ref 0–0.1)
BASOPHILS NFR BLD: 1 % (ref 0–1)
BILIRUB SERPL-MCNC: 0.3 MG/DL (ref 0.2–1)
BILIRUB UR QL: NEGATIVE
BILIRUB UR QL: NEGATIVE
BNP SERPL-MCNC: 267 PG/ML (ref 0–450)
BUN SERPL-MCNC: 35 MG/DL (ref 6–20)
BUN/CREAT SERPL: 26 (ref 12–20)
CALCIUM SERPL-MCNC: 9.1 MG/DL (ref 8.5–10.1)
CALCULATED P AXIS, ECG09: 54 DEGREES
CALCULATED R AXIS, ECG10: -12 DEGREES
CALCULATED T AXIS, ECG11: 51 DEGREES
CHLORIDE SERPL-SCNC: 106 MMOL/L (ref 97–108)
CO2 SERPL-SCNC: 26 MMOL/L (ref 21–32)
COLOR UR: ABNORMAL
COLOR UR: ABNORMAL
CREAT SERPL-MCNC: 1.34 MG/DL (ref 0.55–1.02)
DIAGNOSIS, 93000: NORMAL
DIFFERENTIAL METHOD BLD: ABNORMAL
EOSINOPHIL # BLD: 0.3 K/UL (ref 0–0.4)
EOSINOPHIL NFR BLD: 3 % (ref 0–7)
EPITH CASTS URNS QL MICRO: ABNORMAL /LPF
EPITH CASTS URNS QL MICRO: ABNORMAL /LPF
ERYTHROCYTE [DISTWIDTH] IN BLOOD BY AUTOMATED COUNT: 13.2 % (ref 11.5–14.5)
GLOBULIN SER CALC-MCNC: 3.2 G/DL (ref 2–4)
GLUCOSE SERPL-MCNC: 150 MG/DL (ref 65–100)
GLUCOSE UR STRIP.AUTO-MCNC: NEGATIVE MG/DL
GLUCOSE UR STRIP.AUTO-MCNC: NEGATIVE MG/DL
HCT VFR BLD AUTO: 36.1 % (ref 35–47)
HGB BLD-MCNC: 11.6 G/DL (ref 11.5–16)
HGB UR QL STRIP: ABNORMAL
HGB UR QL STRIP: NEGATIVE
IMM GRANULOCYTES # BLD AUTO: 0 K/UL (ref 0–0.04)
IMM GRANULOCYTES NFR BLD AUTO: 0 % (ref 0–0.5)
KETONES UR QL STRIP.AUTO: NEGATIVE MG/DL
KETONES UR QL STRIP.AUTO: NEGATIVE MG/DL
LEUKOCYTE ESTERASE UR QL STRIP.AUTO: ABNORMAL
LEUKOCYTE ESTERASE UR QL STRIP.AUTO: ABNORMAL
LIPASE SERPL-CCNC: 111 U/L (ref 73–393)
LYMPHOCYTES # BLD: 3.7 K/UL (ref 0.8–3.5)
LYMPHOCYTES NFR BLD: 44 % (ref 12–49)
MAGNESIUM SERPL-MCNC: 1.7 MG/DL (ref 1.6–2.4)
MCH RBC QN AUTO: 31.5 PG (ref 26–34)
MCHC RBC AUTO-ENTMCNC: 32.1 G/DL (ref 30–36.5)
MCV RBC AUTO: 98.1 FL (ref 80–99)
MONOCYTES # BLD: 0.7 K/UL (ref 0–1)
MONOCYTES NFR BLD: 8 % (ref 5–13)
NEUTS SEG # BLD: 3.5 K/UL (ref 1.8–8)
NEUTS SEG NFR BLD: 44 % (ref 32–75)
NITRITE UR QL STRIP.AUTO: NEGATIVE
NITRITE UR QL STRIP.AUTO: NEGATIVE
NRBC # BLD: 0 K/UL (ref 0–0.01)
NRBC BLD-RTO: 0 PER 100 WBC
P-R INTERVAL, ECG05: 200 MS
PH UR STRIP: 5 [PH] (ref 5–8)
PH UR STRIP: 5.5 [PH] (ref 5–8)
PLATELET # BLD AUTO: 158 K/UL (ref 150–400)
PMV BLD AUTO: 11.8 FL (ref 8.9–12.9)
POTASSIUM SERPL-SCNC: 5.1 MMOL/L (ref 3.5–5.1)
PROT SERPL-MCNC: 7 G/DL (ref 6.4–8.2)
PROT UR STRIP-MCNC: 100 MG/DL
PROT UR STRIP-MCNC: ABNORMAL MG/DL
Q-T INTERVAL, ECG07: 402 MS
QRS DURATION, ECG06: 94 MS
QTC CALCULATION (BEZET), ECG08: 436 MS
RBC # BLD AUTO: 3.68 M/UL (ref 3.8–5.2)
RBC #/AREA URNS HPF: ABNORMAL /HPF (ref 0–5)
RBC #/AREA URNS HPF: ABNORMAL /HPF (ref 0–5)
RBC MORPH BLD: ABNORMAL
SODIUM SERPL-SCNC: 142 MMOL/L (ref 136–145)
SP GR UR REFRACTOMETRY: 1.01 (ref 1–1.03)
SP GR UR REFRACTOMETRY: 1.02 (ref 1–1.03)
T4 FREE SERPL-MCNC: 1.3 NG/DL (ref 0.8–1.5)
TROPONIN I SERPL-MCNC: <0.05 NG/ML
TSH SERPL DL<=0.05 MIU/L-ACNC: 6.47 UIU/ML (ref 0.36–3.74)
UR CULT HOLD, URHOLD: NORMAL
UROBILINOGEN UR QL STRIP.AUTO: 0.2 EU/DL (ref 0.2–1)
UROBILINOGEN UR QL STRIP.AUTO: 0.2 EU/DL (ref 0.2–1)
VENTRICULAR RATE, ECG03: 71 BPM
WBC # BLD AUTO: 8.3 K/UL (ref 3.6–11)
WBC URNS QL MICRO: ABNORMAL /HPF (ref 0–4)
WBC URNS QL MICRO: ABNORMAL /HPF (ref 0–4)

## 2020-06-09 PROCEDURE — 84443 ASSAY THYROID STIM HORMONE: CPT

## 2020-06-09 PROCEDURE — 83880 ASSAY OF NATRIURETIC PEPTIDE: CPT

## 2020-06-09 PROCEDURE — 99285 EMERGENCY DEPT VISIT HI MDM: CPT

## 2020-06-09 PROCEDURE — 84439 ASSAY OF FREE THYROXINE: CPT

## 2020-06-09 PROCEDURE — 87077 CULTURE AEROBIC IDENTIFY: CPT

## 2020-06-09 PROCEDURE — 87186 SC STD MICRODIL/AGAR DIL: CPT

## 2020-06-09 PROCEDURE — 81001 URINALYSIS AUTO W/SCOPE: CPT

## 2020-06-09 PROCEDURE — 83690 ASSAY OF LIPASE: CPT

## 2020-06-09 PROCEDURE — 93005 ELECTROCARDIOGRAM TRACING: CPT

## 2020-06-09 PROCEDURE — 85025 COMPLETE CBC W/AUTO DIFF WBC: CPT

## 2020-06-09 PROCEDURE — 80053 COMPREHEN METABOLIC PANEL: CPT

## 2020-06-09 PROCEDURE — 83735 ASSAY OF MAGNESIUM: CPT

## 2020-06-09 PROCEDURE — 84484 ASSAY OF TROPONIN QUANT: CPT

## 2020-06-09 PROCEDURE — 36415 COLL VENOUS BLD VENIPUNCTURE: CPT

## 2020-06-09 PROCEDURE — 87086 URINE CULTURE/COLONY COUNT: CPT

## 2020-06-09 NOTE — ED NOTES
The patient left the Emergency Department ambulatory, alert and oriented and in no acute distress. The patient was encouraged to call or return to the ED for worsening issues or problems and was encouraged to schedule a follow up appointment for continuing care.      The patient and daughter verbalize understanding of discharge instructions and prescriptions, all questions were answered. The patient has no further concerns at this time.

## 2020-06-09 NOTE — ED PROVIDER NOTES
Neli Lewis is a 79 y.o. female with past medical history notable for CKD, diabetic neuropathy, diabetes, CAD status post CABG presenting with generalized weakness, somewhat worse than usual lower extremity edema. She has fluctuating understanding edema at baseline. History is primarily provided by her daughter who speaks [place]. She has not had any chest pain, shortness of breath, orthopnea, dyspnea on exertion. She has had fatigue with exertion over the last week or so. She had diarrhea 3 days ago with 2-3 episodes of loose stool (nonbloody) as well as one episode of nonbilious nonbloody emesis. She had formed stool yesterday. She has not had dysuria.             Past Medical History:   Diagnosis Date    Acquired hypothyroidism 4/2/2019    CAD (coronary artery disease)     calcium score 229 - 2011, cath 4/2009 - [facility]    Cataract     CKD (chronic kidney disease) stage 3, GFR 30-59 ml/min ([facility])     kidney are ok - no current issues as of 3/8/18    Cystoid macular degeneration of right eye     Diabetes ([address]     Diabetic neuropathy ([facility])     Diastolic dysfunction     Esophageal reflux 4/9/2015    Foot ulcer ([address]     Glaucoma     right    Hypercholesterolemia     Hyperlipidemia 1/23/2015    Hypertension     Macular degeneration     rt eye    Microalbuminuria 4/10/2018    Proliferative diabetic retinopathy ([address]     PVD (peripheral vascular disease) ([facility])     no current issues as of 3/8/18    Rupture of ulnar collateral ligament of thumb     Sebaceous cyst 4/4/2017    Urge incontinence     Vitreous hemorrhage of both eyes ([facility])        Past Surgical History:   Procedure Laterality Date    CARDIAC SURG PROCEDURE UNLIST  02/06/2018    CABG x 4    HX CATARACT REMOVAL Left     HX CHOLECYSTECTOMY           Family History:   Problem Relation Age of Onset    Heart Failure Mother     Heart Disease Mother     Diabetes Father     Elevated Lipids Father        Social History Socioeconomic History    Marital status: SINGLE     Spouse name: Not on file    Number of children: Not on file    Years of education: Not on file    Highest education level: Not on file   Occupational History    Not on file   Social Needs    Financial resource strain: Not on file    Food insecurity     Worry: Not on file     Inability: Not on file    Transportation needs     Medical: Not on file     Non-medical: Not on file   Tobacco Use    Smoking status: Never Smoker    Smokeless tobacco: Never Used   Substance and Sexual Activity    Alcohol use: No    Drug use: No    Sexual activity: Never   Lifestyle    Physical activity     Days per week: Not on file     Minutes per session: Not on file    Stress: Not on file   Relationships    Social connections     Talks on phone: Not on file     Gets together: Not on file     Attends Dru[employer] service: Not on file     Active member of club or organization: Not on file     Attends meetings of clubs or organizations: Not on file     Relationship status: Not on file    Intimate partner violence     Fear of current or ex partner: Not on file     Emotionally abused: Not on file     Physically abused: Not on file     Forced sexual activity: Not on file   Other Topics Concern    Not on file   Social History Narrative    Not on file         ALLERGIES: Ibuprofen; Lisinopril; and Norvasc [amlodipine]    Review of Systems   Constitutional: Negative for chills and fever. Eyes: Negative for photophobia. Respiratory: Negative for cough and shortness of breath. Cardiovascular: Negative for chest pain. Gastrointestinal: Negative for abdominal pain and nausea. Genitourinary: Negative for dysuria. Musculoskeletal: Negative for back pain. Neurological: Positive for light-headedness. Negative for headaches. Psychiatric/Behavioral: Negative for confusion. All other systems reviewed and are negative.       Vitals:    06/09/20 1541   BP: 154/61   Pulse: 75 Resp: 18   Temp: 97.9 °F (36.6 °C)   SpO2: 96%   Weight: 100.8 kg (222 lb 3.6 oz)            Physical Exam  Vitals signs reviewed. Constitutional:       General: She is not in acute distress. HENT:      Head: Normocephalic and atraumatic. Mouth/Throat:      Mouth: Mucous membranes are moist.      Pharynx: Oropharynx is clear. Cardiovascular:      Rate and Rhythm: Normal rate and regular rhythm. Heart sounds: Normal heart sounds. Pulmonary:      Effort: Pulmonary effort is normal.      Breath sounds: Normal breath sounds. Abdominal:      Tenderness: There is no abdominal tenderness. There is no guarding or rebound. Musculoskeletal: Normal range of motion. Right lower leg: Edema present. Left lower leg: Edema present. Comments: Trace symmetric pitting lower extremity edema, no erythema or warmth. Skin:     General: Skin is warm and dry. Capillary Refill: Capillary refill takes less than 2 seconds. Neurological:      General: No focal deficit present. Mental Status: She is alert and oriented to person, place, and time. Psychiatric:         Mood and Affect: Mood normal.          MDM  Number of Diagnoses or Management Options  General weakness:   Lower extremity edema:   Diagnosis management comments: Patient with generalized weakness without focal infectious symptoms, electrolytes are essentially at baseline. Tolerating p.o. It is possible that she has a urinary tract infection although her urine was contaminated to attempts, will send for culture, contact the patient in case the culture grows back a single isolate. Vital signs remained stable. Her daughter is amenable to outpatient follow-up with primary care physician return if she has any worsening symptoms. ED Course as of Jun 09 1915   Tue Jun 09, 2020   1548 EKG: 3:46 PMNormal sinus rhythm, ventricular rate 71, normal axis, no ST elevation or depression. Inferior Q waves and lead III.   No evidence of acute ischemia or abnormality.     [NS]   4077 Epithelial cells(!): MANY [NS]      ED Course User Index  [NS] Lara Kenny MD       Procedures

## 2020-06-09 NOTE — ED NOTES
Patient unable to provide 2nd urine sample at this time. Provided patient with water. Call bell within reach. Will continue to monitor.

## 2020-06-09 NOTE — ED TRIAGE NOTES
Triage Note: Patient arrives with her daughter for generally not feeling well for several days. Patient complains of generalized weakness, nausea/vomiting, and diarrhea.

## 2020-06-09 NOTE — DISCHARGE INSTRUCTIONS
We will contact you in case the urine culture grows out a bacteria. Make sure you drink plenty of fluids, maintain an adequate diet and follow-up closely with your primary care physician.

## 2020-06-10 ENCOUNTER — PATIENT OUTREACH (OUTPATIENT)
Dept: INTERNAL MEDICINE CLINIC | Age: 80
End: 2020-06-10

## 2020-06-10 NOTE — PROGRESS NOTES
Education Provided due to At Risk Condition:  ED 6/9/2020:  Weakness/Diarrhea/Leg Swell-LE  Patient on Cov19 D/C FREDRICK Enrollment Report/fu Contact. Left message on voice mail with my contact information for return call. Need to assess for d/c needs post ED or Inpatient Admission. And/or FREDRICK enrollment/fu.

## 2020-06-10 NOTE — PROGRESS NOTES
Education Provided due to At Risk Condition:  ED 2020:  Weakness/Diarrhea/LE Edema  Patient contacted regarding recent discharge and COVID-19 risk. Discussed COVID-19 related testing which was not done at this time. Test results were not done. Patient informed of results, if available? no    Care Transition Nurse/ Ambulatory Care Manager contacted the family by telephone to perform post discharge assessment. Verified name and  with family as identifiers. Patient has following risk factors of: diabetes/HTN. CTN/ACM reviewed discharge instructions, medical action plan and red flags related to discharge diagnosis. Reviewed and educated them on any new and changed medications related to discharge diagnosis. Advised obtaining a 90-day supply of all daily and as-needed medications. Education provided regarding infection prevention, and signs and symptoms of COVID-19 and when to seek medical attention with family who verbalized understanding. Discussed exposure protocols and quarantine from [address] you at higher risk for severe illness  and given an opportunity for questions and concerns. The family agrees to contact the COVID-19 hotline [phone] or PCP office for questions related to their healthcare. CTN/ACM provided contact information for future reference. From CDC: Are you at higher risk for severe illness?  Wash your hands often.  Avoid close contact (6 feet, which is about two arm lengths) with people who are sick.  Put distance between yourself and other people if COVID-19 is spreading in your community.  Clean and disinfect frequently touched surfaces.  Avoid all cruise travel and non-essential air travel.  Call your healthcare professional if you have concerns about COVID-19 and your underlying condition or if you are sick.     For more information on steps you can take to protect yourself, see CDC's How to Protect Yourself      Patient/family/caregiver given information for [employer] and agrees to enroll no    Plan for follow-up call in 7-14 days based on severity of symptoms and risk factors.

## 2020-06-11 ENCOUNTER — PATIENT OUTREACH (OUTPATIENT)
Dept: INTERNAL MEDICINE CLINIC | Age: 80
End: 2020-06-11

## 2020-06-11 RX ORDER — CEPHALEXIN 500 MG/1
500 CAPSULE ORAL 2 TIMES DAILY
Qty: 14 CAP | Refills: 0 | Status: SHIPPED | OUTPATIENT
Start: 2020-06-11 | End: 2020-06-11 | Stop reason: SDUPTHER

## 2020-06-11 RX ORDER — CEPHALEXIN 500 MG/1
500 CAPSULE ORAL 2 TIMES DAILY
Qty: 14 CAP | Refills: 0 | Status: SHIPPED | OUTPATIENT
Start: 2020-06-11 | End: 2020-06-18

## 2020-06-11 NOTE — CALL BACK NOTE
I spoke to the patient's daughter about her urine culture results. + GNR. No speciation or sensitivities available. Prescription for Keflex sent electronically to pharmacy on file and confirmed with pt's daughter. Will need different Rx if sensitivities reveal resistance.

## 2020-06-11 NOTE — PROGRESS NOTES
Goals Addressed                 This Visit's Progress     Facilitate transitions of care (ie. palliative care, assisted living, nursing home, changes in living arrangements). On track     6/11/2020:  Call received from patient that she had not had a call regarding the Abx or any other medication for her mother as was told she would; states mother/patient is seeming to be worst; that there has not been a call back from the PCP office since yesterday;    ACM spoke spoke with PCP office staff Sandie William to f/u w/ patient for prevention of ED readmit if possible; requesting f/u ED FREDRICK visit to be schedule ASAP. ACM spoke with Felipe Hendrickson at [facility]-medication has been called in and is ready for pickup. ACM then spoke with caretaker daughter Morgan Roberts who agrees to have Abx medication picked up from Pharmacy. Also reports appt scheduled with PCP at 8:30AM tomorrow. Caretaker daughter agrees to assure patient is drinking water and eating to prevent dehydration & low BS occurrence respectively. EW    Caretaker daughter agrees to have patient at f/u visit.         Self-schedules and keeps appointments    On track     6/11/2020:  Shauna Kraft f/u appt scheduled w/ PCP tomorrow [email or web address]

## 2020-06-12 ENCOUNTER — OFFICE VISIT (OUTPATIENT)
Dept: INTERNAL MEDICINE CLINIC | Age: 80
End: 2020-06-12

## 2020-06-12 VITALS
WEIGHT: 221 LBS | RESPIRATION RATE: 12 BRPM | BODY MASS INDEX: 35.52 KG/M2 | SYSTOLIC BLOOD PRESSURE: 127 MMHG | HEART RATE: 58 BPM | OXYGEN SATURATION: 96 % | TEMPERATURE: 97.5 F | DIASTOLIC BLOOD PRESSURE: 58 MMHG | HEIGHT: 66 IN

## 2020-06-12 DIAGNOSIS — N18.30 CKD (CHRONIC KIDNEY DISEASE) STAGE 3, GFR 30-59 ML/MIN (HCC): ICD-10-CM

## 2020-06-12 DIAGNOSIS — I10 ESSENTIAL HYPERTENSION: ICD-10-CM

## 2020-06-12 DIAGNOSIS — E08.3599 PROLIFERATIVE DIABETIC RETINOPATHY WITHOUT MACULAR EDEMA ASSOCIATED WITH DIABETES MELLITUS DUE TO UNDERLYING CONDITION, UNSPECIFIED LATERALITY (HCC): ICD-10-CM

## 2020-06-12 DIAGNOSIS — E03.9 ACQUIRED HYPOTHYROIDISM: ICD-10-CM

## 2020-06-12 DIAGNOSIS — N30.00 ACUTE CYSTITIS WITHOUT HEMATURIA: Primary | ICD-10-CM

## 2020-06-12 DIAGNOSIS — E78.2 MIXED HYPERLIPIDEMIA: ICD-10-CM

## 2020-06-12 DIAGNOSIS — E66.01 SEVERE OBESITY (HCC): ICD-10-CM

## 2020-06-12 DIAGNOSIS — I73.9 CLAUDICATION OF RIGHT LOWER EXTREMITY (HCC): ICD-10-CM

## 2020-06-12 DIAGNOSIS — I51.89 DIASTOLIC DYSFUNCTION: ICD-10-CM

## 2020-06-12 DIAGNOSIS — R60.0 LOCALIZED EDEMA: ICD-10-CM

## 2020-06-12 DIAGNOSIS — E55.9 VITAMIN D DEFICIENCY: ICD-10-CM

## 2020-06-12 DIAGNOSIS — M54.12 RIGHT CERVICAL RADICULOPATHY: ICD-10-CM

## 2020-06-12 DIAGNOSIS — E11.21 TYPE 2 DIABETES MELLITUS WITH NEPHROPATHY (HCC): ICD-10-CM

## 2020-06-12 DIAGNOSIS — I73.9 PVD (PERIPHERAL VASCULAR DISEASE) (HCC): ICD-10-CM

## 2020-06-12 DIAGNOSIS — M75.101 ROTATOR CUFF SYNDROME, RIGHT: ICD-10-CM

## 2020-06-12 DIAGNOSIS — G62.9 PERIPHERAL POLYNEUROPATHY: ICD-10-CM

## 2020-06-12 RX ORDER — GABAPENTIN 300 MG/1
300 CAPSULE ORAL
Qty: 90 CAP | Refills: 1 | Status: SHIPPED | OUTPATIENT
Start: 2020-06-12

## 2020-06-12 RX ORDER — FUROSEMIDE 20 MG/1
20 TABLET ORAL
Qty: 30 TAB | Refills: 2 | Status: SHIPPED | OUTPATIENT
Start: 2020-06-12 | End: 2020-09-02

## 2020-06-12 RX ORDER — LEVOTHYROXINE SODIUM 75 UG/1
75 TABLET ORAL
Qty: 90 TAB | Refills: 1 | Status: SHIPPED | OUTPATIENT
Start: 2020-06-12 | End: 2020-07-01

## 2020-06-12 NOTE — PROGRESS NOTES
Rm#[id number]  Presents w/ son   Pt c/o bialteral leg and arm pain   Called daughter-Rena to go over medication list.  Notes hasnt starting taking keflex yet because pts daughter worked late and pharm. Was closed. Notes leg swelling, requesting fluid pills. Chief Complaint   Patient presents with   [facility] Follow Up     6-9-20, [facility], UTI.  has leg swelling      1. Have you been to the ER, urgent care clinic since your last visit? Hospitalized since your last visit? Yes [facility], 6-9-20, uti    2. Have you seen or consulted any other health care providers outside of the [facility] since your last visit? Include any pap smears or colon screening. Yes 5-2020, cardio f/u      Health Maintenance Due   Topic Date Due    Shingrix Vaccine Age 49> (1 of 2) 03/02/1989    Eye Exam Retinal or Dilated  03/03/2019    Medicare Yearly Exam  05/12/2019    Lipid Screen  03/25/2020     3 most recent PHQ Screens 3/31/2020   Little interest or pleasure in doing things Not at all   Feeling down, depressed, irritable, or hopeless More than half the days   Total Score PHQ 2 2   Trouble falling or staying asleep, or sleeping too much -   Feeling tired or having little energy -   Poor appetite, weight loss, or overeating -   Feeling bad about yourself - or that you are a failure or have let yourself or your family down -   Trouble concentrating on things such as school, work, reading, or watching TV -   Moving or speaking so slowly that other people could have noticed; or the opposite being so fidgety that others notice -   Thoughts of being better off dead, or hurting yourself in some way -   PHQ 9 Score -   How difficult have these problems made it for you to do your work, take care of your home and get along with others -     Fall Risk Assessment, last 12 mths 6/12/2020   Able to walk? Yes   Fall in past 12 months?  No   Fall with injury? -   Number of falls in past 12 months -   Fall Risk Score -     Recent Travel Screening and Travel History documentation     Travel Screening       Question Response     In the last month, have you been in contact with someone who was confirmed or suspected to have Coronavirus / COVID-19? No / Unsure     Do you have any of the following symptoms? None of these     Have you traveled internationally in the last month?  No      Travel History   Travel since 05/12/20     No documented travel since 05/12/20

## 2020-06-12 NOTE — PROGRESS NOTES
HPI:  Presents for f/u ER visit and other    Son presents with pt today, though daughter is primary care giver    C/o right arm and leg pain  Off and on x a few months  No injury reported  Right deltoid area     Right arm and hand pain as well with certain activities    Right leg pain - pt correlates it to high BG  Foot with burning sensation  More vague right leg pain. Leg swelling - has been out of her diuretic x a couple of months  Saw Cards 3/27/20 - reviewed notes  They are aware of R>L LE edema and felt it to be related to vein harvest on right    Has not taken abx yet for UTI    Past medical, Social, and Family history reviewed    Prior to Admission medications    Medication Sig Start Date End Date Taking? Authorizing Provider   cephALEXin (Keflex) 500 mg capsule Take 1 Cap by mouth two (2) times a day for 7 days. 6/11/20 6/18/20 Yes Criss Ravi MD   Janumet 50-1,000 mg per tablet TAKE 1 TABLET BY MOUTH ONCE DAILY 5/31/20   Javier Lynch MD   timolol (TIMOPTIC) 0.5 % ophthalmic solution PLACE 1 DROP INTO LEFT EYE TWICE A DAY AS DIRECTED 2/4/20   Provider, Historical   amiodarone (CORDARONE) 200 mg tablet Take  by mouth. Provider, Historical   Insulin Needles, Disposable, (BD Ultra-Fine Short Pen Needle) 31 gauge x 5/16\" ndle USE TO TEST BLOOD SUGAR 3 TIMES A DAY AS DIRECTED 3/26/20   Javier Lynch MD   rosuvastatin (CRESTOR) 40 mg tablet TAKE 1 TAB BY MOUTH DAILY 2/14/20   Marisela Childers NP   levothyroxine (SYNTHROID) 50 mcg tablet TAKE 1 TABLET BY MOUTH EVERY DAY BEFORE BREAKFAST 2/4/20   Calos FINK NP   LANDANAEUS SOLOSTAR U-100 INSULIN 100 unit/mL (3 mL) inpn INJECT 48 UNITS SUBCUTANEOUSLY AT BEDTIME 11/20/19   Javier Lynch MD   ergocalciferol (ERGOCALCIFEROL) 50,000 unit capsule Take 1 Cap by mouth every thirty (30) days. 4/27/19   Javier Lynhc MD   OMEPRAZOLE PO Take  by mouth. Provider, Historical   losartan (COZAAR) 25 mg tablet Take  by mouth daily.     Provider, Historical   polyethylene glycol (MIRALAX) 17 gram/dose powder USE 1 TABLESPOONFUL AS DIRECTED BY MOUTH EVERY DAY 7/25/18   Jelani Cantor MD   insulin lispro (HUMALOG) 100 unit/mL kwikpen 5 Units by SubCUTAneous route Before breakfast, lunch, and dinner. 5/11/18   Jelani Cantor MD   amLODIPine (NORVASC) 5 mg tablet Take 5 mg by mouth daily. Provider, Historical   metoprolol tartrate (LOPRESSOR) 25 mg tablet Take 75 mg by mouth two (2) times a day. Provider, Historical   acetaminophen (TYLENOL EXTRA STRENGTH) 500 mg tablet Take 500 mg by mouth every six (6) hours as needed for Pain. Provider, Historical   COMBIGAN 0.2-0.5 % drop ophthalmic solution INSTILL 1 DROP IN RIGHT EYE EVERY MORNING AND NIGHT. 3/16/17   Provider, Historical   aspirin delayed-release 81 mg tablet Take 81 mg by mouth daily. Other, MD Gurpreet          ROS  Complete ROS reviewed and negative or stable except as noted in HPI. Physical Exam  Vitals signs and nursing note reviewed. Constitutional:       General: She is not in acute distress. HENT:      Head: Normocephalic and atraumatic. Eyes:      General: No scleral icterus. Pupils: Pupils are equal, round, and reactive to light. Neck:      Musculoskeletal: Normal range of motion and neck supple. Vascular: No JVD. Cardiovascular:      Rate and Rhythm: Normal rate and regular rhythm. Heart sounds: Normal heart sounds. No murmur. No friction rub. No gallop. Pulmonary:      Effort: Pulmonary effort is normal. No respiratory distress. Breath sounds: Normal breath sounds. No wheezing or rales. Abdominal:      General: There is no distension. Palpations: Abdomen is soft. Tenderness: There is no abdominal tenderness. Musculoskeletal: Normal range of motion. General: No tenderness, deformity or signs of injury. Right lower leg: Edema (1+) present. Left lower leg: Edema (trace) present.       Comments: Right supraspinatus pain with resistance, but no impingement. Lymphadenopathy:      Cervical: No cervical adenopathy. Skin:     General: Skin is warm. Findings: No rash. Neurological:      Mental Status: She is alert and oriented to person, place, and time. Motor: No abnormal muscle tone. Prior labs reviewed. Reviewed prior imaging reports - [facility] - Right sided PAD  Reviewed ER eval and notes, labs      Assessment/Plan:  LE edema combination of norvasc, hypothyroidism, and post surgical venous insuff  Right rotator cuff possibly with concurrent radiculopathy    ICD-10-CM ICD-9-CM    1. Acute cystitis without hematuria N30.00 595.0    2. Severe obesity ([facility] E66.01 278.01    3. Diastolic dysfunction C90.14 429.9    4. Essential hypertension I10 401.9 CBC WITH AUTOMATED DIFF      METABOLIC PANEL, COMPREHENSIVE   5. Mixed hyperlipidemia E78.2 272.2 CK      LIPID PANEL      METABOLIC PANEL, COMPREHENSIVE   6. Type 2 diabetes mellitus with nephropathy ([facility]) E11.21 250.40 HEMOGLOBIN A1C WITH EAG     583.81    7. Vitamin D deficiency E55.9 268.9 VITAMIN D, 25 HYDROXY   8. CKD (chronic kidney disease) stage 3, GFR 30-59 ml/min ([facility]) N18.3 585.3    9. Acquired hypothyroidism E03.9 244.9 levothyroxine (SYNTHROID) 75 mcg tablet      T4, FREE      TSH 3RD GENERATION   10. PVD (peripheral vascular disease) ([facility]) I73.9 443.9 REFERRAL TO VASCULAR SURGERY   11. Proliferative diabetic retinopathy without macular edema associated with diabetes mellitus due to underlying condition, unspecified laterality ([facility] Y22.3838 249.50      362.02    12. Localized edema R60.0 782.3 furosemide (LASIX) 20 mg tablet   13. Claudication of right lower extremity ([facility]) I73.9 443.9 REFERRAL TO VASCULAR SURGERY   14. Rotator cuff syndrome, right M75.101 726.10 XR SPINE CERV 4 OR 5 V      XR SHOULDER RT AP/LAT MIN 2 V   15. Right cervical radiculopathy M54.12 723.4 XR SPINE CERV 4 OR 5 V   16.  Peripheral polyneuropathy G62.9 356.9 gabapentin (NEURONTIN) 300 mg capsule     Follow-up and Dispositions    · Return in about 1 month (around 7/12/2020), or if symptoms worsen or fail to improve, for Medicare Wellness Visit, blood pressure, diabetes, thyroid, cholesterol.         results and schedule of future studies reviewed with patient, son  reviewed diet and weight    cardiovascular risk and specific lipid/LDL goals reviewed  reviewed medications and side effects in detail   Intermittent prn lasix   Increase thyroid to 75 mcg daily  Labs in 4-6 weeks prior to f/u visit  Ref to vascular re: PAD and claudication on R  Shoulder Xrays  C spine Xrays  [facility] PT  Trial of gabapentin  abx for UTI as rx'd by ER

## 2020-06-13 LAB
BACTERIA SPEC CULT: ABNORMAL
CC UR VC: ABNORMAL
SERVICE CMNT-IMP: ABNORMAL

## 2020-06-14 NOTE — PROGRESS NOTES
Please notify pt of results    Urine culture results show that the antibiotic Rx'd by the ER will treat her UTI  Continue current medication.

## 2020-06-23 ENCOUNTER — DOCUMENTATION ONLY (OUTPATIENT)
Dept: INTERNAL MEDICINE CLINIC | Age: 80
End: 2020-06-23

## 2020-06-30 ENCOUNTER — HOSPITAL ENCOUNTER (OUTPATIENT)
Dept: LAB | Age: 80
Discharge: HOME | End: 2020-06-30
Payer: MEDICARE

## 2020-06-30 PROCEDURE — 80061 LIPID PANEL: CPT

## 2020-06-30 PROCEDURE — 82306 VITAMIN D 25 HYDROXY: CPT

## 2020-06-30 PROCEDURE — 85025 COMPLETE CBC W/AUTO DIFF WBC: CPT

## 2020-06-30 PROCEDURE — 80053 COMPREHEN METABOLIC PANEL: CPT

## 2020-06-30 PROCEDURE — 83036 HEMOGLOBIN GLYCOSYLATED A1C: CPT

## 2020-06-30 PROCEDURE — 84439 ASSAY OF FREE THYROXINE: CPT

## 2020-06-30 PROCEDURE — 82550 ASSAY OF CK (CPK): CPT

## 2020-06-30 PROCEDURE — 84443 ASSAY THYROID STIM HORMONE: CPT

## 2020-06-30 PROCEDURE — 36415 COLL VENOUS BLD VENIPUNCTURE: CPT

## 2020-07-01 ENCOUNTER — HOSPITAL ENCOUNTER (OUTPATIENT)
Dept: LAB | Age: 80
Discharge: HOME | End: 2020-07-01
Payer: MEDICARE

## 2020-07-01 ENCOUNTER — OFFICE VISIT (OUTPATIENT)
Dept: INTERNAL MEDICINE CLINIC | Age: 80
End: 2020-07-01

## 2020-07-01 VITALS
HEART RATE: 66 BPM | RESPIRATION RATE: 16 BRPM | OXYGEN SATURATION: 97 % | SYSTOLIC BLOOD PRESSURE: 136 MMHG | TEMPERATURE: 97.7 F | BODY MASS INDEX: 35.84 KG/M2 | HEIGHT: 66 IN | WEIGHT: 223 LBS | DIASTOLIC BLOOD PRESSURE: 71 MMHG

## 2020-07-01 DIAGNOSIS — Z95.1 S/P CABG X 4: ICD-10-CM

## 2020-07-01 DIAGNOSIS — N18.30 CKD (CHRONIC KIDNEY DISEASE) STAGE 3, GFR 30-59 ML/MIN (HCC): ICD-10-CM

## 2020-07-01 DIAGNOSIS — E78.2 MIXED HYPERLIPIDEMIA: ICD-10-CM

## 2020-07-01 DIAGNOSIS — G47.9 SLEEP DISTURBANCE: ICD-10-CM

## 2020-07-01 DIAGNOSIS — E03.9 ACQUIRED HYPOTHYROIDISM: ICD-10-CM

## 2020-07-01 DIAGNOSIS — I10 ESSENTIAL HYPERTENSION: ICD-10-CM

## 2020-07-01 DIAGNOSIS — N39.0 URINARY TRACT INFECTION WITHOUT HEMATURIA, SITE UNSPECIFIED: ICD-10-CM

## 2020-07-01 DIAGNOSIS — E11.21 TYPE 2 DIABETES MELLITUS WITH NEPHROPATHY (HCC): ICD-10-CM

## 2020-07-01 DIAGNOSIS — R10.30 LOWER ABDOMINAL PAIN: Primary | ICD-10-CM

## 2020-07-01 DIAGNOSIS — K59.00 CONSTIPATION, UNSPECIFIED CONSTIPATION TYPE: ICD-10-CM

## 2020-07-01 LAB
25(OH)D3+25(OH)D2 SERPL-MCNC: 31 NG/ML (ref 30–100)
ALBUMIN SERPL-MCNC: 4.2 G/DL (ref 3.6–4.6)
ALBUMIN/GLOB SERPL: 2.1 {RATIO} (ref 1.2–2.2)
ALP SERPL-CCNC: 58 IU/L (ref 39–117)
ALT SERPL-CCNC: 15 IU/L (ref 0–32)
AST SERPL-CCNC: 13 IU/L (ref 0–40)
BASOPHILS # BLD AUTO: 0 X10E3/UL (ref 0–0.2)
BASOPHILS NFR BLD AUTO: 1 %
BILIRUB SERPL-MCNC: 0.3 MG/DL (ref 0–1.2)
BILIRUB UR QL STRIP: NEGATIVE
BUN SERPL-MCNC: 30 MG/DL (ref 8–27)
BUN/CREAT SERPL: 22 (ref 12–28)
CALCIUM SERPL-MCNC: 9.5 MG/DL (ref 8.7–10.3)
CHLORIDE SERPL-SCNC: 107 MMOL/L (ref 96–106)
CHOLEST SERPL-MCNC: 175 MG/DL (ref 100–199)
CK SERPL-CCNC: 36 U/L (ref 26–161)
CO2 SERPL-SCNC: 24 MMOL/L (ref 20–29)
CREAT SERPL-MCNC: 1.34 MG/DL (ref 0.57–1)
EOSINOPHIL # BLD AUTO: 0.3 X10E3/UL (ref 0–0.4)
EOSINOPHIL NFR BLD AUTO: 4 %
ERYTHROCYTE [DISTWIDTH] IN BLOOD BY AUTOMATED COUNT: 13.8 % (ref 11.7–15.4)
EST. AVERAGE GLUCOSE BLD GHB EST-MCNC: 160 MG/DL
GLOBULIN SER CALC-MCNC: 2 G/DL (ref 1.5–4.5)
GLUCOSE SERPL-MCNC: 111 MG/DL (ref 65–99)
GLUCOSE UR-MCNC: NEGATIVE MG/DL
HBA1C MFR BLD: 7.2 % (ref 4.8–5.6)
HCT VFR BLD AUTO: 35.4 % (ref 34–46.6)
HDLC SERPL-MCNC: 54 MG/DL
HGB BLD-MCNC: 11.5 G/DL (ref 11.1–15.9)
IMM GRANULOCYTES # BLD AUTO: 0 X10E3/UL (ref 0–0.1)
IMM GRANULOCYTES NFR BLD AUTO: 0 %
KETONES P FAST UR STRIP-MCNC: NEGATIVE MG/DL
LDLC SERPL CALC-MCNC: 93 MG/DL (ref 0–99)
LYMPHOCYTES # BLD AUTO: 3.6 X10E3/UL (ref 0.7–3.1)
LYMPHOCYTES NFR BLD AUTO: 52 %
MCH RBC QN AUTO: 31.2 PG (ref 26.6–33)
MCHC RBC AUTO-ENTMCNC: 32.5 G/DL (ref 31.5–35.7)
MCV RBC AUTO: 96 FL (ref 79–97)
MONOCYTES # BLD AUTO: 0.5 X10E3/UL (ref 0.1–0.9)
MONOCYTES NFR BLD AUTO: 8 %
NEUTROPHILS # BLD AUTO: 2.3 X10E3/UL (ref 1.4–7)
NEUTROPHILS NFR BLD AUTO: 35 %
PH UR STRIP: 5.5 [PH] (ref 4.6–8)
PLATELET # BLD AUTO: 154 X10E3/UL (ref 150–450)
POTASSIUM SERPL-SCNC: 4.8 MMOL/L (ref 3.5–5.2)
PROT SERPL-MCNC: 6.2 G/DL (ref 6–8.5)
PROT UR QL STRIP: NEGATIVE
RBC # BLD AUTO: 3.69 X10E6/UL (ref 3.77–5.28)
SODIUM SERPL-SCNC: 141 MMOL/L (ref 134–144)
SP GR UR STRIP: 1 (ref 1–1.03)
T4 FREE SERPL-MCNC: 1.15 NG/DL (ref 0.82–1.77)
TRIGL SERPL-MCNC: 138 MG/DL (ref 0–149)
TSH SERPL DL<=0.005 MIU/L-ACNC: 6.69 UIU/ML (ref 0.45–4.5)
UA UROBILINOGEN AMB POC: ABNORMAL (ref 0.2–1)
URINALYSIS CLARITY POC: CLEAR
URINALYSIS COLOR POC: YELLOW
URINE BLOOD POC: NEGATIVE
URINE LEUKOCYTES POC: ABNORMAL
URINE NITRITES POC: NEGATIVE
VLDLC SERPL CALC-MCNC: 28 MG/DL (ref 5–40)
WBC # BLD AUTO: 6.7 X10E3/UL (ref 3.4–10.8)

## 2020-07-01 PROCEDURE — 87086 URINE CULTURE/COLONY COUNT: CPT

## 2020-07-01 PROCEDURE — 81001 URINALYSIS AUTO W/SCOPE: CPT

## 2020-07-01 PROCEDURE — 87186 SC STD MICRODIL/AGAR DIL: CPT

## 2020-07-01 PROCEDURE — 87088 URINE BACTERIA CULTURE: CPT

## 2020-07-01 RX ORDER — INSULIN LISPRO 100 [IU]/ML
5 INJECTION, SOLUTION INTRAVENOUS; SUBCUTANEOUS
Qty: 2 ADJUSTABLE DOSE PRE-FILLED PEN SYRINGE | Refills: 2 | Status: SHIPPED | OUTPATIENT
Start: 2020-07-01 | End: 2021-04-23

## 2020-07-01 RX ORDER — POLYETHYLENE GLYCOL 3350 17 G/17G
17 POWDER, FOR SOLUTION ORAL
Qty: 595 G | Refills: 5 | Status: SHIPPED | OUTPATIENT
Start: 2020-07-01 | End: 2022-05-20

## 2020-07-01 RX ORDER — LEVOTHYROXINE SODIUM 88 UG/1
88 TABLET ORAL
Qty: 90 TAB | Refills: 1 | Status: SHIPPED | OUTPATIENT
Start: 2020-07-01 | End: 2021-02-06

## 2020-07-01 RX ORDER — MELATONIN 5 MG
5 CAPSULE ORAL
Qty: 30 CAP | Refills: 5 | Status: SHIPPED | OUTPATIENT
Start: 2020-07-01 | End: 2022-05-12 | Stop reason: ALTCHOICE

## 2020-07-01 RX ORDER — ZINC SULFATE 66 MG
TABLET ORAL
COMMUNITY

## 2020-07-01 RX ORDER — DICYCLOMINE HYDROCHLORIDE 10 MG/1
10 CAPSULE ORAL
Qty: 30 CAP | Refills: 1 | Status: SHIPPED | OUTPATIENT
Start: 2020-07-01

## 2020-07-01 RX ORDER — MULTIVITAMIN/IRON/FOLIC ACID 18MG-0.4MG
1 TABLET ORAL DAILY
COMMUNITY

## 2020-07-01 NOTE — PROGRESS NOTES
HPI:  Presents for f/u abd pain    Daughter provides hx    Lower abd pain - not fully resolved    Completed abx for UTI    Reports hx constipation but recently having regular BMs    +fatigue    Pt will eat increased sugars due to stress    Sleep disturbance reported. Past medical, Social, and Family history reviewed    Prior to Admission medications    Medication Sig Start Date End Date Taking? Authorizing Provider   zinc sulfate (Zinc-15) 66 mg tab Take  by mouth. Yes Provider, Historical   multivitamin-iron-FA, hematinic, (Centrum)  mg-mcg tab tablet Take 1 Tab by mouth daily. Yes Provider, Historical   Omega-3 Fatty Acids 60- mg cpDR Take  by mouth daily. Yes Provider, Historical   levothyroxine (SYNTHROID) 75 mcg tablet Take 1 Tab by mouth Daily (before breakfast). 6/12/20  Yes Radha Felder MD   gabapentin (NEURONTIN) 300 mg capsule Take 1 Cap by mouth nightly. Max Daily Amount: 300 mg. Patient taking differently: Take 300 mg by mouth nightly as needed. 6/12/20  Yes Radha Felder MD   Janumet 50-1,000 mg per tablet TAKE 1 TABLET BY MOUTH ONCE DAILY 5/31/20  Yes Radha Felder MD   timolol (TIMOPTIC) 0.5 % ophthalmic solution PLACE 1 DROP INTO LEFT EYE TWICE A DAY AS DIRECTED 2/4/20  Yes Provider, Historical   Insulin Needles, Disposable, (BD Ultra-Fine Short Pen Needle) 31 gauge x 5/16\" ndle USE TO TEST BLOOD SUGAR 3 TIMES A DAY AS DIRECTED 3/26/20  Yes Radha Felder MD   rosuvastatin (CRESTOR) 40 mg tablet TAKE 1 TAB BY MOUTH DAILY 2/14/20  Yes Nahomi Ordaz T, FRIEDA VICKERSAR U-100 INSULIN 100 unit/mL (3 mL) inpn INJECT 48 UNITS SUBCUTANEOUSLY AT BEDTIME 11/20/19  Yes Radha Felder MD   ergocalciferol (ERGOCALCIFEROL) 50,000 unit capsule Take 1 Cap by mouth every thirty (30) days. 4/27/19  Yes Radha Felder MD   OMEPRAZOLE PO Take  by mouth. Yes Provider, Historical   losartan (COZAAR) 25 mg tablet Take  by mouth daily.    Yes Provider, Historical polyethylene glycol (MIRALAX) 17 gram/dose powder USE 1 TABLESPOONFUL AS DIRECTED BY MOUTH EVERY DAY  Patient taking differently: daily as needed. 7/25/18  Yes Agrentina Chavarria MD   insulin lispro (HUMALOG) 100 unit/mL kwikpen 5 Units by SubCUTAneous route Before breakfast, lunch, and dinner. 5/11/18  Yes Argentina Chavarria MD   amLODIPine (NORVASC) 5 mg tablet Take 5 mg by mouth daily. Yes Provider, Historical   metoprolol tartrate (LOPRESSOR) 25 mg tablet Take 75 mg by mouth two (2) times a day. Yes Provider, Historical   acetaminophen (TYLENOL EXTRA STRENGTH) 500 mg tablet Take 500 mg by mouth every six (6) hours as needed for Pain. Yes Provider, Historical   COMBIGAN 0.2-0.5 % drop ophthalmic solution INSTILL 1 DROP IN RIGHT EYE EVERY MORNING AND NIGHT. 3/16/17  Yes Provider, Historical   aspirin delayed-release 81 mg tablet Take 81 mg by mouth daily. Yes Other, MD Gurpreet   furosemide (LASIX) 20 mg tablet Take 1 Tab by mouth every three (3) days. Prn leg swelling 6/12/20   Argentina Chavarria MD   amiodarone (CORDARONE) 200 mg tablet Take  by mouth. Provider, Historical          ROS  Complete ROS reviewed and negative or stable except as noted in HPI. Physical Exam  Vitals signs and nursing note reviewed. Constitutional:       General: She is not in acute distress. HENT:      Head: Normocephalic and atraumatic. Eyes:      General: No scleral icterus. Pupils: Pupils are equal, round, and reactive to light. Neck:      Musculoskeletal: Normal range of motion and neck supple. Vascular: No JVD. Cardiovascular:      Rate and Rhythm: Normal rate and regular rhythm. Heart sounds: Normal heart sounds. No murmur. No friction rub. No gallop. Pulmonary:      Effort: Pulmonary effort is normal. No respiratory distress. Breath sounds: Normal breath sounds. No wheezing or rales. Abdominal:      General: Bowel sounds are normal. There is no distension.       Palpations: Abdomen is soft. There is no mass. Tenderness: There is abdominal tenderness (diffuse - described as pressure, not sharp). There is no guarding or rebound. Hernia: No hernia is present. Musculoskeletal: Normal range of motion. Lymphadenopathy:      Cervical: No cervical adenopathy. Skin:     General: Skin is warm. Findings: No rash. Neurological:      Mental Status: She is alert and oriented to person, place, and time. Motor: No abnormal muscle tone. Prior labs reviewed. Reviewed prior imaging reports        Assessment/Plan:    ICD-10-CM ICD-9-CM    1. Lower abdominal pain R10.30 789.09 AMB POC URINALYSIS DIP STICK AUTO W/O MICRO      XR ABD (KUB)      dicyclomine (BENTYL) 10 mg capsule   2. Essential hypertension I10 401.9    3. Mixed hyperlipidemia E78.2 272.2    4. Type 2 diabetes mellitus with nephropathy (HCC) E11.21 250.40 insulin lispro (HUMALOG) 100 unit/mL kwikpen     583.81    5. CKD (chronic kidney disease) stage 3, GFR 30-59 ml/min ([facility]) N18.3 585.3    6. Acquired hypothyroidism E03.9 244.9 levothyroxine (SYNTHROID) 88 mcg tablet   7. Urinary tract infection without hematuria, site unspecified N39.0 599.0 CULTURE, URINE      URINALYSIS W/ RFLX MICROSCOPIC   8. Constipation, unspecified constipation type K59.00 564.00 XR ABD (KUB)      polyethylene glycol (MIRALAX) 17 gram/dose powder   9. S/P CABG x 4 Z95.1 V45.81 insulin lispro (HUMALOG) 100 unit/mL kwikpen   10.  Sleep disturbance G47.9 780.50 melatonin 5 mg cap capsule     Follow-up and Dispositions    · Return in about 30 days (around 7/31/2020), or if symptoms worsen or fail to improve, for Medicare Wellness Visit as scheduled - IO.        results and schedule of future studies reviewed with patient, daughter  reviewed diet, exercise and weight   cardiovascular risk and specific lipid/LDL goals reviewed  reviewed medications and side effects in detail     KUB  Bowel regimen as indicated  Limit sugars  Increase thyroid dose to 88 mcg daily  Melatonin trial  Continue other current medications

## 2020-07-01 NOTE — PROGRESS NOTES
Rm#[id number]  Presents w/ daughter  Only to discuss acute problems today. Will return for medicare wellness       Chief Complaint   Patient presents with    Abdominal Pain     lower abdomen pain. no urinary symptoms. having normal BM's. once every other day     Results     labs done 6-20-20     1. Have you been to the ER, urgent care clinic since your last visit? Hospitalized since your last visit? No    2. Have you seen or consulted any other health care providers outside of the [address] [place] [address] since your last visit? Include any pap smears or colon screening. Yes, 6-2020, eye spec. Dr. Singh Lose Maintenance Due   Topic Date Due    Shingrix Vaccine Age 50> (1 of 2) [birth year]/02/1989   [facility] Eye Exam Retinal or Dilated  03/03/2019    Medicare Yearly Exam  05/12/2019    Lipid Screen  03/25/2020     3 most recent PHQ Screens 3/31/2020   Little interest or pleasure in doing things Not at all   Feeling down, depressed, irritable, or hopeless More than half the days   Total Score PHQ 2 2   Trouble falling or staying asleep, or sleeping too much -   Feeling tired or having little energy -   Poor appetite, weight loss, or overeating -   Feeling bad about yourself - or that you are a failure or have let yourself or your family down -   Trouble concentrating on things such as school, work, reading, or watching TV -   Moving or speaking so slowly that other people could have noticed; or the opposite being so fidgety that others notice -   Thoughts of being better off dead, or hurting yourself in some way -   PHQ 9 Score -   How difficult have these problems made it for you to do your work, take care of your home and get along with others -     Recent Travel Screening and Travel History documentation     Travel Screening       Question Response     In the last month, have you been in contact with someone who was confirmed or suspected to have Coronavirus / COVID-19?  No / Unsure     Do you have any of the following symptoms? None of these     Have you traveled internationally in the last month?  No      Travel History   Travel since 06/01/20     No documented travel since 06/01/20

## 2020-07-03 LAB
APPEARANCE UR: CLEAR
BACTERIA #/AREA URNS HPF: ABNORMAL /[HPF]
BACTERIA UR CULT: ABNORMAL
BILIRUB UR QL STRIP: NEGATIVE
CASTS URNS QL MICRO: ABNORMAL /LPF
COLOR UR: YELLOW
EPI CELLS #/AREA URNS HPF: ABNORMAL /HPF (ref 0–10)
GLUCOSE UR QL: NEGATIVE
HGB UR QL STRIP: NEGATIVE
KETONES UR QL STRIP: NEGATIVE
LEUKOCYTE ESTERASE UR QL STRIP: ABNORMAL
MICRO URNS: ABNORMAL
MUCOUS THREADS URNS QL MICRO: PRESENT
NITRITE UR QL STRIP: NEGATIVE
PH UR STRIP: 5.5 [PH] (ref 5–7.5)
PROT UR QL STRIP: NEGATIVE
RBC #/AREA URNS HPF: ABNORMAL /HPF (ref 0–2)
SP GR UR: 1.01 (ref 1–1.03)
UROBILINOGEN UR STRIP-MCNC: 0.2 MG/DL (ref 0.2–1)
WBC #/AREA URNS HPF: ABNORMAL /HPF (ref 0–5)

## 2020-07-06 DIAGNOSIS — N39.0 URINARY TRACT INFECTION WITHOUT HEMATURIA, SITE UNSPECIFIED: ICD-10-CM

## 2020-07-06 RX ORDER — SULFAMETHOXAZOLE AND TRIMETHOPRIM 800; 160 MG/1; MG/1
1 TABLET ORAL 2 TIMES DAILY
Qty: 14 TAB | Refills: 0 | Status: SHIPPED | OUTPATIENT
Start: 2020-07-06 | End: 2020-07-13

## 2020-07-07 NOTE — PROGRESS NOTES
Please notify pt's daughter of results    Repeat urine culture still shows infection. Let's treat with Bactrim bid x 7 days to see if we can fully treat the UTI.

## 2020-07-08 ENCOUNTER — HOSPITAL ENCOUNTER (OUTPATIENT)
Dept: GENERAL RADIOLOGY | Age: 80
Discharge: HOME | End: 2020-07-08
Attending: INTERNAL MEDICINE
Payer: MEDICARE

## 2020-07-08 DIAGNOSIS — R10.30 LOWER ABDOMINAL PAIN: ICD-10-CM

## 2020-07-08 DIAGNOSIS — K59.00 CONSTIPATION, UNSPECIFIED CONSTIPATION TYPE: ICD-10-CM

## 2020-07-08 PROCEDURE — 74018 RADEX ABDOMEN 1 VIEW: CPT

## 2020-07-09 NOTE — PROGRESS NOTES
Please notify pt's daughter of results. Stomach Xray shows mild to moderate constipation. Pt should increase her miralax for a few days to mobilize the retained stool, then maintain daily miralax dosing to keep daily, soft bowel movements.

## 2020-07-13 ENCOUNTER — DOCUMENTATION ONLY (OUTPATIENT)
Dept: INTERNAL MEDICINE CLINIC | Age: 80
End: 2020-07-13

## 2020-07-13 NOTE — PROGRESS NOTES
Forms faxed to Severo Batty. Confirmation received. Document placed in bin for centralized scanning.     Fax # [phone]

## 2020-07-31 ENCOUNTER — OFFICE VISIT (OUTPATIENT)
Dept: INTERNAL MEDICINE CLINIC | Age: 80
End: 2020-07-31

## 2020-07-31 ENCOUNTER — HOSPITAL ENCOUNTER (OUTPATIENT)
Dept: LAB | Age: 80
Discharge: HOME | End: 2020-07-31
Payer: MEDICARE

## 2020-07-31 VITALS
HEIGHT: 66 IN | RESPIRATION RATE: 16 BRPM | SYSTOLIC BLOOD PRESSURE: 115 MMHG | HEART RATE: 63 BPM | BODY MASS INDEX: 36.16 KG/M2 | DIASTOLIC BLOOD PRESSURE: 69 MMHG | WEIGHT: 225 LBS | OXYGEN SATURATION: 98 % | TEMPERATURE: 97.7 F

## 2020-07-31 DIAGNOSIS — M85.88 OTHER SPECIFIED DISORDERS OF BONE DENSITY AND STRUCTURE, OTHER SITE: ICD-10-CM

## 2020-07-31 DIAGNOSIS — Z91.89 AT RISK FOR LOSS OF BONE DENSITY: ICD-10-CM

## 2020-07-31 DIAGNOSIS — E03.9 ACQUIRED HYPOTHYROIDISM: ICD-10-CM

## 2020-07-31 DIAGNOSIS — Z00.00 MEDICARE ANNUAL WELLNESS VISIT, SUBSEQUENT: Primary | ICD-10-CM

## 2020-07-31 DIAGNOSIS — E66.01 SEVERE OBESITY (HCC): ICD-10-CM

## 2020-07-31 DIAGNOSIS — E78.2 MIXED HYPERLIPIDEMIA: ICD-10-CM

## 2020-07-31 DIAGNOSIS — Z95.1 S/P CABG X 4: ICD-10-CM

## 2020-07-31 DIAGNOSIS — N18.30 CKD (CHRONIC KIDNEY DISEASE) STAGE 3, GFR 30-59 ML/MIN (HCC): ICD-10-CM

## 2020-07-31 DIAGNOSIS — R42 DIZZINESS: ICD-10-CM

## 2020-07-31 DIAGNOSIS — E11.21 TYPE 2 DIABETES MELLITUS WITH NEPHROPATHY (HCC): ICD-10-CM

## 2020-07-31 DIAGNOSIS — I10 ESSENTIAL HYPERTENSION: ICD-10-CM

## 2020-07-31 DIAGNOSIS — Z79.4 TYPE 2 DIABETES MELLITUS WITH COMPLICATION, WITH LONG-TERM CURRENT USE OF INSULIN (HCC): ICD-10-CM

## 2020-07-31 DIAGNOSIS — K59.00 CONSTIPATION, UNSPECIFIED CONSTIPATION TYPE: ICD-10-CM

## 2020-07-31 DIAGNOSIS — I51.89 DIASTOLIC DYSFUNCTION: ICD-10-CM

## 2020-07-31 DIAGNOSIS — E11.8 TYPE 2 DIABETES MELLITUS WITH COMPLICATION, WITH LONG-TERM CURRENT USE OF INSULIN (HCC): ICD-10-CM

## 2020-07-31 DIAGNOSIS — N39.0 URINARY TRACT INFECTION WITHOUT HEMATURIA, SITE UNSPECIFIED: ICD-10-CM

## 2020-07-31 DIAGNOSIS — Z12.31 ENCOUNTER FOR SCREENING MAMMOGRAM FOR BREAST CANCER: ICD-10-CM

## 2020-07-31 PROCEDURE — 84439 ASSAY OF FREE THYROXINE: CPT

## 2020-07-31 PROCEDURE — 85025 COMPLETE CBC W/AUTO DIFF WBC: CPT

## 2020-07-31 PROCEDURE — 84443 ASSAY THYROID STIM HORMONE: CPT

## 2020-07-31 PROCEDURE — 87086 URINE CULTURE/COLONY COUNT: CPT

## 2020-07-31 PROCEDURE — 81001 URINALYSIS AUTO W/SCOPE: CPT

## 2020-07-31 PROCEDURE — 80053 COMPREHEN METABOLIC PANEL: CPT

## 2020-07-31 NOTE — PATIENT INSTRUCTIONS
Medicare Wellness Visit, Female The best way to live healthy is to have a lifestyle where you eat a well-balanced diet, exercise regularly, limit alcohol use, and quit all forms of tobacco/nicotine, if applicable. Regular preventive services are another way to keep healthy. Preventive services (vaccines, screening tests, monitoring & exams) can help personalize your care plan, which helps you manage your own care. Screening tests can find health problems at the earliest stages, when they are easiest to treat. [facility] follows the current, evidence-based guidelines published by the [facility] Elian Gonzalez ([facility]) when recommending preventive services for our patients. Because we follow these guidelines, sometimes recommendations change over time as research supports it. (For example, mammograms used to be recommended annually. Even though Medicare will still pay for an annual mammogram, the newer guidelines recommend a mammogram every two years for women of average risk). Of course, you and your doctor may decide to screen more often for some diseases, based on your risk and your co-morbidities (chronic disease you are already diagnosed with). Preventive services for you include: - Medicare offers their members a free annual wellness visit, which is time for you and your primary care provider to discuss and plan for your preventive service needs. Take advantage of this benefit every year! 
-All adults over the age of 72 should receive the recommended pneumonia vaccines. Current USPSTF guidelines recommend a series of two vaccines for the best pneumonia protection.  
-All adults should have a flu vaccine yearly and a tetanus vaccine every 10 years.  
-All adults age 48 and older should receive the shingles vaccines (series of two vaccines). -All adults age 38-68 who are overweight should have a diabetes screening test once every three years. -All adults born between [birth year] and 1966 should be screened once for Hepatitis C. 
-Other screening tests and preventive services for persons with diabetes include: an eye exam to screen for diabetic retinopathy, a kidney function test, a foot exam, and stricter control over your cholesterol.  
-Cardiovascular screening for adults with routine risk involves an electrocardiogram (ECG) at intervals determined by your doctor.  
-Colorectal cancer screenings should be done for adults age 54-65 with no increased risk factors for colorectal cancer. There are a number of acceptable methods of screening for this type of cancer. Each test has its own benefits and drawbacks. Discuss with your doctor what is most appropriate for you during your annual wellness visit. The different tests include: colonoscopy (considered the best screening method), a fecal occult blood test, a fecal DNA test, and sigmoidoscopy. 
 
-A bone mass density test is recommended when a woman turns 65 to screen for osteoporosis. This test is only recommended one time, as a screening. Some providers will use this same test as a disease monitoring tool if you already have osteoporosis. -Breast cancer screenings are recommended every other year for women of normal risk, age 54-69. 
-Cervical cancer screenings for women over age 72 are only recommended with certain risk factors. Here is a list of your current Health Maintenance items (your personalized list of preventive services) with a due date: 
Health Maintenance Due Topic Date Due  Shingles Vaccine (1 of 2) 03/02/1989 [facility] Eye Exam  03/03/2019 [facility] Annual Well Visit  05/12/2019

## 2020-07-31 NOTE — PROGRESS NOTES
HPI:  Presents for f/u thyroid, HTN, etc    Accompanied by daughter who translates per pt preference    Fall 7/15/20   Felt dizzy, slid down beside the bed. PT coming biweekly  Slow improvement. Took abx for UTI    Fair glycemic control    No CP, SOB    Past medical, Social, and Family history reviewed    Prior to Admission medications    Medication Sig Start Date End Date Taking? Authorizing Provider   ergocalciferol (ERGOCALCIFEROL) 1,250 mcg (50,000 unit) capsule Take 1 Cap by mouth every thirty (30) days. Indications: vitamin D deficiency (high dose therapy) 7/10/20  Yes Mariia Lutz MD   zinc sulfate (Zinc-15) 66 mg tab Take  by mouth. Yes Provider, Historical   multivitamin-iron-FA, hematinic, (Centrum)  mg-mcg tab tablet Take 1 Tab by mouth daily. Yes Provider, Historical   levothyroxine (SYNTHROID) 88 mcg tablet Take 1 Tab by mouth Daily (before breakfast). 7/1/20  Yes Mariia Lutz MD   melatonin 5 mg cap capsule Take 1 Cap by mouth nightly. 7/1/20  Yes Mariia Lutz MD   insulin lispro (HUMALOG) 100 unit/mL kwikpen 5 Units by SubCUTAneous route Before breakfast, lunch, and dinner. 7/1/20  Yes Mariia Lutz MD   dicyclomine (BENTYL) 10 mg capsule Take 1 Cap by mouth three (3) times daily as needed for Abdominal Cramps. 7/1/20  Yes Mariia Lutz MD   polyethylene glycol [facility]) 17 gram/dose powder Take 17 g by mouth daily as needed for Constipation. 7/1/20  Yes Mariia Lutz MD   Omega-3 Fatty Acids 60- mg cpDR Take  by mouth daily. Yes Provider, Historical   furosemide (LASIX) 20 mg tablet Take 1 Tab by mouth every three (3) days.  Prn leg swelling 6/12/20  Yes Mariia Lutz MD   Janumet 50-1,000 mg per tablet TAKE 1 TABLET BY MOUTH ONCE DAILY 5/31/20  Yes Mariia Lutz MD   timolol (TIMOPTIC) 0.5 % ophthalmic solution PLACE 1 DROP INTO LEFT EYE TWICE A DAY AS DIRECTED 2/4/20  Yes Provider, Historical   amiodarone (CORDARONE) 200 mg tablet Take  by mouth. Yes Provider, Historical   Insulin Needles, Disposable, (BD Ultra-Fine Short Pen Needle) 31 gauge x 5/16\" ndle USE TO TEST BLOOD SUGAR 3 TIMES A DAY AS DIRECTED 3/26/20  Yes Argentina Chavarria MD   rosuvastatin (CRESTOR) 40 mg tablet TAKE 1 TAB BY MOUTH DAILY 2/14/20  Yes Kalin FINK, NP   LANTUS SOLOSTAR U-100 INSULIN 100 unit/mL (3 mL) inpn INJECT 48 UNITS SUBCUTANEOUSLY AT BEDTIME 11/20/19  Yes Argentina Chavarria MD   OMEPRAZOLE PO Take  by mouth. Yes Provider, Historical   losartan (COZAAR) 25 mg tablet Take  by mouth daily. Yes Provider, Historical   amLODIPine (NORVASC) 5 mg tablet Take 5 mg by mouth daily. Yes Provider, Historical   metoprolol tartrate (LOPRESSOR) 25 mg tablet Take 75 mg by mouth two (2) times a day. Yes Provider, Historical   acetaminophen (TYLENOL EXTRA STRENGTH) 500 mg tablet Take 500 mg by mouth every six (6) hours as needed for Pain. Yes Provider, Historical   COMBIGAN 0.2-0.5 % drop ophthalmic solution INSTILL 1 DROP IN RIGHT EYE EVERY MORNING AND NIGHT. 3/16/17  Yes Provider, Historical   aspirin delayed-release 81 mg tablet Take 81 mg by mouth daily. Yes Other, MD Gurpreet   gabapentin (NEURONTIN) 300 mg capsule Take 1 Cap by mouth nightly. Max Daily Amount: 300 mg. Patient taking differently: Take 300 mg by mouth nightly as needed. 6/12/20   Argentina Chavarria MD          ROS  Complete ROS reviewed and negative or stable except as noted in HPI. Physical Exam  Vitals signs and nursing note reviewed. Constitutional:       General: She is not in acute distress. HENT:      Head: Normocephalic and atraumatic. Eyes:      General: No scleral icterus. Pupils: Pupils are equal, round, and reactive to light. Neck:      Musculoskeletal: Normal range of motion and neck supple. Vascular: No JVD. Cardiovascular:      Rate and Rhythm: Normal rate and regular rhythm. Heart sounds: Normal heart sounds. No murmur. No friction rub. No gallop. Pulmonary:      Effort: Pulmonary effort is normal. No respiratory distress. Breath sounds: Normal breath sounds. No wheezing or rales. Abdominal:      General: There is no distension. Palpations: Abdomen is soft. Tenderness: There is no abdominal tenderness. Musculoskeletal: Normal range of motion. Lymphadenopathy:      Cervical: No cervical adenopathy. Skin:     General: Skin is warm. Findings: No rash. Neurological:      Mental Status: She is alert and oriented to person, place, and time. Motor: No abnormal muscle tone. Prior labs reviewed. KUB - +constipation    Assessment/Plan:    ICD-10-CM ICD-9-CM    1. Acquired hypothyroidism  E03.9 244.9 TSH 3RD GENERATION      T4, FREE   2. Medicare annual wellness visit, subsequent  Z00.00 V70.0    3. Urinary tract infection without hematuria, site unspecified  N39.0 599.0 CULTURE, URINE      URINALYSIS W/ RFLX MICROSCOPIC   4. Dizziness  R42 780.4 CBC WITH AUTOMATED DIFF      METABOLIC PANEL, COMPREHENSIVE   5. At risk for loss of bone density  Z91.89 V49.89 DEXA BONE DENSITY STUDY AXIAL   6. Encounter for screening mammogram for breast cancer  Z12.31 V76.12 [facility] MAMMO BI SCREENING INCL CAD   7. Other specified disorders of bone density and structure, other site   M85.88 733.99 DEXA BONE DENSITY STUDY AXIAL   8. Essential hypertension  I10 401.9    9. Mixed hyperlipidemia  E78.2 272.2    10. Type 2 diabetes mellitus with nephropathy ([facility])  E11.21 250.40      583.81    11. CKD (chronic kidney disease) stage 3, GFR 30-59 ml/min ([facility])  N18.3 585.3    12. Constipation, unspecified constipation type  K59.00 564.00    13. S/P CABG x 4  Z95.1 V45.81    14. Diastolic dysfunction  E21.10 429.9    15. Severe obesity ([facility])  E66.01 278.01    16.  Type 2 diabetes mellitus with complication, with long-term current use of insulin ([facility])  E11.8 250.90     Z79.4 V58.67      Follow-up and Dispositions    · Return in about 2 months (around 9/30/2020), or if symptoms worsen or fail to improve, for diabetes, blood pressure - IO.        results and schedule of future studies reviewed with patient  reviewed diet, exercise and weight   cardiovascular risk and specific lipid/LDL goals reviewed  reviewed medications and side effects in detail    Increase miralax to mobilize stool, then return to maintenance dosing  Repeat urine  Recheck thyroid  Continue other current medications

## 2020-07-31 NOTE — PROGRESS NOTES
This is the Subsequent Medicare Annual Wellness Exam, performed 12 months or more after the Initial AWV or the last Subsequent AWV    I have reviewed the patient's medical history in detail and updated the computerized patient record.      History     Patient Active Problem List   Diagnosis Code    Foot pain M79.673    T2DM (type 2 diabetes mellitus) ([facility] E11.9    CAD (coronary artery disease) I25.10    HTN (hypertension) I10    Proliferative diabetic retinopathy ([facility] X67.6549    CKD (chronic kidney disease) stage 3, GFR 30-59 ml/min ([facility]) N18.3    Macular degeneration H35.30    Cataract H26.9    Diabetic neuropathy ([facility] E11.40    Urge incontinence N39.41    Hyperlipidemia E78.5    Encounter for long-term (current) use of other medications Z79.899    Vitreous hemorrhage of both eyes (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 E11.21    Rheumatoid factor positive R76.8    Chest pain R07.9    Unstable angina ([facility]) Z11.1    Diastolic dysfunction L66.94    S/P CABG x 4 Z95.1    PVD (peripheral vascular disease) ([facility]) I73.9    Microalbuminuria R80.9    Acquired hypothyroidism E03.9    Severe obesity ([facility]) E66.01     Past Medical History:   Diagnosis Date    Acquired hypothyroidism 4/2/2019    CAD (coronary artery disease)     calcium score 229 - 2011, cath 4/2009 - [facility]    Cataract     CKD (chronic kidney disease) stage 3, GFR 30-59 ml/min ([facility])     kidney are ok - no current issues as of 3/8/18    Cystoid macular degeneration of right eye     Diabetes ([facility])     Diabetic neuropathy ([facility])     Diastolic dysfunction     Esophageal reflux 4/9/2015    Foot ulcer ([facility]     Glaucoma     right    Hypercholesterolemia     Hyperlipidemia 1/23/2015    Hypertension     Macular degeneration     rt eye    Microalbuminuria 4/10/2018    Proliferative diabetic retinopathy ([facility]     PVD (peripheral vascular disease) ([facility])     no current issues as of 3/8/18    Rupture of ulnar collateral ligament of thumb     Sebaceous cyst 4/4/2017    Urge incontinence     Vitreous hemorrhage of both eyes ([facility])       Past Surgical History:   Procedure Laterality Date    CARDIAC SURG PROCEDURE UNLIST  02/06/2018    CABG x 4    HX CATARACT REMOVAL Left     HX CHOLECYSTECTOMY       Current Outpatient Medications   Medication Sig Dispense Refill    ergocalciferol (ERGOCALCIFEROL) 1,250 mcg (50,000 unit) capsule Take 1 Cap by mouth every thirty (30) days. Indications: vitamin D deficiency (high dose therapy) 3 Cap 3    zinc sulfate (Zinc-15) 66 mg tab Take  by mouth.  multivitamin-iron-FA, hematinic, (Centrum)  mg-mcg tab tablet Take 1 Tab by mouth daily.  levothyroxine (SYNTHROID) 88 mcg tablet Take 1 Tab by mouth Daily (before breakfast). 90 Tab 1    melatonin 5 mg cap capsule Take 1 Cap by mouth nightly. 30 Cap 5    insulin lispro (HUMALOG) 100 unit/mL kwikpen 5 Units by SubCUTAneous route Before breakfast, lunch, and dinner. 2 Adjustable Dose Pre-filled Pen Syringe 2    dicyclomine (BENTYL) 10 mg capsule Take 1 Cap by mouth three (3) times daily as needed for Abdominal Cramps. 30 Cap 1    polyethylene glycol (MIRALAX) 17 gram/dose powder Take 17 g by mouth daily as needed for Constipation. 595 g 5    Omega-3 Fatty Acids 60- mg cpDR Take  by mouth daily.  furosemide (LASIX) 20 mg tablet Take 1 Tab by mouth every three (3) days.  Prn leg swelling 30 Tab 2    Janumet 50-1,000 mg per tablet TAKE 1 TABLET BY MOUTH ONCE DAILY 90 Tab 1    timolol (TIMOPTIC) 0.5 % ophthalmic solution PLACE 1 DROP INTO LEFT EYE TWICE A DAY AS DIRECTED      amiodarone (CORDARONE) 200 mg tablet Take  by mouth.  Insulin Needles, Disposable, (BD Ultra-Fine Short Pen Needle) 31 gauge x 5/16\" ndle USE TO TEST BLOOD SUGAR 3 TIMES A DAY AS DIRECTED 1 Package 3    rosuvastatin (CRESTOR) 40 mg tablet TAKE 1 TAB BY MOUTH DAILY 90 Tab 1    LANTUS SOLOSTAR U-100 INSULIN 100 unit/mL (3 mL) inpn INJECT 48 UNITS SUBCUTANEOUSLY AT BEDTIME 45 Adjustable Dose Pre-filled Pen Syringe 5    OMEPRAZOLE PO Take  by mouth.  losartan (COZAAR) 25 mg tablet Take  by mouth daily.  amLODIPine (NORVASC) 5 mg tablet Take 5 mg by mouth daily.  metoprolol tartrate (LOPRESSOR) 25 mg tablet Take 75 mg by mouth two (2) times a day.  acetaminophen (TYLENOL EXTRA STRENGTH) 500 mg tablet Take 500 mg by mouth every six (6) hours as needed for Pain.  COMBIGAN 0.2-0.5 % drop ophthalmic solution INSTILL 1 DROP IN RIGHT EYE EVERY MORNING AND NIGHT. 6    aspirin delayed-release 81 mg tablet Take 81 mg by mouth daily.  gabapentin (NEURONTIN) 300 mg capsule Take 1 Cap by mouth nightly. Max Daily Amount: 300 mg.  (Patient taking differently: Take 300 mg by mouth nightly as needed.) 90 Cap 1     Allergies   Allergen Reactions    Ibuprofen Unknown (comments)    Lisinopril Cough    Norvasc [Amlodipine] Other (comments)     LE swelling - currently taking with no issues (3/8/18)       Family History   Problem Relation Age of Onset    Heart Failure Mother     Heart Disease Mother     Diabetes Father    [address] [facility] [address] Elevated Lipids Father      Social History     Tobacco Use    Smoking status: Never Smoker    Smokeless tobacco: Never Used   Substance Use Topics    Alcohol use: No       Depression Risk Factor Screening:     3 most recent PHQ Screens 7/31/2020   Little interest or pleasure in doing things Several days   Feeling down, depressed, irritable, or hopeless Several days   Total Score PHQ 2 2   Trouble falling or staying asleep, or sleeping too much -   Feeling tired or having little energy -   Poor appetite, weight loss, or overeating -   Feeling bad about yourself - or that you are a failure or have let yourself or your family down -   Trouble concentrating on things such as school, work, reading, or watching TV -   Moving or speaking so slowly that other people could have noticed; or the opposite being so fidgety that others notice -   Thoughts of being better off dead, or hurting yourself in some way -   PHQ 9 Score -   How difficult have these problems made it for you to do your work, take care of your home and get along with others -     Reviewed SSRI - declined at this time    Alcohol Risk Factor Screening:   Do you average 1 drink per night or more than 7 drinks a week:  No    On any one occasion in the past three months have you have had more than 3 drinks containing alcohol:  No      Functional Ability and Level of Safety:   Hearing: Hearing is good. Activities of Daily Living: The home contains: no safety equipment. Patient does total self care     Ambulation: with no difficulty     Fall Risk:  Fall Risk Assessment, last 12 mths 7/31/2020   Able to walk? Yes   Fall in past 12 months? Yes   Fall with injury? No   Number of falls in past 12 months 1   Fall Risk Score 1     Abuse Screen:  Patient is not abused       Cognitive Screening   Has your family/caregiver stated any concerns about your memory: no         Patient Care Team   Patient Care Team:  Gavi Eden MD as PCP - General (Internal Medicine)  Gavi Eden MD as PCP - REHABILITATION [facility] Provider  Jennifer Tolentino MD (Orthopedic Surgery)  Latisha Ann MD as Surgeon (General Surgery)  Jitendra Nguyễn MD (Cardiology)  Larissa Hand MD (Ophthalmology)    Assessment/Plan   Education and counseling provided:  Are appropriate based on today's review and evaluation    ICD-10-CM ICD-9-CM    1. Medicare annual wellness visit, subsequent  Z00.00 V70.0    2.  Urinary tract infection without hematuria, site unspecified  N39.0 599.0 CULTURE, URINE      URINALYSIS W/ RFLX MICROSCOPIC   3. Acquired hypothyroidism  E03.9 244.9 TSH 3RD GENERATION      T4, FREE   4. Dizziness  R42 780.4 CBC WITH AUTOMATED DIFF      METABOLIC PANEL, COMPREHENSIVE   5. At risk for loss of bone density  Z91.89 V49.89 DEXA BONE DENSITY STUDY AXIAL   6. Encounter for screening mammogram for breast cancer  Z12.31 V76.12 [facility] MAMMO BI SCREENING INCL CAD   7. Other specified disorders of bone density and structure, other site   M85.88 733.99 DEXA BONE DENSITY STUDY AXIAL   8. Essential hypertension  I10 401.9    9. Mixed hyperlipidemia  E78.2 272.2    10. Type 2 diabetes mellitus with nephropathy ([facility])  E11.21 250.40      583.81    11. CKD (chronic kidney disease) stage 3, GFR 30-59 ml/min ([facility])  N18.3 585.3    12. Constipation, unspecified constipation type  K59.00 564.00    13. S/P CABG x 4  Z95.1 V45.81    14. Diastolic dysfunction  B30.23 429.9    15. Severe obesity ([facility])  E66.01 278.01    16.  Type 2 diabetes mellitus with complication, with long-term current use of insulin ([facility])  E11.8 250.90     Z79.4 V58.67      Follow-up and Dispositions    · Return in about 2 months (around 9/30/2020), or if symptoms worsen or fail to improve, for diabetes, blood pressure - IO.        results and schedule of future studies reviewed with patient  reviewed diet, exercise and weight  cardiovascular risk and specific lipid/LDL goals reviewed  reviewed medications and side effects in detail     DEXA   Mammogram  Monitor mood - defer SSRI for now

## 2020-07-31 NOTE — PROGRESS NOTES
Rm#[id number]  Presents w/ daughter   Normal CT scan of heart per cardio     Chief Complaint   Patient presents with    Diabetes     f/u    Hypertension     f/u    Thyroid Problem     f/u   [facility] Visit    Fall     7-15-20. got dizzy and fall      1. Have you been to the ER, urgent care clinic since your last visit? Hospitalized since your last visit? No    2. Have you seen or consulted any other health care providers outside of the [address] [place] [address] since your last visit? Include any pap smears or colon screening. No     Health Maintenance Due   Topic Date Due    Shingrix Vaccine Age 49> (1 of 2) 03/02/1989    Eye Exam Retinal or Dilated  03/03/2019    Medicare Yearly Exam  05/12/2019     3 most recent PHQ Screens 7/31/2020   Little interest or pleasure in doing things Several days   Feeling down, depressed, irritable, or hopeless Several days   Total Score PHQ 2 2   Trouble falling or staying asleep, or sleeping too much -   Feeling tired or having little energy -   Poor appetite, weight loss, or overeating -   Feeling bad about yourself - or that you are a failure or have let yourself or your family down -   Trouble concentrating on things such as school, work, reading, or watching TV -   Moving or speaking so slowly that other people could have noticed; or the opposite being so fidgety that others notice -   Thoughts of being better off dead, or hurting yourself in some way -   PHQ 9 Score -   How difficult have these problems made it for you to do your work, take care of your home and get along with others -     Fall Risk Assessment, last 12 mths 7/31/2020   Able to walk? Yes   Fall in past 12 months? Yes   Fall with injury? No   Number of falls in past 12 months 1   Fall Risk Score 1               Abuse Screening Questionnaire 7/31/2020   Do you ever feel afraid of your partner? N   Are you in a relationship with someone who physically or mentally threatens you?  N   Is it safe for you to go home? Y     ADL Assessment 7/31/2020   Feeding yourself No Help Needed   Getting from bed to chair No Help Needed   Getting dressed Help Needed   Bathing or showering Help Needed   Walk across the room (includes cane/walker) No Help Needed   Using the telphone No Help Needed   Taking your medications Help Needed   Preparing meals No Help Needed   Managing money (expenses/bills) No Help Needed   Moderately strenuous housework (laundry) Help Needed   Shopping for personal items (toiletries/medicines) Help Needed   Shopping for groceries Help Needed   Driving Help Needed   Climbing a flight of stairs Help Needed   Getting to places beyond walking distances Help Needed     Recent Travel Screening and Travel History documentation     Travel Screening     Question   Response    In the last month, have you been in contact with someone who was confirmed or suspected to have Coronavirus / COVID-19? No / Unsure    Do you have any of the following symptoms? None of these    Have you traveled internationally in the last month?   No      Travel History   Travel since 06/30/20     No documented travel since 06/30/20

## 2020-08-01 LAB
ALBUMIN SERPL-MCNC: 4.3 G/DL (ref 3.6–4.6)
ALBUMIN/GLOB SERPL: 2.2 {RATIO} (ref 1.2–2.2)
ALP SERPL-CCNC: 62 IU/L (ref 39–117)
ALT SERPL-CCNC: 19 IU/L (ref 0–32)
APPEARANCE UR: CLEAR
AST SERPL-CCNC: 15 IU/L (ref 0–40)
BACTERIA #/AREA URNS HPF: ABNORMAL /[HPF]
BASOPHILS # BLD AUTO: 0 X10E3/UL (ref 0–0.2)
BASOPHILS NFR BLD AUTO: 1 %
BILIRUB SERPL-MCNC: 0.2 MG/DL (ref 0–1.2)
BILIRUB UR QL STRIP: NEGATIVE
BUN SERPL-MCNC: 34 MG/DL (ref 8–27)
BUN/CREAT SERPL: 25 (ref 12–28)
CALCIUM SERPL-MCNC: 9.3 MG/DL (ref 8.7–10.3)
CASTS URNS QL MICRO: ABNORMAL /LPF
CHLORIDE SERPL-SCNC: 105 MMOL/L (ref 96–106)
CO2 SERPL-SCNC: 21 MMOL/L (ref 20–29)
COLOR UR: YELLOW
CREAT SERPL-MCNC: 1.35 MG/DL (ref 0.57–1)
EOSINOPHIL # BLD AUTO: 0.2 X10E3/UL (ref 0–0.4)
EOSINOPHIL NFR BLD AUTO: 3 %
EPI CELLS #/AREA URNS HPF: ABNORMAL /HPF (ref 0–10)
ERYTHROCYTE [DISTWIDTH] IN BLOOD BY AUTOMATED COUNT: 12.9 % (ref 11.7–15.4)
GLOBULIN SER CALC-MCNC: 2 G/DL (ref 1.5–4.5)
GLUCOSE SERPL-MCNC: 138 MG/DL (ref 65–99)
GLUCOSE UR QL: NEGATIVE
HCT VFR BLD AUTO: 32.2 % (ref 34–46.6)
HGB BLD-MCNC: 10.6 G/DL (ref 11.1–15.9)
HGB UR QL STRIP: NEGATIVE
IMM GRANULOCYTES # BLD AUTO: 0 X10E3/UL (ref 0–0.1)
IMM GRANULOCYTES NFR BLD AUTO: 0 %
KETONES UR QL STRIP: NEGATIVE
LEUKOCYTE ESTERASE UR QL STRIP: ABNORMAL
LYMPHOCYTES # BLD AUTO: 2.9 X10E3/UL (ref 0.7–3.1)
LYMPHOCYTES NFR BLD AUTO: 43 %
MCH RBC QN AUTO: 32.2 PG (ref 26.6–33)
MCHC RBC AUTO-ENTMCNC: 32.9 G/DL (ref 31.5–35.7)
MCV RBC AUTO: 98 FL (ref 79–97)
MICRO URNS: ABNORMAL
MONOCYTES # BLD AUTO: 0.6 X10E3/UL (ref 0.1–0.9)
MONOCYTES NFR BLD AUTO: 9 %
NEUTROPHILS # BLD AUTO: 2.9 X10E3/UL (ref 1.4–7)
NEUTROPHILS NFR BLD AUTO: 44 %
NITRITE UR QL STRIP: NEGATIVE
PH UR STRIP: 5.5 [PH] (ref 5–7.5)
PLATELET # BLD AUTO: 126 X10E3/UL (ref 150–450)
POTASSIUM SERPL-SCNC: 5.4 MMOL/L (ref 3.5–5.2)
PROT SERPL-MCNC: 6.3 G/DL (ref 6–8.5)
PROT UR QL STRIP: ABNORMAL
RBC # BLD AUTO: 3.29 X10E6/UL (ref 3.77–5.28)
RBC #/AREA URNS HPF: ABNORMAL /HPF (ref 0–2)
SODIUM SERPL-SCNC: 142 MMOL/L (ref 134–144)
SP GR UR: 1.02 (ref 1–1.03)
T4 FREE SERPL-MCNC: 1.39 NG/DL (ref 0.82–1.77)
TSH SERPL DL<=0.005 MIU/L-ACNC: 4.72 UIU/ML (ref 0.45–4.5)
UROBILINOGEN UR STRIP-MCNC: 0.2 MG/DL (ref 0.2–1)
WBC # BLD AUTO: 6.6 X10E3/UL (ref 3.4–10.8)
WBC #/AREA URNS HPF: ABNORMAL /HPF (ref 0–5)

## 2020-08-02 LAB — BACTERIA UR CULT: NORMAL

## 2020-08-03 NOTE — PROGRESS NOTES
Please notify pt's daughter of results. No significant growth on urine culture. Thyroid has improved, but may not have had enough time to fully equilibrate. So, continue the current dose for now. Other labs are either normal or stable and at goal.  Continue current medications   Repeat thyroid testing and electrolytes at f/u in 2 months.

## 2020-08-11 ENCOUNTER — HOSPITAL ENCOUNTER (OUTPATIENT)
Dept: MAMMOGRAPHY | Age: 80
Discharge: HOME | End: 2020-08-11
Attending: INTERNAL MEDICINE
Payer: MEDICARE

## 2020-08-11 DIAGNOSIS — M85.88 OTHER SPECIFIED DISORDERS OF BONE DENSITY AND STRUCTURE, OTHER SITE: ICD-10-CM

## 2020-08-11 DIAGNOSIS — Z12.31 ENCOUNTER FOR SCREENING MAMMOGRAM FOR BREAST CANCER: ICD-10-CM

## 2020-08-11 DIAGNOSIS — Z91.89 AT RISK FOR LOSS OF BONE DENSITY: ICD-10-CM

## 2020-08-11 PROCEDURE — 77080 DXA BONE DENSITY AXIAL: CPT

## 2020-08-26 DIAGNOSIS — E11.8 TYPE 2 DIABETES MELLITUS WITH COMPLICATION, WITH LONG-TERM CURRENT USE OF INSULIN (HCC): ICD-10-CM

## 2020-08-26 DIAGNOSIS — Z79.4 TYPE 2 DIABETES MELLITUS WITH COMPLICATION, WITH LONG-TERM CURRENT USE OF INSULIN (HCC): ICD-10-CM

## 2020-08-27 RX ORDER — PEN NEEDLE, DIABETIC 30 GX3/16"
NEEDLE, DISPOSABLE MISCELLANEOUS
Qty: 1 PACKAGE | Refills: 1 | Status: SHIPPED | OUTPATIENT
Start: 2020-08-27 | End: 2021-08-15

## 2020-09-02 DIAGNOSIS — R60.0 LOCALIZED EDEMA: ICD-10-CM

## 2020-09-02 RX ORDER — FUROSEMIDE 20 MG/1
TABLET ORAL
Qty: 90 TAB | Refills: 1 | Status: SHIPPED | OUTPATIENT
Start: 2020-09-02 | End: 2022-04-07

## 2020-10-17 ENCOUNTER — DOCUMENTATION ONLY (OUTPATIENT)
Dept: INTERNAL MEDICINE CLINIC | Age: 80
End: 2020-10-17

## 2020-10-17 NOTE — PROGRESS NOTES
[facility] order # [id number] signed 9/15/20 by provider, faxed 9/16/20 to [phone]; order and confirmation placed in bin for central scan.

## 2020-11-21 DIAGNOSIS — E78.5 HYPERLIPIDEMIA, UNSPECIFIED HYPERLIPIDEMIA TYPE: ICD-10-CM

## 2020-11-22 RX ORDER — ROSUVASTATIN CALCIUM 40 MG/1
TABLET, COATED ORAL
Qty: 90 TAB | Refills: 1 | Status: SHIPPED | OUTPATIENT
Start: 2020-11-22 | End: 2021-10-20

## 2020-12-07 ENCOUNTER — OFFICE VISIT (OUTPATIENT)
Dept: INTERNAL MEDICINE CLINIC | Age: 80
End: 2020-12-07
Payer: MEDICARE

## 2020-12-07 VITALS
HEART RATE: 62 BPM | SYSTOLIC BLOOD PRESSURE: 141 MMHG | TEMPERATURE: 97.6 F | DIASTOLIC BLOOD PRESSURE: 78 MMHG | BODY MASS INDEX: 36.48 KG/M2 | HEIGHT: 66 IN | WEIGHT: 227 LBS | OXYGEN SATURATION: 96 % | RESPIRATION RATE: 16 BRPM

## 2020-12-07 DIAGNOSIS — E11.21 TYPE 2 DIABETES MELLITUS WITH NEPHROPATHY (HCC): ICD-10-CM

## 2020-12-07 DIAGNOSIS — Z79.4 TYPE 2 DIABETES MELLITUS WITH COMPLICATION, WITH LONG-TERM CURRENT USE OF INSULIN (HCC): Primary | ICD-10-CM

## 2020-12-07 DIAGNOSIS — N18.30 STAGE 3 CHRONIC KIDNEY DISEASE, UNSPECIFIED WHETHER STAGE 3A OR 3B CKD (HCC): ICD-10-CM

## 2020-12-07 DIAGNOSIS — R21 RASH AND NONSPECIFIC SKIN ERUPTION: ICD-10-CM

## 2020-12-07 DIAGNOSIS — B37.2 YEAST INFECTION OF THE SKIN: ICD-10-CM

## 2020-12-07 DIAGNOSIS — E11.8 TYPE 2 DIABETES MELLITUS WITH COMPLICATION, WITH LONG-TERM CURRENT USE OF INSULIN (HCC): Primary | ICD-10-CM

## 2020-12-07 DIAGNOSIS — Z23 NEEDS FLU SHOT: ICD-10-CM

## 2020-12-07 DIAGNOSIS — E78.5 HYPERLIPIDEMIA, UNSPECIFIED HYPERLIPIDEMIA TYPE: ICD-10-CM

## 2020-12-07 DIAGNOSIS — E55.9 VITAMIN D DEFICIENCY: ICD-10-CM

## 2020-12-07 DIAGNOSIS — E03.9 ACQUIRED HYPOTHYROIDISM: ICD-10-CM

## 2020-12-07 PROCEDURE — 1090F PRES/ABSN URINE INCON ASSESS: CPT | Performed by: INTERNAL MEDICINE

## 2020-12-07 PROCEDURE — G0463 HOSPITAL OUTPT CLINIC VISIT: HCPCS | Performed by: INTERNAL MEDICINE

## 2020-12-07 PROCEDURE — 99214 OFFICE O/P EST MOD 30 MIN: CPT | Performed by: INTERNAL MEDICINE

## 2020-12-07 PROCEDURE — G8399 PT W/DXA RESULTS DOCUMENT: HCPCS | Performed by: INTERNAL MEDICINE

## 2020-12-07 PROCEDURE — G8427 DOCREV CUR MEDS BY ELIG CLIN: HCPCS | Performed by: INTERNAL MEDICINE

## 2020-12-07 PROCEDURE — 1101F PT FALLS ASSESS-DOCD LE1/YR: CPT | Performed by: INTERNAL MEDICINE

## 2020-12-07 PROCEDURE — 90694 VACC AIIV4 NO PRSRV 0.5ML IM: CPT

## 2020-12-07 PROCEDURE — G8754 DIAS BP LESS 90: HCPCS | Performed by: INTERNAL MEDICINE

## 2020-12-07 PROCEDURE — 3051F HG A1C>EQUAL 7.0%<8.0%: CPT | Performed by: INTERNAL MEDICINE

## 2020-12-07 PROCEDURE — G8510 SCR DEP NEG, NO PLAN REQD: HCPCS | Performed by: INTERNAL MEDICINE

## 2020-12-07 PROCEDURE — G8753 SYS BP > OR = 140: HCPCS | Performed by: INTERNAL MEDICINE

## 2020-12-07 PROCEDURE — G8536 NO DOC ELDER MAL SCRN: HCPCS | Performed by: INTERNAL MEDICINE

## 2020-12-07 PROCEDURE — G8417 CALC BMI ABV UP PARAM F/U: HCPCS | Performed by: INTERNAL MEDICINE

## 2020-12-07 RX ORDER — HYDROCORTISONE 1 %
CREAM (GRAM) TOPICAL 2 TIMES DAILY
Qty: 30 G | Refills: 2 | Status: SHIPPED | OUTPATIENT
Start: 2020-12-07

## 2020-12-07 RX ORDER — NYSTATIN 100000 [USP'U]/G
POWDER TOPICAL 3 TIMES DAILY
Qty: 60 G | Refills: 5 | Status: SHIPPED | OUTPATIENT
Start: 2020-12-07

## 2020-12-07 NOTE — PROGRESS NOTES
HPI:  Presents for f/u DM2, HTN, lipids, etc    Accompanied by daughter who provides translation by pt preference     Neck itch and mild rash. No new exposures known    Pt pretty good with diabetic diet    +med compliance    No other new complaints    No CP, SOB, neuro sx    Eye doctor in May 2020    Past medical, Social, and Family history reviewed    Prior to Admission medications    Medication Sig Start Date End Date Taking? Authorizing Provider   rosuvastatin (CRESTOR) 40 mg tablet TAKE 1 TABLET BY MOUTH EVERY DAY 11/22/20  Yes Boubacar Francis MD   furosemide (LASIX) 20 mg tablet TAKE 1 TAB BY MOUTH EVERY THREE (3) DAYS AS NEEDED FOR LEG SWELLING 9/2/20  Yes Boubacar Francis MD   ergocalciferol (ERGOCALCIFEROL) 1,250 mcg (50,000 unit) capsule Take 1 Cap by mouth every thirty (30) days. Indications: vitamin D deficiency (high dose therapy) 7/10/20  Yes Boubacar Francis MD   zinc sulfate (Zinc-15) 66 mg tab Take  by mouth. Yes Provider, Historical   multivitamin-iron-FA, hematinic, (Centrum)  mg-mcg tab tablet Take 1 Tab by mouth daily. Yes Provider, Historical   levothyroxine (SYNTHROID) 88 mcg tablet Take 1 Tab by mouth Daily (before breakfast). 7/1/20  Yes Boubacar Francis MD   polyethylene glycol [facility]) 17 gram/dose powder Take 17 g by mouth daily as needed for Constipation. 7/1/20  Yes Boubacar Francis MD   Omega-3 Fatty Acids 60- mg cpDR Take  by mouth daily. Yes Provider, Historical   Janumet 50-1,000 mg per tablet TAKE 1 TABLET BY MOUTH ONCE DAILY 5/31/20  Yes Boubacar Francis MD   timolol (TIMOPTIC) 0.5 % ophthalmic solution PLACE 1 DROP INTO LEFT EYE TWICE A DAY AS DIRECTED 2/4/20  Yes Provider, Historical   LANTUS SOLOSTAR U-100 INSULIN 100 unit/mL (3 mL) inpn INJECT 48 UNITS SUBCUTANEOUSLY AT BEDTIME 11/20/19  Yes Boubacar Francis MD   OMEPRAZOLE PO Take  by mouth. Yes Provider, Historical   losartan (COZAAR) 25 mg tablet Take  by mouth daily.    Yes Provider, Historical   amLODIPine (NORVASC) 5 mg tablet Take 5 mg by mouth daily. Yes Provider, Historical   metoprolol tartrate (LOPRESSOR) 25 mg tablet Take 75 mg by mouth two (2) times a day. Yes Provider, Historical   COMBIGAN 0.2-0.5 % drop ophthalmic solution INSTILL 1 DROP IN RIGHT EYE EVERY MORNING AND NIGHT. 3/16/17  Yes Provider, Historical   aspirin delayed-release 81 mg tablet Take 81 mg by mouth daily. Yes Other, MD Gurpreet   Insulin Needles, Disposable, (BD Ultra-Fine Short Pen Needle) 31 gauge x 5/16\" ndle USE TO TEST BLOOD SUGAR 3 TIMES A DAY AS DIRECTED 8/27/20   Natividad Rosas MD   melatonin 5 mg cap capsule Take 1 Cap by mouth nightly. 7/1/20   Natividad Rosas MD   insulin lispro (HUMALOG) 100 unit/mL kwikpen 5 Units by SubCUTAneous route Before breakfast, lunch, and dinner. 7/1/20   Natividad Rosas MD   dicyclomine (BENTYL) 10 mg capsule Take 1 Cap by mouth three (3) times daily as needed for Abdominal Cramps. 7/1/20   Natividad Rosas MD   gabapentin (NEURONTIN) 300 mg capsule Take 1 Cap by mouth nightly. Max Daily Amount: 300 mg. Patient taking differently: Take 300 mg by mouth nightly as needed. 6/12/20   Natividad Rosas MD   amiodarone (CORDARONE) 200 mg tablet Take  by mouth. Provider, Historical   acetaminophen (TYLENOL EXTRA STRENGTH) 500 mg tablet Take 500 mg by mouth every six (6) hours as needed for Pain. Provider, Historical          ROS  Complete ROS reviewed and negative or stable except as noted in HPI. Physical Exam  Vitals signs and nursing note reviewed. Constitutional:       General: She is not in acute distress. HENT:      Head: Normocephalic and atraumatic. Eyes:      General: No scleral icterus. Pupils: Pupils are equal, round, and reactive to light. Neck:      Musculoskeletal: Normal range of motion and neck supple. Vascular: No JVD. Cardiovascular:      Rate and Rhythm: Normal rate and regular rhythm.       Heart sounds: Normal heart sounds. No murmur. No friction rub. No gallop. Pulmonary:      Effort: Pulmonary effort is normal. No respiratory distress. Breath sounds: Normal breath sounds. No wheezing or rales. Abdominal:      General: There is no distension. Palpations: Abdomen is soft. Tenderness: There is no abdominal tenderness. Musculoskeletal: Normal range of motion. Lymphadenopathy:      Cervical: No cervical adenopathy. Skin:     General: Skin is warm. Findings: Erythema (very mild to anterior neck only) present. No rash. Neurological:      Mental Status: She is alert and oriented to person, place, and time. Motor: No abnormal muscle tone. Prior labs reviewed. Assessment/Plan:  Possible dry skin, irritant. ICD-10-CM ICD-9-CM    1. Type 2 diabetes mellitus with complication, with long-term current use of insulin ([facility])  E11.8 250.90 CBC WITH AUTOMATED DIFF    Z79.4 V58.67 HEMOGLOBIN A1C WITH EAG   2. Hyperlipidemia, unspecified hyperlipidemia type  E78.5 272.4 CK      LIPID PANEL      METABOLIC PANEL, COMPREHENSIVE   3. Stage 3 chronic kidney disease, unspecified whether stage 3a or 3b CKD  N18.30 585.3    4. Acquired hypothyroidism  E03.9 244.9 T4, FREE      TSH 3RD GENERATION   5. Type 2 diabetes mellitus with nephropathy (HCC)  E11.21 250.40      583.81    6. Rash and nonspecific skin eruption  R21 782.1 hydrocortisone (CORTAID) 1 % topical cream   7. Yeast infection of the skin  B37.2 112.3 nystatin (MYCOSTATIN) powder   8. Needs flu shot  Z23 V04.81 FLU (FLUAD QUAD INFLUENZA VACCINE,QUAD,ADJUVANTED)   9. Vitamin D deficiency  E55.9 268.9 VITAMIN D, 25 HYDROXY     Follow-up and Dispositions    · Return in about 6 months (around 6/7/2021), or if symptoms worsen or fail to improve, for diabetes, blood pressure, thyroid, cholesterol.         results and schedule of future studies reviewed with patient  reviewed diet, exercise and weight  cardiovascular risk and specific lipid/LDL goals reviewed  reviewed medications and side effects in detail     Topical moisturizer  Low potency topical steroid  Return for fasting labs   Flu shot  shingrix at pharmacy  Continue current medications

## 2020-12-07 NOTE — PROGRESS NOTES
Chief Complaint   Patient presents with    Hypertension    Diabetes       1. Have you been to the ER, urgent care clinic since your last visit? Hospitalized since your last visit? No    2. Have you seen or consulted any other health care providers outside of the [address] [place] [address] since your last visit? Include any pap smears or colon screening. No    Visit Vitals  BP (!) 141/78 (BP 1 Location: Left arm, BP Patient Position: Sitting)   Pulse 62   Temp 97.6 °F (36.4 °C) (Oral)   Resp 16   Ht 5' 6\" (1.676 m)   Wt 227 lb (103 kg)   SpO2 96%   BMI 36.64 kg/m²     Administered FLUAD, pt tolerated well, VIS provided.

## 2020-12-10 ENCOUNTER — APPOINTMENT (OUTPATIENT)
Dept: INTERNAL MEDICINE CLINIC | Age: 80
End: 2020-12-10

## 2020-12-10 DIAGNOSIS — E11.8 TYPE 2 DIABETES MELLITUS WITH COMPLICATION, WITH LONG-TERM CURRENT USE OF INSULIN (HCC): ICD-10-CM

## 2020-12-10 DIAGNOSIS — Z79.4 TYPE 2 DIABETES MELLITUS WITH COMPLICATION, WITH LONG-TERM CURRENT USE OF INSULIN (HCC): ICD-10-CM

## 2020-12-10 DIAGNOSIS — E03.9 ACQUIRED HYPOTHYROIDISM: ICD-10-CM

## 2020-12-10 DIAGNOSIS — E55.9 VITAMIN D DEFICIENCY: ICD-10-CM

## 2020-12-10 DIAGNOSIS — E78.5 HYPERLIPIDEMIA, UNSPECIFIED HYPERLIPIDEMIA TYPE: ICD-10-CM

## 2020-12-10 LAB
25(OH)D3 SERPL-MCNC: 52.2 NG/ML (ref 30–100)
ALBUMIN SERPL-MCNC: 3.9 G/DL (ref 3.5–5)
ALBUMIN/GLOB SERPL: 1.4 {RATIO} (ref 1.1–2.2)
ALP SERPL-CCNC: 65 U/L (ref 45–117)
ALT SERPL-CCNC: 27 U/L (ref 12–78)
ANION GAP SERPL CALC-SCNC: 4 MMOL/L (ref 5–15)
AST SERPL-CCNC: 13 U/L (ref 15–37)
BASOPHILS # BLD: 0 K/UL (ref 0–0.1)
BASOPHILS NFR BLD: 0 % (ref 0–1)
BILIRUB SERPL-MCNC: 0.3 MG/DL (ref 0.2–1)
BUN SERPL-MCNC: 33 MG/DL (ref 6–20)
BUN/CREAT SERPL: 27 (ref 12–20)
CALCIUM SERPL-MCNC: 9.4 MG/DL (ref 8.5–10.1)
CHLORIDE SERPL-SCNC: 110 MMOL/L (ref 97–108)
CHOLEST SERPL-MCNC: 175 MG/DL
CK SERPL-CCNC: 38 U/L (ref 26–192)
CO2 SERPL-SCNC: 29 MMOL/L (ref 21–32)
CREAT SERPL-MCNC: 1.22 MG/DL (ref 0.55–1.02)
DIFFERENTIAL METHOD BLD: ABNORMAL
EOSINOPHIL # BLD: 0.2 K/UL (ref 0–0.4)
EOSINOPHIL NFR BLD: 3 % (ref 0–7)
ERYTHROCYTE [DISTWIDTH] IN BLOOD BY AUTOMATED COUNT: 13.4 % (ref 11.5–14.5)
EST. AVERAGE GLUCOSE BLD GHB EST-MCNC: 157 MG/DL
GLOBULIN SER CALC-MCNC: 2.7 G/DL (ref 2–4)
GLUCOSE SERPL-MCNC: 122 MG/DL (ref 65–100)
HBA1C MFR BLD: 7.1 % (ref 4–5.6)
HCT VFR BLD AUTO: 37.7 % (ref 35–47)
HDLC SERPL-MCNC: 54 MG/DL
HDLC SERPL: 3.2 {RATIO} (ref 0–5)
HGB BLD-MCNC: 11.6 G/DL (ref 11.5–16)
IMM GRANULOCYTES # BLD AUTO: 0 K/UL (ref 0–0.04)
IMM GRANULOCYTES NFR BLD AUTO: 0 % (ref 0–0.5)
LDLC SERPL CALC-MCNC: 84.4 MG/DL (ref 0–100)
LIPID PROFILE,FLP: ABNORMAL
LYMPHOCYTES # BLD: 2.4 K/UL (ref 0.8–3.5)
LYMPHOCYTES NFR BLD: 47 % (ref 12–49)
MCH RBC QN AUTO: 30.7 PG (ref 26–34)
MCHC RBC AUTO-ENTMCNC: 30.8 G/DL (ref 30–36.5)
MCV RBC AUTO: 99.7 FL (ref 80–99)
MONOCYTES # BLD: 0.5 K/UL (ref 0–1)
MONOCYTES NFR BLD: 10 % (ref 5–13)
NEUTS SEG # BLD: 2.1 K/UL (ref 1.8–8)
NEUTS SEG NFR BLD: 40 % (ref 32–75)
NRBC # BLD: 0 K/UL (ref 0–0.01)
NRBC BLD-RTO: 0 PER 100 WBC
PLATELET # BLD AUTO: 147 K/UL (ref 150–400)
PMV BLD AUTO: 12.1 FL (ref 8.9–12.9)
POTASSIUM SERPL-SCNC: 5 MMOL/L (ref 3.5–5.1)
PROT SERPL-MCNC: 6.6 G/DL (ref 6.4–8.2)
RBC # BLD AUTO: 3.78 M/UL (ref 3.8–5.2)
SODIUM SERPL-SCNC: 143 MMOL/L (ref 136–145)
T4 FREE SERPL-MCNC: 1.2 NG/DL (ref 0.8–1.5)
TRIGL SERPL-MCNC: 183 MG/DL (ref ?–150)
TSH SERPL DL<=0.05 MIU/L-ACNC: 2.1 UIU/ML (ref 0.36–3.74)
VLDLC SERPL CALC-MCNC: 36.6 MG/DL
WBC # BLD AUTO: 5.2 K/UL (ref 3.6–11)

## 2020-12-27 RX ORDER — SITAGLIPTIN AND METFORMIN HYDROCHLORIDE 50; 1000 MG/1; MG/1
TABLET, FILM COATED ORAL
Qty: 90 TAB | Refills: 1 | Status: SHIPPED | OUTPATIENT
Start: 2020-12-27 | End: 2021-05-10

## 2021-01-17 RX ORDER — INSULIN GLARGINE 100 [IU]/ML
INJECTION, SOLUTION SUBCUTANEOUS
Qty: 45 ADJUSTABLE DOSE PRE-FILLED PEN SYRINGE | Refills: 5 | Status: SHIPPED | OUTPATIENT
Start: 2021-01-17 | End: 2022-04-07

## 2021-02-05 DIAGNOSIS — E03.9 ACQUIRED HYPOTHYROIDISM: ICD-10-CM

## 2021-02-06 RX ORDER — LEVOTHYROXINE SODIUM 88 UG/1
TABLET ORAL
Qty: 90 TAB | Refills: 1 | Status: SHIPPED | OUTPATIENT
Start: 2021-02-06 | End: 2021-10-09

## 2021-02-10 ENCOUNTER — OFFICE VISIT (OUTPATIENT)
Dept: INTERNAL MEDICINE CLINIC | Age: 81
End: 2021-02-10
Payer: MEDICARE

## 2021-02-10 VITALS
DIASTOLIC BLOOD PRESSURE: 68 MMHG | TEMPERATURE: 97.9 F | SYSTOLIC BLOOD PRESSURE: 110 MMHG | RESPIRATION RATE: 18 BRPM | OXYGEN SATURATION: 94 % | HEART RATE: 66 BPM | BODY MASS INDEX: 36.48 KG/M2 | HEIGHT: 66 IN | WEIGHT: 227 LBS

## 2021-02-10 DIAGNOSIS — R60.9 EDEMA, UNSPECIFIED TYPE: ICD-10-CM

## 2021-02-10 DIAGNOSIS — N39.0 UTI (URINARY TRACT INFECTION): Primary | ICD-10-CM

## 2021-02-10 DIAGNOSIS — K59.00 CONSTIPATION, UNSPECIFIED CONSTIPATION TYPE: ICD-10-CM

## 2021-02-10 DIAGNOSIS — N39.0 URINARY TRACT INFECTION WITHOUT HEMATURIA, SITE UNSPECIFIED: ICD-10-CM

## 2021-02-10 DIAGNOSIS — Z95.1 S/P CABG X 4: ICD-10-CM

## 2021-02-10 DIAGNOSIS — R30.0 DYSURIA: ICD-10-CM

## 2021-02-10 DIAGNOSIS — I51.89 DIASTOLIC DYSFUNCTION: ICD-10-CM

## 2021-02-10 DIAGNOSIS — E11.21 TYPE 2 DIABETES MELLITUS WITH NEPHROPATHY (HCC): ICD-10-CM

## 2021-02-10 LAB
BILIRUB UR QL STRIP: NEGATIVE
GLUCOSE UR-MCNC: NEGATIVE MG/DL
KETONES P FAST UR STRIP-MCNC: NEGATIVE MG/DL
PH UR STRIP: 5.5 [PH] (ref 4.6–8)
PROT UR QL STRIP: NORMAL
SP GR UR STRIP: 1.02 (ref 1–1.03)
UA UROBILINOGEN AMB POC: NORMAL (ref 0.2–1)
URINALYSIS CLARITY POC: CLEAR
URINALYSIS COLOR POC: YELLOW
URINE BLOOD POC: NEGATIVE
URINE LEUKOCYTES POC: NORMAL
URINE NITRITES POC: NEGATIVE

## 2021-02-10 PROCEDURE — G8399 PT W/DXA RESULTS DOCUMENT: HCPCS | Performed by: INTERNAL MEDICINE

## 2021-02-10 PROCEDURE — G8536 NO DOC ELDER MAL SCRN: HCPCS | Performed by: INTERNAL MEDICINE

## 2021-02-10 PROCEDURE — 81003 URINALYSIS AUTO W/O SCOPE: CPT | Performed by: INTERNAL MEDICINE

## 2021-02-10 PROCEDURE — 99214 OFFICE O/P EST MOD 30 MIN: CPT | Performed by: INTERNAL MEDICINE

## 2021-02-10 PROCEDURE — G8427 DOCREV CUR MEDS BY ELIG CLIN: HCPCS | Performed by: INTERNAL MEDICINE

## 2021-02-10 PROCEDURE — 1101F PT FALLS ASSESS-DOCD LE1/YR: CPT | Performed by: INTERNAL MEDICINE

## 2021-02-10 PROCEDURE — G8754 DIAS BP LESS 90: HCPCS | Performed by: INTERNAL MEDICINE

## 2021-02-10 PROCEDURE — G0463 HOSPITAL OUTPT CLINIC VISIT: HCPCS | Performed by: INTERNAL MEDICINE

## 2021-02-10 PROCEDURE — G8432 DEP SCR NOT DOC, RNG: HCPCS | Performed by: INTERNAL MEDICINE

## 2021-02-10 PROCEDURE — G8417 CALC BMI ABV UP PARAM F/U: HCPCS | Performed by: INTERNAL MEDICINE

## 2021-02-10 PROCEDURE — G8752 SYS BP LESS 140: HCPCS | Performed by: INTERNAL MEDICINE

## 2021-02-10 PROCEDURE — 1090F PRES/ABSN URINE INCON ASSESS: CPT | Performed by: INTERNAL MEDICINE

## 2021-02-10 RX ORDER — CEFDINIR 300 MG/1
300 CAPSULE ORAL 2 TIMES DAILY
Qty: 14 CAP | Refills: 0 | Status: SHIPPED | OUTPATIENT
Start: 2021-02-10 | End: 2022-03-10 | Stop reason: SDUPTHER

## 2021-02-10 NOTE — PROGRESS NOTES
HPI:  established patient  Presents for acute care    Accompanied by daughter who translates per pt preference and provides ahx    C/o UTI sx - +dysuria, +frequency, +odor, +urgency    Constipation - x a couple of weeks despite miralax  miralax 1 capful daily chronically. Used tea and miralax and some improvement. abd pain - improved with improved BMs    LE edema/swelling - chronic, intermittent - L>R  S/p left leg vein harvest  Uses lasix q3days - helps some. Past medical, Social, and Family history reviewed    Prior to Admission medications    Medication Sig Start Date End Date Taking? Authorizing Provider   levothyroxine (SYNTHROID) 88 mcg tablet TAKE 1 TABLET BY MOUTH EVERY DAY BEFORE BREAKFAST 2/6/21  Yes Erinn Arboleda MD   Janumet 50-1,000 mg per tablet TAKE 1 TABLET BY MOUTH EVERY DAY 12/27/20  Yes Erinn Arboleda MD   nystatin (MYCOSTATIN) powder Apply  to affected area three (3) times daily. As directed 12/7/20  Yes Erinn Arboleda MD   rosuvastatin (CRESTOR) 40 mg tablet TAKE 1 TABLET BY MOUTH EVERY DAY 11/22/20  Yes Erinn Arboleda MD   Insulin Needles, Disposable, (BD Ultra-Fine Short Pen Needle) 31 gauge x 5/16\" ndle USE TO TEST BLOOD SUGAR 3 TIMES A DAY AS DIRECTED 8/27/20  Yes Erinn Arboleda MD   ergocalciferol (ERGOCALCIFEROL) 1,250 mcg (50,000 unit) capsule Take 1 Cap by mouth every thirty (30) days. Indications: vitamin D deficiency (high dose therapy) 7/10/20  Yes Erinn Arboleda MD   zinc sulfate (Zinc-15) 66 mg tab Take  by mouth. Yes Provider, Historical   multivitamin-iron-FA, hematinic, (Centrum)  mg-mcg tab tablet Take 1 Tab by mouth daily. Yes Provider, Historical   melatonin 5 mg cap capsule Take 1 Cap by mouth nightly. 7/1/20  Yes Erinn Arboleda MD   insulin lispro (HUMALOG) 100 unit/mL kwikpen 5 Units by SubCUTAneous route Before breakfast, lunch, and dinner.  7/1/20  Yes Erinn Arboleda MD   dicyclomine (BENTYL) 10 mg capsule Take 1 Cap by mouth three (3) times daily as needed for Abdominal Cramps. 7/1/20  Yes Lala Jackson MD   polyethylene glycol [facility]) 17 gram/dose powder Take 17 g by mouth daily as needed for Constipation. 7/1/20  Yes Lala Jackson MD   Omega-3 Fatty Acids 60- mg cpDR Take  by mouth daily. Yes Provider, Historical   timolol (TIMOPTIC) 0.5 % ophthalmic solution PLACE 1 DROP INTO LEFT EYE TWICE A DAY AS DIRECTED 2/4/20  Yes Provider, Historical   amiodarone (CORDARONE) 200 mg tablet Take  by mouth. Yes Provider, Historical   OMEPRAZOLE PO Take  by mouth. Yes Provider, Historical   losartan (COZAAR) 25 mg tablet Take  by mouth daily. Yes Provider, Historical   metoprolol tartrate (LOPRESSOR) 25 mg tablet Take 75 mg by mouth two (2) times a day. Yes Provider, Historical   COMBIGAN 0.2-0.5 % drop ophthalmic solution INSTILL 1 DROP IN RIGHT EYE EVERY MORNING AND NIGHT. 3/16/17  Yes Provider, Historical   aspirin delayed-release 81 mg tablet Take 81 mg by mouth daily. Yes Other, MD Gurpreet   insulin glargine (Lantus Solostar U-100 Insulin) 100 unit/mL (3 mL) inpn 48 units sq qhs 1/17/21   Lala Jackson MD   hydrocortisone (CORTAID) 1 % topical cream Apply  to affected area two (2) times a day. use thin layer 12/7/20   Lala Jackson MD   furosemide (LASIX) 20 mg tablet TAKE 1 TAB BY MOUTH EVERY THREE (3) DAYS AS NEEDED FOR LEG SWELLING 9/2/20   Lala Jackson MD   gabapentin (NEURONTIN) 300 mg capsule Take 1 Cap by mouth nightly. Max Daily Amount: 300 mg. Patient taking differently: Take 300 mg by mouth nightly as needed. 6/12/20   Lala Jackson MD   amLODIPine (NORVASC) 5 mg tablet Take 5 mg by mouth daily. Provider, Historical   acetaminophen (TYLENOL EXTRA STRENGTH) 500 mg tablet Take 500 mg by mouth every six (6) hours as needed for Pain. Provider, Historical          ROS  Complete ROS reviewed and negative or stable except as noted in HPI.       Physical Exam  Vitals signs and nursing note reviewed. Constitutional:       General: She is not in acute distress. HENT:      Head: Normocephalic and atraumatic. Eyes:      General: No scleral icterus. Pupils: Pupils are equal, round, and reactive to light. Neck:      Musculoskeletal: Normal range of motion and neck supple. Vascular: No JVD. Cardiovascular:      Rate and Rhythm: Normal rate and regular rhythm. Heart sounds: Normal heart sounds. No murmur. No friction rub. No gallop. Pulmonary:      Effort: Pulmonary effort is normal. No respiratory distress. Breath sounds: Normal breath sounds. No wheezing or rales. Abdominal:      General: There is no distension. Palpations: Abdomen is soft. Tenderness: There is no abdominal tenderness. Musculoskeletal: Normal range of motion. Lymphadenopathy:      Cervical: No cervical adenopathy. Skin:     General: Skin is warm. Findings: No rash. Neurological:      Mental Status: She is alert and oriented to person, place, and time. Motor: No abnormal muscle tone. Prior labs reviewed. Assessment/Plan:    ICD-10-CM ICD-9-CM    1. UTI (urinary tract infection)  N39.0 599.0    2. Dysuria  R30.0 788. 1 CULTURE, URINE      URINALYSIS W/ RFLX MICROSCOPIC      AMB POC URINALYSIS DIP STICK AUTO W/O MICRO      URINALYSIS W/ RFLX MICROSCOPIC      CULTURE, URINE   3. Constipation, unspecified constipation type  K59.00 564.00    4. Type 2 diabetes mellitus with nephropathy (HCC)  E11.21 250.40      583.81    5. S/P CABG x 4  Z95.1 V45.81    6. Diastolic dysfunction  S51.48 429.9    7. Edema, unspecified type  R60.9 782.3    8. Urinary tract infection without hematuria, site unspecified  N39.0 599.0 cefdinir (OMNICEF) 300 mg capsule     Follow-up and Dispositions    · Return in about 4 months (around 6/7/2021), or if symptoms worsen or fail to improve, for as scheduled.         results and schedule of future studies reviewed with patient  reviewed diet, exercise and weight   reviewed medications and side effects in detail    Empiric abx for likely UTI  Send urine for cx  Adjust miralax to achieve regularity  Compression stockings  Lasix intermittently. Continue other current medications       An electronic signature was used to authenticate this note.   -- Avelino Wilson MD

## 2021-02-10 NOTE — PROGRESS NOTES
RM # 15        Chief Complaint   Patient presents with    Bladder Infection     possible UTI unable to give a specimen at this time    Foot Swelling     foot and leg bilateral , left leg the worse    Constipation    Abdominal Pain       1. Have you been to the ER, urgent care clinic since your last visit? Hospitalized since your last visit? No    2. Have you seen or consulted any other health care providers outside of the [address] [place] [address] since your last visit? Include any pap smears or colon screening. No    Health Maintenance Due   Topic Date Due    COVID-19 Vaccine (1 of 2) 03/02/1956    Shingrix Vaccine Age 50> (1 of 2) 03/02/1989       3 most recent PHQ Screens 12/7/2020   Little interest or pleasure in doing things Not at all   Feeling down, depressed, irritable, or hopeless Several days   Total Score PHQ 2 1   Trouble falling or staying asleep, or sleeping too much -   Feeling tired or having little energy -   Poor appetite, weight loss, or overeating -   Feeling bad about yourself - or that you are a failure or have let yourself or your family down -   Trouble concentrating on things such as school, work, reading, or watching TV -   Moving or speaking so slowly that other people could have noticed; or the opposite being so fidgety that others notice -   Thoughts of being better off dead, or hurting yourself in some way -   PHQ 9 Score -   How difficult have these problems made it for you to do your work, take care of your home and get along with others -     Fall Risk Assessment, last 12 mths 12/7/2020   Able to walk? Yes   Fall in past 12 months? No   Number of falls in past 12 months -   Fall with injury? -     Abuse Screening Questionnaire 7/31/2020   Do you ever feel afraid of your partner? N   Are you in a relationship with someone who physically or mentally threatens you? N   Is it safe for you to go home?  Y     ADL Assessment 7/31/2020   Feeding yourself No Help Needed   Getting from bed to chair No Help Needed   Getting dressed Help Needed   Bathing or showering Help Needed   Walk across the room (includes cane/walker) No Help Needed   Using the telphone No Help Needed   Taking your medications Help Needed   Preparing meals No Help Needed   Managing money (expenses/bills) No Help Needed   Moderately strenuous housework (laundry) Help Needed   Shopping for personal items (toiletries/medicines) Help Needed   Shopping for groceries Help Needed   Driving Help Needed   Climbing a flight of stairs Help Needed   Getting to places beyond walking distances Help Needed       Learning Assessment 4/4/2017   PRIMARY LEARNER Patient   HIGHEST LEVEL OF EDUCATION - PRIMARY LEARNER  2 YEARS OF COLLEGE   BARRIERS PRIMARY LEARNER NONE   CO-LEARNER CAREGIVER -   PRIMARY LANGUAGE OTHER (COMMENT)    NEED Yes   LEARNER PREFERENCE PRIMARY OTHER (COMMENT)   ANSWERED BY patient   RELATIONSHIP SELF           AVS  education, follow up, and recommendations provided and addressed with patient.  services used to advise patient no .

## 2021-02-13 LAB
APPEARANCE UR: CLEAR
BACTERIA SPEC CULT: ABNORMAL
BACTERIA SPEC CULT: ABNORMAL
BACTERIA URNS QL MICRO: ABNORMAL /HPF
BILIRUB UR QL: NEGATIVE
CC UR VC: ABNORMAL
COLOR UR: ABNORMAL
EPITH CASTS URNS QL MICRO: ABNORMAL /LPF
GLUCOSE UR STRIP.AUTO-MCNC: NEGATIVE MG/DL
HGB UR QL STRIP: NEGATIVE
HYALINE CASTS URNS QL MICRO: ABNORMAL /LPF (ref 0–5)
KETONES UR QL STRIP.AUTO: NEGATIVE MG/DL
LEUKOCYTE ESTERASE UR QL STRIP.AUTO: ABNORMAL
NITRITE UR QL STRIP.AUTO: NEGATIVE
PH UR STRIP: 5.5 [PH] (ref 5–8)
PROT UR STRIP-MCNC: ABNORMAL MG/DL
RBC #/AREA URNS HPF: ABNORMAL /HPF (ref 0–5)
SERVICE CMNT-IMP: ABNORMAL
SP GR UR REFRACTOMETRY: 1.02 (ref 1–1.03)
UROBILINOGEN UR QL STRIP.AUTO: 0.2 EU/DL (ref 0.2–1)
WBC URNS QL MICRO: ABNORMAL /HPF (ref 0–4)

## 2021-04-23 ENCOUNTER — TRANSCRIBE ORDER (OUTPATIENT)
Dept: INTERNAL MEDICINE CLINIC | Age: 81
End: 2021-04-23

## 2021-04-23 DIAGNOSIS — Z95.1 S/P CABG X 4: ICD-10-CM

## 2021-04-23 DIAGNOSIS — E11.21 TYPE 2 DIABETES MELLITUS WITH NEPHROPATHY (HCC): ICD-10-CM

## 2021-04-23 RX ORDER — INSULIN LISPRO 100 [IU]/ML
INJECTION, SOLUTION INTRAVENOUS; SUBCUTANEOUS
Qty: 15 ADJUSTABLE DOSE PRE-FILLED PEN SYRINGE | Refills: 3 | Status: SHIPPED | OUTPATIENT
Start: 2021-04-23 | End: 2022-05-09

## 2021-04-23 NOTE — TELEPHONE ENCOUNTER
----- Message from [facility] sent at 4/23/2021 10:02 AM EDT -----  Regarding: Adrianna Miller MD  Medication Refill    Caller (if not patient):  Jerel LUNDBERG-pt's daughter      Relationship of caller (if not patient):      Best contact number(s): [phone]      Name of medication and dosage if known: insulin lispro (HUMALOG) 100 unit/mL kwikpen      Is patient out of this medication (yes/no): Yes       Pharmacy name: [id number] [address] listed in chart? (yes/no):  Pharmacy phone number:      Details to clarify the request: pharmacy advised pt needs a renewal      [facility]

## 2021-05-10 RX ORDER — SITAGLIPTIN AND METFORMIN HYDROCHLORIDE 50; 1000 MG/1; MG/1
TABLET, FILM COATED ORAL
Qty: 90 TAB | Refills: 1 | Status: SHIPPED | OUTPATIENT
Start: 2021-05-10 | End: 2022-01-01

## 2021-05-24 RX ORDER — HYDROGEN PEROXIDE 3 %
SOLUTION, NON-ORAL MISCELLANEOUS
Qty: 90 CAPSULE | Refills: 3 | Status: SHIPPED | OUTPATIENT
Start: 2021-05-24 | End: 2022-10-07 | Stop reason: SDUPTHER

## 2021-08-07 ENCOUNTER — DOCUMENTATION ONLY (OUTPATIENT)
Dept: INTERNAL MEDICINE CLINIC | Age: 81
End: 2021-08-07

## 2021-08-07 NOTE — PROGRESS NOTES
Home Care Delivered order # [id number] signed by provider 8/2/21; faxed 8/3/21 to [phone]; order and confirmation scanned into cc

## 2021-08-15 DIAGNOSIS — Z79.4 TYPE 2 DIABETES MELLITUS WITH COMPLICATION, WITH LONG-TERM CURRENT USE OF INSULIN (HCC): ICD-10-CM

## 2021-08-15 DIAGNOSIS — E11.8 TYPE 2 DIABETES MELLITUS WITH COMPLICATION, WITH LONG-TERM CURRENT USE OF INSULIN (HCC): ICD-10-CM

## 2021-08-15 RX ORDER — PEN NEEDLE, DIABETIC 30 GX3/16"
NEEDLE, DISPOSABLE MISCELLANEOUS
Qty: 1 PACKAGE | Refills: 3 | Status: SHIPPED | OUTPATIENT
Start: 2021-08-15

## 2021-10-09 DIAGNOSIS — E03.9 ACQUIRED HYPOTHYROIDISM: ICD-10-CM

## 2021-10-09 RX ORDER — LEVOTHYROXINE SODIUM 88 UG/1
TABLET ORAL
Qty: 90 TABLET | Refills: 0 | Status: SHIPPED | OUTPATIENT
Start: 2021-10-09 | End: 2022-01-01

## 2021-10-20 DIAGNOSIS — E78.5 HYPERLIPIDEMIA, UNSPECIFIED HYPERLIPIDEMIA TYPE: ICD-10-CM

## 2021-10-20 RX ORDER — ROSUVASTATIN CALCIUM 40 MG/1
TABLET, COATED ORAL
Qty: 90 TABLET | Refills: 0 | Status: SHIPPED | OUTPATIENT
Start: 2021-10-20 | End: 2022-01-01

## 2021-12-31 DIAGNOSIS — E03.9 ACQUIRED HYPOTHYROIDISM: ICD-10-CM

## 2021-12-31 DIAGNOSIS — E78.5 HYPERLIPIDEMIA, UNSPECIFIED HYPERLIPIDEMIA TYPE: ICD-10-CM

## 2022-01-01 RX ORDER — LEVOTHYROXINE SODIUM 88 UG/1
TABLET ORAL
Qty: 90 TABLET | Refills: 0 | Status: SHIPPED | OUTPATIENT
Start: 2022-01-01 | End: 2022-03-09 | Stop reason: SDUPTHER

## 2022-01-01 RX ORDER — ROSUVASTATIN CALCIUM 40 MG/1
TABLET, COATED ORAL
Qty: 90 TABLET | Refills: 0 | Status: SHIPPED | OUTPATIENT
Start: 2022-01-01 | End: 2022-03-09 | Stop reason: SDUPTHER

## 2022-01-01 RX ORDER — SITAGLIPTIN AND METFORMIN HYDROCHLORIDE 50; 1000 MG/1; MG/1
TABLET, FILM COATED ORAL
Qty: 90 TABLET | Refills: 0 | Status: SHIPPED | OUTPATIENT
Start: 2022-01-01 | End: 2022-03-09 | Stop reason: SDUPTHER

## 2022-01-05 NOTE — TELEPHONE ENCOUNTER
Writer left a detailed message that the pt's medicaiton's had been approved,and to callback to make an IO appt with .

## 2022-03-09 ENCOUNTER — OFFICE VISIT (OUTPATIENT)
Dept: INTERNAL MEDICINE CLINIC | Age: 82
End: 2022-03-09
Payer: MEDICARE

## 2022-03-09 VITALS
TEMPERATURE: 98.1 F | HEIGHT: 66 IN | HEART RATE: 67 BPM | DIASTOLIC BLOOD PRESSURE: 73 MMHG | RESPIRATION RATE: 16 BRPM | OXYGEN SATURATION: 93 % | BODY MASS INDEX: 34.72 KG/M2 | WEIGHT: 216 LBS | SYSTOLIC BLOOD PRESSURE: 127 MMHG

## 2022-03-09 DIAGNOSIS — E78.5 HYPERLIPIDEMIA, UNSPECIFIED HYPERLIPIDEMIA TYPE: ICD-10-CM

## 2022-03-09 DIAGNOSIS — Z95.1 S/P CABG X 4: ICD-10-CM

## 2022-03-09 DIAGNOSIS — Z12.31 ENCOUNTER FOR SCREENING MAMMOGRAM FOR BREAST CANCER: ICD-10-CM

## 2022-03-09 DIAGNOSIS — I51.89 DIASTOLIC DYSFUNCTION: ICD-10-CM

## 2022-03-09 DIAGNOSIS — N18.30 STAGE 3 CHRONIC KIDNEY DISEASE, UNSPECIFIED WHETHER STAGE 3A OR 3B CKD (HCC): ICD-10-CM

## 2022-03-09 DIAGNOSIS — E11.8 TYPE 2 DIABETES MELLITUS WITH COMPLICATION, WITH LONG-TERM CURRENT USE OF INSULIN (HCC): ICD-10-CM

## 2022-03-09 DIAGNOSIS — Z23 NEEDS FLU SHOT: ICD-10-CM

## 2022-03-09 DIAGNOSIS — Z00.00 MEDICARE ANNUAL WELLNESS VISIT, SUBSEQUENT: Primary | ICD-10-CM

## 2022-03-09 DIAGNOSIS — E55.9 VITAMIN D DEFICIENCY: ICD-10-CM

## 2022-03-09 DIAGNOSIS — E03.9 ACQUIRED HYPOTHYROIDISM: ICD-10-CM

## 2022-03-09 DIAGNOSIS — Z79.4 TYPE 2 DIABETES MELLITUS WITH COMPLICATION, WITH LONG-TERM CURRENT USE OF INSULIN (HCC): ICD-10-CM

## 2022-03-09 DIAGNOSIS — R30.0 DYSURIA: ICD-10-CM

## 2022-03-09 DIAGNOSIS — R26.89 IMBALANCE: ICD-10-CM

## 2022-03-09 DIAGNOSIS — M54.12 CERVICAL RADICULOPATHY: ICD-10-CM

## 2022-03-09 LAB
ALBUMIN UR QL STRIP: 30 MG/L
BILIRUB UR QL STRIP: NEGATIVE
CREATININE, URINE POC: 50 MG/DL
GLUCOSE UR-MCNC: NEGATIVE MG/DL
KETONES P FAST UR STRIP-MCNC: NEGATIVE MG/DL
MICROALBUMIN/CREAT RATIO POC: NORMAL MG/G
PH UR STRIP: 5 [PH] (ref 4.6–8)
PROT UR QL STRIP: NEGATIVE
SP GR UR STRIP: 1.01 (ref 1–1.03)
UA UROBILINOGEN AMB POC: NORMAL (ref 0.2–1)
URINALYSIS CLARITY POC: NORMAL
URINALYSIS COLOR POC: YELLOW
URINE BLOOD POC: NEGATIVE
URINE LEUKOCYTES POC: NORMAL
URINE NITRITES POC: NEGATIVE

## 2022-03-09 PROCEDURE — 1101F PT FALLS ASSESS-DOCD LE1/YR: CPT | Performed by: INTERNAL MEDICINE

## 2022-03-09 PROCEDURE — G0439 PPPS, SUBSEQ VISIT: HCPCS | Performed by: INTERNAL MEDICINE

## 2022-03-09 PROCEDURE — G8399 PT W/DXA RESULTS DOCUMENT: HCPCS | Performed by: INTERNAL MEDICINE

## 2022-03-09 PROCEDURE — G0463 HOSPITAL OUTPT CLINIC VISIT: HCPCS | Performed by: INTERNAL MEDICINE

## 2022-03-09 PROCEDURE — G8754 DIAS BP LESS 90: HCPCS | Performed by: INTERNAL MEDICINE

## 2022-03-09 PROCEDURE — G8536 NO DOC ELDER MAL SCRN: HCPCS | Performed by: INTERNAL MEDICINE

## 2022-03-09 PROCEDURE — G8752 SYS BP LESS 140: HCPCS | Performed by: INTERNAL MEDICINE

## 2022-03-09 PROCEDURE — G8510 SCR DEP NEG, NO PLAN REQD: HCPCS | Performed by: INTERNAL MEDICINE

## 2022-03-09 PROCEDURE — 81002 URINALYSIS NONAUTO W/O SCOPE: CPT | Performed by: INTERNAL MEDICINE

## 2022-03-09 PROCEDURE — 99214 OFFICE O/P EST MOD 30 MIN: CPT | Performed by: INTERNAL MEDICINE

## 2022-03-09 PROCEDURE — 90694 VACC AIIV4 NO PRSRV 0.5ML IM: CPT | Performed by: INTERNAL MEDICINE

## 2022-03-09 PROCEDURE — G8427 DOCREV CUR MEDS BY ELIG CLIN: HCPCS | Performed by: INTERNAL MEDICINE

## 2022-03-09 PROCEDURE — 3051F HG A1C>EQUAL 7.0%<8.0%: CPT | Performed by: INTERNAL MEDICINE

## 2022-03-09 PROCEDURE — 82044 UR ALBUMIN SEMIQUANTITATIVE: CPT | Performed by: INTERNAL MEDICINE

## 2022-03-09 PROCEDURE — 1090F PRES/ABSN URINE INCON ASSESS: CPT | Performed by: INTERNAL MEDICINE

## 2022-03-09 PROCEDURE — G8417 CALC BMI ABV UP PARAM F/U: HCPCS | Performed by: INTERNAL MEDICINE

## 2022-03-09 RX ORDER — ROSUVASTATIN CALCIUM 40 MG/1
40 TABLET, COATED ORAL DAILY
Qty: 90 TABLET | Refills: 1 | Status: SHIPPED | OUTPATIENT
Start: 2022-03-09 | End: 2022-10-03 | Stop reason: SDUPTHER

## 2022-03-09 RX ORDER — SITAGLIPTIN AND METFORMIN HYDROCHLORIDE 50; 1000 MG/1; MG/1
1 TABLET, FILM COATED ORAL DAILY
Qty: 90 TABLET | Refills: 1 | Status: SHIPPED | OUTPATIENT
Start: 2022-03-09

## 2022-03-09 RX ORDER — LEVOTHYROXINE SODIUM 88 UG/1
88 TABLET ORAL
Qty: 90 TABLET | Refills: 1 | Status: SHIPPED | OUTPATIENT
Start: 2022-03-09 | End: 2022-03-10

## 2022-03-09 RX ORDER — ERGOCALCIFEROL 1.25 MG/1
50000 CAPSULE ORAL
Qty: 3 CAPSULE | Refills: 3 | Status: SHIPPED | OUTPATIENT
Start: 2022-03-09

## 2022-03-09 NOTE — PROGRESS NOTES
rm 15    Wants flu vaccine    Chief Complaint   Patient presents with    Diabetes     f/u     1. Have you been to the ER, urgent care clinic since your last visit? Hospitalized since your last visit? No    2. Have you seen or consulted any other health care providers outside of the [address] [place] [address] since your last visit? Include any pap smears or colon screening.  No     Visit Vitals  /73 (BP 1 Location: Left arm, BP Patient Position: Sitting, BP Cuff Size: Adult)   Pulse 67   Temp 98.1 °F (36.7 °C) (Oral)   Resp 16   Ht 5' 6\" (1.676 m)   Wt 216 lb (98 kg)   SpO2 93%   BMI 34.86 kg/m²

## 2022-03-09 NOTE — PROGRESS NOTES
This is the Subsequent Medicare Annual Wellness Exam, performed 12 months or more after the Initial AWV or the last Subsequent AWV    I have reviewed the patient's medical history in detail and updated the computerized patient record. Assessment/Plan   Education and counseling provided:  Are appropriate based on today's review and evaluation    ICD-10-CM ICD-9-CM    1. Medicare annual wellness visit, subsequent  Z00.00 V70.0    2. Needs flu shot  Z23 V04.81 FLU (FLUAD QUAD INFLUENZA VACCINE,QUAD,ADJUVANTED)   3. Hyperlipidemia, unspecified hyperlipidemia type  E78.5 300.3 CK      METABOLIC PANEL, COMPREHENSIVE      LIPID PANEL      rosuvastatin (CRESTOR) 40 mg tablet      LIPID PANEL      METABOLIC PANEL, COMPREHENSIVE      CK   4. Acquired hypothyroidism  E03.9 244.9 T4, FREE      TSH 3RD GENERATION      TSH 3RD GENERATION      T4, FREE      DISCONTINUED: levothyroxine (SYNTHROID) 88 mcg tablet   5. Type 2 diabetes mellitus with complication, with long-term current use of insulin ([facility])  E11.8 250.90 AMB POC URINE, MICROALBUMIN, SEMIQUANT (3 RESULTS)    Z79.4 V58.67 CBC WITH AUTOMATED DIFF      HEMOGLOBIN A1C WITH EAG      HEMOGLOBIN A1C WITH EAG      CBC WITH AUTOMATED DIFF   6. S/P CABG x 4  Z95.1 V45.81    7. Dysuria  R30.0 788.1 AMB POC URINALYSIS DIP STICK MANUAL W/O MICRO      CULTURE, URINE      URINALYSIS W/ RFLX MICROSCOPIC      URINALYSIS W/ RFLX MICROSCOPIC      CULTURE, URINE   8. Diastolic dysfunction  I48.43 429.9    9. Stage 3 chronic kidney disease, unspecified whether stage 3a or 3b CKD ([facility])  N18.30 585.3 CBC WITH AUTOMATED DIFF      CBC WITH AUTOMATED DIFF   10. Vitamin D deficiency  E55.9 268.9 VITAMIN D, 25 HYDROXY      ergocalciferol (ERGOCALCIFEROL) 1,250 mcg (50,000 unit) capsule      VITAMIN D, 25 HYDROXY   11.  Encounter for screening mammogram for breast cancer  Z12.31 V76.12 CANDIDA 3D CARMITA W MAMMO BI SCREENING INCL CAD   12. Cervical radiculopathy  M54.12 723.4 XR SPINE CERV 4 OR 5 V 13. Imbalance  R26.89 781.2      Follow-up and Dispositions    · Return in about 3 months (around 6/9/2022), or if symptoms worsen or fail to improve, for diabetes. results and schedule of future studies reviewed with patient  reviewed diet, exercise and weight  cardiovascular risk and specific lipid/LDL goals reviewed  reviewed medications and side effects in detail       Depression Risk Factor Screening     3 most recent PHQ Screens 3/9/2022   Little interest or pleasure in doing things Not at all   Feeling down, depressed, irritable, or hopeless Not at all   Total Score PHQ 2 0   Trouble falling or staying asleep, or sleeping too much -   Feeling tired or having little energy -   Poor appetite, weight loss, or overeating -   Feeling bad about yourself - or that you are a failure or have let yourself or your family down -   Trouble concentrating on things such as school, work, reading, or watching TV -   Moving or speaking so slowly that other people could have noticed; or the opposite being so fidgety that others notice -   Thoughts of being better off dead, or hurting yourself in some way -   PHQ 9 Score -   How difficult have these problems made it for you to do your work, take care of your home and get along with others -       Alcohol & Drug Abuse Risk Screen    Do you average more than 1 drink per night or more than 7 drinks a week:  No    On any one occasion in the past three months have you have had more than 3 drinks containing alcohol:  No          Functional Ability and Level of Safety    Hearing: Hearing is good. Activities of Daily Living: The home contains: no safety equipment. Patient does total self care      Ambulation: with no difficulty     Fall Risk:  Fall Risk Assessment, last 12 mths 3/9/2022   Able to walk? Yes   Fall in past 12 months? 0   Do you feel unsteady? 1   Are you worried about falling 1   Number of falls in past 12 months -   Fall with injury?  -      Abuse Screen:  Patient is not abused       Cognitive Screening    Has your family/caregiver stated any concerns about your memory: no         Health Maintenance Due     Health Maintenance Due   Topic Date Due    COVID-19 Vaccine (1) Never done    Shingrix Vaccine Age 50> (1 of 2) Never done    Foot Exam Q1  03/31/2021    MICROALBUMIN Q1  03/31/2021    A1C test (Diabetic or Prediabetic)  06/10/2021    Flu Vaccine (1) 09/01/2021    Lipid Screen  12/10/2021       Patient Care Team   Patient Care Team:  Feliz Gu MD as PCP - General (Internal Medicine)  Feliz Gu MD as PCP - [facility] Provider  Domonique Mahan MD (Orthopedic Surgery)  Mayra Olmos MD as Surgeon (General Surgery)  Christina Chen MD (Cardiology)  Bonnie Hernandez MD (Ophthalmology)    History     Patient Active Problem List   Diagnosis Code    Foot pain M79.673    T2DM (type 2 diabetes mellitus) ([address] E11.9    CAD (coronary artery disease) I25.10    Proliferative diabetic retinopathy ([address] E11.3599    CKD (chronic kidney disease) stage 3, GFR 30-59 ml/min ([facility]) N18.30    Macular degeneration H35.30    Cataract H26.9    Diabetic neuropathy ([address] E11.40    Urge incontinence N39.41    Hyperlipidemia E78.5    Encounter for long-term (current) use of other medications Z79.899    Vitreous hemorrhage of both eyes ([address] H43.13    Esophageal reflux K21.9    Essential hypertension I10    Advance directive discussed with patient Z70.80    Vitamin D deficiency E55.9    Mixed hyperlipidemia E78.2    Trochanteric bursitis, right hip M70.61    Glaucoma H40.9    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    Sebaceous cyst L72.3    Type 2 diabetes mellitus with nephropathy ([address] E11.21    Rheumatoid factor positive R76.8    Chest pain R07.9    Unstable angina ([address] S86.0    Diastolic dysfunction Q78.06    S/P CABG x 4 Z95.1    PVD (peripheral vascular disease) ([address] I73.9    Microalbuminuria R80.9    Acquired hypothyroidism E03.9    Severe obesity ([facility] E66.01     Past Medical History:   Diagnosis Date    Acquired hypothyroidism 4/2/2019    CAD (coronary artery disease)     calcium score 229 - 2011, cath 4/2009 - [facility]    Cataract     CKD (chronic kidney disease) stage 3, GFR 30-59 ml/min ([facility])     kidney are ok - no current issues as of 3/8/18    Cystoid macular degeneration of right eye     Diabetes ([address]     Diabetic neuropathy ([facility])     Diastolic dysfunction     Esophageal reflux 4/9/2015    [facility]     Glaucoma     right    Hypercholesterolemia     Hyperlipidemia 1/23/2015    Hypertension     Macular degeneration     rt eye    Microalbuminuria 4/10/2018    Proliferative diabetic retinopathy ([facility]     PVD (peripheral vascular disease) ([facility])     no current issues as of 3/8/18    Rupture of ulnar collateral ligament of thumb     Sebaceous cyst 4/4/2017    Urge incontinence     Vitreous hemorrhage of both eyes ([facility])       Past Surgical History:   Procedure Laterality Date    HX CATARACT REMOVAL Left     HX CHOLECYSTECTOMY      NE CARDIAC SURG PROCEDURE UNLIST  02/06/2018    CABG x 4     Current Outpatient Medications   Medication Sig Dispense Refill    rosuvastatin (CRESTOR) 40 mg tablet TAKE 1 TABLET BY MOUTH EVERY DAY 90 Tablet 0    Janumet 50-1,000 mg per tablet TAKE 1 TABLET BY MOUTH EVERY DAY 90 Tablet 0    levothyroxine (SYNTHROID) 88 mcg tablet TAKE 1 TABLET BY MOUTH EVERY DAY BEFORE BREAKFAST 90 Tablet 0    Insulin Needles, Disposable, (BD Ultra-Fine Short Pen Needle) 31 gauge x 5/16\" ndle TEST 3 TIMES A DAY AS DIRECTED - DX E11.21 1 Package 3    esomeprazole (NEXIUM) 20 mg capsule TAKE 1 CAPSULE BY MOUTH EVERY DAY 90 Capsule 3    insulin lispro (HUMALOG) 100 unit/mL kwikpen INJECT 5 UNITS BENEATH THE SKIN BEFORE BREAKFAST, LUNCH, DINNER 15 Adjustable Dose Pre-filled Pen Syringe 3    insulin glargine (Lantus Solostar U-100 Insulin) 100 unit/mL (3 mL) inpn 48 units sq qhs 45 Adjustable Dose Pre-filled Pen Syringe 5    hydrocortisone (CORTAID) 1 % topical cream Apply  to affected area two (2) times a day. use thin layer 30 g 2    nystatin (MYCOSTATIN) powder Apply  to affected area three (3) times daily. As directed 60 g 5    furosemide (LASIX) 20 mg tablet TAKE 1 TAB BY MOUTH EVERY THREE (3) DAYS AS NEEDED FOR LEG SWELLING 90 Tab 1    ergocalciferol (ERGOCALCIFEROL) 1,250 mcg (50,000 unit) capsule Take 1 Cap by mouth every thirty (30) days. Indications: vitamin D deficiency (high dose therapy) 3 Cap 3    zinc sulfate (Zinc-15) 66 mg tab Take  by mouth.  multivitamin-iron-FA, hematinic, (Centrum)  mg-mcg tab tablet Take 1 Tab by mouth daily.  melatonin 5 mg cap capsule Take 1 Cap by mouth nightly. 30 Cap 5    dicyclomine (BENTYL) 10 mg capsule Take 1 Cap by mouth three (3) times daily as needed for Abdominal Cramps. 30 Cap 1    polyethylene glycol (MIRALAX) 17 gram/dose powder Take 17 g by mouth daily as needed for Constipation. 595 g 5    Omega-3 Fatty Acids 60- mg cpDR Take  by mouth daily.  timolol (TIMOPTIC) 0.5 % ophthalmic solution PLACE 1 DROP INTO LEFT EYE TWICE A DAY AS DIRECTED      amiodarone (CORDARONE) 200 mg tablet Take  by mouth.  OMEPRAZOLE PO Take  by mouth.  losartan (COZAAR) 25 mg tablet Take  by mouth daily.  amLODIPine (NORVASC) 5 mg tablet Take 5 mg by mouth daily.  metoprolol tartrate (LOPRESSOR) 25 mg tablet Take 75 mg by mouth two (2) times a day.  acetaminophen (TYLENOL EXTRA STRENGTH) 500 mg tablet Take 500 mg by mouth every six (6) hours as needed for Pain.  COMBIGAN 0.2-0.5 % drop ophthalmic solution INSTILL 1 DROP IN RIGHT EYE EVERY MORNING AND NIGHT. 6    aspirin delayed-release 81 mg tablet Take 81 mg by mouth daily.  gabapentin (NEURONTIN) 300 mg capsule Take 1 Cap by mouth nightly. Max Daily Amount: 300 mg.  (Patient not taking: Reported on 3/9/2022) 90 Cap 1 Allergies   Allergen Reactions    Ibuprofen Unknown (comments)    Lisinopril Cough    Norvasc [Amlodipine] Other (comments)     LE swelling - currently taking with no issues (3/8/18)       Family History   Problem Relation Age of Onset    Heart Failure Mother     Heart Disease Mother     Diabetes Father    Fermín Lasso Elevated Lipids Father      Social History     Tobacco Use    Smoking status: Never Smoker    Smokeless tobacco: Never Used   Substance Use Topics    Alcohol use: No         Susana Gregorio MD

## 2022-03-09 NOTE — PROGRESS NOTES
HPI:  established patient  Presents for f/u DM2, thyroid, lipids, etc    Accompanied by daughter    Some dysuria  No fevers    Taking and tolerating meds    Right arm radiating pain  No specific injury    Imbalance as well - chronic    No CP, SOB, neuro sx        Past medical, Social, and Family history reviewed    Prior to Admission medications    Medication Sig Start Date End Date Taking? Authorizing Provider   rosuvastatin (CRESTOR) 40 mg tablet TAKE 1 TABLET BY MOUTH EVERY DAY 1/1/22  Yes Luis Nunez MD   Janumet 50-1,000 mg per tablet TAKE 1 TABLET BY MOUTH EVERY DAY 1/1/22  Yes Luis Nunez MD   levothyroxine (SYNTHROID) 88 mcg tablet TAKE 1 TABLET BY MOUTH EVERY DAY BEFORE BREAKFAST 1/1/22  Yes Luis Nunez MD   Insulin Needles, Disposable, (BD Ultra-Fine Short Pen Needle) 31 gauge x 5/16\" ndle TEST 3 TIMES A DAY AS DIRECTED - DX E11.21 8/15/21  Yes Luis Nunez MD   esomeprazole (NEXIUM) 20 mg capsule TAKE 1 CAPSULE BY MOUTH EVERY DAY 5/24/21  Yes Luis Nunez MD   insulin lispro (HUMALOG) 100 unit/mL kwikpen INJECT 5 UNITS BENEATH THE SKIN BEFORE BREAKFAST, LUNCH, DINNER 4/23/21  Yes Luis Nunez MD   insulin glargine (Lantus Solostar U-100 Insulin) 100 unit/mL (3 mL) inpn 48 units sq qhs 1/17/21  Yes Luis Nunez MD   hydrocortisone (CORTAID) 1 % topical cream Apply  to affected area two (2) times a day. use thin layer 12/7/20  Yes Luis Nunez MD   nystatin (MYCOSTATIN) powder Apply  to affected area three (3) times daily. As directed 12/7/20  Yes Luis Nunez MD   furosemide (LASIX) 20 mg tablet TAKE 1 TAB BY MOUTH EVERY THREE (3) DAYS AS NEEDED FOR LEG SWELLING 9/2/20  Yes Luis Nunez MD   ergocalciferol (ERGOCALCIFEROL) 1,250 mcg (50,000 unit) capsule Take 1 Cap by mouth every thirty (30) days. Indications: vitamin D deficiency (high dose therapy) 7/10/20  Yes Luis Nunez MD   zinc sulfate (Zinc-15) 66 mg tab Take  by mouth.    Yes Provider, Historical   multivitamin-iron-FA, hematinic, (Centrum)  mg-mcg tab tablet Take 1 Tab by mouth daily. Yes Provider, Historical   melatonin 5 mg cap capsule Take 1 Cap by mouth nightly. 7/1/20  Yes Krystina Pozo MD   dicyclomine (BENTYL) 10 mg capsule Take 1 Cap by mouth three (3) times daily as needed for Abdominal Cramps. 7/1/20  Yes Krystina Pozo MD   polyethylene glycol [facility]) 17 gram/dose powder Take 17 g by mouth daily as needed for Constipation. 7/1/20  Yes Krystina Pozo MD   Omega-3 Fatty Acids 60- mg cpDR Take  by mouth daily. Yes Provider, Historical   timolol (TIMOPTIC) 0.5 % ophthalmic solution PLACE 1 DROP INTO LEFT EYE TWICE A DAY AS DIRECTED 2/4/20  Yes Provider, Historical   amiodarone (CORDARONE) 200 mg tablet Take  by mouth. Yes Provider, Historical   OMEPRAZOLE PO Take  by mouth. Yes Provider, Historical   losartan (COZAAR) 25 mg tablet Take  by mouth daily. Yes Provider, Historical   amLODIPine (NORVASC) 5 mg tablet Take 5 mg by mouth daily. Yes Provider, Historical   metoprolol tartrate (LOPRESSOR) 25 mg tablet Take 75 mg by mouth two (2) times a day. Yes Provider, Historical   acetaminophen (TYLENOL EXTRA STRENGTH) 500 mg tablet Take 500 mg by mouth every six (6) hours as needed for Pain. Yes Provider, Historical   COMBIGAN 0.2-0.5 % drop ophthalmic solution INSTILL 1 DROP IN RIGHT EYE EVERY MORNING AND NIGHT. 3/16/17  Yes Provider, Historical   aspirin delayed-release 81 mg tablet Take 81 mg by mouth daily. Yes Other, MD Gurpreet   gabapentin (NEURONTIN) 300 mg capsule Take 1 Cap by mouth nightly. Max Daily Amount: 300 mg. Patient not taking: Reported on 3/9/2022 6/12/20   Krystina Pozo MD          ROS  Complete ROS reviewed and negative or stable except as noted in HPI. Physical Exam  Vitals and nursing note reviewed. Constitutional:       General: She is not in acute distress. HENT:      Head: Normocephalic and atraumatic. Eyes:      General: No scleral icterus. Pupils: Pupils are equal, round, and reactive to light. Neck:      Vascular: No JVD. Cardiovascular:      Rate and Rhythm: Normal rate and regular rhythm. Heart sounds: Normal heart sounds. No murmur heard. No friction rub. No gallop. Pulmonary:      Effort: Pulmonary effort is normal. No respiratory distress. Breath sounds: Normal breath sounds. No wheezing or rales. Abdominal:      General: There is no distension. Palpations: Abdomen is soft. Tenderness: There is no abdominal tenderness. Musculoskeletal:         General: Normal range of motion. Cervical back: Normal range of motion and neck supple. Right lower leg: Edema (trace) present. Left lower leg: Edema (trace) present. Lymphadenopathy:      Cervical: No cervical adenopathy. Skin:     General: Skin is warm. Findings: No rash. Neurological:      Mental Status: She is alert and oriented to person, place, and time. Motor: No abnormal muscle tone. Prior labs reviewed. Assessment/Plan:    ICD-10-CM ICD-9-CM    1. Type 2 diabetes mellitus with complication, with long-term current use of insulin ([facility])  E11.8 250.90 AMB POC URINE, MICROALBUMIN, SEMIQUANT (3 RESULTS)    Z79.4 V58.67 CBC WITH AUTOMATED DIFF      HEMOGLOBIN A1C WITH EAG      HEMOGLOBIN A1C WITH EAG      CBC WITH AUTOMATED DIFF   2. Needs flu shot  Z23 V04.81 FLU (FLUAD QUAD INFLUENZA VACCINE,QUAD,ADJUVANTED)   3. Hyperlipidemia, unspecified hyperlipidemia type  E78.5 597.8 CK      METABOLIC PANEL, COMPREHENSIVE      LIPID PANEL      rosuvastatin (CRESTOR) 40 mg tablet      LIPID PANEL      METABOLIC PANEL, COMPREHENSIVE      CK   4. Acquired hypothyroidism  E03.9 244.9 T4, FREE      TSH 3RD GENERATION      TSH 3RD GENERATION      T4, FREE      DISCONTINUED: levothyroxine (SYNTHROID) 88 mcg tablet   5. S/P CABG x 4  Z95.1 V45.81    6.  Dysuria  R30.0 788.1 AMB POC URINALYSIS DIP STICK MANUAL W/O MICRO      CULTURE, URINE      URINALYSIS W/ RFLX MICROSCOPIC      URINALYSIS W/ RFLX MICROSCOPIC      CULTURE, URINE   7. Diastolic dysfunction  Z00.83 429.9    8. Stage 3 chronic kidney disease, unspecified whether stage 3a or 3b CKD (HCC)  N18.30 585.3 CBC WITH AUTOMATED DIFF      CBC WITH AUTOMATED DIFF   9. Vitamin D deficiency  E55.9 268.9 VITAMIN D, 25 HYDROXY      ergocalciferol (ERGOCALCIFEROL) 1,250 mcg (50,000 unit) capsule      VITAMIN D, 25 HYDROXY   10. Encounter for screening mammogram for breast cancer  Z12.31 V76.12 CANDIDA 3D CARMITA W MAMMO BI SCREENING INCL CAD   6. Medicare annual wellness visit, subsequent  Z00.00 V70.0    12. Cervical radiculopathy  M54.12 723.4 XR SPINE CERV 4 OR 5 V   13. Imbalance  R26.89 781.2      Follow-up and Dispositions    · Return in about 3 months (around 6/9/2022), or if symptoms worsen or fail to improve, for diabetes. results and schedule of future studies reviewed with patient, daughter  reviewed diet, exercise and weight    cardiovascular risk and specific lipid/LDL goals reviewed  reviewed medications and side effects in detail    Flu shot  Labs  [facility] PT  C spine Xrays  Defer steroid taper due to DM2  Continue other current medications           An electronic signature was used to authenticate this note.   -- Mansi Carrasco MD

## 2022-03-09 NOTE — PATIENT INSTRUCTIONS
Ask pharmacy about shingles and Tdap vaccines    Medicare Wellness Visit, Female     The best way to live healthy is to have a lifestyle where you eat a well-balanced diet, exercise regularly, limit alcohol use, and quit all forms of tobacco/nicotine, if applicable. Regular preventive services are another way to keep healthy. Preventive services (vaccines, screening tests, monitoring & exams) can help personalize your care plan, which helps you manage your own care. Screening tests can find health problems at the earliest stages, when they are easiest to treat. [facility] follows the current, evidence-based guidelines published by the [facility] [employer] (USPSTF) when recommending preventive services for our patients. Because we follow these guidelines, sometimes recommendations change over time as research supports it. (For example, mammograms used to be recommended annually. Even though Medicare will still pay for an annual mammogram, the newer guidelines recommend a mammogram every two years for women of average risk). Of course, you and your doctor may decide to screen more often for some diseases, based on your risk and your co-morbidities (chronic disease you are already diagnosed with). Preventive services for you include:  - Medicare offers their members a free annual wellness visit, which is time for you and your primary care provider to discuss and plan for your preventive service needs. Take advantage of this benefit every year!  -All adults over the age of 72 should receive the recommended pneumonia vaccines. Current USPSTF guidelines recommend a series of two vaccines for the best pneumonia protection.   -All adults should have a flu vaccine yearly and a tetanus vaccine every 10 years.   -All adults age 48 and older should receive the shingles vaccines (series of two vaccines).       -All adults age 38-68 who are overweight should have a diabetes screening test once every three years.   -All adults born between [birth year] and 1966 should be screened once for Hepatitis C.  -Other screening tests and preventive services for persons with diabetes include: an eye exam to screen for diabetic retinopathy, a kidney function test, a foot exam, and stricter control over your cholesterol.   -Cardiovascular screening for adults with routine risk involves an electrocardiogram (ECG) at intervals determined by your doctor.   -Colorectal cancer screenings should be done for adults age 54-65 with no increased risk factors for colorectal cancer. There are a number of acceptable methods of screening for this type of cancer. Each test has its own benefits and drawbacks. Discuss with your doctor what is most appropriate for you during your annual wellness visit. The different tests include: colonoscopy (considered the best screening method), a fecal occult blood test, a fecal DNA test, and sigmoidoscopy.    -A bone mass density test is recommended when a woman turns 65 to screen for osteoporosis. This test is only recommended one time, as a screening. Some providers will use this same test as a disease monitoring tool if you already have osteoporosis. -Breast cancer screenings are recommended every other year for women of normal risk, age 54-69.  -Cervical cancer screenings for women over age 72 are only recommended with certain risk factors.      Here is a list of your current Health Maintenance items (your personalized list of preventive services) with a due date:  Health Maintenance Due   Topic Date Due    COVID-19 Vaccine (1) Never done    Shingles Vaccine (1 of 2) Never done    Diabetic Foot Care  03/31/2021    Albumin Urine Test  03/31/2021    Hemoglobin A1C    06/10/2021    Yearly Flu Vaccine (1) 09/01/2021    Cholesterol Test   12/10/2021

## 2022-03-10 DIAGNOSIS — E03.9 ACQUIRED HYPOTHYROIDISM: ICD-10-CM

## 2022-03-10 DIAGNOSIS — N39.0 URINARY TRACT INFECTION WITHOUT HEMATURIA, SITE UNSPECIFIED: ICD-10-CM

## 2022-03-10 LAB
25(OH)D3 SERPL-MCNC: 28.9 NG/ML (ref 30–100)
ALBUMIN SERPL-MCNC: 3.6 G/DL (ref 3.5–5)
ALBUMIN/GLOB SERPL: 1.3 {RATIO} (ref 1.1–2.2)
ALP SERPL-CCNC: 56 U/L (ref 45–117)
ALT SERPL-CCNC: 27 U/L (ref 12–78)
ANION GAP SERPL CALC-SCNC: 2 MMOL/L (ref 5–15)
APPEARANCE UR: ABNORMAL
AST SERPL-CCNC: 17 U/L (ref 15–37)
BACTERIA URNS QL MICRO: ABNORMAL /HPF
BASOPHILS # BLD: 0 K/UL (ref 0–0.1)
BASOPHILS NFR BLD: 0 % (ref 0–1)
BILIRUB SERPL-MCNC: 0.2 MG/DL (ref 0.2–1)
BILIRUB UR QL: NEGATIVE
BUN SERPL-MCNC: 39 MG/DL (ref 6–20)
BUN/CREAT SERPL: 28 (ref 12–20)
CALCIUM SERPL-MCNC: 9.2 MG/DL (ref 8.5–10.1)
CHLORIDE SERPL-SCNC: 109 MMOL/L (ref 97–108)
CHOLEST SERPL-MCNC: 199 MG/DL
CK SERPL-CCNC: 33 U/L (ref 26–192)
CO2 SERPL-SCNC: 28 MMOL/L (ref 21–32)
COLOR UR: ABNORMAL
CREAT SERPL-MCNC: 1.39 MG/DL (ref 0.55–1.02)
DIFFERENTIAL METHOD BLD: ABNORMAL
EOSINOPHIL # BLD: 0.2 K/UL (ref 0–0.4)
EOSINOPHIL NFR BLD: 3 % (ref 0–7)
EPITH CASTS URNS QL MICRO: ABNORMAL /LPF
ERYTHROCYTE [DISTWIDTH] IN BLOOD BY AUTOMATED COUNT: 14 % (ref 11.5–14.5)
EST. AVERAGE GLUCOSE BLD GHB EST-MCNC: 154 MG/DL
GLOBULIN SER CALC-MCNC: 2.8 G/DL (ref 2–4)
GLUCOSE SERPL-MCNC: 190 MG/DL (ref 65–100)
GLUCOSE UR STRIP.AUTO-MCNC: NEGATIVE MG/DL
HBA1C MFR BLD: 7 % (ref 4–5.6)
HCT VFR BLD AUTO: 35.7 % (ref 35–47)
HDLC SERPL-MCNC: 46 MG/DL
HDLC SERPL: 4.3 {RATIO} (ref 0–5)
HGB BLD-MCNC: 11.2 G/DL (ref 11.5–16)
HGB UR QL STRIP: NEGATIVE
HYALINE CASTS URNS QL MICRO: ABNORMAL /LPF (ref 0–5)
IMM GRANULOCYTES # BLD AUTO: 0 K/UL (ref 0–0.04)
IMM GRANULOCYTES NFR BLD AUTO: 0 % (ref 0–0.5)
KETONES UR QL STRIP.AUTO: NEGATIVE MG/DL
LDLC SERPL CALC-MCNC: 105.4 MG/DL (ref 0–100)
LEUKOCYTE ESTERASE UR QL STRIP.AUTO: ABNORMAL
LYMPHOCYTES # BLD: 3.1 K/UL (ref 0.8–3.5)
LYMPHOCYTES NFR BLD: 41 % (ref 12–49)
MCH RBC QN AUTO: 31.8 PG (ref 26–34)
MCHC RBC AUTO-ENTMCNC: 31.4 G/DL (ref 30–36.5)
MCV RBC AUTO: 101.4 FL (ref 80–99)
MONOCYTES # BLD: 0.7 K/UL (ref 0–1)
MONOCYTES NFR BLD: 9 % (ref 5–13)
NEUTS SEG # BLD: 3.6 K/UL (ref 1.8–8)
NEUTS SEG NFR BLD: 47 % (ref 32–75)
NITRITE UR QL STRIP.AUTO: NEGATIVE
NRBC # BLD: 0 K/UL (ref 0–0.01)
NRBC BLD-RTO: 0 PER 100 WBC
PH UR STRIP: 5 [PH] (ref 5–8)
PLATELET # BLD AUTO: 139 K/UL (ref 150–400)
PMV BLD AUTO: 12.7 FL (ref 8.9–12.9)
POTASSIUM SERPL-SCNC: 5.1 MMOL/L (ref 3.5–5.1)
PROT SERPL-MCNC: 6.4 G/DL (ref 6.4–8.2)
PROT UR STRIP-MCNC: NEGATIVE MG/DL
RBC # BLD AUTO: 3.52 M/UL (ref 3.8–5.2)
RBC #/AREA URNS HPF: ABNORMAL /HPF (ref 0–5)
SODIUM SERPL-SCNC: 139 MMOL/L (ref 136–145)
SP GR UR REFRACTOMETRY: 1.01 (ref 1–1.03)
T4 FREE SERPL-MCNC: 1 NG/DL (ref 0.8–1.5)
TRIGL SERPL-MCNC: 238 MG/DL (ref ?–150)
TSH SERPL DL<=0.05 MIU/L-ACNC: 5.56 UIU/ML (ref 0.36–3.74)
UROBILINOGEN UR QL STRIP.AUTO: 0.2 EU/DL (ref 0.2–1)
VLDLC SERPL CALC-MCNC: 47.6 MG/DL
WBC # BLD AUTO: 7.6 K/UL (ref 3.6–11)
WBC URNS QL MICRO: ABNORMAL /HPF (ref 0–4)

## 2022-03-10 RX ORDER — LEVOTHYROXINE SODIUM 100 UG/1
100 TABLET ORAL
Qty: 90 TABLET | Refills: 1 | Status: SHIPPED | OUTPATIENT
Start: 2022-03-10 | End: 2022-10-03 | Stop reason: SDUPTHER

## 2022-03-10 RX ORDER — CEFDINIR 300 MG/1
300 CAPSULE ORAL 2 TIMES DAILY
Qty: 14 CAPSULE | Refills: 0 | Status: SHIPPED | OUTPATIENT
Start: 2022-03-10 | End: 2022-05-10 | Stop reason: SDUPTHER

## 2022-03-10 NOTE — PROGRESS NOTES
Urine consistent with UTI - treat with omnicef bid x 7 days  HgbA1C at 7.0 is at goal  Thyroid dose is low - increase to 100 mcg daily of levothyroxine  LDL cholesterol is not adequately controlled at 105 - goal of at least < 100 and prefer 70. Reduce saturated fat in the diet (red meat, butter, cheese, dairy, fried foods, etc) along with taking rosuvastatin daily.   Vitamin D is a little low - take the monthly vitamin D as Rx'd  Other labs are either normal or stable and at goal.  Continue other current medications   Repeat labs in 3 months

## 2022-03-11 LAB
BACTERIA SPEC CULT: ABNORMAL
BACTERIA SPEC CULT: ABNORMAL
CC UR VC: ABNORMAL
SERVICE CMNT-IMP: ABNORMAL

## 2022-03-18 PROBLEM — R76.8 RHEUMATOID FACTOR POSITIVE: Status: ACTIVE | Noted: 2018-01-31

## 2022-03-18 NOTE — PROGRESS NOTES
Tried reaching pt several times in regards to lab results. Lab letter placed in mail for pt. No further actions required at this time.

## 2022-03-19 PROBLEM — Z95.1 S/P CABG X 4: Status: ACTIVE | Noted: 2018-02-06

## 2022-03-19 PROBLEM — E11.21 TYPE 2 DIABETES MELLITUS WITH NEPHROPATHY (HCC): Status: ACTIVE | Noted: 2018-01-22

## 2022-03-19 PROBLEM — E03.9 ACQUIRED HYPOTHYROIDISM: Status: ACTIVE | Noted: 2019-04-02

## 2022-03-19 PROBLEM — L72.3 SEBACEOUS CYST: Status: ACTIVE | Noted: 2017-04-04

## 2022-03-19 PROBLEM — E66.01 SEVERE OBESITY (HCC): Status: ACTIVE | Noted: 2020-06-12

## 2022-03-19 PROBLEM — M70.61 TROCHANTERIC BURSITIS, RIGHT HIP: Status: ACTIVE | Noted: 2017-01-06

## 2022-03-19 PROBLEM — R80.9 MICROALBUMINURIA: Status: ACTIVE | Noted: 2018-04-10

## 2022-03-20 PROBLEM — I20.0 UNSTABLE ANGINA (HCC): Status: ACTIVE | Noted: 2018-02-03

## 2022-03-20 PROBLEM — R07.9 CHEST PAIN: Status: ACTIVE | Noted: 2018-02-02

## 2022-04-05 DIAGNOSIS — R60.0 LOCALIZED EDEMA: ICD-10-CM

## 2022-04-07 RX ORDER — INSULIN GLARGINE 100 [IU]/ML
INJECTION, SOLUTION SUBCUTANEOUS
Qty: 5 PEN | Refills: 5 | Status: SHIPPED | OUTPATIENT
Start: 2022-04-07

## 2022-04-07 RX ORDER — FUROSEMIDE 20 MG/1
TABLET ORAL
Qty: 30 TABLET | Refills: 5 | Status: SHIPPED | OUTPATIENT
Start: 2022-04-07

## 2022-04-18 ENCOUNTER — HOSPITAL ENCOUNTER (OUTPATIENT)
Dept: GENERAL RADIOLOGY | Age: 82
Discharge: HOME | End: 2022-04-18
Payer: MEDICARE

## 2022-04-18 DIAGNOSIS — M54.12 CERVICAL RADICULOPATHY: ICD-10-CM

## 2022-04-18 PROCEDURE — 72050 X-RAY EXAM NECK SPINE 4/5VWS: CPT

## 2022-04-18 NOTE — PROGRESS NOTES
Some arthritis changes as well as a C6-7 disc abnormality that could be contributing to the arm pain. If physical therapy is not helping, we would get a spine specialist to see her.

## 2022-05-08 DIAGNOSIS — E11.21 TYPE 2 DIABETES MELLITUS WITH NEPHROPATHY (HCC): ICD-10-CM

## 2022-05-08 DIAGNOSIS — Z95.1 S/P CABG X 4: ICD-10-CM

## 2022-05-09 RX ORDER — INSULIN LISPRO 100 [IU]/ML
INJECTION, SOLUTION INTRAVENOUS; SUBCUTANEOUS
Qty: 15 PEN | Refills: 3 | Status: SHIPPED | OUTPATIENT
Start: 2022-05-09

## 2022-05-10 ENCOUNTER — OFFICE VISIT (OUTPATIENT)
Dept: INTERNAL MEDICINE CLINIC | Age: 82
End: 2022-05-10
Payer: MEDICARE

## 2022-05-10 VITALS
WEIGHT: 216 LBS | DIASTOLIC BLOOD PRESSURE: 55 MMHG | HEART RATE: 67 BPM | OXYGEN SATURATION: 94 % | BODY MASS INDEX: 34.72 KG/M2 | TEMPERATURE: 98.4 F | HEIGHT: 66 IN | SYSTOLIC BLOOD PRESSURE: 114 MMHG

## 2022-05-10 DIAGNOSIS — N18.31 STAGE 3A CHRONIC KIDNEY DISEASE (HCC): ICD-10-CM

## 2022-05-10 DIAGNOSIS — R35.0 URINARY FREQUENCY: ICD-10-CM

## 2022-05-10 DIAGNOSIS — E11.21 TYPE 2 DIABETES MELLITUS WITH NEPHROPATHY (HCC): Primary | ICD-10-CM

## 2022-05-10 DIAGNOSIS — E78.5 HYPERLIPIDEMIA, UNSPECIFIED HYPERLIPIDEMIA TYPE: ICD-10-CM

## 2022-05-10 DIAGNOSIS — E55.9 VITAMIN D DEFICIENCY: ICD-10-CM

## 2022-05-10 DIAGNOSIS — Z95.1 S/P CABG X 4: ICD-10-CM

## 2022-05-10 DIAGNOSIS — I73.9 PVD (PERIPHERAL VASCULAR DISEASE) (HCC): ICD-10-CM

## 2022-05-10 DIAGNOSIS — N39.0 URINARY TRACT INFECTION WITHOUT HEMATURIA, SITE UNSPECIFIED: ICD-10-CM

## 2022-05-10 DIAGNOSIS — I51.89 DIASTOLIC DYSFUNCTION: ICD-10-CM

## 2022-05-10 DIAGNOSIS — E08.3599 PROLIFERATIVE DIABETIC RETINOPATHY WITHOUT MACULAR EDEMA ASSOCIATED WITH DIABETES MELLITUS DUE TO UNDERLYING CONDITION, UNSPECIFIED LATERALITY (HCC): ICD-10-CM

## 2022-05-10 DIAGNOSIS — E03.9 ACQUIRED HYPOTHYROIDISM: ICD-10-CM

## 2022-05-10 DIAGNOSIS — N39.0 RECURRENT UTI: ICD-10-CM

## 2022-05-10 PROBLEM — N18.30 CHRONIC RENAL DISEASE, STAGE III (HCC): Status: ACTIVE | Noted: 2022-05-10

## 2022-05-10 LAB
BILIRUB UR QL STRIP: NEGATIVE
GLUCOSE UR-MCNC: NEGATIVE MG/DL
KETONES P FAST UR STRIP-MCNC: NEGATIVE MG/DL
PH UR STRIP: 5.5 [PH] (ref 4.6–8)
PROT UR QL STRIP: ABNORMAL
SP GR UR STRIP: 1.01 (ref 1–1.03)
UA UROBILINOGEN AMB POC: ABNORMAL (ref 0.2–1)
URINALYSIS CLARITY POC: CLEAR
URINALYSIS COLOR POC: YELLOW
URINE BLOOD POC: NEGATIVE
URINE LEUKOCYTES POC: ABNORMAL
URINE NITRITES POC: NEGATIVE

## 2022-05-10 PROCEDURE — 99214 OFFICE O/P EST MOD 30 MIN: CPT | Performed by: INTERNAL MEDICINE

## 2022-05-10 PROCEDURE — G8427 DOCREV CUR MEDS BY ELIG CLIN: HCPCS | Performed by: INTERNAL MEDICINE

## 2022-05-10 PROCEDURE — 1101F PT FALLS ASSESS-DOCD LE1/YR: CPT | Performed by: INTERNAL MEDICINE

## 2022-05-10 PROCEDURE — 3051F HG A1C>EQUAL 7.0%<8.0%: CPT | Performed by: INTERNAL MEDICINE

## 2022-05-10 PROCEDURE — 81001 URINALYSIS AUTO W/SCOPE: CPT | Performed by: INTERNAL MEDICINE

## 2022-05-10 PROCEDURE — G8399 PT W/DXA RESULTS DOCUMENT: HCPCS | Performed by: INTERNAL MEDICINE

## 2022-05-10 PROCEDURE — G8754 DIAS BP LESS 90: HCPCS | Performed by: INTERNAL MEDICINE

## 2022-05-10 PROCEDURE — G8510 SCR DEP NEG, NO PLAN REQD: HCPCS | Performed by: INTERNAL MEDICINE

## 2022-05-10 PROCEDURE — G8417 CALC BMI ABV UP PARAM F/U: HCPCS | Performed by: INTERNAL MEDICINE

## 2022-05-10 PROCEDURE — G8536 NO DOC ELDER MAL SCRN: HCPCS | Performed by: INTERNAL MEDICINE

## 2022-05-10 PROCEDURE — G8752 SYS BP LESS 140: HCPCS | Performed by: INTERNAL MEDICINE

## 2022-05-10 PROCEDURE — 1090F PRES/ABSN URINE INCON ASSESS: CPT | Performed by: INTERNAL MEDICINE

## 2022-05-10 PROCEDURE — G0463 HOSPITAL OUTPT CLINIC VISIT: HCPCS | Performed by: INTERNAL MEDICINE

## 2022-05-10 RX ORDER — CEFDINIR 300 MG/1
300 CAPSULE ORAL 2 TIMES DAILY
Qty: 14 CAPSULE | Refills: 0 | Status: SHIPPED | OUTPATIENT
Start: 2022-05-10 | End: 2022-05-17

## 2022-05-10 NOTE — PROGRESS NOTES
RM 13    Chief Complaint   Patient presents with    Urinary Burning     started 2-3 days ago. lower back pain off and on. urinary frequency as well. Visit Vitals  BP (!) 114/55   Pulse 67   Temp 98.4 °F (36.9 °C)   Ht 5' 6\" (1.676 m)   Wt 216 lb (98 kg)   SpO2 94%   BMI 34.86 kg/m²       3 most recent PHQ Screens 5/10/2022   Little interest or pleasure in doing things Not at all   Feeling down, depressed, irritable, or hopeless Not at all   Total Score PHQ 2 0   Trouble falling or staying asleep, or sleeping too much -   Feeling tired or having little energy -   Poor appetite, weight loss, or overeating -   Feeling bad about yourself - or that you are a failure or have let yourself or your family down -   Trouble concentrating on things such as school, work, reading, or watching TV -   Moving or speaking so slowly that other people could have noticed; or the opposite being so fidgety that others notice -   Thoughts of being better off dead, or hurting yourself in some way -   PHQ 9 Score -   How difficult have these problems made it for you to do your work, take care of your home and get along with others -         1. Have you been to the ER, urgent care clinic since your last visit? Hospitalized since your last visit? No    2. Have you seen or consulted any other health care providers outside of the [address] [place] [address] since your last visit? Include any pap smears or colon screening.  No    Health Maintenance Due   Topic Date Due    COVID-19 Vaccine (1) Never done    Shingrix Vaccine Age 50> (1 of 2) Never done    Foot Exam Q1  03/31/2021       Learning Assessment 4/4/2017   PRIMARY LEARNER Patient   HIGHEST LEVEL OF EDUCATION - PRIMARY LEARNER  2 YEARS OF COLLEGE   BARRIERS PRIMARY LEARNER NONE   CO-LEARNER CAREGIVER -   PRIMARY LANGUAGE OTHER (COMMENT)    NEED Yes   LEARNER PREFERENCE PRIMARY OTHER (COMMENT)   ANSWERED BY patient   RELATIONSHIP SELF         AVS  education, follow up, and recommendations provided and addressed with patient.   services used to advise patient - no

## 2022-05-10 NOTE — PROGRESS NOTES
HPI:  established patient  Presents for f/u DM2, lipids, etc    Accompanied by daughter - translates per pt preference    C/o dysuria, frequency  Recurrent UTI hx    Thyroid dose increased     Taking and tolerating meds well    Past medical, Social, and Family history reviewed    Prior to Admission medications    Medication Sig Start Date End Date Taking? Authorizing Provider   HumaLOG KwikPen Insulin 100 unit/mL kwikpen INJECT 5 UNITS BENEATH THE SKIN BEFORE BREAKFAST, LUNCH, DINNER 5/9/22  Yes Zenia Gaffney MD   furosemide (LASIX) 20 mg tablet TAKE 1 TAB BY MOUTH EVERY THREE (3) DAYS AS NEEDED FOR LEG SWELLING 4/7/22  Yes Zenia Gaffney MD   insulin glargine (Lantus Solostar U-100 Insulin) 100 unit/mL (3 mL) inpn INJECT 48 UNITS BENEATH THE SKIN AT BEDTIME 4/7/22  Yes Zenia Gaffney MD   levothyroxine (SYNTHROID) 100 mcg tablet Take 1 Tablet by mouth Daily (before breakfast). 3/10/22  Yes Zenia Gaffney MD   ergocalciferol (ERGOCALCIFEROL) 1,250 mcg (50,000 unit) capsule Take 1 Capsule by mouth every thirty (30) days. Indications: vitamin D deficiency (high dose therapy) 3/9/22  Yes Zenia Gaffney MD   rosuvastatin (CRESTOR) 40 mg tablet Take 1 Tablet by mouth daily. 3/9/22  Yes Zenia Gaffney MD   SITagliptin-metFORMIN (Janumet) 50-1,000 mg per tablet Take 1 Tablet by mouth daily. 3/9/22  Yes Zenia Gaffney MD   Insulin Needles, Disposable, (BD Ultra-Fine Short Pen Needle) 31 gauge x 5/16\" ndle TEST 3 TIMES A DAY AS DIRECTED - DX E11.21 8/15/21  Yes Zenia Gaffney MD   esomeprazole (NEXIUM) 20 mg capsule TAKE 1 CAPSULE BY MOUTH EVERY DAY 5/24/21  Yes Zenia Gaffney MD   hydrocortisone (CORTAID) 1 % topical cream Apply  to affected area two (2) times a day. use thin layer 12/7/20  Yes Zenia Gaffney MD   nystatin (MYCOSTATIN) powder Apply  to affected area three (3) times daily.  As directed 12/7/20  Yes Zenia Gaffney MD   zinc sulfate (Zinc-15) 66 mg tab Take  by mouth.   Yes Provider, Historical   multivitamin-iron-FA, hematinic, (Centrum)  mg-mcg tab tablet Take 1 Tab by mouth daily. Yes Provider, Historical   dicyclomine (BENTYL) 10 mg capsule Take 1 Cap by mouth three (3) times daily as needed for Abdominal Cramps. 7/1/20  Yes Evangelina Storm MD   polyethylene glycol [facility]) 17 gram/dose powder Take 17 g by mouth daily as needed for Constipation. 7/1/20  Yes Evangelina Storm MD   Omega-3 Fatty Acids 60- mg cpDR Take  by mouth daily. Yes Provider, Historical   timolol (TIMOPTIC) 0.5 % ophthalmic solution PLACE 1 DROP INTO LEFT EYE TWICE A DAY AS DIRECTED 2/4/20  Yes Provider, Historical   OMEPRAZOLE PO Take  by mouth. Yes Provider, Historical   losartan (COZAAR) 25 mg tablet Take  by mouth daily. Yes Provider, Historical   amLODIPine (NORVASC) 5 mg tablet Take 5 mg by mouth daily. Yes Provider, Historical   metoprolol tartrate (LOPRESSOR) 25 mg tablet Take 75 mg by mouth two (2) times a day. Yes Provider, Historical   acetaminophen (TYLENOL EXTRA STRENGTH) 500 mg tablet Take 500 mg by mouth every six (6) hours as needed for Pain. Yes Provider, Historical   COMBIGAN 0.2-0.5 % drop ophthalmic solution INSTILL 1 DROP IN RIGHT EYE EVERY MORNING AND NIGHT. 3/16/17  Yes Provider, Historical   aspirin delayed-release 81 mg tablet Take 81 mg by mouth daily. Yes Other, MD Gurpreet   melatonin 5 mg cap capsule Take 1 Cap by mouth nightly. Patient not taking: Reported on 5/10/2022 7/1/20   Evangelina Storm MD   gabapentin (NEURONTIN) 300 mg capsule Take 1 Cap by mouth nightly. Max Daily Amount: 300 mg. Patient not taking: Reported on 3/9/2022 6/12/20   Evangelina Storm MD   amiodarone (CORDARONE) 200 mg tablet Take  by mouth. Patient not taking: Reported on 5/10/2022    Provider, Historical          ROS  Complete ROS reviewed and negative or stable except as noted in HPI. Physical Exam  Vitals and nursing note reviewed.    Constitutional: General: She is not in acute distress. HENT:      Head: Normocephalic and atraumatic. Eyes:      General: No scleral icterus. Pupils: Pupils are equal, round, and reactive to light. Neck:      Vascular: No JVD. Cardiovascular:      Rate and Rhythm: Normal rate and regular rhythm. Heart sounds: Normal heart sounds. No murmur heard. No friction rub. No gallop. Pulmonary:      Effort: Pulmonary effort is normal. No respiratory distress. Breath sounds: Normal breath sounds. No wheezing or rales. Abdominal:      General: There is no distension. Palpations: Abdomen is soft. Tenderness: There is no abdominal tenderness. Musculoskeletal:         General: Normal range of motion. Cervical back: Normal range of motion and neck supple. Right lower leg: Edema (trace) present. Left lower leg: Edema (trace) present. Lymphadenopathy:      Cervical: No cervical adenopathy. Skin:     General: Skin is warm. Findings: No rash. Neurological:      Mental Status: She is alert and oriented to person, place, and time. Motor: No abnormal muscle tone. Prior labs reviewed. Assessment/Plan:    ICD-10-CM ICD-9-CM    1. Type 2 diabetes mellitus with nephropathy ([facility])  E11.21 250.40 HEMOGLOBIN A1C WITH EAG     583.81    2. Urinary frequency  R35.0 788.41 AMB POC URINALYSIS DIP STICK AUTO W/ MICRO      AMB POC URINALYSIS DIP STICK MANUAL W/O MICRO      CULTURE, URINE      URINALYSIS W/ RFLX MICROSCOPIC      cefdinir (OMNICEF) 300 mg capsule   3. Recurrent UTI  N39.0 599.0 REFERRAL TO FEMALE PELVIC MEDICINE AND RECONSTRUCTIVE SURGERY      cefdinir (OMNICEF) 300 mg capsule      CULTURE, URINE   4. Stage 3a chronic kidney disease ([facility])  N18.31 585.3    5. PVD (peripheral vascular disease) ([facility])  I73.9 443.9    6.  Proliferative diabetic retinopathy without macular edema associated with diabetes mellitus due to underlying condition, unspecified laterality ([facility] [address] D24.9866 249.50      362.02    7. Urinary tract infection without hematuria, site unspecified  N39.0 599.0 cefdinir (OMNICEF) 300 mg capsule   8. S/P CABG x 4  Z95.1 V45.81    9. Hyperlipidemia, unspecified hyperlipidemia type  E78.5 174.2 CK      METABOLIC PANEL, COMPREHENSIVE      LIPID PANEL   10. Acquired hypothyroidism  E03.9 244.9 TSH 3RD GENERATION      T4, FREE   11. Diastolic dysfunction  R49.65 429.9 CBC WITH AUTOMATED DIFF   12. Vitamin D deficiency  E55.9 268.9 VITAMIN D, 25 HYDROXY     Follow-up and Dispositions    · Return in about 4 months (around 9/10/2022), or if symptoms worsen or fail to improve, for diabetes, thyroid. results and schedule of future studies reviewed with patient, daughter  reviewed diet, exercise and weight   cardiovascular risk and specific lipid/LDL goals reviewed  reviewed medications and side effects in detail    Ref to urogyn  Send urine  Empiric abx  Labs in 2 weeks or so - to return  COVID booster (pt's 3rd dose)  Continue current medications for now      An electronic signature was used to authenticate this note.   -- Reed Mcfarland MD

## 2022-05-11 LAB
APPEARANCE UR: CLEAR
BACTERIA URNS QL MICRO: NEGATIVE /HPF
BILIRUB UR QL: NEGATIVE
COLOR UR: ABNORMAL
EPITH CASTS URNS QL MICRO: ABNORMAL /LPF
GLUCOSE UR STRIP.AUTO-MCNC: NEGATIVE MG/DL
HGB UR QL STRIP: NEGATIVE
HYALINE CASTS URNS QL MICRO: ABNORMAL /LPF (ref 0–5)
KETONES UR QL STRIP.AUTO: NEGATIVE MG/DL
LEUKOCYTE ESTERASE UR QL STRIP.AUTO: ABNORMAL
NITRITE UR QL STRIP.AUTO: NEGATIVE
PH UR STRIP: 5 [PH] (ref 5–8)
PROT UR STRIP-MCNC: ABNORMAL MG/DL
RBC #/AREA URNS HPF: ABNORMAL /HPF (ref 0–5)
SP GR UR REFRACTOMETRY: 1.01 (ref 1–1.03)
UROBILINOGEN UR QL STRIP.AUTO: 0.2 EU/DL (ref 0.2–1)
WBC URNS QL MICRO: ABNORMAL /HPF (ref 0–4)

## 2022-05-12 ENCOUNTER — HOSPITAL ENCOUNTER (OUTPATIENT)
Dept: MAMMOGRAPHY | Age: 82
Discharge: HOME | End: 2022-05-12
Attending: INTERNAL MEDICINE
Payer: MEDICARE

## 2022-05-12 DIAGNOSIS — Z12.31 ENCOUNTER FOR SCREENING MAMMOGRAM FOR BREAST CANCER: ICD-10-CM

## 2022-05-12 LAB
BACTERIA SPEC CULT: NORMAL
SERVICE CMNT-IMP: NORMAL

## 2022-05-12 PROCEDURE — 77063 BREAST TOMOSYNTHESIS BI: CPT

## 2022-05-12 NOTE — PROGRESS NOTES
Urine culture has no growth. But, if any antibiotic had been taken prior to sample collection, we will get a falsely negative test.  If symptoms have improved on the antibiotic then it should be completed.

## 2022-05-19 DIAGNOSIS — K59.00 CONSTIPATION, UNSPECIFIED CONSTIPATION TYPE: ICD-10-CM

## 2022-05-20 RX ORDER — NICOTINE POLACRILEX 2 MG
LOZENGE BUCCAL
Qty: 510 G | Refills: 5 | Status: SHIPPED | OUTPATIENT
Start: 2022-05-20

## 2022-05-27 ENCOUNTER — APPOINTMENT (OUTPATIENT)
Dept: CT IMAGING | Age: 82
End: 2022-05-27
Attending: STUDENT IN AN ORGANIZED HEALTH CARE EDUCATION/TRAINING PROGRAM
Payer: MEDICARE

## 2022-05-27 ENCOUNTER — APPOINTMENT (OUTPATIENT)
Dept: MRI IMAGING | Age: 82
End: 2022-05-27
Attending: STUDENT IN AN ORGANIZED HEALTH CARE EDUCATION/TRAINING PROGRAM
Payer: MEDICARE

## 2022-05-27 ENCOUNTER — HOSPITAL ENCOUNTER (EMERGENCY)
Age: 82
Discharge: HOME | End: 2022-05-27
Attending: STUDENT IN AN ORGANIZED HEALTH CARE EDUCATION/TRAINING PROGRAM
Payer: MEDICARE

## 2022-05-27 VITALS
OXYGEN SATURATION: 97 % | HEIGHT: 66 IN | BODY MASS INDEX: 36.8 KG/M2 | WEIGHT: 229 LBS | HEART RATE: 65 BPM | RESPIRATION RATE: 15 BRPM | SYSTOLIC BLOOD PRESSURE: 141 MMHG | DIASTOLIC BLOOD PRESSURE: 94 MMHG | TEMPERATURE: 98 F

## 2022-05-27 DIAGNOSIS — M48.02 NEUROFORAMINAL STENOSIS OF CERVICAL SPINE: ICD-10-CM

## 2022-05-27 DIAGNOSIS — M54.2 NECK PAIN: Primary | ICD-10-CM

## 2022-05-27 DIAGNOSIS — R20.0 RIGHT SIDED NUMBNESS: ICD-10-CM

## 2022-05-27 LAB
ALBUMIN SERPL-MCNC: 3.6 G/DL (ref 3.5–5)
ALBUMIN/GLOB SERPL: 1 {RATIO} (ref 1.1–2.2)
ALP SERPL-CCNC: 65 U/L (ref 45–117)
ALT SERPL-CCNC: 34 U/L (ref 12–78)
ANION GAP SERPL CALC-SCNC: 4 MMOL/L (ref 5–15)
APPEARANCE UR: CLEAR
AST SERPL-CCNC: 23 U/L (ref 15–37)
BACTERIA URNS QL MICRO: NEGATIVE /HPF
BASOPHILS # BLD: 0 K/UL (ref 0–0.1)
BASOPHILS NFR BLD: 0 % (ref 0–1)
BILIRUB SERPL-MCNC: 0.3 MG/DL (ref 0.2–1)
BILIRUB UR QL: NEGATIVE
BUN SERPL-MCNC: 38 MG/DL (ref 6–20)
BUN/CREAT SERPL: 32 (ref 12–20)
CALCIUM SERPL-MCNC: 10 MG/DL (ref 8.5–10.1)
CHLORIDE SERPL-SCNC: 109 MMOL/L (ref 97–108)
CO2 SERPL-SCNC: 26 MMOL/L (ref 21–32)
COLOR UR: ABNORMAL
COMMENT, HOLDF: NORMAL
CREAT SERPL-MCNC: 1.17 MG/DL (ref 0.55–1.02)
DIFFERENTIAL METHOD BLD: ABNORMAL
EOSINOPHIL # BLD: 0.3 K/UL (ref 0–0.4)
EOSINOPHIL NFR BLD: 3 % (ref 0–7)
EPITH CASTS URNS QL MICRO: ABNORMAL /LPF
ERYTHROCYTE [DISTWIDTH] IN BLOOD BY AUTOMATED COUNT: 12.7 % (ref 11.5–14.5)
GLOBULIN SER CALC-MCNC: 3.6 G/DL (ref 2–4)
GLUCOSE SERPL-MCNC: 191 MG/DL (ref 65–100)
GLUCOSE UR STRIP.AUTO-MCNC: NEGATIVE MG/DL
HCT VFR BLD AUTO: 35.8 % (ref 35–47)
HGB BLD-MCNC: 11.8 G/DL (ref 11.5–16)
HGB UR QL STRIP: NEGATIVE
HYALINE CASTS URNS QL MICRO: ABNORMAL /LPF (ref 0–5)
IMM GRANULOCYTES # BLD AUTO: 0 K/UL (ref 0–0.04)
IMM GRANULOCYTES NFR BLD AUTO: 0 % (ref 0–0.5)
KETONES UR QL STRIP.AUTO: NEGATIVE MG/DL
LEUKOCYTE ESTERASE UR QL STRIP.AUTO: ABNORMAL
LYMPHOCYTES # BLD: 2.9 K/UL (ref 0.8–3.5)
LYMPHOCYTES NFR BLD: 31 % (ref 12–49)
MAGNESIUM SERPL-MCNC: 2.5 MG/DL (ref 1.6–2.4)
MCH RBC QN AUTO: 32 PG (ref 26–34)
MCHC RBC AUTO-ENTMCNC: 33 G/DL (ref 30–36.5)
MCV RBC AUTO: 97 FL (ref 80–99)
MONOCYTES # BLD: 0.8 K/UL (ref 0–1)
MONOCYTES NFR BLD: 9 % (ref 5–13)
NEUTS SEG # BLD: 5.3 K/UL (ref 1.8–8)
NEUTS SEG NFR BLD: 57 % (ref 32–75)
NITRITE UR QL STRIP.AUTO: NEGATIVE
NRBC # BLD: 0 K/UL (ref 0–0.01)
NRBC BLD-RTO: 0 PER 100 WBC
PH UR STRIP: 6.5 [PH] (ref 5–8)
PLATELET # BLD AUTO: 147 K/UL (ref 150–400)
PMV BLD AUTO: 12 FL (ref 8.9–12.9)
POTASSIUM SERPL-SCNC: 4.5 MMOL/L (ref 3.5–5.1)
PROT SERPL-MCNC: 7.2 G/DL (ref 6.4–8.2)
PROT UR STRIP-MCNC: 30 MG/DL
RBC # BLD AUTO: 3.69 M/UL (ref 3.8–5.2)
RBC #/AREA URNS HPF: ABNORMAL /HPF (ref 0–5)
SAMPLES BEING HELD,HOLD: NORMAL
SODIUM SERPL-SCNC: 139 MMOL/L (ref 136–145)
SP GR UR REFRACTOMETRY: 1.01 (ref 1–1.03)
TROPONIN-HIGH SENSITIVITY: 6 NG/L (ref 0–51)
UROBILINOGEN UR QL STRIP.AUTO: 0.2 EU/DL (ref 0.2–1)
WBC # BLD AUTO: 9.3 K/UL (ref 3.6–11)
WBC URNS QL MICRO: ABNORMAL /HPF (ref 0–4)

## 2022-05-27 PROCEDURE — 80053 COMPREHEN METABOLIC PANEL: CPT

## 2022-05-27 PROCEDURE — 84484 ASSAY OF TROPONIN QUANT: CPT

## 2022-05-27 PROCEDURE — 36415 COLL VENOUS BLD VENIPUNCTURE: CPT

## 2022-05-27 PROCEDURE — 70551 MRI BRAIN STEM W/O DYE: CPT

## 2022-05-27 PROCEDURE — 81001 URINALYSIS AUTO W/SCOPE: CPT

## 2022-05-27 PROCEDURE — 85025 COMPLETE CBC W/AUTO DIFF WBC: CPT

## 2022-05-27 PROCEDURE — 72125 CT NECK SPINE W/O DYE: CPT

## 2022-05-27 PROCEDURE — 72141 MRI NECK SPINE W/O DYE: CPT

## 2022-05-27 PROCEDURE — 83735 ASSAY OF MAGNESIUM: CPT

## 2022-05-27 PROCEDURE — 99284 EMERGENCY DEPT VISIT MOD MDM: CPT

## 2022-05-27 PROCEDURE — 70450 CT HEAD/BRAIN W/O DYE: CPT

## 2022-05-27 RX ORDER — PREDNISONE 5 MG/1
TABLET ORAL
Qty: 21 TABLET | Refills: 0 | Status: SHIPPED | OUTPATIENT
Start: 2022-05-27

## 2022-05-27 NOTE — ED NOTES
MD reviewed discharge paperwork with patient. Patient states no further questions at this time. IV removed and patient ambulated independently to lobby.

## 2022-05-27 NOTE — ED PROVIDER NOTES
The patient is an 48-year-old female history of coronary disease, CKD, diabetes, diastolic heart dysfunction, proliferative diabetic retinopathy presenting today with her daughter with right-sided numbness and neck pain. History is difficult to obtain however her daughter at bedside helps to interpret. Timeline of onset of symptoms is a bit unclear. It seems that she has had this significant neck pain radiating into the right arm and leg for several weeks to months now with associated intermittent numbness and weakness in the right hand. She saw her primary care doctor regarding this who told her she may have a pinched nerve and started her on physical therapy which initially helped however it seems to be now getting worse. She is not taking any medications nor has she had any steroid injections. She has not followed up with spine. Today she had trouble getting out of bed due to the amount of pain she was having, especially with turning the neck. On my exam I noticed that her right face was both numb and slightly drooping. I asked her daughter if she agreed that it was drooping and she felt it was as well. Patient was asked when this started and she thinks it was sometime early yesterday morning but again the history is a bit unclear. She has no history of stroke in the past.  She is not on blood thinners. She denies headache but has pain to the right face. No fever. No recent illness with vomiting or diarrhea. Her daughter feels like her speech is okay. No changes in vision.            Past Medical History:   Diagnosis Date    Acquired hypothyroidism 4/2/2019    CAD (coronary artery disease)     calcium score 229 - 2011, cath 4/2009 - [facility]    Cataract     CKD (chronic kidney disease) stage 3, GFR 30-59 ml/min ([facility])     kidney are ok - no current issues as of 3/8/18    Cystoid macular degeneration of right eye     Diabetes ([facility] [address]     Diabetic neuropathy (HCC)     Diastolic dysfunction     Esophageal reflux 4/9/2015    Foot ulcer ([address]     Glaucoma     right    Hypercholesterolemia     Hyperlipidemia 1/23/2015    Hypertension     Macular degeneration     rt eye    Microalbuminuria 4/10/2018    Proliferative diabetic retinopathy (HCC)     PVD (peripheral vascular disease) (HCC)     no current issues as of 3/8/18    Rupture of ulnar collateral ligament of thumb     Sebaceous cyst 4/4/2017    Urge incontinence     Vitreous hemorrhage of both eyes (HCC)        Past Surgical History:   Procedure Laterality Date    HX CATARACT REMOVAL Left     HX CHOLECYSTECTOMY      HI CARDIAC SURG PROCEDURE UNLIST  02/06/2018    CABG x 4         Family History:   Problem Relation Age of Onset    Heart Failure Mother     Heart Disease Mother     Diabetes Father    Ainsley Tomas Elevated Lipids Father        Social History     Socioeconomic History    Marital status: SINGLE     Spouse name: Not on file    Number of children: Not on file    Years of education: Not on file    Highest education level: Not on file   Occupational History    Not on file   Tobacco Use    Smoking status: Never Smoker    Smokeless tobacco: Never Used   Substance and Sexual Activity    Alcohol use: No    Drug use: No    Sexual activity: Never   Other Topics Concern    Not on file   Social History Narrative    Not on file     Social Determinants of Health     Financial Resource Strain:     Difficulty of Paying Living Expenses: Not on file   Food Insecurity:     Worried About Running Out of Food in the Last Year: Not on file    Radha of Food in the Last Year: Not on file   Transportation Needs:     Lack of Transportation (Medical): Not on file    Lack of Transportation (Non-Medical):  Not on file   Physical Activity:     Days of Exercise per Week: Not on file    Minutes of Exercise per Session: Not on file   Stress:     Feeling of Stress : Not on file   Social Connections:     Frequency of Communication with Friends and Family: Not on file    Frequency of Social Gatherings with Friends and Family: Not on file    Attends Christian Services: Not on file    Active Member of Clubs or Organizations: Not on file    Attends Club or Organization Meetings: Not on file    Marital Status: Not on file   Intimate Partner Violence:     Fear of Current or Ex-Partner: Not on file    Emotionally Abused: Not on file    Physically Abused: Not on file    Sexually Abused: Not on file   Housing Stability:     Unable to Pay for Housing in the Last Year: Not on file    Number of [place] in the Last Year: Not on file    Unstable Housing in the Last Year: Not on file         ALLERGIES: Ibuprofen, Lisinopril, and Norvasc [amlodipine]    Review of Systems   Constitutional: Negative for chills and fever. HENT: Negative for congestion and rhinorrhea. Eyes: Negative for redness and visual disturbance. Respiratory: Negative for cough and shortness of breath. Cardiovascular: Negative for chest pain and leg swelling. Gastrointestinal: Negative for abdominal pain, diarrhea, nausea and vomiting. Genitourinary: Negative for dysuria, flank pain, frequency, hematuria and urgency. Musculoskeletal: Positive for neck pain. Negative for arthralgias, back pain and myalgias. Skin: Negative for rash and wound. Allergic/Immunologic: Negative for immunocompromised state. Neurological: Positive for facial asymmetry, weakness and numbness. Negative for dizziness. Vitals:    05/27/22 0919 05/27/22 0920   BP: (!) 168/78 (!) 164/71   Pulse: 64 64   Resp: 18    Temp: 97.2 °F (36.2 °C)    Weight: 103.9 kg (229 lb)    Height: 5' 6\" (1.676 m)             Physical Exam  Vitals and nursing note reviewed. Constitutional:       General: She is not in acute distress. Appearance: She is well-developed. She is not diaphoretic. HENT:      Head: Normocephalic. Mouth/Throat:      Pharynx: No oropharyngeal exudate.    Eyes:      General: Right eye: No discharge. Left eye: No discharge. Pupils: Pupils are equal, round, and reactive to light. Cardiovascular:      Rate and Rhythm: Normal rate and regular rhythm. Heart sounds: Normal heart sounds. No murmur heard. No friction rub. No gallop. Pulmonary:      Effort: Pulmonary effort is normal. No respiratory distress. Breath sounds: Normal breath sounds. No stridor. No wheezing or rales. Abdominal:      General: Bowel sounds are normal. There is no distension. Palpations: Abdomen is soft. Tenderness: There is no abdominal tenderness. There is no guarding or rebound. Musculoskeletal:         General: No deformity. Normal range of motion. Cervical back: Normal range of motion and neck supple. Skin:     General: Skin is warm and dry. Capillary Refill: Capillary refill takes less than 2 seconds. Findings: No rash. Neurological:      Mental Status: She is alert and oriented to person, place, and time. Comments: There is numbness to the right face with subtle droop of the mouth  She has equal  strength but sensation is diminished throughout the right upper extremity.   She walks with a limp secondary to pain, decreased sensation in the right leg compared to the left however strength on my neurologic exam equal bilaterally  Equal  strength  Median/radial and ulnar nerves tested individually and intact   Psychiatric:         Behavior: Behavior normal.          MDM       Procedures      Labs:  UA clear  No leukocytosis or anemia  Renal function and electrolytes acceptable  Troponin normal    CT head and C-spine negative  MRI head negative  MRI C-spine shows mild to moderate neuroforaminal stenosis at C6-C7 on the right      The patient is an 57-year-old female presenting today with several weeks to months of right-sided neck pain radiating into the arm and leg however today the different seem to be that she was having some right facial numbness and subtle right facial droop. The patient is a poor historian and somewhat difficult to determine if pain or numbness is causing her to feel so poorly. Given her symptoms that are getting progressively worse I did order CTs which were negative followed by MRI head to rule out stroke which was negative and MRI C-spine to rule out spinal cord pathology which was also negative for significant findings, however mild to moderate neuroforaminal stenosis. I will treat with a short course of steroids and I discussed blood sugar management/need to follow it closely while on the steroids. Patient to be discharged home and to follow-up with PCP for further management. ICD-10-CM ICD-9-CM    1. Neck pain  M54.2 723.1    2. Right sided numbness  R20.0 782.0    3.  Neuroforaminal stenosis of cervical spine  M48.02 723.0      MATTIE Silva,

## 2022-05-27 NOTE — DISCHARGE INSTRUCTIONS
RETURN IF WORSENING PAIN, TROUBLE HOLDING STOOL/URINE, RADIATION OF PAIN, OR WORSENING WEAKNESS/NUMBNESS    THE STEROIDS MAY CAUSE YOUR SUGARS TO GO UP SO PLEASE WATCH THEM CLOSELY AND CALL YOUR DOCTOR IF ANY PERSISTENTLY ELEVATED SUGARS

## 2022-05-27 NOTE — ED TRIAGE NOTES
Pt arrived from home c/o R neck pain radiating radiating to R arm and R leg X 2 months. Pt reports coming to the ER today because her pain got worse. Pt reports going to Physical therapy for this pain with no improvement.

## 2022-06-14 ENCOUNTER — DOCUMENTATION ONLY (OUTPATIENT)
Dept: INTERNAL MEDICINE CLINIC | Age: 82
End: 2022-06-14

## 2022-06-14 NOTE — PROGRESS NOTES
[id number] [facility] Physical Therapy Recertification Plan of Care  Signed 06/14/2022 by provider , faxed  to [phone] order and confirmation scanned into cc.

## 2022-06-21 ENCOUNTER — DOCUMENTATION ONLY (OUTPATIENT)
Dept: INTERNAL MEDICINE CLINIC | Age: 82
End: 2022-06-21

## 2022-06-22 NOTE — PROGRESS NOTES
[facility]  [id number] signed by provider on 6/20/2022/ faxed on 6/21/22 to [phone], scanned into cc

## 2022-10-03 DIAGNOSIS — E78.5 HYPERLIPIDEMIA, UNSPECIFIED HYPERLIPIDEMIA TYPE: ICD-10-CM

## 2022-10-03 DIAGNOSIS — E03.9 ACQUIRED HYPOTHYROIDISM: ICD-10-CM

## 2022-10-03 DIAGNOSIS — K59.00 CONSTIPATION, UNSPECIFIED CONSTIPATION TYPE: ICD-10-CM

## 2022-10-03 RX ORDER — LEVOTHYROXINE SODIUM 100 UG/1
100 TABLET ORAL
Qty: 90 TABLET | Refills: 1 | Status: SHIPPED | OUTPATIENT
Start: 2022-10-03

## 2022-10-03 RX ORDER — ROSUVASTATIN CALCIUM 40 MG/1
40 TABLET, COATED ORAL DAILY
Qty: 90 TABLET | Refills: 0 | Status: SHIPPED | OUTPATIENT
Start: 2022-10-03

## 2022-10-03 NOTE — TELEPHONE ENCOUNTER
Last visit 05/10/2022 MD Laverne Chu   Next appointment Nothing scheduled   Previous refill encounter(s)   03/09/2022 Crestor #90 with 1 refill,   03/10/2022 Synthroid #90 with 1 refill. For Pharmacy Admin Tracking Only    Intervention Detail: New Rx: 2, reason: Patient Preference  Time Spent (min): 5  '    Requested Prescriptions     Pending Prescriptions Disp Refills    levothyroxine (SYNTHROID) 100 mcg tablet 90 Tablet 0     Sig: Take 1 Tablet by mouth Daily (before breakfast). rosuvastatin (CRESTOR) 40 mg tablet 90 Tablet 0     Sig: Take 1 Tablet by mouth daily.

## 2022-10-07 NOTE — TELEPHONE ENCOUNTER
Last visit 05/10/2022 MD Gretel Reyes   Next appointment Nothing scheduled   Previous refill encounter(s)   05/24/2021 Nexium #90 with 3 refills. For Pharmacy Admin Tracking Only    Intervention Detail: New Rx: 1, reason: Patient Preference  Time Spent (min): 5      Requested Prescriptions     Pending Prescriptions Disp Refills    esomeprazole (NEXIUM) 20 mg capsule 90 Capsule 0     Sig: Take 1 Capsule by mouth daily.

## 2022-10-08 RX ORDER — HYDROGEN PEROXIDE 3 %
20 SOLUTION, NON-ORAL MISCELLANEOUS DAILY
Qty: 90 CAPSULE | Refills: 0 | Status: SHIPPED | OUTPATIENT
Start: 2022-10-08

## 2022-11-09 NOTE — TELEPHONE ENCOUNTER
Last visit 05/10/2022 MD Nae Mullins   Next appointment [facility] scheduled   Previous refill encounter(s)   03/09/2022 Janumet #90 with 1 refill. For Pharmacy Admin Tracking Only  Intervention Detail: New Rx: 1, reason: Patient Preference  Time Spent (min): 5        Requested Prescriptions     Pending Prescriptions Disp Refills    SITagliptin-metFORMIN (Carlosumet) 50-1,000 mg per tablet 90 Tablet 0     Sig: Take 1 Tablet by mouth daily.

## 2022-11-10 RX ORDER — SITAGLIPTIN AND METFORMIN HYDROCHLORIDE 50; 1000 MG/1; MG/1
1 TABLET, FILM COATED ORAL DAILY
Qty: 90 TABLET | Refills: 0 | Status: SHIPPED | OUTPATIENT
Start: 2022-11-10

## 2022-11-15 DIAGNOSIS — E11.21 TYPE 2 DIABETES MELLITUS WITH NEPHROPATHY (HCC): ICD-10-CM

## 2022-11-15 DIAGNOSIS — Z95.1 S/P CABG X 4: ICD-10-CM

## 2022-11-17 RX ORDER — INSULIN LISPRO 100 [IU]/ML
INJECTION, SOLUTION INTRAVENOUS; SUBCUTANEOUS
Qty: 15 PEN | Refills: 0 | Status: SHIPPED | OUTPATIENT
Start: 2022-11-17

## 2022-11-25 NOTE — TELEPHONE ENCOUNTER
----- Message from Surya Glaserargenis sent at 11/25/2022  9:51 AM EST -----  Subject: Refill Request    QUESTIONS  Name of Medication? insulin glargine (Lantus Solostar U-100 Insulin) 100   unit/mL (3 mL) inpn  Patient-reported dosage and instructions? 48 units daily  How many days do you have left? 1  Preferred Pharmacy? [id number] Pharmacy phone number (if available)? [phone]  ---------------------------------------------------------------------------  --------------  CALL BACK INFO  What is the best way for the office to contact you? OK to leave message on   voicemail  Preferred Call Back Phone Number? [phone]  ---------------------------------------------------------------------------  --------------  SCRIPT ANSWERS  Relationship to Patient? Other  Representative Name? Jonathan Domínguez  Is the Representative on the appropriate HIPAA document in Epic?  Yes

## 2022-11-25 NOTE — TELEPHONE ENCOUNTER
Last Visit: 5/10/22 with MD Sebastian Vargas  Next Appointment: Advised to follow-up in 4 months  Previous Refill Encounter(s): 22 #5 with 5 refills    Requested Prescriptions     Pending Prescriptions Disp Refills    insulin glargine (Lantus Solostar U-100 Insulin) 100 unit/mL (3 mL) inpn 15 Pen 0     Si Units by SubCUTAneous route nightly. For [facility] in place:   Recommendation Provided To:    Intervention Detail: New Rx: 1, reason: Patient Preference and Scheduled Appointment  Gap Closed?:   Intervention Accepted By:   Time Spent (min): 5

## 2022-11-28 RX ORDER — INSULIN GLARGINE 100 [IU]/ML
48 INJECTION, SOLUTION SUBCUTANEOUS
Qty: 15 PEN | Refills: 0 | Status: SHIPPED | OUTPATIENT
Start: 2022-11-28

## 2023-01-04 RX ORDER — SITAGLIPTIN AND METFORMIN HYDROCHLORIDE 50; 1000 MG/1; MG/1
TABLET, FILM COATED ORAL
Qty: 90 TABLET | Refills: 0 | Status: SHIPPED | OUTPATIENT
Start: 2023-01-04

## 2023-01-10 RX ORDER — HYDROGEN PEROXIDE 3 %
SOLUTION, NON-ORAL MISCELLANEOUS
Qty: 90 CAPSULE | Refills: 0 | Status: SHIPPED | OUTPATIENT
Start: 2023-01-10

## 2023-05-24 ENCOUNTER — CLINICAL DOCUMENTATION (OUTPATIENT)
Age: 83
End: 2023-05-24

## 2023-05-30 RX ORDER — INSULIN GLARGINE 100 [IU]/ML
INJECTION, SOLUTION SUBCUTANEOUS
COMMUNITY
Start: 2022-12-26

## 2023-05-30 RX ORDER — ASPIRIN 81 MG/1
TABLET ORAL DAILY
COMMUNITY

## 2023-05-30 RX ORDER — TIMOLOL MALEATE 5 MG/ML
SOLUTION/ DROPS OPHTHALMIC
COMMUNITY
Start: 2020-02-04

## 2023-05-30 RX ORDER — BRIMONIDINE TARTRATE AND TIMOLOL MALEATE 2; 5 MG/ML; MG/ML
SOLUTION OPHTHALMIC
COMMUNITY
Start: 2017-03-16

## 2023-05-30 RX ORDER — SITAGLIPTIN AND METFORMIN HYDROCHLORIDE 1000; 50 MG/1; MG/1
TABLET, FILM COATED ORAL
COMMUNITY
Start: 2023-01-04

## 2023-05-30 RX ORDER — FUROSEMIDE 20 MG/1
TABLET ORAL
COMMUNITY
Start: 2022-04-07

## 2023-05-30 RX ORDER — DICYCLOMINE HYDROCHLORIDE 10 MG/1
CAPSULE ORAL 3 TIMES DAILY PRN
COMMUNITY
Start: 2020-07-01

## 2023-05-30 RX ORDER — GABAPENTIN 300 MG/1
CAPSULE ORAL
COMMUNITY
Start: 2020-06-12

## 2023-05-30 RX ORDER — ERGOCALCIFEROL 1.25 MG/1
CAPSULE ORAL
COMMUNITY
Start: 2022-03-09

## 2023-05-30 RX ORDER — NYSTATIN 100000 [USP'U]/G
POWDER TOPICAL 3 TIMES DAILY
COMMUNITY
Start: 2020-12-07

## 2023-05-30 RX ORDER — LOSARTAN POTASSIUM 25 MG/1
TABLET ORAL DAILY
COMMUNITY

## 2023-05-30 RX ORDER — AMLODIPINE BESYLATE 5 MG/1
TABLET ORAL DAILY
COMMUNITY

## 2023-05-30 RX ORDER — LEVOTHYROXINE SODIUM 0.1 MG/1
TABLET ORAL
COMMUNITY
Start: 2022-10-03

## 2023-05-30 RX ORDER — PREDNISONE 5 MG/1
TABLET ORAL
COMMUNITY
Start: 2022-05-27

## 2023-05-30 RX ORDER — ROSUVASTATIN CALCIUM 40 MG/1
TABLET, COATED ORAL DAILY
COMMUNITY
Start: 2022-10-03

## 2023-05-30 RX ORDER — INSULIN LISPRO 100 [IU]/ML
INJECTION, SOLUTION INTRAVENOUS; SUBCUTANEOUS
COMMUNITY
Start: 2022-11-17

## 2023-05-30 RX ORDER — POLYETHYLENE GLYCOL 3350 17 G/17G
POWDER, FOR SOLUTION ORAL
COMMUNITY
Start: 2022-05-20

## 2023-05-30 RX ORDER — DIAPER,BRIEF,INFANT-TODD,DISP
EACH MISCELLANEOUS 2 TIMES DAILY
COMMUNITY
Start: 2020-12-07

## 2023-05-30 RX ORDER — AMIODARONE HYDROCHLORIDE 200 MG/1
TABLET ORAL
COMMUNITY

## 2023-05-30 RX ORDER — ACETAMINOPHEN 500 MG
TABLET ORAL EVERY 6 HOURS PRN
COMMUNITY

## 2025-05-14 NOTE — PROGRESS NOTES
lvm for daughter to return call about lab results Detail Level: Generalized Quality 226: Preventive Care And Screening: Tobacco Use: Screening And Cessation Intervention: Patient screened for tobacco use and is an ex/non-smoker Quality 47: Advance Care Plan: Advance Care Planning discussed and documented in the medical record; patient did not wish or was not able to name a surrogate decision maker or provide an advance care plan.

## (undated) DEVICE — SYR 50ML LR LCK 1ML GRAD NSAF --

## (undated) DEVICE — SYR 10ML LUER LOK 1/5ML GRAD --

## (undated) DEVICE — INTENDED TO STANDARDIZE OR CAMERAS TO ALLOW FOR THE USE OF THE OR CAMERA COVER: Brand: ASPEN® O.R. CAMERA COVER

## (undated) DEVICE — TRANSFER PK BLD PROD 300ML --

## (undated) DEVICE — TRAY CATHETER 16FR F INCLUDE LUB URIN M TEMP SENS 2 STATLOK STBL

## (undated) DEVICE — Device

## (undated) DEVICE — BLADE OPHTH KNF D3MM 15DEG CATRCT BLU MICRO-SHARP

## (undated) DEVICE — STERNUM BLADE, OFFSET (31.7 X 0.64 X 6.3MM)

## (undated) DEVICE — NEEDLE HYPO 18GA L1.5IN PNK S STL HUB POLYPR SHLD REG BVL

## (undated) DEVICE — TEMP PACING WIRE: Brand: MYO/WIRE

## (undated) DEVICE — DRAPE SLUSH DISC W44XL66IN ST FOR RND BSIN HUSH SLUSH SYS

## (undated) DEVICE — DRESSING AQUACEL ADVANTAGE ALG W4XL5IN RECT GREATER ABSRB HYDRFBR TECHNOLOGY

## (undated) DEVICE — CATHETER IV 14GA L2IN POLYUR STR ORNG HUB SFTY RADPQ DISP

## (undated) DEVICE — SUT PROL 7-0 24IN BV1 DA BLU --

## (undated) DEVICE — MEDI-VAC NON-CONDUCTIVE SUCTION TUBING: Brand: CARDINAL HEALTH

## (undated) DEVICE — SUTURE PROL SZ 4-0 L36IN NONABSORBABLE BLU L26MM SH 1/2 CIR 8521H

## (undated) DEVICE — AVID DUAL STAGE VENOUS DRAINAGE CANNULA: Brand: AVID DUAL STAGE VENOUS DRAINAGE CANNULA

## (undated) DEVICE — SUTURE DEK POLY GRN BR SZ3 0 T 2 2N 6716

## (undated) DEVICE — SUTURE MCRYL SZ 3-0 L27IN ABSRB UD L24MM PS-1 3/8 CIR PRIM Y936H

## (undated) DEVICE — CONNECTOR DRNGE W3/8-0.5XH3/16XL3/16IN 2:1 SIL CARD STR

## (undated) DEVICE — 40418 TRENDELENBURG ONE-STEP ARM PROTECTORS LARGE (1 PAIR): Brand: 40418 TRENDELENBURG ONE-STEP ARM PROTECTORS LARGE (1 PAIR)

## (undated) DEVICE — AORTIC PUNCHES ARE USED TO CREATE A UNIFORM OPENING IN BLOOD VESSELS DURING CARDIOVASCULAR SURGERY. THE VESSEL GRAFT IS INSERTED INTO THE CREATED OPENING AND SUTURED TO THE VESSEL WALL. AORTIC LANCETS ARE USED TO MAKE A SMALL UNIFORM CUT IN A BLOOD VESSEL TO FACILITATE INSERTION OF AN AORTIC PUNCH.  PUNCHES COME IN VARIOUS LENGTHS, DIAMETERS AND TIP CONFIGURATIONS.: Brand: CLEANCUT ROTATING AORTIC PUNCH

## (undated) DEVICE — SENSOR TEMP SKIN DISP

## (undated) DEVICE — 6 FOOT DISPOSABLE EXTENSION CABLE WITH SAFE CONNECT / SCREW-DOWN

## (undated) DEVICE — (D)PREP SKN CHLRAPRP APPL 26ML -- CONVERT TO ITEM 371833

## (undated) DEVICE — GOWN,SIRUS,NONRNF,SETINSLV,XL,20/CS: Brand: MEDLINE

## (undated) DEVICE — REM POLYHESIVE ADULT PATIENT RETURN ELECTRODE: Brand: VALLEYLAB

## (undated) DEVICE — DRAPE FLD WRM W44XL66IN C6L FOR INTRATEMP SYS THERMABASIN

## (undated) DEVICE — LUB SURG MEDC STRL 2OZ TUBE MC -- MEDICHOICE

## (undated) DEVICE — DRAIN SURG 24FR L5/16IN DIA8MM SIL RND HUBLESS FULL FLUT

## (undated) DEVICE — SOL ANTI-FOG 6ML MEDC -- MEDICHOICE - CONVERT TO 358427

## (undated) DEVICE — STERILE POLYISOPRENE POWDER-FREE SURGICAL GLOVES WITH EMOLLIENT COATING: Brand: PROTEXIS

## (undated) DEVICE — 1200 GUARD II KIT W/5MM TUBE W/O VAC TUBE: Brand: GUARDIAN

## (undated) DEVICE — DEVON™ KNEE AND BODY STRAP 60" X 3" (1.5 M X 7.6 CM): Brand: DEVON

## (undated) DEVICE — SUPPORT MAMM SURG 48-50 IN 2XL BRA

## (undated) DEVICE — AORTIC PERFUSION CANNULA: Brand: EDWARDS LIFESCIENCES AORTIC PERFUSION CANNULA

## (undated) DEVICE — INFECTION CONTROL KIT SYS

## (undated) DEVICE — SOLUTION IV 500ML 0.9% SOD CHL FLX CONT

## (undated) DEVICE — SYS VSL HARV HEMOPRO2 VASOVIEW -- HARV SYS MINIMUM ORDER 5/EA

## (undated) DEVICE — HOOK LOCK LATEX FREE ELASTIC BANDAGE D/L 6INX10YD

## (undated) DEVICE — GLOVE SURG SZ 7.5 L11.2IN THK9.8MIL STRW LTX POLYMER BEAD

## (undated) DEVICE — SUTURE PROL SZ 5-0 L30IN NONABSORBABLE BLU L13MM RB-2 1/2 8710H

## (undated) DEVICE — SOLUTION IV 1000ML 0.9% SOD CHL

## (undated) DEVICE — BAG RED 3PLY 2MIL 30X40 IN

## (undated) DEVICE — SUTURE SZ 7 L18IN NONABSORBABLE SIL CCS L48MM 1/2 CIR STRNM M655G

## (undated) DEVICE — CARD SMRT DISP TRANSIT TIME MEDISTEM VERIQ

## (undated) DEVICE — KIT BLWR MISTER 5P 15L W/ TBNG SET IRRIG MIST TO IMPROVE

## (undated) DEVICE — SOLUTION IRRIG 1000ML H2O STRL BLT

## (undated) DEVICE — BLADE OPHTH 180DEG CUT SURF BLU STR SHRP DBL BVL GRINDLESS

## (undated) DEVICE — GAUZE SPONGES,12 PLY: Brand: CURITY

## (undated) DEVICE — LEAD PCMKR MYOCARDL BPLR TEMP. --

## (undated) DEVICE — BANDAGE COMPR ELASTIC 5 YDX6 IN

## (undated) DEVICE — DRAPE PRB US TRNSDCR 6X96IN --

## (undated) DEVICE — LIGHT HANDLE: Brand: DEVON

## (undated) DEVICE — SOLUTION IV 1000ML PH 7.4 INJ NRMSOL R

## (undated) DEVICE — DERMABOND SKIN ADH 0.7ML -- DERMABOND ADVANCED 12/BX

## (undated) DEVICE — TUBING INSUFLTN 10FT LUER -- CONVERT TO ITEM 368568

## (undated) DEVICE — INTENDED FOR TISSUE SEPARATION, AND OTHER PROCEDURES THAT REQUIRE A SHARP SURGICAL BLADE TO PUNCTURE OR CUT.: Brand: BARD-PARKER ® CARBON RIB-BACK BLADES

## (undated) DEVICE — INSERT SUT HLD F/OCTBS RETRCTR --

## (undated) DEVICE — PRESSURE MONITORING SET: Brand: TRUWAVE

## (undated) DEVICE — CLIP INT SM WIDE RED TI TRNSVRS GRV CHEVRON SHP W PRECIS

## (undated) DEVICE — SUTURE PROL SZ 8-0 L24IN NONABSORBABLE BLU L6.5MM BV130-5 8732H

## (undated) DEVICE — SUTURE PROL SZ 6-0 L24IN NONABSORBABLE BLU L13MM C-1 3/8 8726H